# Patient Record
Sex: FEMALE | Race: WHITE
[De-identification: names, ages, dates, MRNs, and addresses within clinical notes are randomized per-mention and may not be internally consistent; named-entity substitution may affect disease eponyms.]

---

## 2014-11-13 RX ORDER — TIMOLOL 0.5 %
1 DROPS OPHTHALMIC (EYE)
Qty: 0 | Refills: 0 | DISCHARGE
Start: 2014-11-13

## 2014-11-13 RX ORDER — LATANOPROST 0.05 MG/ML
1 SOLUTION/ DROPS OPHTHALMIC; TOPICAL
Qty: 0 | Refills: 0 | DISCHARGE
Start: 2014-11-13

## 2017-02-07 ENCOUNTER — APPOINTMENT (OUTPATIENT)
Dept: NEUROLOGY | Facility: CLINIC | Age: 82
End: 2017-02-07

## 2017-02-07 VITALS — DIASTOLIC BLOOD PRESSURE: 78 MMHG | SYSTOLIC BLOOD PRESSURE: 124 MMHG | WEIGHT: 111 LBS | BODY MASS INDEX: 19.66 KG/M2

## 2017-02-07 RX ORDER — OXYCODONE AND ACETAMINOPHEN 5; 325 MG/1; MG/1
5-325 TABLET ORAL
Qty: 20 | Refills: 0 | Status: DISCONTINUED | COMMUNITY
Start: 2016-08-31

## 2017-02-07 RX ORDER — LATANOPROST/PF 0.005 %
0.01 DROPS OPHTHALMIC (EYE)
Qty: 25 | Refills: 0 | Status: ACTIVE | COMMUNITY
Start: 2016-09-30

## 2017-02-07 RX ORDER — PANTOPRAZOLE 40 MG/1
40 TABLET, DELAYED RELEASE ORAL
Qty: 60 | Refills: 0 | Status: ACTIVE | COMMUNITY
Start: 2016-09-01

## 2017-02-28 ENCOUNTER — MEDICATION RENEWAL (OUTPATIENT)
Age: 82
End: 2017-02-28

## 2017-03-20 ENCOUNTER — RX RENEWAL (OUTPATIENT)
Age: 82
End: 2017-03-20

## 2017-03-29 ENCOUNTER — EMERGENCY (EMERGENCY)
Facility: HOSPITAL | Age: 82
LOS: 0 days | Discharge: HOME | End: 2017-03-29
Attending: EMERGENCY MEDICINE | Admitting: EMERGENCY MEDICINE
Payer: MEDICARE

## 2017-03-29 VITALS
SYSTOLIC BLOOD PRESSURE: 192 MMHG | HEART RATE: 69 BPM | OXYGEN SATURATION: 99 % | DIASTOLIC BLOOD PRESSURE: 65 MMHG | TEMPERATURE: 98 F | RESPIRATION RATE: 18 BRPM

## 2017-03-29 VITALS — WEIGHT: 125 LBS

## 2017-03-29 DIAGNOSIS — Z98.89 OTHER SPECIFIED POSTPROCEDURAL STATES: Chronic | ICD-10-CM

## 2017-03-29 PROCEDURE — 72100 X-RAY EXAM L-S SPINE 2/3 VWS: CPT | Mod: 26

## 2017-03-29 PROCEDURE — 99283 EMERGENCY DEPT VISIT LOW MDM: CPT

## 2017-03-29 RX ORDER — ACETAMINOPHEN 500 MG
975 TABLET ORAL ONCE
Qty: 0 | Refills: 0 | Status: COMPLETED | OUTPATIENT
Start: 2017-03-29 | End: 2017-03-29

## 2017-03-29 RX ADMIN — Medication 975 MILLIGRAM(S): at 16:25

## 2017-03-29 NOTE — ED STATDOCS - NS ED MD SCRIBE ATTENDING SCRIBE SECTIONS
PHYSICAL EXAM/HISTORY OF PRESENT ILLNESS/RESULTS/PROGRESS NOTE/REVIEW OF SYSTEMS/PAST MEDICAL/SURGICAL/SOCIAL HISTORY/DISPOSITION

## 2017-03-29 NOTE — ED STATDOCS - ATTENDING CONTRIBUTION TO CARE
I, Sachin Santos, performed the initial face to face bedside interview with this patient regarding history of present illness, review of symptoms and relevant past medical, social and family history.  I completed an independent physical examination.  I was the initial provider who evaluated this patient. I have signed out the follow up of any pending tests (i.e. labs, radiological studies) to the ACP.  I have communicated the patient’s plan of care and disposition with the ACP.  The history, relevant review of systems, past medical and surgical history, medical decision making, and physical examination was documented by the scribe in my presence and I attest to the accuracy of the documentation.

## 2017-03-29 NOTE — ED STATDOCS - OBJECTIVE STATEMENT
80 y/o female presents to the ED brought in by home health aide s/p trip and fall yesterday. Pts aide states that she tripped over he robe and fell to the floor, no head trauma, no LOC. Pt is currently c/o lower back pain. Aide states that pt has been ambulatory today. Currently pt has no other complaints and denies chest pain, SOB, abd pain and n/v/d.

## 2017-03-29 NOTE — ED STATDOCS - PROGRESS NOTE DETAILS
81 yr. old female PMH; Alzheimers, Chronic back pain, Stomach Ulcer with Repair of Perforation,  Right Hip Replacement preasents to ED with aide who reports patient fell backward in bedroom last night no apparent head injury or LOC, No other injuries Not taking any blood thinners or aspirin. Seen and examined by attending in Intake . Plan: X-Ray LS spine and Tylenol for pain. Will F/U with results and re evaluate. Monserrat RODRIGUEZ

## 2017-03-29 NOTE — ED ADULT NURSE NOTE - OBJECTIVE STATEMENT
Pain after falling on a flat surface last night. Denies Head injury states she might have tripped on her robe. Intermittent lower back pain that doesn't radiate, worse with movement.

## 2017-03-30 ENCOUNTER — RX RENEWAL (OUTPATIENT)
Age: 82
End: 2017-03-30

## 2017-03-31 ENCOUNTER — INPATIENT (INPATIENT)
Facility: HOSPITAL | Age: 82
LOS: 2 days | Discharge: HOME HEALTH | End: 2017-04-03
Attending: HOSPITALIST | Admitting: HOSPITALIST
Payer: MEDICARE

## 2017-03-31 VITALS
DIASTOLIC BLOOD PRESSURE: 77 MMHG | HEART RATE: 73 BPM | TEMPERATURE: 99 F | HEIGHT: 62 IN | SYSTOLIC BLOOD PRESSURE: 191 MMHG | OXYGEN SATURATION: 95 % | WEIGHT: 125 LBS | RESPIRATION RATE: 15 BRPM

## 2017-03-31 DIAGNOSIS — Z98.89 OTHER SPECIFIED POSTPROCEDURAL STATES: Chronic | ICD-10-CM

## 2017-03-31 DIAGNOSIS — M54.9 DORSALGIA, UNSPECIFIED: ICD-10-CM

## 2017-03-31 DIAGNOSIS — E78.00 PURE HYPERCHOLESTEROLEMIA, UNSPECIFIED: ICD-10-CM

## 2017-03-31 DIAGNOSIS — Z04.1 ENCOUNTER FOR EXAMINATION AND OBSERVATION FOLLOWING TRANSPORT ACCIDENT: ICD-10-CM

## 2017-03-31 DIAGNOSIS — G30.9 ALZHEIMER'S DISEASE, UNSPECIFIED: ICD-10-CM

## 2017-03-31 LAB
ANION GAP SERPL CALC-SCNC: 8 MMOL/L — SIGNIFICANT CHANGE UP (ref 5–17)
APTT BLD: 29.5 SEC — SIGNIFICANT CHANGE UP (ref 27.5–37.4)
BASOPHILS # BLD AUTO: 0.1 K/UL — SIGNIFICANT CHANGE UP (ref 0–0.2)
BASOPHILS NFR BLD AUTO: 1.1 % — SIGNIFICANT CHANGE UP (ref 0–2)
BLD GP AB SCN SERPL QL: SIGNIFICANT CHANGE UP
BUN SERPL-MCNC: 9 MG/DL — SIGNIFICANT CHANGE UP (ref 7–23)
CALCIUM SERPL-MCNC: 9.5 MG/DL — SIGNIFICANT CHANGE UP (ref 8.5–10.1)
CHLORIDE SERPL-SCNC: 103 MMOL/L — SIGNIFICANT CHANGE UP (ref 96–108)
CO2 SERPL-SCNC: 25 MMOL/L — SIGNIFICANT CHANGE UP (ref 22–31)
CREAT SERPL-MCNC: 0.68 MG/DL — SIGNIFICANT CHANGE UP (ref 0.5–1.3)
EOSINOPHIL # BLD AUTO: 0.1 K/UL — SIGNIFICANT CHANGE UP (ref 0–0.5)
EOSINOPHIL NFR BLD AUTO: 1.1 % — SIGNIFICANT CHANGE UP (ref 0–6)
GLUCOSE SERPL-MCNC: 128 MG/DL — HIGH (ref 70–99)
HCT VFR BLD CALC: 46.5 % — HIGH (ref 34.5–45)
HGB BLD-MCNC: 16.1 G/DL — HIGH (ref 11.5–15.5)
INR BLD: 1 RATIO — SIGNIFICANT CHANGE UP (ref 0.88–1.16)
LYMPHOCYTES # BLD AUTO: 1.4 K/UL — SIGNIFICANT CHANGE UP (ref 1–3.3)
LYMPHOCYTES # BLD AUTO: 13.9 % — SIGNIFICANT CHANGE UP (ref 13–44)
MCHC RBC-ENTMCNC: 34.6 GM/DL — SIGNIFICANT CHANGE UP (ref 32–36)
MCHC RBC-ENTMCNC: 35 PG — HIGH (ref 27–34)
MCV RBC AUTO: 101.2 FL — HIGH (ref 80–100)
MONOCYTES # BLD AUTO: 0.8 K/UL — SIGNIFICANT CHANGE UP (ref 0–0.9)
MONOCYTES NFR BLD AUTO: 8.6 % — SIGNIFICANT CHANGE UP (ref 2–14)
NEUTROPHILS # BLD AUTO: 7.4 K/UL — SIGNIFICANT CHANGE UP (ref 1.8–7.4)
NEUTROPHILS NFR BLD AUTO: 75.3 % — SIGNIFICANT CHANGE UP (ref 43–77)
PLATELET # BLD AUTO: 196 K/UL — SIGNIFICANT CHANGE UP (ref 150–400)
POTASSIUM SERPL-MCNC: 4.2 MMOL/L — SIGNIFICANT CHANGE UP (ref 3.5–5.3)
POTASSIUM SERPL-SCNC: 4.2 MMOL/L — SIGNIFICANT CHANGE UP (ref 3.5–5.3)
PROTHROM AB SERPL-ACNC: 10.8 SEC — SIGNIFICANT CHANGE UP (ref 9.8–12.7)
RBC # BLD: 4.59 M/UL — SIGNIFICANT CHANGE UP (ref 3.8–5.2)
RBC # FLD: 11.9 % — SIGNIFICANT CHANGE UP (ref 10.3–14.5)
SODIUM SERPL-SCNC: 136 MMOL/L — SIGNIFICANT CHANGE UP (ref 135–145)
TYPE + AB SCN PNL BLD: SIGNIFICANT CHANGE UP
WBC # BLD: 9.8 K/UL — SIGNIFICANT CHANGE UP (ref 3.8–10.5)
WBC # FLD AUTO: 9.8 K/UL — SIGNIFICANT CHANGE UP (ref 3.8–10.5)

## 2017-03-31 PROCEDURE — 72131 CT LUMBAR SPINE W/O DYE: CPT | Mod: 26

## 2017-03-31 PROCEDURE — 99285 EMERGENCY DEPT VISIT HI MDM: CPT

## 2017-03-31 PROCEDURE — 72128 CT CHEST SPINE W/O DYE: CPT | Mod: 26

## 2017-03-31 RX ORDER — ENOXAPARIN SODIUM 100 MG/ML
40 INJECTION SUBCUTANEOUS EVERY 24 HOURS
Qty: 0 | Refills: 0 | Status: DISCONTINUED | OUTPATIENT
Start: 2017-03-31 | End: 2017-04-03

## 2017-03-31 RX ORDER — DONEPEZIL HYDROCHLORIDE 10 MG/1
10 TABLET, FILM COATED ORAL AT BEDTIME
Qty: 0 | Refills: 0 | Status: DISCONTINUED | OUTPATIENT
Start: 2017-03-31 | End: 2017-04-03

## 2017-03-31 RX ORDER — LATANOPROST 0.05 MG/ML
1 SOLUTION/ DROPS OPHTHALMIC; TOPICAL AT BEDTIME
Qty: 0 | Refills: 0 | Status: DISCONTINUED | OUTPATIENT
Start: 2017-03-31 | End: 2017-04-03

## 2017-03-31 RX ORDER — ONDANSETRON 8 MG/1
4 TABLET, FILM COATED ORAL ONCE
Qty: 0 | Refills: 0 | Status: COMPLETED | OUTPATIENT
Start: 2017-03-31 | End: 2017-03-31

## 2017-03-31 RX ORDER — ONDANSETRON 8 MG/1
4 TABLET, FILM COATED ORAL EVERY 6 HOURS
Qty: 0 | Refills: 0 | Status: DISCONTINUED | OUTPATIENT
Start: 2017-03-31 | End: 2017-04-03

## 2017-03-31 RX ORDER — MEMANTINE HYDROCHLORIDE 10 MG/1
10 TABLET ORAL
Qty: 0 | Refills: 0 | Status: DISCONTINUED | OUTPATIENT
Start: 2017-03-31 | End: 2017-04-03

## 2017-03-31 RX ORDER — OXYCODONE HYDROCHLORIDE 5 MG/1
5 TABLET ORAL EVERY 4 HOURS
Qty: 0 | Refills: 0 | Status: DISCONTINUED | OUTPATIENT
Start: 2017-03-31 | End: 2017-04-03

## 2017-03-31 RX ORDER — HYDROMORPHONE HYDROCHLORIDE 2 MG/ML
0.5 INJECTION INTRAMUSCULAR; INTRAVENOUS; SUBCUTANEOUS ONCE
Qty: 0 | Refills: 0 | Status: DISCONTINUED | OUTPATIENT
Start: 2017-03-31 | End: 2017-03-31

## 2017-03-31 RX ORDER — OXYCODONE HYDROCHLORIDE 5 MG/1
10 TABLET ORAL EVERY 4 HOURS
Qty: 0 | Refills: 0 | Status: DISCONTINUED | OUTPATIENT
Start: 2017-03-31 | End: 2017-04-03

## 2017-03-31 RX ORDER — ACETAMINOPHEN 500 MG
650 TABLET ORAL EVERY 6 HOURS
Qty: 0 | Refills: 0 | Status: DISCONTINUED | OUTPATIENT
Start: 2017-03-31 | End: 2017-04-03

## 2017-03-31 RX ORDER — DOCUSATE SODIUM 100 MG
100 CAPSULE ORAL THREE TIMES A DAY
Qty: 0 | Refills: 0 | Status: DISCONTINUED | OUTPATIENT
Start: 2017-03-31 | End: 2017-04-03

## 2017-03-31 RX ORDER — LIDOCAINE 4 G/100G
1 CREAM TOPICAL DAILY
Qty: 0 | Refills: 0 | Status: DISCONTINUED | OUTPATIENT
Start: 2017-03-31 | End: 2017-04-03

## 2017-03-31 RX ORDER — SIMVASTATIN 20 MG/1
20 TABLET, FILM COATED ORAL AT BEDTIME
Qty: 0 | Refills: 0 | Status: DISCONTINUED | OUTPATIENT
Start: 2017-03-31 | End: 2017-04-03

## 2017-03-31 RX ORDER — TIMOLOL 0.5 %
1 DROPS OPHTHALMIC (EYE)
Qty: 0 | Refills: 0 | Status: DISCONTINUED | OUTPATIENT
Start: 2017-03-31 | End: 2017-04-03

## 2017-03-31 RX ORDER — CITALOPRAM 10 MG/1
20 TABLET, FILM COATED ORAL DAILY
Qty: 0 | Refills: 0 | Status: DISCONTINUED | OUTPATIENT
Start: 2017-03-31 | End: 2017-04-03

## 2017-03-31 RX ADMIN — LATANOPROST 1 DROP(S): 0.05 SOLUTION/ DROPS OPHTHALMIC; TOPICAL at 22:08

## 2017-03-31 RX ADMIN — SIMVASTATIN 20 MILLIGRAM(S): 20 TABLET, FILM COATED ORAL at 22:09

## 2017-03-31 RX ADMIN — LIDOCAINE 1 PATCH: 4 CREAM TOPICAL at 18:44

## 2017-03-31 RX ADMIN — OXYCODONE HYDROCHLORIDE 10 MILLIGRAM(S): 5 TABLET ORAL at 17:27

## 2017-03-31 RX ADMIN — Medication 1 TABLET(S): at 15:01

## 2017-03-31 RX ADMIN — OXYCODONE HYDROCHLORIDE 10 MILLIGRAM(S): 5 TABLET ORAL at 22:39

## 2017-03-31 RX ADMIN — OXYCODONE HYDROCHLORIDE 10 MILLIGRAM(S): 5 TABLET ORAL at 15:03

## 2017-03-31 RX ADMIN — OXYCODONE HYDROCHLORIDE 10 MILLIGRAM(S): 5 TABLET ORAL at 22:09

## 2017-03-31 RX ADMIN — Medication 100 MILLIGRAM(S): at 22:08

## 2017-03-31 RX ADMIN — ONDANSETRON 4 MILLIGRAM(S): 8 TABLET, FILM COATED ORAL at 10:14

## 2017-03-31 RX ADMIN — Medication 1 DROP(S): at 17:18

## 2017-03-31 RX ADMIN — DONEPEZIL HYDROCHLORIDE 10 MILLIGRAM(S): 10 TABLET, FILM COATED ORAL at 22:09

## 2017-03-31 RX ADMIN — Medication 650 MILLIGRAM(S): at 15:02

## 2017-03-31 RX ADMIN — MEMANTINE HYDROCHLORIDE 10 MILLIGRAM(S): 10 TABLET ORAL at 17:18

## 2017-03-31 RX ADMIN — ENOXAPARIN SODIUM 40 MILLIGRAM(S): 100 INJECTION SUBCUTANEOUS at 15:05

## 2017-03-31 RX ADMIN — Medication 650 MILLIGRAM(S): at 17:25

## 2017-03-31 RX ADMIN — HYDROMORPHONE HYDROCHLORIDE 0.5 MILLIGRAM(S): 2 INJECTION INTRAMUSCULAR; INTRAVENOUS; SUBCUTANEOUS at 10:14

## 2017-03-31 RX ADMIN — CITALOPRAM 20 MILLIGRAM(S): 10 TABLET, FILM COATED ORAL at 17:18

## 2017-03-31 NOTE — H&P ADULT - ASSESSMENT
#S/p mechanical fall with new compression fractures T9 and S3  Admit to 2 [place]  Spine eval DR Shannon/DR العراقي  Additional imaging as per spine  Pain meds prn by oxycodone  PT if OK with spine    #H/o severe spinal canal stenosis/previous compression fractures  Pain meds    #HTN, stable    #Mild dementia  Cont current meds    #Dispo- inpatient admit

## 2017-03-31 NOTE — H&P ADULT - NSHPPHYSICALEXAM_GEN_ALL_CORE
Vital Signs Last 24 Hrs  T(C): 36.4, Max: 37.3 (03-31 @ 08:31)  T(F): 97.6, Max: 99.1 (03-31 @ 08:31)  HR: 79 (71 - 79)  BP: 122/57 (122/57 - 197/79)  BP(mean): --  RR: 16 (15 - 16)  SpO2: 92% (92% - 95%)

## 2017-03-31 NOTE — ED PROVIDER NOTE - OBJECTIVE STATEMENT
80 y/o F with a PMHx of chronic back pain, Alzheimer's disease presents to the ED c/o lower back pain that has been worsening over the past few days. Pt daughter at bedside states that she was recently here s/p mechanical fall. Pt daughter states that she has been having difficulty ambulating on her own and has been tx her with Percocet that was prescribed to her after fall. Pt states that the pain med have been providing little to no alleviation. Pt currently calm and denies extremity weakness, numbness, CP, abd pain, SOB, fever, cough, chills or any other acute c/o at this time.

## 2017-03-31 NOTE — H&P ADULT - NSHPLABSRESULTS_GEN_ALL_CORE
16.1   9.8   )-----------( 196      ( 31 Mar 2017 10:06 )             46.5     31 Mar 2017 11:06    136    |  103    |  9      ----------------------------<  128    4.2     |  25     |  0.68     Ca    9.5        31 Mar 2017 11:06    PT/INR - ( 31 Mar 2017 11:06 )   PT: 10.8 sec;   INR: 1.00 ratio       PTT - ( 31 Mar 2017 11:06 )  PTT:29.5 sec  CAPILLARY BLOOD GLUCOSE  EXAM:  LUMBAR SPINE                            PROCEDURE DATE:  03/29/2017        INTERPRETATION:  Clinical information: Fall    AP, lateral, coned-down lateral views of the lumbosacral spine    COMPARISON: Report from MRI lumbar spine March 09, 2016    FINDINGS: There is generalized bony demineralization. A severe wedge   compression deformity at T12 is new and a moderate L1 compression   deformity is significantly worsened. Mild L5 compression deformity is   unchanged. Mild anterolisthesis ofL4 and L5. Severe diffuse bony   demineralization.    IMPRESSION: As compared with March 09, 2016, there is new severe T12   wedge compression deformity and worsening of an L1 compression deformity.      EXAM:  CT LUMBAR SPINE                            PROCEDURE DATE:  03/31/2017        INTERPRETATION:  History: Status post fall. Upper and lower back pain.   Rule out fracture.    Description: Noncontrast CT studies of the thoracic and lumbar spinewere   performed.    Axial images with coronal and sagittal reformatted series were obtained.    Comparison is made to the lumbar spine x-ray series from 03/29/2017,   spinal region of the abdomen CT study from 07/04/2016, and prior lumbar   spine MRI study 03/09/2016.    A moderate compression fracture deformity involves the L1 vertebral body,   stable compared to the 03/29/2017 x-ray series, and increased compared to   the 07/04/2016 abdomen CT study.    A severe compression fracture deformity involves the T12 vertebral body   with associated kyphotic deformity (gibbus deformity). This is stable   compared to the 03/29/2017 x-ray studies, and mildly increased compared   to the 07/04/2016 abdomen CT study. Mild bowing of bone into the spinal  canal is present with associated mild bony canal stenosis.    A moderate to severe compression fracture deformity involves the T9   vertebral body. This is not fully covered on the x-ray series from   03/29/2017, and is new compared to the 07/04/2016 abdomen CT examination.   Mild bowing of bone posteriorly to the spinal canal is present with   associated mild bony canal stenosis.    A nondisplaced fracture involves the anterior aspect of the S3 vertebra,   new compared to the 07/04/2016 examination. This is not well appreciated   on the x-ray series from 03/29/2017.    Generalized osteopenia is noted. Consider correlation with bone   densitometry for quantification if clinically warranted.    Severe disc space narrowing is present at the L5-S1 level with associated   vacuum change.    Multilevel mild subluxation is noted in the lumbar spine, unchanged.    In the lumbar spine, disc bulges, facet degenerative changes, and   ligamentum flavum hypertrophy result in multilevel spinal canal stenosis   and neural foraminal narrowing, the overall degree of which is not well   demonstrated on this study.    Please note that evaluation of the central spinal canal, thoracic spinal   cord, and cauda equina is limited with noncontrast CT technique.    IMPRESSION:    A moderate compression fracture deformity involves the L1 vertebral body,   stable compared to the 03/29/2017 x-ray series, and increased compared to   the 07/04/2016 abdomen CT study.

## 2017-03-31 NOTE — ED PROVIDER NOTE - DETAILS:
I, Wily Bermudez, performed the initial face to face bedside interview with this patient regarding history of present illness, review of symptoms and relevant past medical, social and family history.  I completed an independent physical examination.  I was the initial provider who evaluated this patient.  The history, relevant review of systems, past medical and surgical history, medical decision making, and physical examination was documented by the scribe in my presence and I attest to the accuracy of the documentation.

## 2017-03-31 NOTE — PATIENT PROFILE ADULT. - VISION (WITH CORRECTIVE LENSES IF THE PATIENT USUALLY WEARS THEM):
utilizes glasses/Normal vision: sees adequately in most situations; can see medication labels, newsprint

## 2017-03-31 NOTE — CONSULT NOTE ADULT - ASSESSMENT
#S/p mechanical fall with new compression fractures T9 and S3  Admit to 2 [place]  Spine eval DR Shannon/DR العراقي  Additional imaging as per spine  Pain meds prn by oxycodone  PT if OK with spine    #H/o severe spinal canal stenosis/previous compression fractures  Pain meds    #HTN, stable    #Mild dementia  Cont current meds    #Dispo- inpatient admit #S/p mechanical fall with new compression fractures T9 and S3  Admit to 2 [place]  Pain meds prn by oxycodone  PT     #H/o severe spinal canal stenosis/previous compression fractures  Pain meds    #HTN, stable    #Mild dementia  Cont current meds    #Dispo- inpatient admit

## 2017-03-31 NOTE — ED PROVIDER NOTE - NS ED MD SCRIBE ATTENDING SCRIBE SECTIONS
HISTORY OF PRESENT ILLNESS/PROGRESS NOTE/PAST MEDICAL/SURGICAL/SOCIAL HISTORY/REVIEW OF SYSTEMS/DISPOSITION/PHYSICAL EXAM/RESULTS

## 2017-03-31 NOTE — ED ADULT NURSE NOTE - OBJECTIVE STATEMENT
pt fell on tuesday, since then she complains of progressively worse lower back pain, worse with activity, pt is hypertensive with no known history of HTN

## 2017-03-31 NOTE — H&P ADULT - HISTORY OF PRESENT ILLNESS
82yo/F with PMH HTN, mild dementia, depression, spinal stenosis presented with severe low back pain and difficulty ambulating. Patient slipped and fell 2 days ago, since then her pain has been getting worse and her ability to walk was impaired so daughter initially brought her in to ED 2 days ago, pt was discharged. Pain got worse and she came back

## 2017-03-31 NOTE — ED PROVIDER NOTE - PSH
S/P appendectomy  childhood  S/P colonoscopy  every 2 or 3 years  S/P D&C (status post dilation and curettage)  age 32  S/P tonsillectomy  childhood

## 2017-03-31 NOTE — CONSULT NOTE ADULT - SUBJECTIVE AND OBJECTIVE BOX
82yo/F with PMH HTN, mild dementia, depression, spinal stenosis presented with severe low back pain and difficulty ambulating. Patient slipped and fell 2 days ago, since then her pain has been getting worse and her ability to walk was impaired so daughter initially brought her in to ED 2 days ago, pt was discharged. Pain got worse and she came back      Remaining H&P reviewed-Full note dictated      PE: Patient states she has sacral pain without thoracic back pain  Answers some questions appropriately but poor STM  Tender over sacrum.   No thoracic or lumbar tenderness.  No T-L BP with log rolling  Negative SLR bilaterally  No focal motor weakness LEs.    CT T & LS spine with compression deformities T9-new since CT A&P in July, 2016, T12 with marked collapse since July 2016, L1 with slight worsening since July 2016 and new anterior cortex buckle S3-incompleted visualized on present CT and new since July 2016. (+) lysis B/L L5 with Gr1 spondylolisthesis L4/5, DDD L5/S1 with vacuum disc-stable since July 2016.    Imp:  Mechanical fall with sacral pain with possible ND S3 fracture  Chronic appearing compression deformities T9, T12, L1.  Lytic spondylolisthesis L4/5  DDD L5/S1    Plan:  Hold MRIs  Add Lidoderm patch to sacrum  Continue analgesia  Start PT in AM to determine function level

## 2017-03-31 NOTE — ED PROVIDER NOTE - CONSTITUTIONAL, MLM
normal... Well appearing, well nourished elderly female, awake, alert, oriented to person, place, time/situation and in no apparent distress.

## 2017-04-01 RX ADMIN — LIDOCAINE 1 PATCH: 4 CREAM TOPICAL at 11:29

## 2017-04-01 RX ADMIN — OXYCODONE HYDROCHLORIDE 10 MILLIGRAM(S): 5 TABLET ORAL at 11:30

## 2017-04-01 RX ADMIN — OXYCODONE HYDROCHLORIDE 10 MILLIGRAM(S): 5 TABLET ORAL at 06:34

## 2017-04-01 RX ADMIN — DONEPEZIL HYDROCHLORIDE 10 MILLIGRAM(S): 10 TABLET, FILM COATED ORAL at 21:50

## 2017-04-01 RX ADMIN — Medication 100 MILLIGRAM(S): at 21:50

## 2017-04-01 RX ADMIN — SIMVASTATIN 20 MILLIGRAM(S): 20 TABLET, FILM COATED ORAL at 21:50

## 2017-04-01 RX ADMIN — OXYCODONE HYDROCHLORIDE 10 MILLIGRAM(S): 5 TABLET ORAL at 10:51

## 2017-04-01 RX ADMIN — Medication 100 MILLIGRAM(S): at 14:22

## 2017-04-01 RX ADMIN — LIDOCAINE 1 PATCH: 4 CREAM TOPICAL at 06:35

## 2017-04-01 RX ADMIN — MEMANTINE HYDROCHLORIDE 10 MILLIGRAM(S): 10 TABLET ORAL at 06:34

## 2017-04-01 RX ADMIN — MEMANTINE HYDROCHLORIDE 10 MILLIGRAM(S): 10 TABLET ORAL at 18:03

## 2017-04-01 RX ADMIN — CITALOPRAM 20 MILLIGRAM(S): 10 TABLET, FILM COATED ORAL at 11:26

## 2017-04-01 RX ADMIN — OXYCODONE HYDROCHLORIDE 10 MILLIGRAM(S): 5 TABLET ORAL at 06:52

## 2017-04-01 RX ADMIN — LATANOPROST 1 DROP(S): 0.05 SOLUTION/ DROPS OPHTHALMIC; TOPICAL at 21:51

## 2017-04-01 RX ADMIN — Medication 1 DROP(S): at 18:02

## 2017-04-01 RX ADMIN — Medication 1 DROP(S): at 06:36

## 2017-04-01 RX ADMIN — ENOXAPARIN SODIUM 40 MILLIGRAM(S): 100 INJECTION SUBCUTANEOUS at 14:22

## 2017-04-01 RX ADMIN — OXYCODONE HYDROCHLORIDE 10 MILLIGRAM(S): 5 TABLET ORAL at 20:14

## 2017-04-01 RX ADMIN — OXYCODONE HYDROCHLORIDE 10 MILLIGRAM(S): 5 TABLET ORAL at 21:00

## 2017-04-01 RX ADMIN — Medication 1 TABLET(S): at 11:26

## 2017-04-01 NOTE — PHYSICAL THERAPY INITIAL EVALUATION ADULT - GROSSLY INTACT, SENSORY
Grossly Intact
no syncope/no vomiting/no nausea/no shortness of breath/no dizziness/no chills/no diaphoresis/no cough/no back pain/no fever

## 2017-04-01 NOTE — PROGRESS NOTE ADULT - ASSESSMENT
fall with sacral pain-possible acute ND fracture S3  chronic compression fractures T9, T12, L1  Degenerative spondylolisthesis L4/5  DDD L5/S1    Plan:  Continue mild analgesia.  Lidoderm patch  Start OOB/PT

## 2017-04-01 NOTE — PHYSICAL THERAPY INITIAL EVALUATION ADULT - CRITERIA FOR SKILLED THERAPEUTIC INTERVENTIONS
predicted duration of therapy intervention/anticipated discharge recommendation/rehab potential/risk reduction/prevention/therapy frequency/functional limitations in following categories/impairments found/anticipated equipment needs at discharge

## 2017-04-01 NOTE — PHYSICAL THERAPY INITIAL EVALUATION ADULT - ADDITIONAL COMMENTS
PLOF provided by pt, ? historian. Pt lives in 2 level [place] w/ 2-3 [facility] w/ rail. Indep amb w/o AD. Has shower chair, carter

## 2017-04-01 NOTE — PROGRESS NOTE ADULT - SUBJECTIVE AND OBJECTIVE BOX
YOSEF DESAI    Patient is a 81y old  Female who presents with a chief complaint of fall, back pain (31 Mar 2017 14:25)      HPI:  82yo/F with PMH HTN, mild dementia, depression, spinal stenosis presented with severe low back pain and difficulty ambulating. Patient slipped and fell 2 days ago, since then her pain has been getting worse and her ability to walk was impaired so daughter initially brought her in to ED 2 days ago, pt was discharged. Pain got worse and she came back (31 Mar 2017 14:25)    4/1/17 Patient appears coherent-states she has low back pain-manageable at present.    MEDICATIONS  (STANDING):  citalopram 20milliGRAM(s) Oral daily  simvastatin 20milliGRAM(s) Oral at bedtime  donepezil 10milliGRAM(s) Oral at bedtime  docusate sodium 100milliGRAM(s) Oral three times a day  memantine 10milliGRAM(s) Oral two times a day  latanoprost 0.005% Ophthalmic Solution 1Drop(s) Both EYES at bedtime  timolol 0.5% Solution 1Drop(s) Both EYES two times a day  enoxaparin Injectable 40milliGRAM(s) SubCutaneous every 24 hours  multivitamin 1Tablet(s) Oral daily  lidocaine   Patch 1Patch Transdermal daily      Allergies    No Known Allergies    Intolerances    Vital Signs Last 24 Hrs  T(C): 36.8, Max: 36.9 (03-31 @ 23:17)  T(F): 98.3, Max: 98.5 (03-31 @ 23:17)  HR: 72 (65 - 84)  BP: 145/60 (122/57 - 197/79)  BP(mean): --  RR: 18 (16 - 18)  SpO2: 95% (92% - 98%)    I&O's Summary      PHYSICAL EXAM:  Awake and alert, sitting upright in bed eating. States low back pain without thoracic pain  Tolerating diet  Mild lumbar pain with change in position  Neuro without deficits.                          16.1   9.8   )-----------( 196      ( 31 Mar 2017 10:06 )             46.5       31 Mar 2017 11:06    136    |  103    |  9      ----------------------------<  128    4.2     |  25     |  0.68     Ca    9.5        31 Mar 2017 11:06

## 2017-04-01 NOTE — PROGRESS NOTE ADULT - SUBJECTIVE AND OBJECTIVE BOX
PCP: DR Hughes    CC- Patient is a 81y old  Female who presents with a chief complaint of fall, back pain (31 Mar 2017 14:25)    HPI:  80yo/F with PMH HTN, mild dementia, depression, spinal stenosis presented with severe low back pain and difficulty ambulating. Patient slipped and fell 2 days ago, since then her pain has been getting worse and her ability to walk was impaired so daughter initially brought her in to ED 2 days ago, pt was discharged. Pain got worse and she came back (31 Mar 2017 14:25)    4/1/17- doing good, was up with PT, pain is managable    T(C): 36.8, Max: 36.9 (03-31 @ 23:17)  HR: 72 (65 - 84)  BP: 145/60 (131/53 - 159/62)  RR: 18 (16 - 18)  SpO2: 95% (95% - 98%)  Wt(kg): --    LABS:                        16.1   9.8   )-----------( 196      ( 31 Mar 2017 10:06 )             46.5     31 Mar 2017 11:06    136    |  103    |  9      ----------------------------<  128    4.2     |  25     |  0.68     Ca    9.5        31 Mar 2017 11:06    PT/INR - ( 31 Mar 2017 11:06 )   PT: 10.8 sec;   INR: 1.00 ratio    PTT - ( 31 Mar 2017 11:06 )  PTT:29.5 sec    RADIOLOGY & ADDITIONAL TESTS:    EXAM:  CT LUMBAR SPINE                        PROCEDURE DATE:  03/31/2017        INTERPRETATION:  History: Status post fall. Upper and lower back pain.   Rule out fracture.    Description: Noncontrast CT studies of the thoracic and lumbar spinewere   performed.    Axial images with coronal and sagittal reformatted series were obtained.    Comparison is made to the lumbar spine x-ray series from 03/29/2017,   spinal region of the abdomen CT study from 07/04/2016, and prior lumbar   spine MRI study 03/09/2016.    A moderate compression fracture deformity involves the L1 vertebral body,   stable compared to the 03/29/2017 x-ray series, and increased compared to   the 07/04/2016 abdomen CT study.    A severe compression fracture deformity involves the T12 vertebral body   with associated kyphotic deformity (gibbus deformity). This is stable   compared to the 03/29/2017 x-ray studies, and mildly increased compared   to the 07/04/2016 abdomen CT study. Mild bowing of bone into the spinal  canal is present with associated mild bony canal stenosis.    A moderate to severe compression fracture deformity involves the T9   vertebral body. This is not fully covered on the x-ray series from   03/29/2017, and is new compared to the 07/04/2016 abdomen CT examination.   Mild bowing of bone posteriorly to the spinal canal is present with   associated mild bony canal stenosis.    A nondisplaced fracture involves the anterior aspect of the S3 vertebra,   new compared to the 07/04/2016 examination. This is not well appreciated   on the x-ray series from 03/29/2017.    Generalized osteopenia is noted. Consider correlation with bone   densitometry for quantification if clinically warranted.    Severe disc space narrowing is present at the L5-S1 level with associated   vacuum change.    Multilevel mild subluxation is noted in the lumbar spine, unchanged.    In the lumbar spine, disc bulges, facet degenerative changes, and   ligamentum flavum hypertrophy result in multilevel spinal canal stenosis   and neural foraminal narrowing, the overall degree of which is not well   demonstrated on this study.    Please note that evaluation of the central spinal canal, thoracic spinal   cord, and cauda equina is limited with noncontrast CT technique.    IMPRESSION:    A moderate compression fracture deformity involves the L1 vertebral body,   stable compared to the 03/29/2017 x-ray series, and increased compared to   the 07/04/2016 abdomen CT study.    A severe compression fracture deformity involves the T12 vertebral body   with associated kyphotic deformity (gibbus deformity). This is stable   compared to the 03/29/2017 x-ray studies, and mildly increased compared   to the 07/04/2016 abdomen CT study.    A moderate to severe compressionfracture deformity involves the T9   vertebral body. This is not fully covered on the x-ray series from   03/29/2017, and is new compared to the 07/04/2016 abdomen CT examination.     A nondisplaced fracture involves the anterior aspect of the S3 vertebra,   new compared to the 07/04/2016 examination.     EXAM:  LUMBAR SPINE                          PROCEDURE DATE:  03/29/2017    INTERPRETATION:  Clinical information: Fall    AP, lateral, coned-down lateral views of the lumbosacral spine    COMPARISON: Report from MRI lumbar spine March 09, 2016    FINDINGS: There is generalized bony demineralization. A severe wedge   compression deformity at T12 is new and a moderate L1 compression   deformity is significantly worsened. Mild L5 compression deformity is   unchanged. Mild anterolisthesis ofL4 and L5. Severe diffuse bony   demineralization.    IMPRESSION: As compared with March 09, 2016, there is new severe T12   wedge compression deformity and worsening of an L1 compression deformity.    PHYSICAL EXAM:    GENERAL: NAD, well-groomed, well-developed  HEAD:  Atraumatic, Normocephalic  EYES: EOMI, PERRLA, conjunctiva and sclera clear  HEENT: Moist mucous membranes  NECK: Supple, No JVD  NERVOUS SYSTEM:  Awake, confused, Motor Strength 5/5 B/L upper and lower extremities; DTRs 2+ intact and symmetric  CHEST/LUNG: Clear to auscultation bilaterally; No rales, rhonchi, wheezing, or rubs  HEART: Regular rate and rhythm; No murmurs, rubs, or gallops  ABDOMEN: Soft, Nontender, Nondistended; Bowel sounds present  GENITOURINARY- Voiding, no palpable bladder  EXTREMITIES:  2+ Peripheral Pulses, No clubbing, cyanosis, or edema  MUSCULOSKELTAL- No muscle tenderness, Muscle tone normal, No joint tenderness, no Joint swelling, Joint range of motion-normal    MEDS:  citalopram 20milliGRAM(s) Oral daily  simvastatin 20milliGRAM(s) Oral at bedtime  donepezil 10milliGRAM(s) Oral at bedtime  docusate sodium 100milliGRAM(s) Oral three times a day  memantine 10milliGRAM(s) Oral two times a day  latanoprost 0.005% Ophthalmic Solution 1Drop(s) Both EYES at bedtime  timolol 0.5% Solution 1Drop(s) Both EYES two times a day  enoxaparin Injectable 40milliGRAM(s) SubCutaneous every 24 hours  acetaminophen   Tablet 650milliGRAM(s) Oral every 6 hours PRN  acetaminophen   Tablet. 650milliGRAM(s) Oral every 6 hours PRN  ondansetron Injectable 4milliGRAM(s) IV Push every 6 hours PRN  aluminum hydroxide/magnesium hydroxide/simethicone Suspension 30milliLiter(s) Oral every 4 hours PRN  multivitamin 1Tablet(s) Oral daily  oxyCODONE IR 5milliGRAM(s) Oral every 4 hours PRN  oxyCODONE IR 10milliGRAM(s) Oral every 4 hours PRN  lidocaine   Patch 1Patch Transdermal daily    Assessment/Plan    #S/p mechanical fall with new compression fractures T9 and S3  Spine eval DR Shannon/DR العراقي appreciated. No surgical intervention planned  Pain is OK on Lidoderm and PO Oxycodone  Daughter wants home PT upon discharge  SW eval for discharge planning/arrangements    #H/o severe spinal canal stenosis/previous compression fractures  Pain meds    #HTN, stable    #Mild dementia  Cont current meds    #Dispo- cont PT, SW eval for DC needs

## 2017-04-01 NOTE — PHYSICAL THERAPY INITIAL EVALUATION ADULT - PERTINENT HX OF CURRENT PROBLEM, REHAB EVAL
Pt w/ severe low back pain and difficulty ambulating. Patient slipped and fell 2 days ago. CT: mod compression fx deformity involves the L1 vertebral body,severe compression fx deformity involves the T12 vertebral body, mod to severe compression fx deformity involves the T9 vert. body, A ND fx involves the ant aspect of the S3 vertebra-see report, see XR. Per spine PA Start PT in AM to determine function level

## 2017-04-02 DIAGNOSIS — S32.14XA TYPE 1 FRACTURE OF SACRUM, INITIAL ENCOUNTER FOR CLOSED FRACTURE: ICD-10-CM

## 2017-04-02 RX ORDER — LIDOCAINE 4 G/100G
1 CREAM TOPICAL DAILY
Qty: 0 | Refills: 0 | Status: DISCONTINUED | OUTPATIENT
Start: 2017-04-02 | End: 2017-04-03

## 2017-04-02 RX ADMIN — MEMANTINE HYDROCHLORIDE 10 MILLIGRAM(S): 10 TABLET ORAL at 05:57

## 2017-04-02 RX ADMIN — SIMVASTATIN 20 MILLIGRAM(S): 20 TABLET, FILM COATED ORAL at 21:12

## 2017-04-02 RX ADMIN — ENOXAPARIN SODIUM 40 MILLIGRAM(S): 100 INJECTION SUBCUTANEOUS at 14:11

## 2017-04-02 RX ADMIN — OXYCODONE HYDROCHLORIDE 10 MILLIGRAM(S): 5 TABLET ORAL at 19:26

## 2017-04-02 RX ADMIN — Medication 100 MILLIGRAM(S): at 05:57

## 2017-04-02 RX ADMIN — LATANOPROST 1 DROP(S): 0.05 SOLUTION/ DROPS OPHTHALMIC; TOPICAL at 21:12

## 2017-04-02 RX ADMIN — Medication 1 DROP(S): at 17:20

## 2017-04-02 RX ADMIN — OXYCODONE HYDROCHLORIDE 10 MILLIGRAM(S): 5 TABLET ORAL at 19:41

## 2017-04-02 RX ADMIN — LIDOCAINE 1 PATCH: 4 CREAM TOPICAL at 11:11

## 2017-04-02 RX ADMIN — MEMANTINE HYDROCHLORIDE 10 MILLIGRAM(S): 10 TABLET ORAL at 17:51

## 2017-04-02 RX ADMIN — OXYCODONE HYDROCHLORIDE 10 MILLIGRAM(S): 5 TABLET ORAL at 08:28

## 2017-04-02 RX ADMIN — Medication 1 DROP(S): at 05:58

## 2017-04-02 RX ADMIN — OXYCODONE HYDROCHLORIDE 10 MILLIGRAM(S): 5 TABLET ORAL at 14:57

## 2017-04-02 RX ADMIN — OXYCODONE HYDROCHLORIDE 10 MILLIGRAM(S): 5 TABLET ORAL at 02:35

## 2017-04-02 RX ADMIN — Medication 100 MILLIGRAM(S): at 21:12

## 2017-04-02 RX ADMIN — Medication 100 MILLIGRAM(S): at 14:11

## 2017-04-02 RX ADMIN — Medication 1 TABLET(S): at 11:11

## 2017-04-02 RX ADMIN — OXYCODONE HYDROCHLORIDE 10 MILLIGRAM(S): 5 TABLET ORAL at 15:20

## 2017-04-02 RX ADMIN — OXYCODONE HYDROCHLORIDE 10 MILLIGRAM(S): 5 TABLET ORAL at 09:18

## 2017-04-02 RX ADMIN — Medication 650 MILLIGRAM(S): at 22:00

## 2017-04-02 RX ADMIN — CITALOPRAM 20 MILLIGRAM(S): 10 TABLET, FILM COATED ORAL at 11:11

## 2017-04-02 RX ADMIN — DONEPEZIL HYDROCHLORIDE 10 MILLIGRAM(S): 10 TABLET, FILM COATED ORAL at 21:12

## 2017-04-02 NOTE — PROGRESS NOTE ADULT - SUBJECTIVE AND OBJECTIVE BOX
pt still in LBP but not as severe as it has been;   the patch is helping;  ++ tenderness Lower back  NV intact  needs PT   \  A: old fx T9,T12,L1  Degen spondy L45  acute S3 buckle fx    P:  rest   Ice  analgesics  Lidoderm Patch

## 2017-04-02 NOTE — PROGRESS NOTE ADULT - SUBJECTIVE AND OBJECTIVE BOX
PCP: DR Hughes    CC- Patient is a 81y old  Female who presents with a chief complaint of fall, back pain (31 Mar 2017 14:25)    HPI:  82yo/F with PMH HTN, mild dementia, depression, spinal stenosis presented with severe low back pain and difficulty ambulating. Patient slipped and fell 2 days ago, since then her pain has been getting worse and her ability to walk was impaired so daughter initially brought her in to ED 2 days ago, pt was discharged. Pain got worse and she came back (31 Mar 2017 14:25)    4/1/17- doing good, was up with PT, pain is managable  4/2/17 pain is controlled    Vital Signs Last 24 Hrs  T(C): 36.4, Max: 36.8 (04-01 @ 15:56)  T(F): 97.5, Max: 98.2 (04-01 @ 15:56)  HR: 74 (74 - 77)  BP: 145/61 (120/57 - 145/61)  BP(mean): --  RR: 16 (16 - 17)  SpO2: 95% (93% - 95%)    LABS:                        16.1   9.8   )-----------( 196      ( 31 Mar 2017 10:06 )             46.5     31 Mar 2017 11:06    136    |  103    |  9      ----------------------------<  128    4.2     |  25     |  0.68     Ca    9.5        31 Mar 2017 11:06    PT/INR - ( 31 Mar 2017 11:06 )   PT: 10.8 sec;   INR: 1.00 ratio    PTT - ( 31 Mar 2017 11:06 )  PTT:29.5 sec    RADIOLOGY & ADDITIONAL TESTS:    EXAM:  CT LUMBAR SPINE                        PROCEDURE DATE:  03/31/2017        INTERPRETATION:  History: Status post fall. Upper and lower back pain.   Rule out fracture.    Description: Noncontrast CT studies of the thoracic and lumbar spinewere   performed.    Axial images with coronal and sagittal reformatted series were obtained.    Comparison is made to the lumbar spine x-ray series from 03/29/2017,   spinal region of the abdomen CT study from 07/04/2016, and prior lumbar   spine MRI study 03/09/2016.    A moderate compression fracture deformity involves the L1 vertebral body,   stable compared to the 03/29/2017 x-ray series, and increased compared to   the 07/04/2016 abdomen CT study.    A severe compression fracture deformity involves the T12 vertebral body   with associated kyphotic deformity (gibbus deformity). This is stable   compared to the 03/29/2017 x-ray studies, and mildly increased compared   to the 07/04/2016 abdomen CT study. Mild bowing of bone into the spinal  canal is present with associated mild bony canal stenosis.    A moderate to severe compression fracture deformity involves the T9   vertebral body. This is not fully covered on the x-ray series from   03/29/2017, and is new compared to the 07/04/2016 abdomen CT examination.   Mild bowing of bone posteriorly to the spinal canal is present with   associated mild bony canal stenosis.    A nondisplaced fracture involves the anterior aspect of the S3 vertebra,   new compared to the 07/04/2016 examination. This is not well appreciated   on the x-ray series from 03/29/2017.    Generalized osteopenia is noted. Consider correlation with bone   densitometry for quantification if clinically warranted.    Severe disc space narrowing is present at the L5-S1 level with associated   vacuum change.    Multilevel mild subluxation is noted in the lumbar spine, unchanged.    In the lumbar spine, disc bulges, facet degenerative changes, and   ligamentum flavum hypertrophy result in multilevel spinal canal stenosis   and neural foraminal narrowing, the overall degree of which is not well   demonstrated on this study.    Please note that evaluation of the central spinal canal, thoracic spinal   cord, and cauda equina is limited with noncontrast CT technique.    IMPRESSION:    A moderate compression fracture deformity involves the L1 vertebral body,   stable compared to the 03/29/2017 x-ray series, and increased compared to   the 07/04/2016 abdomen CT study.    A severe compression fracture deformity involves the T12 vertebral body   with associated kyphotic deformity (gibbus deformity). This is stable   compared to the 03/29/2017 x-ray studies, and mildly increased compared   to the 07/04/2016 abdomen CT study.    A moderate to severe compressionfracture deformity involves the T9   vertebral body. This is not fully covered on the x-ray series from   03/29/2017, and is new compared to the 07/04/2016 abdomen CT examination.     A nondisplaced fracture involves the anterior aspect of the S3 vertebra,   new compared to the 07/04/2016 examination.     EXAM:  LUMBAR SPINE                          PROCEDURE DATE:  03/29/2017    INTERPRETATION:  Clinical information: Fall    AP, lateral, coned-down lateral views of the lumbosacral spine    COMPARISON: Report from MRI lumbar spine March 09, 2016    FINDINGS: There is generalized bony demineralization. A severe wedge   compression deformity at T12 is new and a moderate L1 compression   deformity is significantly worsened. Mild L5 compression deformity is   unchanged. Mild anterolisthesis ofL4 and L5. Severe diffuse bony   demineralization.    IMPRESSION: As compared with March 09, 2016, there is new severe T12   wedge compression deformity and worsening of an L1 compression deformity.    PHYSICAL EXAM:    GENERAL: NAD, well-groomed, well-developed  HEAD:  Atraumatic, Normocephalic  EYES: EOMI, PERRLA, conjunctiva and sclera clear  HEENT: Moist mucous membranes  NECK: Supple, No JVD  NERVOUS SYSTEM:  Awake, confused, Motor Strength 5/5 B/L upper and lower extremities; DTRs 2+ intact and symmetric  CHEST/LUNG: Clear to auscultation bilaterally; No rales, rhonchi, wheezing, or rubs  HEART: Regular rate and rhythm; No murmurs, rubs, or gallops  ABDOMEN: Soft, Nontender, Nondistended; Bowel sounds present  GENITOURINARY- Voiding, no palpable bladder  EXTREMITIES:  2+ Peripheral Pulses, No clubbing, cyanosis, or edema  MUSCULOSKELTAL- No muscle tenderness, Muscle tone normal, No joint tenderness, no Joint swelling, Joint range of motion-normal    MEDS:  citalopram 20milliGRAM(s) Oral daily  simvastatin 20milliGRAM(s) Oral at bedtime  donepezil 10milliGRAM(s) Oral at bedtime  docusate sodium 100milliGRAM(s) Oral three times a day  memantine 10milliGRAM(s) Oral two times a day  latanoprost 0.005% Ophthalmic Solution 1Drop(s) Both EYES at bedtime  timolol 0.5% Solution 1Drop(s) Both EYES two times a day  enoxaparin Injectable 40milliGRAM(s) SubCutaneous every 24 hours  acetaminophen   Tablet 650milliGRAM(s) Oral every 6 hours PRN  acetaminophen   Tablet. 650milliGRAM(s) Oral every 6 hours PRN  ondansetron Injectable 4milliGRAM(s) IV Push every 6 hours PRN  aluminum hydroxide/magnesium hydroxide/simethicone Suspension 30milliLiter(s) Oral every 4 hours PRN  multivitamin 1Tablet(s) Oral daily  oxyCODONE IR 5milliGRAM(s) Oral every 4 hours PRN  oxyCODONE IR 10milliGRAM(s) Oral every 4 hours PRN  lidocaine   Patch 1Patch Transdermal daily    Assessment/Plan    #S/p mechanical fall with new compression fractures T9 and S3  Spine eval DR Shannon/DR العراقي appreciated. No surgical intervention planned  Pain is OK on Lidoderm and PO Oxycodone  Daughter wants home PT upon discharge   eval for discharge planning/arrangements    #H/o severe spinal canal stenosis/previous compression fractures  Pain meds    #HTN, stable    #Mild dementia  Cont current meds    #Dispo- cont PT,  eval for DC needs. Likely home with home PT tomorrow

## 2017-04-03 ENCOUNTER — TRANSCRIPTION ENCOUNTER (OUTPATIENT)
Age: 82
End: 2017-04-03

## 2017-04-03 VITALS
DIASTOLIC BLOOD PRESSURE: 67 MMHG | HEART RATE: 80 BPM | RESPIRATION RATE: 18 BRPM | SYSTOLIC BLOOD PRESSURE: 135 MMHG | OXYGEN SATURATION: 97 %

## 2017-04-03 RX ORDER — LOSARTAN POTASSIUM 100 MG/1
1 TABLET, FILM COATED ORAL
Qty: 30 | Refills: 0
Start: 2017-04-03 | End: 2017-05-03

## 2017-04-03 RX ORDER — OXYCODONE HYDROCHLORIDE 5 MG/1
1 TABLET ORAL
Qty: 84 | Refills: 0
Start: 2017-04-03 | End: 2017-04-17

## 2017-04-03 RX ORDER — LOSARTAN POTASSIUM 100 MG/1
25 TABLET, FILM COATED ORAL DAILY
Qty: 0 | Refills: 0 | Status: DISCONTINUED | OUTPATIENT
Start: 2017-04-03 | End: 2017-04-03

## 2017-04-03 RX ORDER — ACETAMINOPHEN 500 MG
2 TABLET ORAL
Qty: 0 | Refills: 0 | DISCHARGE
Start: 2017-04-03

## 2017-04-03 RX ADMIN — Medication 1 DROP(S): at 05:31

## 2017-04-03 RX ADMIN — Medication 100 MILLIGRAM(S): at 13:20

## 2017-04-03 RX ADMIN — LIDOCAINE 1 PATCH: 4 CREAM TOPICAL at 12:34

## 2017-04-03 RX ADMIN — OXYCODONE HYDROCHLORIDE 10 MILLIGRAM(S): 5 TABLET ORAL at 06:00

## 2017-04-03 RX ADMIN — OXYCODONE HYDROCHLORIDE 10 MILLIGRAM(S): 5 TABLET ORAL at 05:32

## 2017-04-03 RX ADMIN — Medication 1 TABLET(S): at 12:35

## 2017-04-03 RX ADMIN — OXYCODONE HYDROCHLORIDE 10 MILLIGRAM(S): 5 TABLET ORAL at 00:30

## 2017-04-03 RX ADMIN — Medication 100 MILLIGRAM(S): at 05:30

## 2017-04-03 RX ADMIN — OXYCODONE HYDROCHLORIDE 10 MILLIGRAM(S): 5 TABLET ORAL at 09:12

## 2017-04-03 RX ADMIN — LIDOCAINE 1 PATCH: 4 CREAM TOPICAL at 12:35

## 2017-04-03 RX ADMIN — OXYCODONE HYDROCHLORIDE 10 MILLIGRAM(S): 5 TABLET ORAL at 10:10

## 2017-04-03 RX ADMIN — LOSARTAN POTASSIUM 25 MILLIGRAM(S): 100 TABLET, FILM COATED ORAL at 13:20

## 2017-04-03 RX ADMIN — MEMANTINE HYDROCHLORIDE 10 MILLIGRAM(S): 10 TABLET ORAL at 05:31

## 2017-04-03 RX ADMIN — OXYCODONE HYDROCHLORIDE 10 MILLIGRAM(S): 5 TABLET ORAL at 13:20

## 2017-04-03 RX ADMIN — OXYCODONE HYDROCHLORIDE 10 MILLIGRAM(S): 5 TABLET ORAL at 14:20

## 2017-04-03 RX ADMIN — CITALOPRAM 20 MILLIGRAM(S): 10 TABLET, FILM COATED ORAL at 12:35

## 2017-04-03 RX ADMIN — OXYCODONE HYDROCHLORIDE 10 MILLIGRAM(S): 5 TABLET ORAL at 00:04

## 2017-04-03 NOTE — DISCHARGE NOTE ADULT - MEDICATION SUMMARY - MEDICATIONS TO TAKE
I will START or STAY ON the medications listed below when I get home from the hospital:    acetaminophen 325 mg oral tablet  -- 2 tab(s) by mouth every 6 hours, As needed, For Temp greater than 38 C (100.4 F)  -- Indication: For pain    acetaminophen 325 mg oral tablet  -- 2 tab(s) by mouth every 6 hours, As needed, Mild Pain (1 - 3)  -- Indication: For pain    losartan 25 mg oral tablet  -- 1 tab(s) by mouth once a day  -- Indication: For HTN    citalopram 20 mg oral tablet  -- 1 tab(s) by mouth once a day  -- Indication: For depression    simvastatin 20 mg oral tablet  -- 1 tab(s) by mouth once a day (at bedtime)  -- Indication: For hyperlipidemia    donepezil 10 mg oral tablet  -- 1 tab(s) by mouth once a day (at bedtime)  -- Indication: For dementia    docusate sodium 100 mg oral capsule  -- 1 cap(s) by mouth 3 times a day  -- Indication: For Constipation    Namenda XR 14 mg oral capsule, extended release  -- 1 cap(s) by mouth once a day  -- Indication: For dementia    latanoprost 0.005% ophthalmic solution  -- 1 drop(s) to each affected eye once a day (at bedtime)  -- Indication: For glaucoma    timolol 0.5% ophthalmic solution  -- 1 drop(s) to each affected eye 2 times a day  -- Indication: For glaucoma    Multiple Vitamins oral tablet  -- 1 tab(s) by mouth once a day  -- Indication: For vitamins

## 2017-04-03 NOTE — DISCHARGE NOTE ADULT - CARE PROVIDER_API CALL
Jim العراقي), Orthopaedic Surgery  [address] [place] [address]  Phone: [phone]  Fax: [phone]    Sunshine Enriquez), Pain Medicine; [facility]  [address] [place] [address]  Phone: [phone]  Fax: [phone]

## 2017-04-03 NOTE — DISCHARGE NOTE ADULT - INSTRUCTIONS
low salt be careful getting out of bed & the chair.  Always use assistance.  No bending or heavy lifting.  Follow up with spine MD & pain MD as directed

## 2017-04-03 NOTE — DISCHARGE NOTE ADULT - HOSPITAL COURSE
PCP: Ellen  CC-Patient is a 81y old  Female who presents with a chief complaint of fall, back pain (31 Mar 2017 14:25)  HPI:  80yo/F with PMH HTN, mild dementia, depression, spinal stenosis presented with severe low back pain and difficulty ambulating. Patient slipped and fell 2 days ago, since then her pain has been getting worse and her ability to walk was impaired so daughter initially brought her in to ED 2 days ago, pt was discharged. Pain got worse and she came back (31 Mar 2017 14:25)  Hospital course- seen by spine, no surgery needed. Pain is controlled.  T(C): 36.9, Max: 36.9 (04-03 @ 00:11)  HR: 80 (63 - 91)  BP: 135/67 (119/56 - 192/69)  RR: 18 (16 - 19)  SpO2: 97% (96% - 98%)  Wt(kg): --    PHYSICAL EXAM:    GENERAL: NAD, well-groomed, well-developed  HEAD:  Atraumatic, Normocephalic  EYES: EOMI, PERRLA, conjunctiva and sclera clear  HEENT: Moist mucous membranes  NECK: Supple, No JVD  NERVOUS SYSTEM:  Awake, alert  CHEST/LUNG: Clear to auscultation bilaterally; No rales, rhonchi, wheezing, or rubs  HEART: Regular rate and rhythm; No murmurs, rubs, or gallops  ABDOMEN: Soft, Nontender, Nondistended; Bowel sounds present  GENITOURINARY- Voiding, no palpable bladder  EXTREMITIES:  2+ Peripheral Pulses, No clubbing, cyanosis, or edema  MUSCULOSKELTAL- +spine tenderness    citalopram 20milliGRAM(s) Oral daily  simvastatin 20milliGRAM(s) Oral at bedtime  donepezil 10milliGRAM(s) Oral at bedtime  docusate sodium 100milliGRAM(s) Oral three times a day  memantine 10milliGRAM(s) Oral two times a day  latanoprost 0.005% Ophthalmic Solution 1Drop(s) Both EYES at bedtime  timolol 0.5% Solution 1Drop(s) Both EYES two times a day  enoxaparin Injectable 40milliGRAM(s) SubCutaneous every 24 hours  acetaminophen   Tablet 650milliGRAM(s) Oral every 6 hours PRN  acetaminophen   Tablet. 650milliGRAM(s) Oral every 6 hours PRN  ondansetron Injectable 4milliGRAM(s) IV Push every 6 hours PRN  aluminum hydroxide/magnesium hydroxide/simethicone Suspension 30milliLiter(s) Oral every 4 hours PRN  multivitamin 1Tablet(s) Oral daily  oxyCODONE IR 5milliGRAM(s) Oral every 4 hours PRN  oxyCODONE IR 10milliGRAM(s) Oral every 4 hours PRN  lidocaine   Patch 1Patch Transdermal daily  lidocaine   Patch 1Patch Transdermal daily  losartan 25milliGRAM(s) Oral daily    Assessment/Plan  #Fall with compression fracture T9, S3  Outpatient f/u with Dr العراقي/pain management Dr Draper  Home PT  #HTN, start low dose of Cozaar. Outpatient f/u with PMD  #Dementia/depression- resume home meds  #Dispo- home today

## 2017-04-03 NOTE — DISCHARGE NOTE ADULT - PATIENT PORTAL LINK FT
“You can access the Follow[employer] Patient Portal, offered by [facility], by registering with the following website: [email or web address]”

## 2017-04-03 NOTE — PROGRESS NOTE ADULT - SUBJECTIVE AND OBJECTIVE BOX
YOSEF DESAI    Patient is a 81y old  Female who presents with a chief complaint of fall, back pain (31 Mar 2017 14:25)      HPI:  80yo/F with PMH HTN, mild dementia, depression, spinal stenosis presented with severe low back pain and difficulty ambulating. Patient slipped and fell 2 days ago, since then her pain has been getting worse and her ability to walk was impaired so daughter initially brought her in to ED 2 days ago, pt was discharged. Pain got worse and she came back (31 Mar 2017 14:25)    4/1/17 Patient appears coherent-states she has low back pain-manageable at present.    MEDICATIONS  (STANDING):  citalopram 20milliGRAM(s) Oral daily  simvastatin 20milliGRAM(s) Oral at bedtime  donepezil 10milliGRAM(s) Oral at bedtime  docusate sodium 100milliGRAM(s) Oral three times a day  memantine 10milliGRAM(s) Oral two times a day  latanoprost 0.005% Ophthalmic Solution 1Drop(s) Both EYES at bedtime  timolol 0.5% Solution 1Drop(s) Both EYES two times a day  enoxaparin Injectable 40milliGRAM(s) SubCutaneous every 24 hours  multivitamin 1Tablet(s) Oral daily  lidocaine   Patch 1Patch Transdermal daily      Allergies    No Known Allergies    Intolerances    Vital Signs Last 24 Hrs  T(C): 36.8, Max: 36.9 (03-31 @ 23:17)  T(F): 98.3, Max: 98.5 (03-31 @ 23:17)  HR: 72 (65 - 84)  BP: 145/60 (122/57 - 197/79)  BP(mean): --  RR: 18 (16 - 18)  SpO2: 95% (92% - 98%)    I&O's Summary      PHYSICAL EXAM:  Awake and alert, sitting in bedside chair eating. States mild low back pain without thoracic pain  Ambulating with PT assist  Tolerating diet  Mild lumbar pain with change in position  Neuro without deficits.                          16.1   9.8   )-----------( 196      ( 31 Mar 2017 10:06 )             46.5       31 Mar 2017 11:06    136    |  103    |  9      ----------------------------<  128    4.2     |  25     |  0.68     Ca    9.5        31 Mar 2017 11:06

## 2017-04-03 NOTE — DISCHARGE NOTE ADULT - CARE PLAN
Principal Discharge DX:	Closed type I fracture of sacrum, initial encounter  Goal:	return to ADL's  Instructions for follow-up, activity and diet:	home PT/outpatient pain management

## 2017-04-03 NOTE — DISCHARGE NOTE ADULT - MEDICATION SUMMARY - MEDICATIONS TO STOP TAKING
I will STOP taking the medications listed below when I get home from the hospital:    Xarelto 10 mg oral tablet  -- 1 tab(s) by mouth once a day continue for   35 days    celecoxib 200 mg oral capsule  -- 1 cap(s) by mouth 2 times a day (with meals)

## 2017-04-03 NOTE — PROGRESS NOTE ADULT - ASSESSMENT
fall with sacral pain-possible acute ND fracture S3  chronic compression fractures T9, T12, L1  Degenerative spondylolisthesis L4/5  DDD L5/S1    Plan:  Continue mild analgesia.  Lidoderm patch  Cont OOB/PT  Stable for D/C for Spine  F/U office 2 weeks at [phone]

## 2017-04-05 DIAGNOSIS — H40.9 UNSPECIFIED GLAUCOMA: ICD-10-CM
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
## 2017-04-05 DIAGNOSIS — S32.14XA TYPE 1 FRACTURE OF SACRUM, INITIAL ENCOUNTER FOR CLOSED FRACTURE: ICD-10-CM
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
## 2017-04-05 DIAGNOSIS — E78.5 HYPERLIPIDEMIA, UNSPECIFIED: ICD-10-CM

## 2017-04-05 DIAGNOSIS — M48.00 SPINAL STENOSIS, SITE UNSPECIFIED: ICD-10-CM

## 2017-04-05 DIAGNOSIS — I10 ESSENTIAL (PRIMARY) HYPERTENSION: ICD-10-CM
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
## 2017-04-05 DIAGNOSIS — F32.9 MAJOR DEPRESSIVE DISORDER, SINGLE EPISODE, UNSPECIFIED: ICD-10-CM

## 2017-04-05 DIAGNOSIS — F03.90 UNSPECIFIED DEMENTIA, UNSPECIFIED SEVERITY, WITHOUT BEHAVIORAL DISTURBANCE, PSYCHOTIC DISTURBANCE, MOOD DISTURBANCE, AND ANXIETY: ICD-10-CM

## 2017-04-05 DIAGNOSIS — S22.078A OTHER FRACTURE OF T9-T10 VERTEBRA, INITIAL ENCOUNTER FOR CLOSED FRACTURE: ICD-10-CM

## 2017-04-05 DIAGNOSIS — M43.16 SPONDYLOLISTHESIS, LUMBAR REGION: ICD-10-CM
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
## 2017-04-28 ENCOUNTER — RX RENEWAL (OUTPATIENT)
Age: 82
End: 2017-04-28

## 2017-06-15 ENCOUNTER — RX RENEWAL (OUTPATIENT)
Age: 82
End: 2017-06-15

## 2017-06-20 ENCOUNTER — RX RENEWAL (OUTPATIENT)
Age: 82
End: 2017-06-20

## 2017-07-31 ENCOUNTER — MEDICATION RENEWAL (OUTPATIENT)
Age: 82
End: 2017-07-31

## 2017-08-07 ENCOUNTER — APPOINTMENT (OUTPATIENT)
Dept: NEUROLOGY | Facility: CLINIC | Age: 82
End: 2017-08-07
Payer: MEDICARE

## 2017-08-07 VITALS
HEIGHT: 63 IN | WEIGHT: 111 LBS | SYSTOLIC BLOOD PRESSURE: 118 MMHG | HEART RATE: 72 BPM | DIASTOLIC BLOOD PRESSURE: 70 MMHG | BODY MASS INDEX: 19.67 KG/M2

## 2017-08-07 PROCEDURE — 99214 OFFICE O/P EST MOD 30 MIN: CPT

## 2017-08-07 RX ORDER — LOSARTAN POTASSIUM 25 MG/1
25 TABLET, FILM COATED ORAL
Qty: 30 | Refills: 0 | Status: DISCONTINUED | COMMUNITY
Start: 2017-04-03

## 2017-08-07 RX ORDER — HYDROCODONE BITARTRATE AND ACETAMINOPHEN 10; 325 MG/1; MG/1
10-325 TABLET ORAL
Qty: 40 | Refills: 0 | Status: DISCONTINUED | COMMUNITY
Start: 2017-04-19

## 2017-08-07 RX ORDER — TRAMADOL HYDROCHLORIDE 50 MG/1
50 TABLET, COATED ORAL
Qty: 120 | Refills: 0 | Status: DISCONTINUED | COMMUNITY
Start: 2017-04-05

## 2017-08-07 RX ORDER — TIMOLOL MALEATE 5 MG/ML
0.5 SOLUTION OPHTHALMIC
Qty: 5 | Refills: 0 | Status: ACTIVE | COMMUNITY
Start: 2017-03-07

## 2017-08-07 RX ORDER — POLYETHYLENE GLYCOL 3350, SODIUM CHLORIDE, SODIUM BICARBONATE AND POTASSIUM CHLORIDE WITH LEMON FLAVOR 420; 11.2; 5.72; 1.48 G/4L; G/4L; G/4L; G/4L
420 POWDER, FOR SOLUTION ORAL
Qty: 4000 | Refills: 0 | Status: DISCONTINUED | COMMUNITY
Start: 2017-04-06

## 2017-08-07 RX ORDER — OXYCODONE 5 MG/1
5 TABLET ORAL
Qty: 84 | Refills: 0 | Status: DISCONTINUED | COMMUNITY
Start: 2017-04-03

## 2017-08-07 RX ORDER — MEMANTINE HYDROCHLORIDE 10 MG/1
10 TABLET, FILM COATED ORAL
Qty: 60 | Refills: 0 | Status: COMPLETED | COMMUNITY
Start: 2017-02-28 | End: 2017-08-07

## 2017-08-28 ENCOUNTER — RX RENEWAL (OUTPATIENT)
Age: 82
End: 2017-08-28

## 2017-08-31 ENCOUNTER — RX RENEWAL (OUTPATIENT)
Age: 82
End: 2017-08-31

## 2017-09-22 ENCOUNTER — RX RENEWAL (OUTPATIENT)
Age: 82
End: 2017-09-22

## 2017-09-25 ENCOUNTER — RX RENEWAL (OUTPATIENT)
Age: 82
End: 2017-09-25

## 2017-11-27 ENCOUNTER — APPOINTMENT (OUTPATIENT)
Dept: NEUROLOGY | Facility: CLINIC | Age: 82
End: 2017-11-27
Payer: MEDICARE

## 2017-11-27 ENCOUNTER — RX RENEWAL (OUTPATIENT)
Age: 82
End: 2017-11-27

## 2017-11-27 VITALS
DIASTOLIC BLOOD PRESSURE: 58 MMHG | WEIGHT: 119 LBS | SYSTOLIC BLOOD PRESSURE: 126 MMHG | HEART RATE: 64 BPM | HEIGHT: 63 IN | BODY MASS INDEX: 21.09 KG/M2

## 2017-11-27 DIAGNOSIS — R26.81 UNSTEADINESS ON FEET: ICD-10-CM

## 2017-11-27 PROCEDURE — 99214 OFFICE O/P EST MOD 30 MIN: CPT

## 2018-01-02 ENCOUNTER — RX RENEWAL (OUTPATIENT)
Age: 83
End: 2018-01-02

## 2018-03-05 ENCOUNTER — RX RENEWAL (OUTPATIENT)
Age: 83
End: 2018-03-05

## 2018-04-05 ENCOUNTER — RX RENEWAL (OUTPATIENT)
Age: 83
End: 2018-04-05

## 2018-05-18 ENCOUNTER — APPOINTMENT (OUTPATIENT)
Dept: NEUROLOGY | Facility: CLINIC | Age: 83
End: 2018-05-18
Payer: MEDICARE

## 2018-05-18 VITALS
DIASTOLIC BLOOD PRESSURE: 56 MMHG | HEIGHT: 63 IN | SYSTOLIC BLOOD PRESSURE: 100 MMHG | WEIGHT: 125 LBS | HEART RATE: 70 BPM | BODY MASS INDEX: 22.15 KG/M2

## 2018-05-18 PROCEDURE — 99214 OFFICE O/P EST MOD 30 MIN: CPT

## 2018-06-13 ENCOUNTER — RX RENEWAL (OUTPATIENT)
Age: 83
End: 2018-06-13

## 2018-06-14 ENCOUNTER — RX RENEWAL (OUTPATIENT)
Age: 83
End: 2018-06-14

## 2018-06-25 ENCOUNTER — RX RENEWAL (OUTPATIENT)
Age: 83
End: 2018-06-25

## 2018-07-02 ENCOUNTER — RX RENEWAL (OUTPATIENT)
Age: 83
End: 2018-07-02

## 2018-09-10 ENCOUNTER — RX RENEWAL (OUTPATIENT)
Age: 83
End: 2018-09-10

## 2018-09-17 ENCOUNTER — RX RENEWAL (OUTPATIENT)
Age: 83
End: 2018-09-17

## 2018-10-01 ENCOUNTER — RX RENEWAL (OUTPATIENT)
Age: 83
End: 2018-10-01

## 2018-11-19 ENCOUNTER — APPOINTMENT (OUTPATIENT)
Dept: NEUROLOGY | Facility: CLINIC | Age: 83
End: 2018-11-19
Payer: MEDICARE

## 2018-11-19 VITALS
WEIGHT: 136 LBS | HEIGHT: 63 IN | HEART RATE: 65 BPM | DIASTOLIC BLOOD PRESSURE: 58 MMHG | BODY MASS INDEX: 24.1 KG/M2 | SYSTOLIC BLOOD PRESSURE: 122 MMHG

## 2018-11-19 DIAGNOSIS — R63.5 ABNORMAL WEIGHT GAIN: ICD-10-CM

## 2018-11-19 PROCEDURE — 99214 OFFICE O/P EST MOD 30 MIN: CPT

## 2018-11-19 RX ORDER — CEFUROXIME AXETIL 500 MG/1
500 TABLET ORAL
Qty: 20 | Refills: 0 | Status: COMPLETED | COMMUNITY
Start: 2018-05-21

## 2018-12-25 ENCOUNTER — RX RENEWAL (OUTPATIENT)
Age: 83
End: 2018-12-25

## 2018-12-30 ENCOUNTER — RX RENEWAL (OUTPATIENT)
Age: 83
End: 2018-12-30

## 2019-02-25 ENCOUNTER — RX RENEWAL (OUTPATIENT)
Age: 84
End: 2019-02-25

## 2019-03-22 ENCOUNTER — RX RENEWAL (OUTPATIENT)
Age: 84
End: 2019-03-22

## 2019-04-05 ENCOUNTER — RX RENEWAL (OUTPATIENT)
Age: 84
End: 2019-04-05

## 2019-05-28 ENCOUNTER — APPOINTMENT (OUTPATIENT)
Dept: NEUROLOGY | Facility: CLINIC | Age: 84
End: 2019-05-28
Payer: MEDICARE

## 2019-05-28 VITALS
HEIGHT: 63 IN | HEART RATE: 69 BPM | SYSTOLIC BLOOD PRESSURE: 120 MMHG | WEIGHT: 137 LBS | BODY MASS INDEX: 24.27 KG/M2 | DIASTOLIC BLOOD PRESSURE: 72 MMHG

## 2019-05-28 PROCEDURE — 99214 OFFICE O/P EST MOD 30 MIN: CPT

## 2019-05-28 NOTE — HISTORY OF PRESENT ILLNESS
[FreeTextEntry1] : Patient is here for a followup visit today, was last seen on 11/19/18. Patient is accompanied by aide, as per pt and aide, pt is active, watches TV a lot, also reads a lot - Does not remember anything of what she reads, has been going for walks with her aide, She does not need help with her basic activities like bathing, dressing or feeding, aide also reports that she makes her that in the morning.\par \par Patient has no complaints, she is pleasant, reports she goes to [employer] regularly. Denies visual hallucinations or sleep disturbance.\par \par Pt has been noted to have mild progression of cognitive decline, not significant to warrant any change in her day-to-day life, she is on donepezil and Namenda, in addition takes citalopram for anxiety/mood\par

## 2019-05-28 NOTE — PHYSICAL EXAM
[General Appearance - In No Acute Distress] : in no acute distress [Limited Balance] : the patient's balance was impaired [Extraocular Movements] : extraocular movements were intact [PERRL With Normal Accommodation] : pupils were equal in size, round, reactive to light, with normal accommodation [No APD] : no afferent pupillary defect [Hearing Threshold Finger Rub Not [place]] : hearing was normal [Heart Sounds] : normal S1 and S2 [Auscultation Breath Sounds / Voice Sounds] : lungs were clear to auscultation bilaterally [No Spinal Tenderness] : no spinal tenderness [Arterial Pulses Carotid] : carotid pulses were normal with no bruits [Edema] : there was no peripheral edema [Abnormal Walk] : normal gait [] : no rash [FreeTextEntry1] : Mild - anxiety [FreeTextEntry4] : the patient is alert, attentive, follow simple commands, language is intact comprehension and repetition, mildly hesitant to speech at times. Patient is oriented to self and month, not to year she does not remember who the president is, severe deficits in short-term and intermediate recall. She is however pleasant, appropriate and interactive. [FreeTextEntry5] : 11- X11 normal. [FreeTextEntry6] : sensory/motor/coordination systems grossly normal. [FreeTextEntry8] : Mildly hunched posture

## 2019-05-28 NOTE — DISCUSSION/SUMMARY
[FreeTextEntry1] : 83-year-old female with history of mildly progressive cognitive decline since past 5 years, has noted significant decline in cognition and functioning in the recent past. Cognitive assessment; [employer]; performed on 4/14/15 reveals Global cognitive score: 77.7, more than one standard deviation below average in domains: Memory, exec function,  attention; compared with the prior study performed on 1/22/13, decline primarily in memory, attention and executive function.\par \par #1) Dementia, senile, progressive.\par \par - Continue  Aricept 10 mg q.d. Namenda 10 mg bid,  \par \par #2) Generalized anxiety disorder, she has responded well to citalopram 20 mg q.d.\par \par #3) Mild gait instability, wt gain\par \par - I had a long discussion with the patient and her aide, we discussed her activities, daily short walks as she has gained wt, also discussed other safety factors\par \par she will followup with me in 6 months.\par

## 2019-05-28 NOTE — DATA REVIEWED
[de-identified] : Cognitive assessment [employer]; 4/14/15.\par Global cognitive score: 77.7, \par More than one standard deviation below average in domains: Memory 79.1, exec function: 72.2,  attention: 59.4\par Above average in cognitive domains: Visual-spatial 100.2\par \par Cognitive assessment  [employer]x, MCI battery; 1/22/13\par Global cognitive score: 85.7, \par More than one standard deviation below average in domains: Memory 84.6, visuospatial 74\par Below average and domains: Executive function 95, extension 89.1 [de-identified] : 9/4/16: CT head: No intracranial hemorrhage mass effect or midline shift, unchanged microvascular disease\par CT C-spine: No acute fracture or dislocation, mild multilevel cervical spondylosis which appears unchanged

## 2019-05-28 NOTE — REVIEW OF SYSTEMS
[Recent Weight Gain (___ Lbs)] : recent [unfilled] ~Ulb weight gain [Decr. Concentrating Ability] : decreased concentrating ability [Memory Lapses or Loss] : memory loss [Poor Coordination] : poor coordination [Anxiety] : anxiety [Negative] : Constitutional [Feeling Poorly] : not feeling poorly [Feeling Tired] : not feeling tired [Recent Weight Loss (___ Lbs)] : no recent weight loss [Difficulty with Language] : no ~M difficulty with language [Arm Weakness] : no arm weakness [Hand Weakness] : no hand weakness [Leg Weakness] : no leg weakness [Difficulty Writing] : no difficulty writing [Numbness] : no numbness [Tingling] : no tingling [Vertigo] : no vertigo [Tension Headache] : no tension-type headache [Dizziness] : no dizziness [Eyesight Problems] : no eyesight problems [Difficulty Walking] : no difficulty walking [Loss Of Hearing] : no hearing loss [Palpitations] : no palpitations [Heartburn] : no heartburn [Vomiting] : no vomiting [Shortness Of Breath] : no shortness of breath [Muscle Weakness] : no muscle weakness [Skin Lesions] : no skin lesions [Incontinence] : no incontinence [FreeTextEntry9] : right hip pain [Easy Bleeding] : no tendency for easy bleeding

## 2019-05-28 NOTE — REASON FOR VISIT
[Follow-Up: _____] : a [unfilled] follow-up visit [Formal Caregiver] : formal caregiver [FreeTextEntry1] : for Dementia

## 2019-05-30 ENCOUNTER — RX RENEWAL (OUTPATIENT)
Age: 84
End: 2019-05-30

## 2019-06-18 ENCOUNTER — RX RENEWAL (OUTPATIENT)
Age: 84
End: 2019-06-18

## 2019-07-01 ENCOUNTER — RX RENEWAL (OUTPATIENT)
Age: 84
End: 2019-07-01

## 2019-08-26 ENCOUNTER — RX RENEWAL (OUTPATIENT)
Age: 84
End: 2019-08-26

## 2019-09-05 ENCOUNTER — RX RENEWAL (OUTPATIENT)
Age: 84
End: 2019-09-05

## 2019-09-15 ENCOUNTER — RX RENEWAL (OUTPATIENT)
Age: 84
End: 2019-09-15

## 2019-09-30 ENCOUNTER — RX RENEWAL (OUTPATIENT)
Age: 84
End: 2019-09-30

## 2019-12-16 ENCOUNTER — RX RENEWAL (OUTPATIENT)
Age: 84
End: 2019-12-16

## 2019-12-20 ENCOUNTER — APPOINTMENT (OUTPATIENT)
Dept: NEUROLOGY | Facility: CLINIC | Age: 84
End: 2019-12-20
Payer: MEDICARE

## 2019-12-20 VITALS
WEIGHT: 125 LBS | SYSTOLIC BLOOD PRESSURE: 122 MMHG | HEART RATE: 67 BPM | HEIGHT: 63 IN | BODY MASS INDEX: 22.15 KG/M2 | DIASTOLIC BLOOD PRESSURE: 71 MMHG

## 2019-12-20 PROCEDURE — 99214 OFFICE O/P EST MOD 30 MIN: CPT

## 2019-12-20 NOTE — DISCUSSION/SUMMARY
[FreeTextEntry1] : 84-year-old female with history of mildly progressive cognitive decline since past 5 years, has noted significant decline in cognition and functioning in the recent past. \par \par #1) Dementia, senile, progressive.\par \par - Continue  Aricept 10 mg q.d. Namenda 10 mg bid,  \par \par #2) Generalized anxiety disorder, she has responded well to citalopram 20 mg q.d.\par \par #3) Mild gait instability\par \par - I had a long discussion with the patient and her aide, we discussed her activities, daily short walks as she has gained wt, also discussed other safety factors\par \par -l followup with me in 6 months.\par

## 2019-12-20 NOTE — HISTORY OF PRESENT ILLNESS
[FreeTextEntry1] : Patient is here for a followup visit today, was last seen on 5/28/19. Patient reports she is well, reports she is active, watches TV, needs help with ADL's and driving, does not need help with her basic activities like bathing, dressing or feeding.\par \par Patient has no complaints, she is pleasant, reports she goes to [employer] regularly. Denies visual hallucinations or sleep disturbance.\par \par Pt has been noted to have mild progression of cognitive decline, not significant to warrant any change in her day-to-day life, she is on donepezil and Namenda, in addition takes citalopram for anxiety/mood\par

## 2019-12-20 NOTE — DATA REVIEWED
[de-identified] : Cognitive assessment [employer]; 4/14/15.\par Global cognitive score: 77.7, \par More than one standard deviation below average in domains: Memory 79.1, exec function: 72.2,  attention: 59.4\par Above average in cognitive domains: Visual-spatial 100.2\par \par Cognitive assessment  [employer]x, MCI battery; 1/22/13\par Global cognitive score: 85.7, \par More than one standard deviation below average in domains: Memory 84.6, visuospatial 74\par Below average and domains: Executive function 95, extension 89.1 [de-identified] : 9/4/16: CT head: No intracranial hemorrhage mass effect or midline shift, unchanged microvascular disease\par CT C-spine: No acute fracture or dislocation, mild multilevel cervical spondylosis which appears unchanged

## 2019-12-20 NOTE — PHYSICAL EXAM
[General Appearance - In No Acute Distress] : in no acute distress [Limited Balance] : the patient's balance was impaired [PERRL With Normal Accommodation] : pupils were equal in size, round, reactive to light, with normal accommodation [Extraocular Movements] : extraocular movements were intact [No APD] : no afferent pupillary defect [Auscultation Breath Sounds / Voice Sounds] : lungs were clear to auscultation bilaterally [] : the neck was supple [Hearing Threshold Finger Rub Not [place]] : hearing was normal [Edema] : there was no peripheral edema [Arterial Pulses Carotid] : carotid pulses were normal with no bruits [Abnormal Walk] : normal gait [FreeTextEntry1] : Mild - anxiety [FreeTextEntry8] : Mildly hunched posture [FreeTextEntry4] : the patient is alert, attentive, follow simple commands, language is intact comprehension and repetition, mildly hesitant to speech at times. Patient is oriented to self and month, not to year she does not remember who the president is, severe deficits in short-term and intermediate recall. She is however pleasant, appropriate and interactive. [FreeTextEntry5] : 11- X11 normal. [FreeTextEntry6] : sensory/motor/coordination systems grossly normal.

## 2019-12-20 NOTE — REVIEW OF SYSTEMS
[Recent Weight Gain (___ Lbs)] : recent [unfilled] ~Ulb weight gain [Memory Lapses or Loss] : memory loss [Decr. Concentrating Ability] : decreased concentrating ability [Poor Coordination] : poor coordination [Anxiety] : anxiety [Negative] : Constitutional [Feeling Tired] : not feeling tired [Feeling Poorly] : not feeling poorly [Recent Weight Loss (___ Lbs)] : no recent weight loss [Difficulty with Language] : no ~M difficulty with language [Hand Weakness] : no hand weakness [Arm Weakness] : no arm weakness [Leg Weakness] : no leg weakness [Numbness] : no numbness [Difficulty Writing] : no difficulty writing [Dizziness] : no dizziness [Vertigo] : no vertigo [Tingling] : no tingling [Difficulty Walking] : no difficulty walking [Tension Headache] : no tension-type headache [Eyesight Problems] : no eyesight problems [Shortness Of Breath] : no shortness of breath [Loss Of Hearing] : no hearing loss [Palpitations] : no palpitations [Heartburn] : no heartburn [Vomiting] : no vomiting [Incontinence] : no incontinence [Muscle Weakness] : no muscle weakness [Easy Bleeding] : no tendency for easy bleeding [Skin Lesions] : no skin lesions [FreeTextEntry9] : right hip pain

## 2019-12-26 ENCOUNTER — RX RENEWAL (OUTPATIENT)
Age: 84
End: 2019-12-26

## 2020-02-03 ENCOUNTER — RX RENEWAL (OUTPATIENT)
Age: 85
End: 2020-02-03

## 2020-03-16 ENCOUNTER — RX RENEWAL (OUTPATIENT)
Age: 85
End: 2020-03-16

## 2020-06-16 ENCOUNTER — RX RENEWAL (OUTPATIENT)
Age: 85
End: 2020-06-16

## 2020-07-17 ENCOUNTER — APPOINTMENT (OUTPATIENT)
Dept: NEUROLOGY | Facility: CLINIC | Age: 85
End: 2020-07-17
Payer: MEDICARE

## 2020-07-17 PROCEDURE — 99214 OFFICE O/P EST MOD 30 MIN: CPT | Mod: 95

## 2020-07-17 NOTE — PHYSICAL EXAM
[General Appearance - Alert] : alert [General Appearance - In No Acute Distress] : in no acute distress [FreeTextEntry1] : Cheerful and interactive [Person] : oriented to person [Cranial Nerves Optic (II)] : visual acuity intact bilaterally,  visual fields full to confrontation, pupils equal round and reactive to light [Cranial Nerves Oculomotor (III)] : extraocular motion intact [Cranial Nerves Facial (VII)] : face symmetrical [Cranial Nerves Vestibulocochlear (VIII)] : hearing was intact bilaterally [Cranial Nerves Accessory (XI - Cranial And Spinal)] : head turning and shoulder shrug symmetric [Cranial Nerves Hypoglossal (XII)] : there was no tongue deviation with protrusion [FreeTextEntry4] : the patient is alert, attentive, follow simple commands, language, comprehension and repetition intact, mildly hesitant speech at times. Does not remember who the president is, short-term and intermediate recall deficits, Knows Current Covid-19 situation [FreeTextEntry6] : Limited virtual sensory/motor exam.

## 2020-07-17 NOTE — REVIEW OF SYSTEMS
[Memory Lapses or Loss] : memory loss [Decr. Concentrating Ability] : decreased concentrating ability [Poor Coordination] : poor coordination [Anxiety] : anxiety [Confused or Disoriented] : no confusion [Difficulty Writing] : difficulty writing [Vomiting] : no vomiting [Heartburn] : no heartburn [Negative] : Heme/Lymph

## 2020-07-17 NOTE — HISTORY OF PRESENT ILLNESS
[Home] : at home, [unfilled] , at the time of the visit. [Medical Office: ([facility])___] : at the medical office located in  [Family Member] : family member [FreeTextEntry4] : Shira, daughter [FreeTextEntry1] : Patient for Telehealth followup visit, last seen on 12/20/19. Patient is accompanied by her daughter Shira, patient reports that she is well, is active, reports that she has been keeping herself busy with reading, TV and other activities, also tells me she has been going to [employer] every week ( that is not true to Covid), She does seem to of a virus that is going on. \par \par As per the daughter, she has not noted any significant change in her behavior, the median and or significant decline in her day-to-day activities, she does admit that she is eating less, she has a full-time aide who helps with all basic needs and ADLs.  Aide has not noted any visual hallucinations or sleep disturbance.\par \par Patient continues to take Donepezil and memantine, in addition to citalopram for anxiety and mood.

## 2020-07-17 NOTE — DATA REVIEWED
[de-identified] : Cognitive assessment [employer]; 4/14/15.\par Global cognitive score: 77.7, \par More than one standard deviation below average in domains: Memory 79.1, exec function: 72.2,  attention: 59.4\par Above average in cognitive domains: Visual-spatial 100.2\par \par Cognitive assessment  [employer]x, MCI battery; 1/22/13\par Global cognitive score: 85.7, \par More than one standard deviation below average in domains: Memory 84.6, visuospatial 74\par Below average and domains: Executive function 95, extension 89.1 [de-identified] : 9/4/16: CT head: No intracranial hemorrhage mass effect or midline shift, unchanged microvascular disease\par CT C-spine: No acute fracture or dislocation, mild multilevel cervical spondylosis which appears unchanged

## 2020-07-17 NOTE — DISCUSSION/SUMMARY
[FreeTextEntry1] : 84-year-old female with history of mildly progressive cognitive decline since past 5 years, has noted significant decline in cognition and functioning in the recent past. \par \par #1) Dementia - Alzheimers; progressive.\par \par - Continue  Aricept 10 mg q.d. Namenda 10 mg bid,  \par \par #2) Generalized anxiety disorder, she has responded well to citalopram 20 mg q.d.\par \par #3) Mild gait instability\par \par - I had a discussion with the patient and her daughter, recommended activities, daily short walks, recreational and cognitive exercises, discussed  safety factors\par \par -l followup with me in 6 months.\par

## 2020-09-11 ENCOUNTER — RX RENEWAL (OUTPATIENT)
Age: 85
End: 2020-09-11

## 2020-10-05 ENCOUNTER — RX RENEWAL (OUTPATIENT)
Age: 85
End: 2020-10-05

## 2020-12-04 ENCOUNTER — RX RENEWAL (OUTPATIENT)
Age: 85
End: 2020-12-04

## 2021-03-26 ENCOUNTER — APPOINTMENT (OUTPATIENT)
Dept: NEUROLOGY | Facility: CLINIC | Age: 86
End: 2021-03-26
Payer: MEDICARE

## 2021-03-26 PROCEDURE — 99214 OFFICE O/P EST MOD 30 MIN: CPT | Mod: 95

## 2021-03-26 NOTE — HISTORY OF PRESENT ILLNESS
[Home] : at home, [unfilled] , at the time of the visit. [Medical Office: ([facility])___] : at the medical office located in  [FreeTextEntry3] : Shira [FreeTextEntry1] : Patient for followup visit, last Telehealth visit on 7/17/20. Patient is accompanied by her daughter Shira, patient reports that she is well, has no complaints, gets little testy when asked questions about her activities, reports that she has been keeping herself busy, has no recollection of who the president is?  \par \par As per the daughter, she has has slight decline, in her day-to-day activities, is walking less and eating less, she has a full-time aide who helps with all basic needs and ADLs, pt needs prompting for showers, she has not has any visual hallucinations or sleep disturbance.\par \par Patient continues to take Donepezil and memantine, in addition to citalopram for anxiety and mood. \par  \par

## 2021-03-26 NOTE — DISCUSSION/SUMMARY
[FreeTextEntry1] : 85-year-old female with history of mildly progressive cognitive decline since past 5 years, has noted significant decline in cognition and functioning in the recent past. \par \par #1) Dementia - Alzheimers; progressive.\par \par - Continue  Aricept 10 mg q.d. Namenda 10 mg bid,  \par \par #2) Generalized anxiety disorder, she has responded well to citalopram 20 mg q.d.\par \par #3) Mild gait instability\par \par - I had a discussion with the patient and her daughter, recommended activities, daily short walks, recreational and cognitive exercises, discussed  safety factors\par \par -l followup with me in 6 months.\par

## 2021-03-26 NOTE — REVIEW OF SYSTEMS
[Recent Weight Loss (___ Lbs)] : recent [unfilled] ~Ulb weight loss [Confused or Disoriented] : no confusion [Memory Lapses or Loss] : memory loss [Decr. Concentrating Ability] : decreased concentrating ability [Poor Coordination] : poor coordination [Difficulty Writing] : difficulty writing [Anxiety] : anxiety [Change In Personality] : personality change [Vomiting] : no vomiting [Heartburn] : no heartburn [Negative] : Heme/Lymph [FreeTextEntry9] : LBP

## 2021-03-26 NOTE — REASON FOR VISIT
[Follow-Up: _____] : a [unfilled] follow-up visit [Family Member] : family member [FreeTextEntry1] : Dementia

## 2021-03-26 NOTE — PHYSICAL EXAM
[General Appearance - Alert] : alert [General Appearance - In No Acute Distress] : in no acute distress [FreeTextEntry1] : interactive BUT IRRITABLE [Person] : oriented to person [Cranial Nerves Optic (II)] : visual acuity intact bilaterally,  visual fields full to confrontation, pupils equal round and reactive to light [Cranial Nerves Oculomotor (III)] : extraocular motion intact [Cranial Nerves Facial (VII)] : face symmetrical [Cranial Nerves Vestibulocochlear (VIII)] : hearing was intact bilaterally [Cranial Nerves Accessory (XI - Cranial And Spinal)] : head turning and shoulder shrug symmetric [Cranial Nerves Hypoglossal (XII)] : there was no tongue deviation with protrusion [FreeTextEntry4] : the patient is alert, attentive, follow simple commands, language / repetition intact, mildly hesitant speech at times. Does not remember who the president is, short-term and intermediate recall deficits, Knows change of season [FreeTextEntry6] : Limited virtual sensory/motor exam.

## 2021-03-26 NOTE — DATA REVIEWED
[de-identified] : Cognitive assessment [employer]; 4/14/15.\par Global cognitive score: 77.7, \par More than one standard deviation below average in domains: Memory 79.1, exec function: 72.2,  attention: 59.4\par Above average in cognitive domains: Visual-spatial 100.2\par \par Cognitive assessment  [employer]x, MCI battery; 1/22/13\par Global cognitive score: 85.7, \par More than one standard deviation below average in domains: Memory 84.6, visuospatial 74\par Below average and domains: Executive function 95, extension 89.1 [de-identified] : 9/4/16: CT head: No intracranial hemorrhage mass effect or midline shift, unchanged microvascular disease\par CT C-spine: No acute fracture or dislocation, mild multilevel cervical spondylosis which appears unchanged

## 2021-04-05 ENCOUNTER — RX RENEWAL (OUTPATIENT)
Age: 86
End: 2021-04-05

## 2021-04-07 NOTE — CONSULT LETTER
Hpi Title: Evaluation of Skin Lesions
[Dear  ___] : Dear  [unfilled],
How Severe Are Your Spot(S)?: mild
Have Your Spot(S) Been Treated In The Past?: has not been treated

## 2021-06-07 ENCOUNTER — APPOINTMENT (OUTPATIENT)
Dept: MAMMOGRAPHY | Facility: CLINIC | Age: 86
End: 2021-06-07
Payer: MEDICARE

## 2021-06-07 ENCOUNTER — OUTPATIENT (OUTPATIENT)
Dept: OUTPATIENT SERVICES | Facility: HOSPITAL | Age: 86
LOS: 1 days | End: 2021-06-07
Payer: MEDICARE

## 2021-06-07 ENCOUNTER — APPOINTMENT (OUTPATIENT)
Dept: ULTRASOUND IMAGING | Facility: CLINIC | Age: 86
End: 2021-06-07
Payer: MEDICARE

## 2021-06-07 DIAGNOSIS — Z12.31 ENCOUNTER FOR SCREENING MAMMOGRAM FOR MALIGNANT NEOPLASM OF BREAST: ICD-10-CM

## 2021-06-07 DIAGNOSIS — Z98.89 OTHER SPECIFIED POSTPROCEDURAL STATES: Chronic | ICD-10-CM

## 2021-06-07 PROCEDURE — 76641 ULTRASOUND BREAST COMPLETE: CPT

## 2021-06-07 PROCEDURE — 76641 ULTRASOUND BREAST COMPLETE: CPT | Mod: 26,50

## 2021-06-30 ENCOUNTER — RX RENEWAL (OUTPATIENT)
Age: 86
End: 2021-06-30

## 2021-07-17 ENCOUNTER — TRANSCRIPTION ENCOUNTER (OUTPATIENT)
Age: 86
End: 2021-07-17

## 2021-08-04 ENCOUNTER — APPOINTMENT (OUTPATIENT)
Dept: DERMATOLOGY | Facility: CLINIC | Age: 86
End: 2021-08-04
Payer: MEDICARE

## 2021-08-04 ENCOUNTER — NON-APPOINTMENT (OUTPATIENT)
Age: 86
End: 2021-08-04

## 2021-08-04 DIAGNOSIS — D48.5 NEOPLASM OF UNCERTAIN BEHAVIOR OF SKIN: ICD-10-CM

## 2021-08-04 DIAGNOSIS — D18.01 HEMANGIOMA OF SKIN AND SUBCUTANEOUS TISSUE: ICD-10-CM

## 2021-08-04 DIAGNOSIS — L82.1 OTHER SEBORRHEIC KERATOSIS: ICD-10-CM

## 2021-08-04 PROCEDURE — 11102 TANGNTL BX SKIN SINGLE LES: CPT

## 2021-08-04 PROCEDURE — 99203 OFFICE O/P NEW LOW 30 MIN: CPT | Mod: 25

## 2021-08-04 NOTE — HISTORY OF PRESENT ILLNESS
[FreeTextEntry1] : Patient presents for skin examination. [de-identified] : Denies new, changing, bleeding or tender lesions on the skin over the past year.\par

## 2021-08-04 NOTE — ASSESSMENT
[FreeTextEntry1] : A complete skin examination was performed.  We discussed the importance of photoprotection, including the use of hats, protective clothing and sunscreens with an SPF of at least 30.  Sun avoidance was also discussed.  The ABCDE's of melanoma was discussed with the patient.  Regular skin exams are encouraged.\par \par R/O Cystic BCC - left  malar.\par Plan D&C if positive.

## 2021-08-04 NOTE — PHYSICAL EXAM
[Alert] : alert [Oriented x 3] : ~L oriented x 3 [Well Nourished] : well nourished [Full Body Skin Exam Performed] : performed [FreeTextEntry3] : A full skin exam was performed including the scalp, face, neck, chest, abdomen, back, buttocks, upper extremities and lower extremities.  The patient declined examination of the breasts and genitalia.  \par The exam did show the following benign growths:\par Seborrheic keratoses.\par Cherry angioma.\par \par Whitish papule with fine telangiectasias, left malar region.\par \par

## 2021-08-11 LAB — CORE LAB BIOPSY: NORMAL

## 2021-10-14 ENCOUNTER — EMERGENCY (EMERGENCY)
Facility: HOSPITAL | Age: 86
LOS: 0 days | Discharge: HOME | End: 2021-10-14
Attending: FAMILY MEDICINE
Payer: MEDICARE

## 2021-10-14 VITALS — WEIGHT: 130.07 LBS | HEIGHT: 62 IN

## 2021-10-14 VITALS
TEMPERATURE: 98 F | OXYGEN SATURATION: 99 % | DIASTOLIC BLOOD PRESSURE: 60 MMHG | RESPIRATION RATE: 18 BRPM | HEART RATE: 66 BPM | SYSTOLIC BLOOD PRESSURE: 105 MMHG

## 2021-10-14 DIAGNOSIS — R68.83 CHILLS (WITHOUT FEVER): ICD-10-CM

## 2021-10-14 DIAGNOSIS — R05.9 COUGH, UNSPECIFIED: ICD-10-CM

## 2021-10-14 DIAGNOSIS — N39.0 URINARY TRACT INFECTION, SITE NOT SPECIFIED: ICD-10-CM

## 2021-10-14 DIAGNOSIS — Z98.89 OTHER SPECIFIED POSTPROCEDURAL STATES: Chronic | ICD-10-CM

## 2021-10-14 DIAGNOSIS — R53.1 WEAKNESS: ICD-10-CM

## 2021-10-14 DIAGNOSIS — E78.00 PURE HYPERCHOLESTEROLEMIA, UNSPECIFIED: ICD-10-CM

## 2021-10-14 LAB
ALBUMIN SERPL ELPH-MCNC: 3.4 G/DL — SIGNIFICANT CHANGE UP (ref 3.3–5)
ALP SERPL-CCNC: 87 U/L — SIGNIFICANT CHANGE UP (ref 40–120)
ALT FLD-CCNC: 19 U/L — SIGNIFICANT CHANGE UP (ref 12–78)
ANION GAP SERPL CALC-SCNC: 4 MMOL/L — LOW (ref 5–17)
APPEARANCE UR: CLEAR — SIGNIFICANT CHANGE UP
AST SERPL-CCNC: 20 U/L — SIGNIFICANT CHANGE UP (ref 15–37)
BASOPHILS # BLD AUTO: 0.07 K/UL — SIGNIFICANT CHANGE UP (ref 0–0.2)
BASOPHILS NFR BLD AUTO: 0.9 % — SIGNIFICANT CHANGE UP (ref 0–2)
BILIRUB SERPL-MCNC: 0.4 MG/DL — SIGNIFICANT CHANGE UP (ref 0.2–1.2)
BILIRUB UR-MCNC: NEGATIVE — SIGNIFICANT CHANGE UP
BUN SERPL-MCNC: 21 MG/DL — SIGNIFICANT CHANGE UP (ref 7–23)
CALCIUM SERPL-MCNC: 9.4 MG/DL — SIGNIFICANT CHANGE UP (ref 8.5–10.1)
CHLORIDE SERPL-SCNC: 109 MMOL/L — HIGH (ref 96–108)
CO2 SERPL-SCNC: 28 MMOL/L — SIGNIFICANT CHANGE UP (ref 22–31)
COLOR SPEC: YELLOW — SIGNIFICANT CHANGE UP
CREAT SERPL-MCNC: 0.99 MG/DL — SIGNIFICANT CHANGE UP (ref 0.5–1.3)
DIFF PNL FLD: ABNORMAL
EOSINOPHIL # BLD AUTO: 0.17 K/UL — SIGNIFICANT CHANGE UP (ref 0–0.5)
EOSINOPHIL NFR BLD AUTO: 2.3 % — SIGNIFICANT CHANGE UP (ref 0–6)
GLUCOSE SERPL-MCNC: 93 MG/DL — SIGNIFICANT CHANGE UP (ref 70–99)
GLUCOSE UR QL: NEGATIVE MG/DL — SIGNIFICANT CHANGE UP
HCT VFR BLD CALC: 42.1 % — SIGNIFICANT CHANGE UP (ref 34.5–45)
HGB BLD-MCNC: 14 G/DL — SIGNIFICANT CHANGE UP (ref 11.5–15.5)
IMM GRANULOCYTES NFR BLD AUTO: 0.3 % — SIGNIFICANT CHANGE UP (ref 0–1.5)
KETONES UR-MCNC: NEGATIVE — SIGNIFICANT CHANGE UP
LEUKOCYTE ESTERASE UR-ACNC: ABNORMAL
LYMPHOCYTES # BLD AUTO: 2.68 K/UL — SIGNIFICANT CHANGE UP (ref 1–3.3)
LYMPHOCYTES # BLD AUTO: 36 % — SIGNIFICANT CHANGE UP (ref 13–44)
MCHC RBC-ENTMCNC: 33.3 GM/DL — SIGNIFICANT CHANGE UP (ref 32–36)
MCHC RBC-ENTMCNC: 33.3 PG — SIGNIFICANT CHANGE UP (ref 27–34)
MCV RBC AUTO: 100.2 FL — HIGH (ref 80–100)
MONOCYTES # BLD AUTO: 0.84 K/UL — SIGNIFICANT CHANGE UP (ref 0–0.9)
MONOCYTES NFR BLD AUTO: 11.3 % — SIGNIFICANT CHANGE UP (ref 2–14)
NEUTROPHILS # BLD AUTO: 3.66 K/UL — SIGNIFICANT CHANGE UP (ref 1.8–7.4)
NEUTROPHILS NFR BLD AUTO: 49.2 % — SIGNIFICANT CHANGE UP (ref 43–77)
NITRITE UR-MCNC: NEGATIVE — SIGNIFICANT CHANGE UP
NT-PROBNP SERPL-SCNC: 225 PG/ML — SIGNIFICANT CHANGE UP (ref 0–450)
PH UR: 7 — SIGNIFICANT CHANGE UP (ref 5–8)
PLATELET # BLD AUTO: 217 K/UL — SIGNIFICANT CHANGE UP (ref 150–400)
POTASSIUM SERPL-MCNC: 4.3 MMOL/L — SIGNIFICANT CHANGE UP (ref 3.5–5.3)
POTASSIUM SERPL-SCNC: 4.3 MMOL/L — SIGNIFICANT CHANGE UP (ref 3.5–5.3)
PROT SERPL-MCNC: 7 GM/DL — SIGNIFICANT CHANGE UP (ref 6–8.3)
PROT UR-MCNC: 30 MG/DL
RBC # BLD: 4.2 M/UL — SIGNIFICANT CHANGE UP (ref 3.8–5.2)
RBC # FLD: 12.8 % — SIGNIFICANT CHANGE UP (ref 10.3–14.5)
SARS-COV-2 RNA SPEC QL NAA+PROBE: SIGNIFICANT CHANGE UP
SODIUM SERPL-SCNC: 141 MMOL/L — SIGNIFICANT CHANGE UP (ref 135–145)
SP GR SPEC: 1.01 — SIGNIFICANT CHANGE UP (ref 1.01–1.02)
TROPONIN I, HIGH SENSITIVITY RESULT: 9.52 NG/L — SIGNIFICANT CHANGE UP
UROBILINOGEN FLD QL: NEGATIVE MG/DL — SIGNIFICANT CHANGE UP
WBC # BLD: 7.44 K/UL — SIGNIFICANT CHANGE UP (ref 3.8–10.5)
WBC # FLD AUTO: 7.44 K/UL — SIGNIFICANT CHANGE UP (ref 3.8–10.5)

## 2021-10-14 PROCEDURE — 80053 COMPREHEN METABOLIC PANEL: CPT

## 2021-10-14 PROCEDURE — 36415 COLL VENOUS BLD VENIPUNCTURE: CPT

## 2021-10-14 PROCEDURE — 93010 ELECTROCARDIOGRAM REPORT: CPT

## 2021-10-14 PROCEDURE — U0003: CPT

## 2021-10-14 PROCEDURE — 87086 URINE CULTURE/COLONY COUNT: CPT

## 2021-10-14 PROCEDURE — 84484 ASSAY OF TROPONIN QUANT: CPT

## 2021-10-14 PROCEDURE — 85025 COMPLETE CBC W/AUTO DIFF WBC: CPT

## 2021-10-14 PROCEDURE — 99285 EMERGENCY DEPT VISIT HI MDM: CPT

## 2021-10-14 PROCEDURE — 93005 ELECTROCARDIOGRAM TRACING: CPT

## 2021-10-14 PROCEDURE — 71045 X-RAY EXAM CHEST 1 VIEW: CPT

## 2021-10-14 PROCEDURE — U0005: CPT

## 2021-10-14 PROCEDURE — 99284 EMERGENCY DEPT VISIT MOD MDM: CPT | Mod: 25

## 2021-10-14 PROCEDURE — 71045 X-RAY EXAM CHEST 1 VIEW: CPT | Mod: 26

## 2021-10-14 PROCEDURE — 81001 URINALYSIS AUTO W/SCOPE: CPT

## 2021-10-14 PROCEDURE — 83880 ASSAY OF NATRIURETIC PEPTIDE: CPT

## 2021-10-14 RX ORDER — CEPHALEXIN 500 MG
500 CAPSULE ORAL ONCE
Refills: 0 | Status: COMPLETED | OUTPATIENT
Start: 2021-10-14 | End: 2021-10-14

## 2021-10-14 RX ORDER — CEPHALEXIN 500 MG
1 CAPSULE ORAL
Qty: 14 | Refills: 0
Start: 2021-10-14 | End: 2021-10-20

## 2021-10-14 RX ADMIN — Medication 500 MILLIGRAM(S): at 21:22

## 2021-10-14 NOTE — ED ADULT NURSE NOTE - CHIEF COMPLAINT QUOTE
c/o cough, chills this morning, and lethargy started today, O2 sat at home 93% on RA, denies fever HX; dementia, perforated stomach ulcer, unsteady gait, chronic back pain, patient's daughter Shira Navarro #[phone]

## 2021-10-14 NOTE — ED PROVIDER NOTE - NSFOLLOWUPINSTRUCTIONS_ED_ALL_ED_FT
Take Keflex 500mg twice daily . Drink lots of fluids. Follow up with your doctor. Return to er if worse.

## 2021-10-14 NOTE — ED PROVIDER NOTE - CLINICAL SUMMARY MEDICAL DECISION MAKING FREE TEXT BOX
Pt with chills this morning. Pt with chills this morning and lethargy with hx of dementia. Labs, UA, CXR.

## 2021-10-14 NOTE — ED PROVIDER NOTE - NSICDXPASTSURGICALHX_GEN_ALL_CORE_FT
PAST SURGICAL HISTORY:  S/P appendectomy childhood    S/P colonoscopy every 2 or 3 years    S/P D&C (status post dilation and curettage) age 32    S/P tonsillectomy childhood

## 2021-10-14 NOTE — ED ADULT TRIAGE NOTE - CHIEF COMPLAINT QUOTE
c/o cough, chills this morning, and lethargy started today, O2 sat at home 93% on RA, denies fever HX; dementia, perforated stomach ulcer, unsteady gait, chronic back pain, patient's daughter Shira Navarro #[phone]

## 2021-10-14 NOTE — ED ADULT NURSE NOTE - OBJECTIVE STATEMENT
PT to ED for reports from daughter PT was lethargic and a cough was noted. PT complained of chills, no fever. Denies SOB and chest pain at this time. No distress. PT confused at baseline. Will monitor and reassess

## 2021-10-14 NOTE — ED PROVIDER NOTE - NSICDXPASTMEDICALHX_GEN_ALL_CORE_FT
PAST MEDICAL HISTORY:  Alzheimer disease     Glaucoma     Hypercholesteremia     Osteoarthritis of right hip       Dementia

## 2021-10-14 NOTE — ED PROVIDER NOTE - OBJECTIVE STATEMENT
pt has dementia and is with daughtrr. this mornign she had chills and was weak. monitoring her. no fever. very lethargic. waching pulse ox and monitor temperature. no hx of covid. covid vaccinated. Dr. Hughes. appointment was at 2pm but couldn't get pt to appointment. still very largic but oriented. chills were unusual so wanted to get evaluated. no prob elsm urinatingno diarrhea, no vomiting. hx perforated stomach ulcer. 85 y/o female with PMHx of dementia, Alzheimer disease, glaucoma, hypercholesteremia, osteoarthritis of right hip, perforated stomach ulcer presents to the ED c/o cough. Per daughter, this morning pt had chills and was weak and lethargic so she monitored temperature and O2 sat. Pt has no hx of covid. Pt received both doses of COVID vaccine. PCP is Dr. Hughes. Pt had appointment today at 2pm but couldn't get up to go. Pt still feels very lethargic but oriented. Because chills were unusual, daughter brought pt in to get evaluated. Pt denies difficulty urinating, fever, diarrhea, vomiting.

## 2021-10-14 NOTE — ED PROVIDER NOTE - PATIENT PORTAL LINK FT
You can access the FollowMyHealth Patient Portal offered by [facility] by registering at the following website: [email or web address]. By joining [facility]’s FollowMyHealth portal, you will also be able to view your health information using other applications (apps) compatible with our system.

## 2021-10-14 NOTE — ED PROVIDER NOTE - CONSTITUTIONAL, MLM
Well appearing, awake, alert, oriented to person, place, time/situation and in no apparent distress. normal... Well appearing, awake, alert, oriented to person, place, time/situation, slightly confused and in no apparent distress.

## 2021-10-15 LAB
CULTURE RESULTS: SIGNIFICANT CHANGE UP
SPECIMEN SOURCE: SIGNIFICANT CHANGE UP

## 2021-10-16 ENCOUNTER — RX RENEWAL (OUTPATIENT)
Age: 86
End: 2021-10-16

## 2021-11-01 ENCOUNTER — APPOINTMENT (OUTPATIENT)
Dept: NEUROLOGY | Facility: CLINIC | Age: 86
End: 2021-11-01
Payer: MEDICARE

## 2021-11-01 VITALS
HEIGHT: 63 IN | HEART RATE: 60 BPM | TEMPERATURE: 97.8 F | SYSTOLIC BLOOD PRESSURE: 104 MMHG | WEIGHT: 125 LBS | DIASTOLIC BLOOD PRESSURE: 64 MMHG | BODY MASS INDEX: 22.15 KG/M2

## 2021-11-01 DIAGNOSIS — F41.9 ANXIETY DISORDER, UNSPECIFIED: ICD-10-CM

## 2021-11-01 PROCEDURE — 99214 OFFICE O/P EST MOD 30 MIN: CPT

## 2021-11-01 NOTE — REASON FOR VISIT
[Follow-Up: _____] : a [unfilled] follow-up visit [Family Member] : family member [FreeTextEntry1] : for Alz dementia

## 2021-11-01 NOTE — DATA REVIEWED
[de-identified] : Cognitive assessment [employer]; 4/14/15.\par Global cognitive score: 77.7, \par More than one standard deviation below average in domains: Memory 79.1, exec function: 72.2,  attention: 59.4\par Above average in cognitive domains: Visual-spatial 100.2\par \par Cognitive assessment  [employer]x, MCI battery; 1/22/13\par Global cognitive score: 85.7, \par More than one standard deviation below average in domains: Memory 84.6, visuospatial 74\par Below average and domains: Executive function 95, extension 89.1 [de-identified] : 9/4/16: CT head: No intracranial hemorrhage mass effect or midline shift, unchanged microvascular disease\par CT C-spine: No acute fracture or dislocation, mild multilevel cervical spondylosis which appears unchanged

## 2021-11-01 NOTE — HISTORY OF PRESENT ILLNESS
[FreeTextEntry1] : The patient is here for a followup visit today, last telehealth visit on 3/26/21. She is accompanied by her daughter Shira. Patient is pleasant and co-operative, reports no complaints.\par \par As per the daughter, she has had some decline, gets more confused especially when she is tired, she is less active, also has urinary incontinence, she has a full-time aides who helps with all basic needs and ADLs. Pt needs assist with showers and other ADL'S, she is able to feed herself, is able to converse well, follows all commands and is well oriented to her family. She has not has any visual hallucinations or sleep disturbance, appetite is good. She can walk within her home, for outside activities and longer stretches uses a wheelchair.\par \par also informs me that she has had few UTIs, giving these patients she gets more confused\par \par Patient continues to take Donepezil and memantine, in addition to citalopram for anxiety and mood. \par \par \par

## 2021-11-01 NOTE — PHYSICAL EXAM
[General Appearance - In No Acute Distress] : in no acute distress [PERRL With Normal Accommodation] : pupils were equal in size, round, reactive to light, with normal accommodation [Extraocular Movements] : extraocular movements were intact [No APD] : no afferent pupillary defect [Hearing Threshold Finger Rub Not Grainger] : hearing was normal [] : the neck was supple [General Appearance - Alert] : alert [FreeTextEntry1] : co-operative, poor insight [FreeTextEntry4] : Alert, attentive, follows simple commands, intact comprehension, at times dysatriculate speech, oriented to self and season, not to year she does not remember who the president is, she can recall her childrens' names [FreeTextEntry5] : 11- X11 normal. [FreeTextEntry6] : sensory/motor/coordination:  systems grossly normal. [FreeTextEntry8] : Gait/balance: Hunched posture, walks with broad-base small steps

## 2021-11-01 NOTE — DISCUSSION/SUMMARY
[FreeTextEntry1] : 86-year-old female with history of mildly progressive cognitive decline since past 5 years, has noted significant decline in cognition and functioning in the recent past. \par \par #1) Dementia - Alzheimers; mildly progressive.\par \par - Continue  Aricept 10 mg q.d. Namenda 10 mg bid,  \par \par #2) Generalized anxiety disorder, good response with citalopram 20 mg q.d.\par \par #3) Mild gait instability\par \par - I had a discussion with the patient and her daughter, recommended activities, daily short walks, recreational and cognitive exercises, discussed  safety factors\par \par -l followup with me in 6 months.\par

## 2021-11-01 NOTE — REVIEW OF SYSTEMS
[Recent Weight Loss (___ Lbs)] : recent [unfilled] ~Ulb weight loss [Memory Lapses or Loss] : memory loss [Decr. Concentrating Ability] : decreased concentrating ability [Poor Coordination] : poor coordination [Difficulty Writing] : difficulty writing [Change In Personality] : personality change [Negative] : Heme/Lymph [As Noted in HPI] : as noted in HPI [Confused or Disoriented] : confusion [Incontinence] : incontinence [Heartburn] : no heartburn

## 2021-12-08 ENCOUNTER — RX RENEWAL (OUTPATIENT)
Age: 86
End: 2021-12-08

## 2022-01-09 NOTE — ED PROVIDER NOTE - CHPI ED SYMPTOMS POS
Discharge teaching for vomiting and URI provided to mother utilizing hospital . Reviewed home care, use of cool mist humidifier, use of saline drops with nasal suctioning, importance of hydration and when to return to ED with worsening symptoms. Rx for zofran sent to preferred pharmacy, instruction provided. Tylenol and Motrin dosing discussed - dosing sheet provided. Instructed on importance of follow up care with Quincy Jeronimo M.D.  [address] [place] [id number] [phone]           All questions answered, mother verbalizes understanding to all teaching. Copy of discharge paperwork provided. Signed copy in chart. Armband removed. Pt alert, pink, interactive and in NAD. Carried out of department with mother in stable condition.     BACK PAIN

## 2022-03-25 PROBLEM — F03.90 UNSPECIFIED DEMENTIA, UNSPECIFIED SEVERITY, WITHOUT BEHAVIORAL DISTURBANCE, PSYCHOTIC DISTURBANCE, MOOD DISTURBANCE, AND ANXIETY: Chronic | Status: ACTIVE | Noted: 2021-10-15

## 2022-03-28 ENCOUNTER — APPOINTMENT (OUTPATIENT)
Dept: ORTHOPEDIC SURGERY | Facility: CLINIC | Age: 87
End: 2022-03-28

## 2022-03-31 ENCOUNTER — APPOINTMENT (OUTPATIENT)
Dept: ORTHOPEDIC SURGERY | Facility: CLINIC | Age: 87
End: 2022-03-31
Payer: MEDICARE

## 2022-03-31 DIAGNOSIS — M19.012 PRIMARY OSTEOARTHRITIS, LEFT SHOULDER: ICD-10-CM

## 2022-03-31 PROCEDURE — 73030 X-RAY EXAM OF SHOULDER: CPT | Mod: 26,LT

## 2022-03-31 PROCEDURE — 99202 OFFICE O/P NEW SF 15 MIN: CPT

## 2022-04-01 DIAGNOSIS — Z82.62 FAMILY HISTORY OF OSTEOPOROSIS: ICD-10-CM

## 2022-04-01 DIAGNOSIS — Z87.39 PERSONAL HISTORY OF OTHER DISEASES OF THE MUSCULOSKELETAL SYSTEM AND CONNECTIVE TISSUE: ICD-10-CM

## 2022-04-01 DIAGNOSIS — Z78.9 OTHER SPECIFIED HEALTH STATUS: ICD-10-CM

## 2022-04-05 VITALS — HEIGHT: 63 IN | WEIGHT: 125 LBS | BODY MASS INDEX: 22.15 KG/M2

## 2022-04-05 NOTE — HISTORY OF PRESENT ILLNESS
[(no relief)  0] : the relief from medicine is 0/10 (no relief) [3] : the ailment interference is 3/10 [de-identified] : The patient comes in today with her daughter for left shoulder pain.  She does have a history of dementia and is in a wheelchair.  She states she has a lot of left shoulder pain.  The patient states the onset/injury occurred on 03/01/22.  This injury is not work related or due to an automobile accident.  The patient states the pain seems to be a sharp pain when she moves it in certain ways.   [de-identified] : Moving certain ways, putting on coat or shirt [de-identified] : Massage [] : No [de-identified] : Physical therapy [de-identified] : Tylenol

## 2022-04-05 NOTE — PHYSICAL EXAM
[Wheelchair] : uses a wheelchair [de-identified] : Left Shoulder: Range of motion is about 90 actively and passively 115 to 120.  Neurovascular and neurologic within normal limits.  Internal rotation is definitely limited to about 15 degrees.  Negative crank test.  Negative O’Jeff’s test.  Negative Speed’s test. Negative Yergason’s test.  Positive cross arm test.  Positive tenderness to palpation over the AC joint. Positive Hawkin’s sign.  Positive Neer’s sign. Negative apprehension. Negative sulcus sign.  No gross neurological or vascular deficits distally.  Skin is intact.  No rashes, scars or lesions. 2+ radial and ulnar pulses. No extra-articular swelling or tenderness.\par \par Right Shoulder: \par Shoulder: Range of Motion in Degrees:   	\par    	                        Claimant:          Normal:  	\par Abduction (Active)  	180  	180 degrees  	\par Abduction (Passive)  	180  	180 degrees  	\par Forward elevation (Active):  	180  	180 degrees  	\par Forward elevation (Passive):  180  	180 degrees  	\par External rotation (Active):  	45  	45 degrees  	\par External rotation (Passive):  	45  	45 degrees  	\par Internal rotation (Active):  	L-1  	L-1  	\par Internal rotation (Passive):  	L-1  	L-1  \par 	\par No motor weakness to internal rotation, external rotation or abduction in the scapular plane.  Negative crank test.  Negative O’Jeff’s test.  Negative Speed’s test. Negative Yergason’s test.  Negative cross arm test.  No tenderness to palpation at the AC joint. Negative Hawkin’s sign.  Negative Neer’s sign.  Negative apprehension. Negative sulcus sign.  No gross neurological or vascular deficits distally.  Skin is intact.  No rashes, scars or lesions.  2+ radial and ulnar pulses. No extra-articular swelling or tenderness. \par \par   [de-identified] : Appearance:  Well developed, well-nourished female in no acute distress.\par   [de-identified] : Radiographs, two views of the left shoulder, reveal osteoarthritis.\par

## 2022-04-05 NOTE — ADDENDUM
[FreeTextEntry1] : This note was written by Cristi Mahoney on 04/05/2022, acting as a scribe for NAHID BUNN, NAYANA/L, PA

## 2022-04-05 NOTE — DISCUSSION/SUMMARY
[de-identified] : At this time, the patient presents with left shoulder osteoarthritis.  We are going to do conservative treatment with just topical cream and home exercises.  She gets some physical therapy at home anyway.  She will return to the office only as needed.\par

## 2022-04-09 ENCOUNTER — RX RENEWAL (OUTPATIENT)
Age: 87
End: 2022-04-09

## 2022-05-05 ENCOUNTER — EMERGENCY (EMERGENCY)
Facility: HOSPITAL | Age: 87
LOS: 0 days | Discharge: HOME | End: 2022-05-05
Attending: EMERGENCY MEDICINE
Payer: MEDICARE

## 2022-05-05 VITALS
SYSTOLIC BLOOD PRESSURE: 111 MMHG | TEMPERATURE: 98 F | DIASTOLIC BLOOD PRESSURE: 43 MMHG | OXYGEN SATURATION: 100 % | HEART RATE: 60 BPM | RESPIRATION RATE: 17 BRPM

## 2022-05-05 VITALS
WEIGHT: 139.99 LBS | HEIGHT: 62 IN | RESPIRATION RATE: 18 BRPM | SYSTOLIC BLOOD PRESSURE: 137 MMHG | TEMPERATURE: 98 F | DIASTOLIC BLOOD PRESSURE: 88 MMHG | OXYGEN SATURATION: 95 % | HEART RATE: 80 BPM

## 2022-05-05 DIAGNOSIS — F02.80 DEMENTIA IN OTHER DISEASES CLASSIFIED ELSEWHERE, UNSPECIFIED SEVERITY, WITHOUT BEHAVIORAL DISTURBANCE, PSYCHOTIC DISTURBANCE, MOOD DISTURBANCE, AND ANXIETY: ICD-10-CM

## 2022-05-05 DIAGNOSIS — R19.7 DIARRHEA, UNSPECIFIED: ICD-10-CM

## 2022-05-05 DIAGNOSIS — M16.11 UNILATERAL PRIMARY OSTEOARTHRITIS, RIGHT HIP: ICD-10-CM

## 2022-05-05 DIAGNOSIS — R53.83 OTHER FATIGUE: ICD-10-CM

## 2022-05-05 DIAGNOSIS — Z20.822 CONTACT WITH AND (SUSPECTED) EXPOSURE TO COVID-19: ICD-10-CM

## 2022-05-05 DIAGNOSIS — E78.00 PURE HYPERCHOLESTEROLEMIA, UNSPECIFIED: ICD-10-CM

## 2022-05-05 DIAGNOSIS — Z98.89 OTHER SPECIFIED POSTPROCEDURAL STATES: Chronic | ICD-10-CM

## 2022-05-05 DIAGNOSIS — Z87.59 PERSONAL HISTORY OF OTHER COMPLICATIONS OF PREGNANCY, CHILDBIRTH AND THE PUERPERIUM: ICD-10-CM

## 2022-05-05 DIAGNOSIS — Z90.49 ACQUIRED ABSENCE OF OTHER SPECIFIED PARTS OF DIGESTIVE TRACT: ICD-10-CM

## 2022-05-05 DIAGNOSIS — E78.5 HYPERLIPIDEMIA, UNSPECIFIED: ICD-10-CM

## 2022-05-05 DIAGNOSIS — G30.9 ALZHEIMER'S DISEASE, UNSPECIFIED: ICD-10-CM

## 2022-05-05 DIAGNOSIS — Z87.440 PERSONAL HISTORY OF URINARY (TRACT) INFECTIONS: ICD-10-CM

## 2022-05-05 LAB
ALBUMIN SERPL ELPH-MCNC: 3.3 G/DL — SIGNIFICANT CHANGE UP (ref 3.3–5)
ALP SERPL-CCNC: 99 U/L — SIGNIFICANT CHANGE UP (ref 40–120)
ALT FLD-CCNC: 23 U/L — SIGNIFICANT CHANGE UP (ref 12–78)
ANION GAP SERPL CALC-SCNC: 5 MMOL/L — SIGNIFICANT CHANGE UP (ref 5–17)
APPEARANCE UR: CLEAR — SIGNIFICANT CHANGE UP
APTT BLD: 31 SEC — SIGNIFICANT CHANGE UP (ref 27.5–35.5)
AST SERPL-CCNC: 21 U/L — SIGNIFICANT CHANGE UP (ref 15–37)
BASOPHILS # BLD AUTO: 0.05 K/UL — SIGNIFICANT CHANGE UP (ref 0–0.2)
BASOPHILS NFR BLD AUTO: 0.7 % — SIGNIFICANT CHANGE UP (ref 0–2)
BILIRUB SERPL-MCNC: 0.4 MG/DL — SIGNIFICANT CHANGE UP (ref 0.2–1.2)
BILIRUB UR-MCNC: NEGATIVE — SIGNIFICANT CHANGE UP
BUN SERPL-MCNC: 17 MG/DL — SIGNIFICANT CHANGE UP (ref 7–23)
CALCIUM SERPL-MCNC: 9.6 MG/DL — SIGNIFICANT CHANGE UP (ref 8.5–10.1)
CHLORIDE SERPL-SCNC: 112 MMOL/L — HIGH (ref 96–108)
CO2 SERPL-SCNC: 26 MMOL/L — SIGNIFICANT CHANGE UP (ref 22–31)
COLOR SPEC: YELLOW — SIGNIFICANT CHANGE UP
CREAT SERPL-MCNC: 0.91 MG/DL — SIGNIFICANT CHANGE UP (ref 0.5–1.3)
DIFF PNL FLD: NEGATIVE — SIGNIFICANT CHANGE UP
EGFR: 62 ML/MIN/1.73M2 — SIGNIFICANT CHANGE UP
EOSINOPHIL # BLD AUTO: 0.16 K/UL — SIGNIFICANT CHANGE UP (ref 0–0.5)
EOSINOPHIL NFR BLD AUTO: 2.2 % — SIGNIFICANT CHANGE UP (ref 0–6)
GLUCOSE SERPL-MCNC: 91 MG/DL — SIGNIFICANT CHANGE UP (ref 70–99)
GLUCOSE UR QL: NEGATIVE — SIGNIFICANT CHANGE UP
HCT VFR BLD CALC: 41 % — SIGNIFICANT CHANGE UP (ref 34.5–45)
HGB BLD-MCNC: 13.9 G/DL — SIGNIFICANT CHANGE UP (ref 11.5–15.5)
IMM GRANULOCYTES NFR BLD AUTO: 0.3 % — SIGNIFICANT CHANGE UP (ref 0–1.5)
INR BLD: 1.06 RATIO — SIGNIFICANT CHANGE UP (ref 0.88–1.16)
KETONES UR-MCNC: NEGATIVE — SIGNIFICANT CHANGE UP
LEUKOCYTE ESTERASE UR-ACNC: ABNORMAL
LYMPHOCYTES # BLD AUTO: 2.16 K/UL — SIGNIFICANT CHANGE UP (ref 1–3.3)
LYMPHOCYTES # BLD AUTO: 30.3 % — SIGNIFICANT CHANGE UP (ref 13–44)
MCHC RBC-ENTMCNC: 33.6 PG — SIGNIFICANT CHANGE UP (ref 27–34)
MCHC RBC-ENTMCNC: 33.9 GM/DL — SIGNIFICANT CHANGE UP (ref 32–36)
MCV RBC AUTO: 99 FL — SIGNIFICANT CHANGE UP (ref 80–100)
MONOCYTES # BLD AUTO: 0.61 K/UL — SIGNIFICANT CHANGE UP (ref 0–0.9)
MONOCYTES NFR BLD AUTO: 8.6 % — SIGNIFICANT CHANGE UP (ref 2–14)
NEUTROPHILS # BLD AUTO: 4.13 K/UL — SIGNIFICANT CHANGE UP (ref 1.8–7.4)
NEUTROPHILS NFR BLD AUTO: 57.9 % — SIGNIFICANT CHANGE UP (ref 43–77)
NITRITE UR-MCNC: NEGATIVE — SIGNIFICANT CHANGE UP
PH UR: 6 — SIGNIFICANT CHANGE UP (ref 5–8)
PLATELET # BLD AUTO: 224 K/UL — SIGNIFICANT CHANGE UP (ref 150–400)
POTASSIUM SERPL-MCNC: 4.1 MMOL/L — SIGNIFICANT CHANGE UP (ref 3.5–5.3)
POTASSIUM SERPL-SCNC: 4.1 MMOL/L — SIGNIFICANT CHANGE UP (ref 3.5–5.3)
PROT SERPL-MCNC: 6.8 GM/DL — SIGNIFICANT CHANGE UP (ref 6–8.3)
PROT UR-MCNC: NEGATIVE — SIGNIFICANT CHANGE UP
PROTHROM AB SERPL-ACNC: 12.3 SEC — SIGNIFICANT CHANGE UP (ref 10.5–13.4)
RBC # BLD: 4.14 M/UL — SIGNIFICANT CHANGE UP (ref 3.8–5.2)
RBC # FLD: 12.5 % — SIGNIFICANT CHANGE UP (ref 10.3–14.5)
SODIUM SERPL-SCNC: 143 MMOL/L — SIGNIFICANT CHANGE UP (ref 135–145)
SP GR SPEC: 1.01 — SIGNIFICANT CHANGE UP (ref 1.01–1.02)
UROBILINOGEN FLD QL: NEGATIVE — SIGNIFICANT CHANGE UP
WBC # BLD: 7.13 K/UL — SIGNIFICANT CHANGE UP (ref 3.8–10.5)
WBC # FLD AUTO: 7.13 K/UL — SIGNIFICANT CHANGE UP (ref 3.8–10.5)

## 2022-05-05 PROCEDURE — 70450 CT HEAD/BRAIN W/O DYE: CPT | Mod: MA

## 2022-05-05 PROCEDURE — U0003: CPT

## 2022-05-05 PROCEDURE — 85610 PROTHROMBIN TIME: CPT

## 2022-05-05 PROCEDURE — 85025 COMPLETE CBC W/AUTO DIFF WBC: CPT

## 2022-05-05 PROCEDURE — U0005: CPT

## 2022-05-05 PROCEDURE — 36415 COLL VENOUS BLD VENIPUNCTURE: CPT

## 2022-05-05 PROCEDURE — 85730 THROMBOPLASTIN TIME PARTIAL: CPT

## 2022-05-05 PROCEDURE — 74177 CT ABD & PELVIS W/CONTRAST: CPT | Mod: MA

## 2022-05-05 PROCEDURE — 80053 COMPREHEN METABOLIC PANEL: CPT

## 2022-05-05 PROCEDURE — 87040 BLOOD CULTURE FOR BACTERIA: CPT | Mod: 91

## 2022-05-05 PROCEDURE — 81001 URINALYSIS AUTO W/SCOPE: CPT

## 2022-05-05 PROCEDURE — 99284 EMERGENCY DEPT VISIT MOD MDM: CPT | Mod: GC

## 2022-05-05 PROCEDURE — 93010 ELECTROCARDIOGRAM REPORT: CPT

## 2022-05-05 PROCEDURE — 70450 CT HEAD/BRAIN W/O DYE: CPT | Mod: 26,MA

## 2022-05-05 PROCEDURE — 82962 GLUCOSE BLOOD TEST: CPT

## 2022-05-05 PROCEDURE — 71045 X-RAY EXAM CHEST 1 VIEW: CPT

## 2022-05-05 PROCEDURE — 93005 ELECTROCARDIOGRAM TRACING: CPT

## 2022-05-05 PROCEDURE — 99285 EMERGENCY DEPT VISIT HI MDM: CPT | Mod: 25

## 2022-05-05 PROCEDURE — 87086 URINE CULTURE/COLONY COUNT: CPT

## 2022-05-05 PROCEDURE — 74177 CT ABD & PELVIS W/CONTRAST: CPT | Mod: 26,MA

## 2022-05-05 PROCEDURE — 71045 X-RAY EXAM CHEST 1 VIEW: CPT | Mod: 26

## 2022-05-05 RX ORDER — SODIUM CHLORIDE 9 MG/ML
1000 INJECTION INTRAMUSCULAR; INTRAVENOUS; SUBCUTANEOUS ONCE
Refills: 0 | Status: COMPLETED | OUTPATIENT
Start: 2022-05-05 | End: 2022-05-05

## 2022-05-05 RX ADMIN — SODIUM CHLORIDE 1000 MILLILITER(S): 9 INJECTION INTRAMUSCULAR; INTRAVENOUS; SUBCUTANEOUS at 14:54

## 2022-05-05 RX ADMIN — SODIUM CHLORIDE 2000 MILLILITER(S): 9 INJECTION INTRAMUSCULAR; INTRAVENOUS; SUBCUTANEOUS at 13:21

## 2022-05-05 NOTE — ED ADULT NURSE NOTE - OBJECTIVE STATEMENT
86 year old female found laying on stretcher.  pt's son states the aide had a problem waking her this morning.  took about 3 hours.  son states this has happened in the past but not this long.  pt only became responsive while EMS was at the house.  pt has dementia.

## 2022-05-05 NOTE — ED ADULT NURSE NOTE - INTERVENTIONS DEFINITIONS
[place] to call system/Instruct patient to call for assistance/Monitor for mental status changes and reorient to person, place, and time

## 2022-05-05 NOTE — ED PROVIDER NOTE - PHYSICAL EXAMINATION
PHYSICAL EXAM:  GENERAL: non-toxic appearing; in no respiratory distress  HEAD Atraumatic, Normocephalic; dry mucous membranes;  NECK: No JVD; trachea midline  EYES: PERRL, EOMs intact b/l w/out deficits; normal conjunctiva  CHEST/LUNG: CTAB no wheezes/rhonchi/rales  HEART: RRR no murmur/gallops/rubs  ABDOMEN: soft, NT, ND  EXTREMITIES: No LE edema, +2 radial pulses b/l, +2 DP/PT pulses b/l  MUSCULOSKELETAL: moving all 4 extremities spontaneously;   NERVOUS SYSTEM:  A&Ox1 (knows name), mildly agitated when performing exam; No motor deficits or sensory deficits;   SKIN:  Warm and dry as visualized

## 2022-05-05 NOTE — ED PROVIDER NOTE - ATTENDING CONTRIBUTION TO CARE
I, Brittany Alberto MD,  performed the initial face to face bedside interview with this patient regarding history of present illness, review of symptoms and relevant past medical, social and family history.  I completed an independent physical examination.  I was the initial provider who evaluated this patient. I have signed out the follow up of any pending tests (i.e. labs, radiological studies) to the resident.  I have communicated the patient’s plan of care and disposition with the resident.  The history, relevant review of systems, past medical and surgical history, medical decision making, and physical examination was documented by the scribe in my presence and I attest to the accuracy of the documentation.

## 2022-05-05 NOTE — ED PROVIDER NOTE - NS ED ATTENDING STATEMENT MOD
I have seen and examined this patient and fully participated in the care of this patient as the teaching attending.  The service was shared with the LYSSA.  I reviewed and verified the documentation and independently performed the documented: Attending with

## 2022-05-05 NOTE — ED ADULT TRIAGE NOTE - CHIEF COMPLAINT QUOTE
Pt BIBA with report of lethargy this am. Per EMS report, pt live in aide reports that she sometimes is "lethargic in the morning." Per report, pt continued to be lethargic over morning, but per EMS pt returned to more baseline status of alert confusion with hx of dementia.

## 2022-05-05 NOTE — ED PROVIDER NOTE - PROGRESS NOTE DETAILS
Renee Schwartz for attending Dr. Alberto: 87 y/o female with a PMHx of Alzheimer's disease, dementia, glaucoma, hypercholesterolemia, osteoarthritis, UTI presents to the ED for AMS. Per son, pt's aide had difficulty waking pt up this morning. Son went to the house, noticed pt was lethargic and EMS was called. Pt has had diarrhea x10 days. Denies abd pain. Exam with dry mucosa. No abd tenderness. Bartolo Thao MD. shared decision making with daughter at bedside re: admission vs dc. at this time pt is more awake and more towards her baseline. given negative w/u, daughter agreeable to be d/c'd home where she would be more oriented /comfortable. CT abd/pelvis noting the right urothelial enhancement however, UA is negative (has epithelial cells) and thus, would not treat at this time; pt is non toxic appearing and afebrile. A urine culture was also sent to follow up. ED evaluation and management discussed with the patient. Close PMD follow up encouraged.  Strict ED return instructions discussed in detail and patient given the opportunity to ask any questions about their discharge diagnosis and instructions. Patient verbalized understanding.

## 2022-05-05 NOTE — ED PROVIDER NOTE - OBJECTIVE STATEMENT
87 yo F PMHx Alzheimer's dementia (A&Ox1), HLD, presents to the ED with son for lethargy. Pt has 24/7 home aides and when the son called for an update, they told the son that the pt was not arousable. When the son got up, pt was still hard to arouse. By the time EMS arrived, pt became more awake. Yesterday, she was in her usual state of health. Of note, the pt had watery diarrhea x10 days recently resolved 3 days ago and has been drinking more water recently. No known reported fevers, cough, n/v/d. History is limited due to pt's Alzheimers.

## 2022-05-05 NOTE — ED PROVIDER NOTE - CLINICAL SUMMARY MEDICAL DECISION MAKING FREE TEXT BOX
pt with pmhx advanced alzherimer dementia, presents to the ED for lethargy and difficult arousing the pt from sleep this morning. pt had 10 days of diarrhea that resolved 3 days ago. concern for possible UTI, RAJIV with severe dehydration, possible brain bleed. will obtain labs, urine, cxr, cth, reassess

## 2022-05-05 NOTE — ED PROVIDER NOTE - NSICDXPASTMEDICALHX_GEN_ALL_CORE_FT
PAST MEDICAL HISTORY:  Alzheimer disease     Dementia     Glaucoma     Hypercholesteremia     Osteoarthritis of right hip

## 2022-05-05 NOTE — ED PROVIDER NOTE - PATIENT PORTAL LINK FT
You can access the FollowMyHealth Patient Portal offered by [facility] by registering at the following website: [email or web address]. By joining [employer]’s FollowMyHealth portal, you will also be able to view your health information using other applications (apps) compatible with our system.

## 2022-05-05 NOTE — ED ADULT NURSE REASSESSMENT NOTE - NS ED NURSE REASSESS COMMENT FT1
pt's daughter refusing straight cath for clean catch urine on patient.  pt's daughter taking patient to bathroom to collect herself.

## 2022-05-05 NOTE — ED PROVIDER NOTE - NSFOLLOWUPINSTRUCTIONS_ED_ALL_ED_FT
Your mother was seen in the emergency department after she was found more lethargic than usual. Your blood tests, urine test, CAT scan of your head, abdomen, and pelvis were normal today.    Please have your mother follow up with her primary care physician within 2-3 days. Please call to make an appointment. Please bring a copy of your results from today to the doctor's visit.    You may take Acetaminophen over the counter as needed for pain and/or fever. Use as directed and see medication warnings.  You may take Ibuprofen over the counter as needed for pain and/or fever. Use as directed and see medication warnings.    Please return to the emergency department for worsening of your symptoms.

## 2022-05-06 LAB
CULTURE RESULTS: SIGNIFICANT CHANGE UP
SPECIMEN SOURCE: SIGNIFICANT CHANGE UP

## 2022-05-10 LAB
CULTURE RESULTS: SIGNIFICANT CHANGE UP
CULTURE RESULTS: SIGNIFICANT CHANGE UP
SPECIMEN SOURCE: SIGNIFICANT CHANGE UP
SPECIMEN SOURCE: SIGNIFICANT CHANGE UP

## 2022-06-03 ENCOUNTER — EMERGENCY (EMERGENCY)
Facility: HOSPITAL | Age: 87
LOS: 0 days | Discharge: HOME | End: 2022-06-04
Attending: EMERGENCY MEDICINE
Payer: MEDICARE

## 2022-06-03 VITALS
SYSTOLIC BLOOD PRESSURE: 112 MMHG | WEIGHT: 125 LBS | HEART RATE: 76 BPM | DIASTOLIC BLOOD PRESSURE: 58 MMHG | HEIGHT: 62 IN | TEMPERATURE: 98 F | RESPIRATION RATE: 16 BRPM | OXYGEN SATURATION: 94 %

## 2022-06-03 VITALS
TEMPERATURE: 98 F | SYSTOLIC BLOOD PRESSURE: 121 MMHG | RESPIRATION RATE: 18 BRPM | HEART RATE: 79 BPM | OXYGEN SATURATION: 98 % | DIASTOLIC BLOOD PRESSURE: 60 MMHG

## 2022-06-03 DIAGNOSIS — Z98.89 OTHER SPECIFIED POSTPROCEDURAL STATES: Chronic | ICD-10-CM

## 2022-06-03 DIAGNOSIS — S09.90XA UNSPECIFIED INJURY OF HEAD, INITIAL ENCOUNTER: ICD-10-CM

## 2022-06-03 DIAGNOSIS — R53.83 OTHER FATIGUE: ICD-10-CM
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
## 2022-06-03 DIAGNOSIS — G30.9 ALZHEIMER'S DISEASE, UNSPECIFIED: ICD-10-CM

## 2022-06-03 DIAGNOSIS — M25.511 PAIN IN RIGHT SHOULDER: ICD-10-CM

## 2022-06-03 DIAGNOSIS — E78.00 PURE HYPERCHOLESTEROLEMIA, UNSPECIFIED: ICD-10-CM

## 2022-06-03 DIAGNOSIS — R53.1 WEAKNESS: ICD-10-CM

## 2022-06-03 DIAGNOSIS — Z87.59 PERSONAL HISTORY OF OTHER COMPLICATIONS OF PREGNANCY, CHILDBIRTH AND THE PUERPERIUM: ICD-10-CM

## 2022-06-03 DIAGNOSIS — M16.11 UNILATERAL PRIMARY OSTEOARTHRITIS, RIGHT HIP: ICD-10-CM

## 2022-06-03 DIAGNOSIS — Z90.49 ACQUIRED ABSENCE OF OTHER SPECIFIED PARTS OF DIGESTIVE TRACT: ICD-10-CM

## 2022-06-03 DIAGNOSIS — N39.0 URINARY TRACT INFECTION, SITE NOT SPECIFIED: ICD-10-CM

## 2022-06-03 DIAGNOSIS — Z90.89 ACQUIRED ABSENCE OF OTHER ORGANS: ICD-10-CM

## 2022-06-03 DIAGNOSIS — R29.6 REPEATED FALLS: ICD-10-CM

## 2022-06-03 DIAGNOSIS — Z87.440 PERSONAL HISTORY OF URINARY (TRACT) INFECTIONS: ICD-10-CM

## 2022-06-03 DIAGNOSIS — S22.089A UNSPECIFIED FRACTURE OF T11-T12 VERTEBRA, INITIAL ENCOUNTER FOR CLOSED FRACTURE: ICD-10-CM

## 2022-06-03 DIAGNOSIS — Z20.822 CONTACT WITH AND (SUSPECTED) EXPOSURE TO COVID-19: ICD-10-CM

## 2022-06-03 DIAGNOSIS — F02.80 DEMENTIA IN OTHER DISEASES CLASSIFIED ELSEWHERE, UNSPECIFIED SEVERITY, WITHOUT BEHAVIORAL DISTURBANCE, PSYCHOTIC DISTURBANCE, MOOD DISTURBANCE, AND ANXIETY: ICD-10-CM
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
## 2022-06-03 DIAGNOSIS — Z87.81 PERSONAL HISTORY OF (HEALED) TRAUMATIC FRACTURE: ICD-10-CM

## 2022-06-03 DIAGNOSIS — F03.90 UNSPECIFIED DEMENTIA WITHOUT BEHAVIORAL DISTURBANCE: ICD-10-CM

## 2022-06-03 LAB
ALBUMIN SERPL ELPH-MCNC: 3.3 G/DL — SIGNIFICANT CHANGE UP (ref 3.3–5)
ALP SERPL-CCNC: 106 U/L — SIGNIFICANT CHANGE UP (ref 40–120)
ALT FLD-CCNC: 26 U/L — SIGNIFICANT CHANGE UP (ref 12–78)
ANION GAP SERPL CALC-SCNC: 6 MMOL/L — SIGNIFICANT CHANGE UP (ref 5–17)
APPEARANCE UR: CLEAR — SIGNIFICANT CHANGE UP
AST SERPL-CCNC: 21 U/L — SIGNIFICANT CHANGE UP (ref 15–37)
BASOPHILS # BLD AUTO: 0.08 K/UL — SIGNIFICANT CHANGE UP (ref 0–0.2)
BASOPHILS NFR BLD AUTO: 0.8 % — SIGNIFICANT CHANGE UP (ref 0–2)
BILIRUB SERPL-MCNC: 0.4 MG/DL — SIGNIFICANT CHANGE UP (ref 0.2–1.2)
BILIRUB UR-MCNC: NEGATIVE — SIGNIFICANT CHANGE UP
BUN SERPL-MCNC: 20 MG/DL — SIGNIFICANT CHANGE UP (ref 7–23)
CALCIUM SERPL-MCNC: 10.1 MG/DL — SIGNIFICANT CHANGE UP (ref 8.5–10.1)
CHLORIDE SERPL-SCNC: 110 MMOL/L — HIGH (ref 96–108)
CO2 SERPL-SCNC: 24 MMOL/L — SIGNIFICANT CHANGE UP (ref 22–31)
COLOR SPEC: YELLOW — SIGNIFICANT CHANGE UP
CREAT SERPL-MCNC: 0.99 MG/DL — SIGNIFICANT CHANGE UP (ref 0.5–1.3)
DIFF PNL FLD: ABNORMAL
EGFR: 56 ML/MIN/1.73M2 — LOW
EOSINOPHIL # BLD AUTO: 0.21 K/UL — SIGNIFICANT CHANGE UP (ref 0–0.5)
EOSINOPHIL NFR BLD AUTO: 2.1 % — SIGNIFICANT CHANGE UP (ref 0–6)
GLUCOSE SERPL-MCNC: 112 MG/DL — HIGH (ref 70–99)
GLUCOSE UR QL: NEGATIVE — SIGNIFICANT CHANGE UP
HCT VFR BLD CALC: 44 % — SIGNIFICANT CHANGE UP (ref 34.5–45)
HGB BLD-MCNC: 14.3 G/DL — SIGNIFICANT CHANGE UP (ref 11.5–15.5)
IMM GRANULOCYTES NFR BLD AUTO: 0.3 % — SIGNIFICANT CHANGE UP (ref 0–1.5)
KETONES UR-MCNC: ABNORMAL
LEUKOCYTE ESTERASE UR-ACNC: ABNORMAL
LYMPHOCYTES # BLD AUTO: 2.88 K/UL — SIGNIFICANT CHANGE UP (ref 1–3.3)
LYMPHOCYTES # BLD AUTO: 28.5 % — SIGNIFICANT CHANGE UP (ref 13–44)
MCHC RBC-ENTMCNC: 32.5 GM/DL — SIGNIFICANT CHANGE UP (ref 32–36)
MCHC RBC-ENTMCNC: 33.3 PG — SIGNIFICANT CHANGE UP (ref 27–34)
MCV RBC AUTO: 102.3 FL — HIGH (ref 80–100)
MONOCYTES # BLD AUTO: 1.13 K/UL — HIGH (ref 0–0.9)
MONOCYTES NFR BLD AUTO: 11.2 % — SIGNIFICANT CHANGE UP (ref 2–14)
NEUTROPHILS # BLD AUTO: 5.77 K/UL — SIGNIFICANT CHANGE UP (ref 1.8–7.4)
NEUTROPHILS NFR BLD AUTO: 57.1 % — SIGNIFICANT CHANGE UP (ref 43–77)
NITRITE UR-MCNC: NEGATIVE — SIGNIFICANT CHANGE UP
PH UR: 5 — SIGNIFICANT CHANGE UP (ref 5–8)
PLATELET # BLD AUTO: 247 K/UL — SIGNIFICANT CHANGE UP (ref 150–400)
POTASSIUM SERPL-MCNC: 4.3 MMOL/L — SIGNIFICANT CHANGE UP (ref 3.5–5.3)
POTASSIUM SERPL-SCNC: 4.3 MMOL/L — SIGNIFICANT CHANGE UP (ref 3.5–5.3)
PROT SERPL-MCNC: 7.2 GM/DL — SIGNIFICANT CHANGE UP (ref 6–8.3)
PROT UR-MCNC: NEGATIVE — SIGNIFICANT CHANGE UP
RBC # BLD: 4.3 M/UL — SIGNIFICANT CHANGE UP (ref 3.8–5.2)
RBC # FLD: 13 % — SIGNIFICANT CHANGE UP (ref 10.3–14.5)
SODIUM SERPL-SCNC: 140 MMOL/L — SIGNIFICANT CHANGE UP (ref 135–145)
SP GR SPEC: 1.02 — SIGNIFICANT CHANGE UP (ref 1.01–1.02)
TROPONIN I, HIGH SENSITIVITY RESULT: 7.33 NG/L — SIGNIFICANT CHANGE UP
UROBILINOGEN FLD QL: NEGATIVE — SIGNIFICANT CHANGE UP
WBC # BLD: 10.1 K/UL — SIGNIFICANT CHANGE UP (ref 3.8–10.5)
WBC # FLD AUTO: 10.1 K/UL — SIGNIFICANT CHANGE UP (ref 3.8–10.5)

## 2022-06-03 PROCEDURE — 72100 X-RAY EXAM L-S SPINE 2/3 VWS: CPT

## 2022-06-03 PROCEDURE — 99285 EMERGENCY DEPT VISIT HI MDM: CPT | Mod: 25

## 2022-06-03 PROCEDURE — 99284 EMERGENCY DEPT VISIT MOD MDM: CPT

## 2022-06-03 PROCEDURE — 93005 ELECTROCARDIOGRAM TRACING: CPT

## 2022-06-03 PROCEDURE — 87186 SC STD MICRODIL/AGAR DIL: CPT

## 2022-06-03 PROCEDURE — 85025 COMPLETE CBC W/AUTO DIFF WBC: CPT

## 2022-06-03 PROCEDURE — 84484 ASSAY OF TROPONIN QUANT: CPT

## 2022-06-03 PROCEDURE — 93010 ELECTROCARDIOGRAM REPORT: CPT

## 2022-06-03 PROCEDURE — U0003: CPT

## 2022-06-03 PROCEDURE — 71045 X-RAY EXAM CHEST 1 VIEW: CPT | Mod: 26

## 2022-06-03 PROCEDURE — 72125 CT NECK SPINE W/O DYE: CPT | Mod: MA

## 2022-06-03 PROCEDURE — 81001 URINALYSIS AUTO W/SCOPE: CPT

## 2022-06-03 PROCEDURE — 80053 COMPREHEN METABOLIC PANEL: CPT

## 2022-06-03 PROCEDURE — 71045 X-RAY EXAM CHEST 1 VIEW: CPT

## 2022-06-03 PROCEDURE — 72100 X-RAY EXAM L-S SPINE 2/3 VWS: CPT | Mod: 26

## 2022-06-03 PROCEDURE — U0005: CPT

## 2022-06-03 PROCEDURE — 70450 CT HEAD/BRAIN W/O DYE: CPT | Mod: MA

## 2022-06-03 PROCEDURE — 36415 COLL VENOUS BLD VENIPUNCTURE: CPT

## 2022-06-03 PROCEDURE — 72125 CT NECK SPINE W/O DYE: CPT | Mod: 26,MA

## 2022-06-03 PROCEDURE — 87086 URINE CULTURE/COLONY COUNT: CPT

## 2022-06-03 PROCEDURE — 96374 THER/PROPH/DIAG INJ IV PUSH: CPT

## 2022-06-03 PROCEDURE — 70450 CT HEAD/BRAIN W/O DYE: CPT | Mod: 26,MA

## 2022-06-03 RX ORDER — CIPROFLOXACIN HCL 0.3 %
2 DROPS OPHTHALMIC (EYE)
Qty: 28 | Refills: 0
Start: 2022-06-03 | End: 2022-06-09

## 2022-06-03 RX ORDER — CEFTRIAXONE 500 MG/1
1000 INJECTION, POWDER, FOR SOLUTION INTRAMUSCULAR; INTRAVENOUS ONCE
Refills: 0 | Status: DISCONTINUED | OUTPATIENT
Start: 2022-06-03 | End: 2022-06-03

## 2022-06-03 RX ORDER — SODIUM CHLORIDE 9 MG/ML
1000 INJECTION INTRAMUSCULAR; INTRAVENOUS; SUBCUTANEOUS ONCE
Refills: 0 | Status: COMPLETED | OUTPATIENT
Start: 2022-06-03 | End: 2022-06-03

## 2022-06-03 RX ORDER — CEFTRIAXONE 500 MG/1
1000 INJECTION, POWDER, FOR SOLUTION INTRAMUSCULAR; INTRAVENOUS ONCE
Refills: 0 | Status: COMPLETED | OUTPATIENT
Start: 2022-06-03 | End: 2022-06-03

## 2022-06-03 RX ORDER — CEPHALEXIN 500 MG
1 CAPSULE ORAL
Qty: 21 | Refills: 0
Start: 2022-06-03 | End: 2022-06-09

## 2022-06-03 RX ADMIN — SODIUM CHLORIDE 1000 MILLILITER(S): 9 INJECTION INTRAMUSCULAR; INTRAVENOUS; SUBCUTANEOUS at 20:47

## 2022-06-03 RX ADMIN — CEFTRIAXONE 100 MILLIGRAM(S): 500 INJECTION, POWDER, FOR SOLUTION INTRAMUSCULAR; INTRAVENOUS at 22:16

## 2022-06-03 NOTE — ED PROVIDER NOTE - PROGRESS NOTE DETAILS
signed out to ON doc pending CT thoracic spine.  family updated pt's family prefer to forgo the CT.  states pt has an old fx there and she is getting tired.  they want to take her home.  will dc.

## 2022-06-03 NOTE — ED PROVIDER NOTE - EYES, MLM
Clear bilaterally, pupils equal, round and reactive to light. scleral injection BL L>R c/w conjunctivitis

## 2022-06-03 NOTE — ED PROVIDER NOTE - PATIENT PORTAL LINK FT
You can access the FollowMyHealth Patient Portal offered by [facility] by registering at the following website: [email or web address]. By joining [employer]’s FollowMyHealth portal, you will also be able to view your health information using other applications (apps) compatible with our system.

## 2022-06-03 NOTE — ED PROVIDER NOTE - OBJECTIVE STATEMENT
87 yo female w/PMHx of dementia, glaucoma, OA, Alzheimer's, UTI presents to the ED s/p unwitnessed fall last night. According to daughter, pt has chronic back pain and has difficulty ambulating. Pt was found on the floor by home health aid. Hx of falls and hairline fractures of the spine. Pt has weakness b/l more in the left hand and pain in the right shoulders. No other complaints at this time. Pt is more lethargic than usual. Pt takes Atenolol.

## 2022-06-03 NOTE — ED ADULT NURSE NOTE - FINAL NURSING ELECTRONIC SIGNATURE
Chief Complaint   Patient presents with     Establish Care     Patient is here to establish a new PCP.      Physical     Patient is here for annual physical.      Ayleen Vasquez LPN at 9:54 AM on 2/8/2018.     04-Jun-2022 00:13

## 2022-06-03 NOTE — ED STATDOCS - PROGRESS NOTE DETAILS
Renee Long for attending Dr. Funk:  85 y/o female with PMHx of dementia, hypercholesteremia, alzheimer's, glaucoma, OA of right hip presents to the ED for ?syncope. Pt with dementia and non ambulatory. Will send pt to main ED for further evaluation.

## 2022-06-03 NOTE — ED ADULT NURSE NOTE - OBJECTIVE STATEMENT
86 year old female found laying on stretcher complaining of a fall last night and found on the floor by aid.  pt denies pain.  pt's daughter states she would like her mother scanned for any injuries.  pt's daughter also expresses need for UA due to AMS a couple days ago.

## 2022-06-03 NOTE — ED PROVIDER NOTE - NSFOLLOWUPINSTRUCTIONS_ED_ALL_ED_FT
Keflex for antibiotics for UTI  ciloxan eye drops for conjunctivitis  Continue your other medications as prescribed.  Anything worsens or persists, return to ER for further care and evaluation.      Urinary Tract Infection, Adult  ImageA urinary tract infection (UTI) is an infection of any part of the urinary tract, which includes the kidneys, ureters, bladder, and urethra. These organs make, store, and get rid of urine in the body. UTI can be a bladder infection (cystitis) or kidney infection (pyelonephritis).    What are the causes?  This infection may be caused by fungi, viruses, or bacteria. Bacteria are the most common cause of UTIs. This condition can also be caused by repeated incomplete emptying of the bladder during urination.    What increases the risk?  This condition is more likely to develop if:    You ignore your need to urinate or hold urine for long periods of time.  You do not empty your bladder completely during urination.  You wipe back to front after urinating or having a bowel movement, if you are female.  You are uncircumcised, if you are male.  You are constipated.  You have a urinary catheter that stays in place (indwelling).  You have a weak defense (immune) system.  You have a medical condition that affects your bowels, kidneys, or bladder.  You have diabetes.  You take antibiotic medicines frequently or for long periods of time, and the antibiotics no longer work well against certain types of infections (antibiotic resistance).  You take medicines that irritate your urinary tract.  You are exposed to chemicals that irritate your urinary tract.  You are female.    What are the signs or symptoms?  Symptoms of this condition include:    Fever.  Frequent urination or passing small amounts of urine frequently.  Needing to urinate urgently.  Pain or burning with urination.  Urine that smells bad or unusual.  Cloudy urine.  Pain in the lower abdomen or back.  Trouble urinating.  Blood in the urine.  Vomiting or being less hungry than normal.  Diarrhea or abdominal pain.  Vaginal discharge, if you are female.    How is this diagnosed?  This condition is diagnosed with a medical history and physical exam. You will also need to provide a urine sample to test your urine. Other tests may be done, including:    Blood tests.  Sexually transmitted disease (STD) testing.    If you have had more than one UTI, a cystoscopy or imaging studies may be done to determine the cause of the infections.    How is this treated?  Treatment for this condition often includes a combination of two or more of the following:    Antibiotic medicine.  Other medicines to treat less common causes of UTI.  Over-the-counter medicines to treat pain.  Drinking enough water to stay hydrated.    Follow these instructions at home:  Take over-the-counter and prescription medicines only as told by your health care provider.  If you were prescribed an antibiotic, take it as told by your health care provider. Do not stop taking the antibiotic even if you start to feel better.  Avoid alcohol, caffeine, tea, and carbonated beverages. They can irritate your bladder.  Drink enough fluid to keep your urine clear or pale yellow.  Keep all follow-up visits as told by your health care provider. This is important.  ImageMake sure to:    Empty your bladder often and completely. Do not hold urine for long periods of time.  Empty your bladder before and after sex.  Wipe from front to back after a bowel movement if you are female. Use each tissue one time when you wipe.    Contact a health care provider if:  You have back pain.  You have a fever.  You feel nauseous or vomit.  Your symptoms do not get better after 3 days.  Your symptoms go away and then return.  Get help right away if:  You have severe back pain or lower abdominal pain.  You are vomiting and cannot keep down any medicines or water.  This information is not intended to replace advice given to you by your health care provider. Make sure you discuss any questions you have with your health care provider.

## 2022-06-03 NOTE — ED ADULT TRIAGE NOTE - CHIEF COMPLAINT QUOTE
Pt BIB daughter after unwitnessed fall last night. Per daughter, aide found pt lying on floor having fallen out of bed. Pt unable to give hx of fall with baseline confusion.  Per daughter, pt has not complained of anything in particular, but would not know to complain at baseline.  Denies any anticoagulants. Pt denies pain at this time when asked.

## 2022-06-03 NOTE — ED PROVIDER NOTE - CARE PLAN
1 Principal Discharge DX:	Head injury  Secondary Diagnosis:	Falls  Secondary Diagnosis:	Thoracic compression fracture  Secondary Diagnosis:	Acute UTI

## 2022-06-03 NOTE — ED ADULT NURSE REASSESSMENT NOTE - NS ED NURSE REASSESS COMMENT FT1
PT CT of thoracic spine has been cancelled by ED MD.  Pt family member (son or son-in-law) stated that they were ready to take pt home because they were here all day and wanted to leave.  Pt medically cleared by ED MD for DC.

## 2022-06-15 ENCOUNTER — APPOINTMENT (OUTPATIENT)
Dept: NEUROLOGY | Facility: CLINIC | Age: 87
End: 2022-06-15

## 2022-06-15 ENCOUNTER — APPOINTMENT (OUTPATIENT)
Dept: NEUROLOGY | Facility: CLINIC | Age: 87
End: 2022-06-15
Payer: MEDICARE

## 2022-06-15 VITALS
HEIGHT: 63 IN | BODY MASS INDEX: 22.15 KG/M2 | SYSTOLIC BLOOD PRESSURE: 100 MMHG | WEIGHT: 125 LBS | HEART RATE: 64 BPM | DIASTOLIC BLOOD PRESSURE: 65 MMHG

## 2022-06-15 DIAGNOSIS — F02.80 ALZHEIMER'S DISEASE, UNSPECIFIED: ICD-10-CM

## 2022-06-15 DIAGNOSIS — G30.9 ALZHEIMER'S DISEASE, UNSPECIFIED: ICD-10-CM

## 2022-06-15 PROCEDURE — 99214 OFFICE O/P EST MOD 30 MIN: CPT

## 2022-06-15 NOTE — HISTORY OF PRESENT ILLNESS
[FreeTextEntry1] : Patient is here for follow-up visit today, last seen on 2021. Patient's daughter had called reporting patient's condition is deteriorating significantly, hence needed urgent evaluation.  Upon seeing the patient, patient has significantly declined in regard to cognitive abilities, is unable to recognize me, is disoriented to time place, cannot recall where she lives, reports that she lives with her  who is  long time ago. \par \par Daughter reports patient is not eating well, her motor skills are sluggish, and is unstable on her feet, apparently she fell a week ago when she woke up at night, was taken to [facility] ER, CT scan of the head and spine demonstrated no acute findings. \par \par Daughter also reports that she needs help with all ADLs, she has been using a diaper at night, has had occasional UTIs, also needs prompting and help with feeding.

## 2022-06-15 NOTE — PHYSICAL EXAM
[General Appearance - Alert] : alert [General Appearance - In No Acute Distress] : in no acute distress [PERRL With Normal Accommodation] : pupils were equal in size, round, reactive to light, with normal accommodation [Extraocular Movements] : extraocular movements were intact [No APD] : no afferent pupillary defect [Hearing Threshold Finger Rub Not [place]] : hearing was normal [] : the neck was supple [FreeTextEntry1] :  Poor insight / irritable [FreeTextEntry4] : Alert, inattentive, follows some simple commands, oriented to self only, can only remember her daughter Pamella' name  [FreeTextEntry5] : 11- X11 normal. [FreeTextEntry6] : sensory/motor/coordination: Slight tremors left hand, strength 5/5 in all 4 extremities [FreeTextEntry8] : Gait/balance: Hunched posture, walks with broad-base small steps with assist

## 2022-06-15 NOTE — DATA REVIEWED
[de-identified] : Cognitive assessment [employer]; 4/14/15.\par Global cognitive score: 77.7 \par More than one standard deviation below average in domains: Memory 79.1, exec function: 72.2,  attention: 59.4\par Above average in cognitive domains: Visual-spatial 100.2\par \par Cognitive assessment  [employer], MCI battery; 1/22/13\par Global cognitive score: 85.7, \par More than one standard deviation below average in domains: Memory 84.6, visuospatial 74\par Below average and domains: Executive function 95, extension 89.1 [de-identified] : 6/3/2022: CT head no mass-effect or hemorrhage, stable exam.  CT C-spine - no fracture or traumatic subluxation\par 9/4/16: CT head: No intracranial hemorrhage mass effect or midline shift, unchanged microvascular disease\par CT C-spine: No acute fracture or dislocation, mild multilevel cervical spondylosis which appears unchanged

## 2022-06-15 NOTE — REASON FOR VISIT
[Follow-Up: _____] : a [unfilled] follow-up visit [Formal Caregiver] : formal caregiver [Family Member] : family member [FreeTextEntry1] : For worsening cognitive and functioning status

## 2022-06-15 NOTE — DISCUSSION/SUMMARY
[FreeTextEntry1] : 86-year-old F with mildly progressive cognitive decline since past 6-7 years, has noted significant decline in cognition and functioning in the recent past year. \par \par #1) Dementia - Alzheimers; progressive, now totally dependent.\par \par - Continue  Aricept 10 mg q.d. Namenda 10 mg bid,  \par -Supportive care\par \par #2) Generalized anxiety disorder, \par \par - citalopram 20 mg q.d.\par \par #3) Gait instability\par \par - I had a discussion with the patients daughter, discussed  safety factors, especially falls\par \par -l followup with me in 6 months.\par

## 2022-06-15 NOTE — REVIEW OF SYSTEMS
[Confused or Disoriented] : confusion [Memory Lapses or Loss] : memory loss [Decr. Concentrating Ability] : decreased concentrating ability [Poor Coordination] : poor coordination [Difficulty Writing] : difficulty writing [Change In Personality] : personality change [Incontinence] : incontinence [Negative] : Heme/Lymph [Difficulty Walking] : difficulty walking [As Noted in HPI] : as noted in HPI [Heartburn] : no heartburn

## 2022-07-11 ENCOUNTER — RX RENEWAL (OUTPATIENT)
Age: 87
End: 2022-07-11

## 2022-09-06 RX ORDER — MEMANTINE HYDROCHLORIDE 10 MG/1
10 TABLET, FILM COATED ORAL
Qty: 180 | Refills: 0 | Status: ACTIVE | COMMUNITY
Start: 2017-07-31 | End: 1900-01-01

## 2022-09-24 ENCOUNTER — INPATIENT (INPATIENT)
Facility: HOSPITAL | Age: 87
LOS: 12 days | Discharge: NURSING, REHAB OR LONG-TERM CARE | DRG: 521 | End: 2022-10-07
Attending: INTERNAL MEDICINE | Admitting: FAMILY MEDICINE
Payer: MEDICARE

## 2022-09-24 VITALS
OXYGEN SATURATION: 100 % | TEMPERATURE: 98 F | DIASTOLIC BLOOD PRESSURE: 53 MMHG | SYSTOLIC BLOOD PRESSURE: 105 MMHG | HEART RATE: 73 BPM | HEIGHT: 62 IN | WEIGHT: 100.09 LBS | RESPIRATION RATE: 18 BRPM

## 2022-09-24 DIAGNOSIS — Z98.89 OTHER SPECIFIED POSTPROCEDURAL STATES: Chronic | ICD-10-CM

## 2022-09-24 DIAGNOSIS — S72.009A FRACTURE OF UNSPECIFIED PART OF NECK OF UNSPECIFIED FEMUR, INITIAL ENCOUNTER FOR CLOSED FRACTURE: ICD-10-CM

## 2022-09-24 LAB
APPEARANCE UR: CLEAR — SIGNIFICANT CHANGE UP
APTT BLD: 30.6 SEC — SIGNIFICANT CHANGE UP (ref 27.5–35.5)
BASOPHILS # BLD AUTO: 0.07 K/UL — SIGNIFICANT CHANGE UP (ref 0–0.2)
BASOPHILS NFR BLD AUTO: 0.4 % — SIGNIFICANT CHANGE UP (ref 0–2)
BILIRUB UR-MCNC: NEGATIVE — SIGNIFICANT CHANGE UP
COLOR SPEC: ABNORMAL
DIFF PNL FLD: ABNORMAL
EOSINOPHIL # BLD AUTO: 0.02 K/UL — SIGNIFICANT CHANGE UP (ref 0–0.5)
EOSINOPHIL NFR BLD AUTO: 0.1 % — SIGNIFICANT CHANGE UP (ref 0–6)
GLUCOSE UR QL: NEGATIVE — SIGNIFICANT CHANGE UP
HCT VFR BLD CALC: 44.9 % — SIGNIFICANT CHANGE UP (ref 34.5–45)
HGB BLD-MCNC: 15.3 G/DL — SIGNIFICANT CHANGE UP (ref 11.5–15.5)
IMM GRANULOCYTES NFR BLD AUTO: 0.7 % — SIGNIFICANT CHANGE UP (ref 0–0.9)
INR BLD: 1.08 RATIO — SIGNIFICANT CHANGE UP (ref 0.88–1.16)
KETONES UR-MCNC: ABNORMAL
LEUKOCYTE ESTERASE UR-ACNC: ABNORMAL
LYMPHOCYTES # BLD AUTO: 1.88 K/UL — SIGNIFICANT CHANGE UP (ref 1–3.3)
LYMPHOCYTES # BLD AUTO: 10.1 % — LOW (ref 13–44)
MCHC RBC-ENTMCNC: 34.1 GM/DL — SIGNIFICANT CHANGE UP (ref 32–36)
MCHC RBC-ENTMCNC: 34.5 PG — HIGH (ref 27–34)
MCV RBC AUTO: 101.4 FL — HIGH (ref 80–100)
MONOCYTES # BLD AUTO: 1.17 K/UL — HIGH (ref 0–0.9)
MONOCYTES NFR BLD AUTO: 6.3 % — SIGNIFICANT CHANGE UP (ref 2–14)
NEUTROPHILS # BLD AUTO: 15.37 K/UL — HIGH (ref 1.8–7.4)
NEUTROPHILS NFR BLD AUTO: 82.4 % — HIGH (ref 43–77)
NITRITE UR-MCNC: NEGATIVE — SIGNIFICANT CHANGE UP
PH UR: 6.5 — SIGNIFICANT CHANGE UP (ref 5–8)
PLATELET # BLD AUTO: 241 K/UL — SIGNIFICANT CHANGE UP (ref 150–400)
PROT UR-MCNC: 15
PROTHROM AB SERPL-ACNC: 12.5 SEC — SIGNIFICANT CHANGE UP (ref 10.5–13.4)
RBC # BLD: 4.43 M/UL — SIGNIFICANT CHANGE UP (ref 3.8–5.2)
RBC # FLD: 13.1 % — SIGNIFICANT CHANGE UP (ref 10.3–14.5)
SARS-COV-2 RNA SPEC QL NAA+PROBE: SIGNIFICANT CHANGE UP
SP GR SPEC: 1.02 — SIGNIFICANT CHANGE UP (ref 1.01–1.02)
UROBILINOGEN FLD QL: NEGATIVE — SIGNIFICANT CHANGE UP
WBC # BLD: 18.64 K/UL — HIGH (ref 3.8–10.5)
WBC # FLD AUTO: 18.64 K/UL — HIGH (ref 3.8–10.5)

## 2022-09-24 PROCEDURE — 73552 X-RAY EXAM OF FEMUR 2/>: CPT | Mod: 26,LT

## 2022-09-24 PROCEDURE — 83735 ASSAY OF MAGNESIUM: CPT

## 2022-09-24 PROCEDURE — 86901 BLOOD TYPING SEROLOGIC RH(D): CPT

## 2022-09-24 PROCEDURE — 88305 TISSUE EXAM BY PATHOLOGIST: CPT

## 2022-09-24 PROCEDURE — 71045 X-RAY EXAM CHEST 1 VIEW: CPT

## 2022-09-24 PROCEDURE — C1776: CPT

## 2022-09-24 PROCEDURE — U0005: CPT

## 2022-09-24 PROCEDURE — 97116 GAIT TRAINING THERAPY: CPT | Mod: GP

## 2022-09-24 PROCEDURE — 83605 ASSAY OF LACTIC ACID: CPT

## 2022-09-24 PROCEDURE — 97162 PT EVAL MOD COMPLEX 30 MIN: CPT | Mod: GP

## 2022-09-24 PROCEDURE — 93005 ELECTROCARDIOGRAM TRACING: CPT

## 2022-09-24 PROCEDURE — 81001 URINALYSIS AUTO W/SCOPE: CPT

## 2022-09-24 PROCEDURE — 82803 BLOOD GASES ANY COMBINATION: CPT

## 2022-09-24 PROCEDURE — 88311 DECALCIFY TISSUE: CPT

## 2022-09-24 PROCEDURE — 85027 COMPLETE CBC AUTOMATED: CPT

## 2022-09-24 PROCEDURE — 97166 OT EVAL MOD COMPLEX 45 MIN: CPT | Mod: GO

## 2022-09-24 PROCEDURE — 85610 PROTHROMBIN TIME: CPT

## 2022-09-24 PROCEDURE — C1713: CPT

## 2022-09-24 PROCEDURE — 80048 BASIC METABOLIC PNL TOTAL CA: CPT

## 2022-09-24 PROCEDURE — 99285 EMERGENCY DEPT VISIT HI MDM: CPT

## 2022-09-24 PROCEDURE — 72131 CT LUMBAR SPINE W/O DYE: CPT | Mod: 26,MA

## 2022-09-24 PROCEDURE — 93010 ELECTROCARDIOGRAM REPORT: CPT | Mod: 76

## 2022-09-24 PROCEDURE — 82306 VITAMIN D 25 HYDROXY: CPT

## 2022-09-24 PROCEDURE — 86900 BLOOD TYPING SEROLOGIC ABO: CPT

## 2022-09-24 PROCEDURE — 73501 X-RAY EXAM HIP UNI 1 VIEW: CPT | Mod: LT

## 2022-09-24 PROCEDURE — 86850 RBC ANTIBODY SCREEN: CPT

## 2022-09-24 PROCEDURE — 73502 X-RAY EXAM HIP UNI 2-3 VIEWS: CPT | Mod: 26,LT

## 2022-09-24 PROCEDURE — 99223 1ST HOSP IP/OBS HIGH 75: CPT

## 2022-09-24 PROCEDURE — 70450 CT HEAD/BRAIN W/O DYE: CPT | Mod: 26,MA

## 2022-09-24 PROCEDURE — 36415 COLL VENOUS BLD VENIPUNCTURE: CPT

## 2022-09-24 PROCEDURE — 71045 X-RAY EXAM CHEST 1 VIEW: CPT | Mod: 26

## 2022-09-24 PROCEDURE — 85730 THROMBOPLASTIN TIME PARTIAL: CPT

## 2022-09-24 PROCEDURE — 82962 GLUCOSE BLOOD TEST: CPT

## 2022-09-24 PROCEDURE — C1889: CPT

## 2022-09-24 PROCEDURE — 36600 WITHDRAWAL OF ARTERIAL BLOOD: CPT

## 2022-09-24 PROCEDURE — 84100 ASSAY OF PHOSPHORUS: CPT

## 2022-09-24 PROCEDURE — 80053 COMPREHEN METABOLIC PANEL: CPT

## 2022-09-24 PROCEDURE — 92610 EVALUATE SWALLOWING FUNCTION: CPT | Mod: GN

## 2022-09-24 PROCEDURE — 87040 BLOOD CULTURE FOR BACTERIA: CPT

## 2022-09-24 PROCEDURE — U0003: CPT

## 2022-09-24 PROCEDURE — 92526 ORAL FUNCTION THERAPY: CPT | Mod: GN

## 2022-09-24 PROCEDURE — 97530 THERAPEUTIC ACTIVITIES: CPT | Mod: GP

## 2022-09-24 PROCEDURE — 70450 CT HEAD/BRAIN W/O DYE: CPT

## 2022-09-24 RX ORDER — TRAMADOL HYDROCHLORIDE 50 MG/1
25 TABLET ORAL EVERY 6 HOURS
Refills: 0 | Status: DISCONTINUED | OUTPATIENT
Start: 2022-09-24 | End: 2022-09-24

## 2022-09-24 RX ORDER — MORPHINE SULFATE 50 MG/1
2 CAPSULE, EXTENDED RELEASE ORAL ONCE
Refills: 0 | Status: DISCONTINUED | OUTPATIENT
Start: 2022-09-24 | End: 2022-09-24

## 2022-09-24 RX ORDER — ONDANSETRON 8 MG/1
4 TABLET, FILM COATED ORAL EVERY 8 HOURS
Refills: 0 | Status: DISCONTINUED | OUTPATIENT
Start: 2022-09-24 | End: 2022-10-07

## 2022-09-24 RX ORDER — SODIUM CHLORIDE 9 MG/ML
1000 INJECTION, SOLUTION INTRAVENOUS
Refills: 0 | Status: DISCONTINUED | OUTPATIENT
Start: 2022-09-24 | End: 2022-09-24

## 2022-09-24 RX ORDER — CHOLECALCIFEROL (VITAMIN D3) 125 MCG
1000 CAPSULE ORAL DAILY
Refills: 0 | Status: DISCONTINUED | OUTPATIENT
Start: 2022-09-24 | End: 2022-10-02

## 2022-09-24 RX ORDER — TRANEXAMIC ACID 100 MG/ML
1000 INJECTION, SOLUTION INTRAVENOUS ONCE
Refills: 0 | Status: DISCONTINUED | OUTPATIENT
Start: 2022-09-25 | End: 2022-09-29

## 2022-09-24 RX ORDER — CITALOPRAM 10 MG/1
20 TABLET, FILM COATED ORAL DAILY
Refills: 0 | Status: DISCONTINUED | OUTPATIENT
Start: 2022-09-24 | End: 2022-10-02

## 2022-09-24 RX ORDER — ENOXAPARIN SODIUM 100 MG/ML
40 INJECTION SUBCUTANEOUS ONCE
Refills: 0 | Status: COMPLETED | OUTPATIENT
Start: 2022-09-24 | End: 2022-09-24

## 2022-09-24 RX ORDER — TIMOLOL 0.5 %
1 DROPS OPHTHALMIC (EYE) DAILY
Refills: 0 | Status: DISCONTINUED | OUTPATIENT
Start: 2022-09-24 | End: 2022-10-07

## 2022-09-24 RX ORDER — ACETAMINOPHEN 500 MG
650 TABLET ORAL EVERY 6 HOURS
Refills: 0 | Status: DISCONTINUED | OUTPATIENT
Start: 2022-09-24 | End: 2022-10-07

## 2022-09-24 RX ORDER — ENOXAPARIN SODIUM 100 MG/ML
40 INJECTION SUBCUTANEOUS EVERY 24 HOURS
Refills: 0 | Status: DISCONTINUED | OUTPATIENT
Start: 2022-09-26 | End: 2022-10-07

## 2022-09-24 RX ORDER — LATANOPROST 0.05 MG/ML
1 SOLUTION/ DROPS OPHTHALMIC; TOPICAL AT BEDTIME
Refills: 0 | Status: DISCONTINUED | OUTPATIENT
Start: 2022-09-24 | End: 2022-10-07

## 2022-09-24 RX ORDER — MORPHINE SULFATE 50 MG/1
2 CAPSULE, EXTENDED RELEASE ORAL EVERY 6 HOURS
Refills: 0 | Status: DISCONTINUED | OUTPATIENT
Start: 2022-09-24 | End: 2022-09-27

## 2022-09-24 RX ORDER — SIMVASTATIN 20 MG/1
20 TABLET, FILM COATED ORAL AT BEDTIME
Refills: 0 | Status: DISCONTINUED | OUTPATIENT
Start: 2022-09-24 | End: 2022-10-02

## 2022-09-24 RX ORDER — DONEPEZIL HYDROCHLORIDE 10 MG/1
10 TABLET, FILM COATED ORAL AT BEDTIME
Refills: 0 | Status: DISCONTINUED | OUTPATIENT
Start: 2022-09-24 | End: 2022-10-02

## 2022-09-24 RX ORDER — MEMANTINE HYDROCHLORIDE 10 MG/1
10 TABLET ORAL
Refills: 0 | Status: DISCONTINUED | OUTPATIENT
Start: 2022-09-24 | End: 2022-10-02

## 2022-09-24 RX ORDER — TRANEXAMIC ACID 100 MG/ML
1000 INJECTION, SOLUTION INTRAVENOUS ONCE
Refills: 0 | Status: DISCONTINUED | OUTPATIENT
Start: 2022-09-25 | End: 2022-10-07

## 2022-09-24 RX ORDER — SODIUM CHLORIDE 9 MG/ML
1000 INJECTION, SOLUTION INTRAVENOUS
Refills: 0 | Status: DISCONTINUED | OUTPATIENT
Start: 2022-09-24 | End: 2022-09-26

## 2022-09-24 RX ADMIN — MORPHINE SULFATE 2 MILLIGRAM(S): 50 CAPSULE, EXTENDED RELEASE ORAL at 17:28

## 2022-09-24 RX ADMIN — ENOXAPARIN SODIUM 40 MILLIGRAM(S): 100 INJECTION SUBCUTANEOUS at 14:05

## 2022-09-24 RX ADMIN — LATANOPROST 1 DROP(S): 0.05 SOLUTION/ DROPS OPHTHALMIC; TOPICAL at 22:54

## 2022-09-24 RX ADMIN — MEMANTINE HYDROCHLORIDE 10 MILLIGRAM(S): 10 TABLET ORAL at 22:54

## 2022-09-24 RX ADMIN — MORPHINE SULFATE 2 MILLIGRAM(S): 50 CAPSULE, EXTENDED RELEASE ORAL at 11:16

## 2022-09-24 RX ADMIN — SIMVASTATIN 20 MILLIGRAM(S): 20 TABLET, FILM COATED ORAL at 22:54

## 2022-09-24 RX ADMIN — DONEPEZIL HYDROCHLORIDE 10 MILLIGRAM(S): 10 TABLET, FILM COATED ORAL at 22:54

## 2022-09-24 RX ADMIN — MORPHINE SULFATE 2 MILLIGRAM(S): 50 CAPSULE, EXTENDED RELEASE ORAL at 17:13

## 2022-09-24 RX ADMIN — SODIUM CHLORIDE 75 MILLILITER(S): 9 INJECTION, SOLUTION INTRAVENOUS at 11:12

## 2022-09-24 NOTE — ED ADULT TRIAGE NOTE - CHIEF COMPLAINT QUOTE
Witnessed fall at home with aide. Did not hit head, -LOC. No anticoagulants. Per EMS patient was complaining of left hip pain, patient states she cannot remember which hip hurts. Hx dementia. Denies any complaints at triage, does not know why she is in ED. At baseline mental status, VS stable. Witnessed fall at home with aide. Did not hit head, -LOC. No anticoagulants. Per EMS patient was complaining of left hip pain, patient states she cannot remember which hip hurts. Hx dementia. Denies any complaints at triage, does not know why she is in ED. At baseline mental status, VS stable. Skin tear noted to left elbow.

## 2022-09-24 NOTE — H&P ADULT - NSHPPHYSICALEXAM_GEN_ALL_CORE
Vital Signs Last 24 Hrs  T(C): 36.7 (24 Sep 2022 06:07), Max: 36.7 (24 Sep 2022 06:07)  T(F): 98 (24 Sep 2022 06:07), Max: 98 (24 Sep 2022 06:07)  HR: 73 (24 Sep 2022 06:07) (73 - 73)  BP: 105/53 (24 Sep 2022 06:07) (105/53 - 105/53)  BP(mean): --  RR: 18 (24 Sep 2022 06:07) (18 - 18)  SpO2: 100% (24 Sep 2022 06:07) (100% - 100%)    GENERAL: No acute distress, pleasantly confused  HEENT: PERRL, EOMI, MM dry, no oropharyngeal lesions  NECK: supple, no stiffness, no JVD, no thyromegaly  PULM: respirations non-labored, clear to auscultation bilaterally, no rales, rhonchi, or wheezes  CV: regular rate and rhythm, no murmurs, gallops, or rubs  GI: abdomen soft, nontender, nondistended, no masses felt, normal bowel sounds  MSK: no joint swelling, TTP, erythema, or warmth.  LYMPH: no anterior cervical, posterior cervical, supraclavicular, or inguinal lymphadenopathy  NEURO: A&Ox0-1, non-focal, no tremors  SKIN: no rashes, lesions, or edema

## 2022-09-24 NOTE — ED ADULT NURSE NOTE - CHIEF COMPLAINT QUOTE
Witnessed fall at home with aide. Did not hit head, -LOC. No anticoagulants. Per EMS patient was complaining of left hip pain, patient states she cannot remember which hip hurts. Hx dementia. Denies any complaints at triage, does not know why she is in ED. At baseline mental status, VS stable. Skin tear noted to left elbow.

## 2022-09-24 NOTE — ED PROVIDER NOTE - MUSCULOSKELETAL NECK EXAM
[FreeTextEntry1] : 4y M seen for ear recheck- OM has cleared.\par Still has clear rhinorrhea. Cutting molars.\par Ibuprofen q6-8h PRN teething.\par Hydrocortisone for rash behind knee.\par F/U PRN.\par  no deformity, pain or tenderness. no restriction of movement

## 2022-09-24 NOTE — ED ADULT NURSE NOTE - NSIMPLEMENTINTERV_GEN_ALL_ED
Implemented All Fall with Harm Risk Interventions:  [place] to call system. Call bell, personal items and telephone within reach. Instruct patient to call for assistance. Room bathroom lighting operational. Non-slip footwear when patient is off stretcher. Physically safe environment: no spills, clutter or unnecessary equipment. Stretcher in lowest position, wheels locked, appropriate side rails in place. Provide visual cue, wrist band, yellow gown, etc. Monitor gait and stability. Monitor for mental status changes and reorient to person, place, and time. Review medications for side effects contributing to fall risk. Reinforce activity limits and safety measures with patient and family. Provide visual clues: red socks.

## 2022-09-24 NOTE — H&P ADULT - ASSESSMENT
88 yo F with a PMH of HLD, glaucoma, Alzheimer's dementia, and OA who presents after a fall, found to have a L hip fracture.    #Subcapital Fracture of L Femur/Fall/Preoperative Clearance  - S/p fall when climbing out of a hospital bed  - With non-comminuted fracture  - Seen by Ortho, plan for OR 9/25  - NPO after midnight with IVFs  - EKG pending but otherwise, patient is medically optimized as a low cardiac risk for low-moderate risk surgery  - Pain control PRN  - NWB    #Dementia with depression  -         In the ED, she was given Morphine 2 mg IV x1 and started on LR @ 75 ml/hr. 88 yo F with a PMH of HLD, glaucoma, Alzheimer's dementia, and OA who presents after a fall, found to have a L hip fracture.    #Subcapital Fracture of L Femur/Fall/Preoperative Clearance/OA  - S/p fall when climbing out of a hospital bed  - With non-comminuted fracture  - Seen by Ortho, plan for OR 9/25  - NPO after midnight with IVFs  - EKG pending but otherwise, patient is medically optimized as a low cardiac risk for low-moderate risk surgery  - Pain control PRN  - NWB  - Of note, has history of R hip placement from OA  - Will check Vitamin D levels    #Dementia, senile with depression  - C/w Citalopram 20 mg QD, Memantine 10 mg BID, and Donepezil 10 mg QHS    #HLD  - C/w Simvastatin 20 mg QD    #Glaucoma  - C/w Latanoprost and Timolol eye drops    #Prophylactic Measure  - DVT PPX: Lovenox x1 today, then hold until POD #1  - Diet: DASH, NPO after midnight  - Dispo: pending post-op course 88 yo F with a PMH of HLD, glaucoma, Alzheimer's dementia, and OA who presents after a fall, found to have a L hip fracture.    #Subcapital Fracture of L Femur/Fall/Preoperative Clearance/OA  - S/p fall when climbing out of a hospital bed  - With non-comminuted fracture  - Seen by Ortho, plan for OR 9/25  - NPO after midnight with IVFs  - EKG pending but otherwise, patient is medically optimized as a low cardiac risk for low-moderate risk surgery  - Pending UA  - Pain control PRN  - NWB  - Of note, has history of R hip placement from OA  - Will check Vitamin D levels    #Dementia, senile with depression  - C/w Citalopram 20 mg QD, Memantine 10 mg BID, and Donepezil 10 mg QHS    #HLD  - C/w Simvastatin 20 mg QD    #Glaucoma  - C/w Latanoprost and Timolol eye drops    #Prophylactic Measure  - DVT PPX: Lovenox x1 today, then hold until POD #1  - Diet: DASH, NPO after midnight  - Dispo: pending post-op course 88 yo F with a PMH of HLD, glaucoma, Alzheimer's dementia, and OA who presents after a fall, found to have a L hip fracture.    #Subcapital Fracture of L Femur/Fall/Preoperative Clearance/OA  - S/p fall when climbing out of a hospital bed  - With non-comminuted fracture  - Seen by Ortho, plan for OR 9/25  - NPO after midnight with IVFs  - Patient is medically optimized as a low cardiac risk for low-moderate risk surgery. No need for cardiology clearance  - Pending UA  - Pain control PRN  - NWB  - Of note, has history of R hip placement from OA  - Will check Vitamin D levels    #Dementia, senile with depression  - C/w Citalopram 20 mg QD, Memantine 10 mg BID, and Donepezil 10 mg QHS    #HLD  - C/w Simvastatin 20 mg QD    #Glaucoma  - C/w Latanoprost and Timolol eye drops    #Prophylactic Measure  - DVT PPX: Lovenox x1 today, then hold until POD #1  - Diet: DASH, NPO after midnight  - Dispo: pending post-op course 86 yo F with a PMH of HLD, glaucoma, Alzheimer's dementia, and OA who presents after a fall, found to have a L hip fracture.    #Subcapital Fracture of L Femur/Fall/Preoperative Clearance/OA/Leukocytosis  - S/p fall when climbing out of a hospital bed  - With non-comminuted fracture  - Seen by Ortho, plan for OR 9/25  - NPO after midnight with IVFs  - Patient is medically optimized as a low cardiac risk for low-moderate risk surgery. No need for cardiology clearance  - Pain control PRN  - NWB  - Of note, has history of R hip placement from OA  - Will check Vitamin D levels  - Leukocytosis is likely reactive, but continue to trend CBC. No obvious evidence of infection, but check UA    #Dementia, senile with depression  - C/w Citalopram 20 mg QD, Memantine 10 mg BID, and Donepezil 10 mg QHS    #HLD  - C/w Simvastatin 20 mg QD    #Glaucoma  - C/w Latanoprost and Timolol eye drops    #Prophylactic Measure  - DVT PPX: Lovenox x1 today, then hold until POD #1  - Diet: DASH, NPO after midnight  - Dispo: pending post-op course

## 2022-09-24 NOTE — H&P ADULT - NSHPSOCIALHISTORY_GEN_ALL_CORE
No smoking history, last drink was 5 years ago.  Patient lives at home with a 24/7 aide. She has a hospital bed.  She ambulates with 1-person assistance and eats with assistance.  She is generally continent during the day but wears pads at night.

## 2022-09-24 NOTE — ED ADULT NURSE NOTE - OBJECTIVE STATEMENT
Patient is an 87 year old female with history of dementia presented to the ED s/p witnessed fall at home with aide. Reportedly, patient did not hit head and did not lose consciousness.  As per EMS,  patient was complaining of left hip pain, but is denying pain/discomfort on arrival to the ED. She appears to be in no acute distress.

## 2022-09-24 NOTE — ED PROVIDER NOTE - OBJECTIVE STATEMENT
86 yo female w/PMHx of dementia, glaucoma, OA, Alzheimer's, UTI presents to the ED s/p unwitnessed fall approximately 1 hour prior to arrival.  The patient's son notes that she has had a prior fracture of the left hip.  Patient was found on the ground by her aide and noted to be complaining of left hip pain.  Patient is unable to provide any review of systems or HPI on her own due to her baseline mental status.

## 2022-09-24 NOTE — ED ADULT NURSE REASSESSMENT NOTE - NS ED NURSE REASSESS COMMENT FT1
Attempted to straight cath patient. Pt with 20ml of urine on bladder scan. Straight cath unsuccessful at this time. Will try again.

## 2022-09-24 NOTE — CONSULT NOTE ADULT - SUBJECTIVE AND OBJECTIVE BOX
87yFemale c/o L hip pain s/p unwitnessed mechanical fall around 0500 this AM. Patient is demented at baseline, poor historian. Daughter is present at bedside, states that patient has a walker but does not use it. Patient lives at home alone but with 24/7 nursing aides. Aide was away going to the bathroom when she heard a thump and found the patient down on the ground out of bed.  Unable to comment on head hit but LOC. Patient denies numbness or tingling in the LLE. Patient denies any other injuries.    PMH:  Osteoarthritis of right hip    Glaucoma    Alzheimer disease    Hypercholesteremia    Dementia      PSH:  S/P appendectomy    S/P tonsillectomy    S/P D&amp;C (status post dilation and curettage)    S/P colonoscopy      AH:    Meds: See med rec    T(C): 36.7 (09-24-22 @ 06:07)  HR: 73 (09-24-22 @ 06:07)  BP: 105/53 (09-24-22 @ 06:07)  RR: 18 (09-24-22 @ 06:07)  SpO2: 100% (09-24-22 @ 06:07)  Wt(kg): --                        15.3   18.64 )-----------( 241      ( 24 Sep 2022 08:09 )             44.9                 PE  Gen: NAD, alert and oriented  Resp: Unlabored breathing  LLE: Skin intact, no ecchymosis,        SILT DP/SP/ Justino/Saph,        +EHL/FHL/TA/Gastroc,        Knee/ankle painless ROM,        hip ROM limited 2/2 pain,       DP+,        soft compartments, no calf ttp,        +log roll.      Secondary:  No TTP over bony landmarks, SILT BL, ROM intact BL, distal pulses palpable.    Imaging:  XR demonstrating L hip transcervical femoral neck fracture     Plan:  -Plan for surgical intervention for L hip hemiarthroplasty tomorrow, will book and begin preop.  -Preop labs/imaging: CBC/BMP/PT/PTT/INR/T&S/Covid/CXR/EKG.  -NPO after midnight/IVFs while NPO.  -NWB LLE, strict bedrest  -DVT ppx - may give a dose of lovenox today and then hold all anticoagulants for OR tomorrow. SCDs ok  -Pain control prn  -Medical management, continue home meds. Please document medical clearance for surgery.  -Case discussed with attending, will advise if plan changes.

## 2022-09-24 NOTE — H&P ADULT - HISTORY OF PRESENT ILLNESS
88 yo F with a PMH of HLD, glaucoma, Alzheimer's dementia, and OA who presents after a fall. The patient cannot provide any history, so it was obtained from her daughter Shira at bedside. The patient has a 24/7 air, and has a hospital bed at home, but when the aid went to the bathroom last night, the patient (despite having railings) climbed to the edge of the bed and fell on her left side. It is unclear if she hit her head. Reportedly, the patient has been more restless recently at night. No recent medication changes. No complaints from daughter. Her last fall was years ago, and she does not normally fall because of the precautions with her bed and having her aide 24/7. She normally can ambulate with assistance and eat with assistance.      In the ED, she was given Morphine 2 mg IV x1 and started on LR @ 75 ml/hr.

## 2022-09-24 NOTE — ED ADULT TRIAGE NOTE - TEMPERATURE IN FAHRENHEIT (DEGREES F)
CT OF INTERNAL AUDITORY CANALS PERFORMED WITHOUT CONTRAST ENHANCEMENT:

 

History: Ear pain, also with fever. 

 

FINDINGS: 

Visualized brain parenchyma appears unremarkable. 

 

There is moderate mastoid air cell opacification bilaterally. There is also bilateral middle ear cell
 mucosal change. I do not see any signs of any bony erosive change to suggest any type of cholesteato
ma. Ossicular chains appear to be in normal positions. 

 

IMPRESSION: 

Bilateral mastoid air cell disease and bilateral middle ear changes. 

 

POS: TARI
98

## 2022-09-24 NOTE — ED PROVIDER NOTE - CLINICAL SUMMARY MEDICAL DECISION MAKING FREE TEXT BOX
Pt with h/o dementia p/w left hip pain/swelling and decreased ROM s/p fall suspect femur fracture.  Plan: pain control prn, CBC, CMP, CT head, left hip/femur x-ray

## 2022-09-24 NOTE — ED ADULT NURSE REASSESSMENT NOTE - NS ED NURSE REASSESS COMMENT FT1
Pt received from CELSO Linares, pt resting in bed comfortably with daughter at bedside, VSS pt placed on 2 L nasal cannula oxygen 88% on room air.  Report given to 2N at 3670.

## 2022-09-24 NOTE — ED PROVIDER NOTE - PHYSICAL EXAMINATION
Tenderness to palpation on lateral aspect of left hip with inability to perform a straight leg raise

## 2022-09-24 NOTE — ED ADULT TRIAGE NOTE - ARRIVAL FROM
[address] MD, [address], [facility], [place]       Chanell Vuong, SAYDA Castillo, [facility]   VIRGINIA Tam NP        at [place] [id number] [facility] [address] [address]     [phone]     FAX: [phone]    at [address], [address] [facility], [address]     [phone]     FAX: [phone]     Patient Care Team:  Alida Crystal MD as PCP - General (Family Practice)    Patient Active Problem List   Diagnosis Code    Hypertension I10    Kidney stones N20.0    Chronic [facility] without hepatic [facility] ([facility] B18.2       Arturo Blakely returns to the [place] [address] regarding chronic HCV and its management. The active problem list, all pertinent past medical history, medications, radiologic findings and laboratory findings related to the liver disorder were reviewed with the patient. The patient is a 66 y.o.  female who tested positive for chronic HCV in 2006 when she donated blood. 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 Patient completed 12 weeks of HCV re-treatment with Harvoni (6/2017-9/2017). She did well on treatment with no significant side effects. She missed no medications throughout treatment. She returns to the clinic today to monitor her response to treatment. The patient has no symptoms which can be attributed to the liver disorder. Today, patient denies abdominal pain, change in bowel habits, dark urine, myalgias, arthralgias, pruritus and problems concentrating. The patient completes all daily activities without any functional limitations.     ALLERGIES  Allergies   Allergen Reactions    Latex Rash    Clindamycin Rash    Sulfa (Sulfonamide Antibiotics) Rash     MEDICATIONS  Current Outpatient Prescriptions   Medication Sig    ALPRAZolam (XANAX) 0.25 mg tablet Take 0.25 mg by mouth daily as needed.  losartan-hydroCHLOROthiazide (HYZAAR) 100-25 mg per tablet Take 1 Tab by mouth daily.  metoprolol tartrate (LOPRESSOR) 25 mg tablet Take 25 mg by mouth two (2) times a day. No current facility-administered medications for this visit. SYSTEM REVIEW NOT RELATED TO LIVER DISEASE OR REVIEWED ABOVE:  Constitution systems: Negative for fever, chills, weight gain, weight loss. Eyes: Negative for visual changes. ENT: Negative for sore throat, painful swallowing. Respiratory: Negative for cough, hemoptysis, SOB. Cardiology: Negative for chest pain, palpitations. GI:  Negative for constipation or diarrhea. : Negative for urinary frequency, dysuria, hematuria, nocturia. Skin: Negative for rash. Hematology: Negative for easy bruising, blood clots. Musculo-skelatal: Negative for back pain, muscle pain, weakness. Neurologic: Negative for headaches, dizziness, vertigo, memory problems not related to HE. Psychology: Negative for anxiety, depression. FAMILY HISTORY:  The father  of MI. The mother has the following chronic diseases: heart disease. There is no family history of liver disease. SOCIAL HISTORY:  The patient is . The patient has 1 child, 1 stepchild and 9 grandchildren. The patient stopped using tobacco products in . The patient consumes 3 alcoholic beverages per week. 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 PHYSICAL EXAMINATION:  /78  Pulse 72  Temp 97.6 °F (36.4 °C) (Oral)   Wt 193 lb 3.2 oz (87.6 kg)  SpO2 98%  BMI 30.26 kg/m2  General: No acute distress. Eyes: Sclera anicteric. ENT: No oral lesions. Thyroid normal.  Nodes: No adenopathy. Skin: No spider angiomata. No jaundice. No palmar erythema. Respiratory: Lungs clear to auscultation. Cardiovascular: Regular heart rate. No murmurs. No JVD. Abdomen: Soft non-tender.  Liver size normal to percussion/palpation. Spleen not palpable. No obvious ascites. Extremities: No edema. No muscle wasting. No gross arthritic changes. Neurologic: Alert and oriented. Cranial nerves grossly intact. No asterixis. LABORATORY STUDIES:  Liver [place] of [address] Units 12/12/2017 9/12/2017   WBC 3.4 - 10.8 x10E3/uL     ANC 1.4 - 7.0 x10E3/uL     HGB 11.1 - 15.9 g/dL      - 379 x10E3/uL     AST 0 - 40 IU/L 20 27   ALT 0 - 32 IU/L 23 39 (H)   Alk Phos 39 - 117 IU/L 94 92   Bili, Total 0.0 - 1.2 mg/dL 0.3 0.4   Bili, Direct 0.00 - 0.40 mg/dL 0.09 0.12   Albumin 3.6 - 4.8 g/dL 4.4 4.3   BUN 8 - 27 mg/dL     Creat 0.57 - 1.00 mg/dL     Na 134 - 144 mmol/L     K 3.5 - 5.2 mmol/L     Cl 96 - 106 mmol/L     CO2 18 - 29 mmol/L     Glucose 65 - 99 mg/dL     Virology Latest Ref Rng & Units  9/12/2017   HCV RNA, ZEINAB, QL Negative  Negative     A hepatic function panel and HCV RNA were ordered today. Will review results when available. SEROLOGIES:  Serologies Latest Ref Rng & Units 4/28/2017   Hep A Ab, Total Negative Positive (A)   Hep B Surface Ag Negative Negative   Hep B Core Ab, Total Negative Negative   Hep B Surface AB QL  Non Reactive   Hep C Genotype  1a   HCV RT-PCR, Quant IU/mL [id number]     LIVER HISTOLOGY:  5/25/2017. FibroScan performed at [address]. EkPa was 7.0. Suggested fibrosis level is F1.    ENDOSCOPIC PROCEDURES:  Not available or performed    RADIOLOGY:  Not available or performed    OTHER TESTING:  Not available or performed    ASSESSMENT AND PLAN:  Chronic HCV with portal fibrosis. Liver function is normal. The platelet count is normal.      HCV treatment. Relapser to PEG/RBV/TEL treatment in the past. Completed 12 weeks of Harvoni treatment in early September 2017. Tolerated treatment very well. Will review HCV RNA when available to monitor response to treatment. The patient was directed to continue all current medications at the current dosages.  There are no contraindications for the patient to take any medications that are necessary for treatment of other medical issues. The patient was counseled regarding alcohol consumption. The need for vaccination against viral hepatitis A and B will be assessed with serologic and instituted as appropriate. All of the above issues were discussed with the patient. All questions were answered. The patient expressed a clear understanding of the above. [address] [place] [address] in 6 months.     Tamra Alexander NP  [address] [address]  Ph: [phone]  Fax: [phone] Home

## 2022-09-24 NOTE — H&P ADULT - NSHPLABSRESULTS_GEN_ALL_CORE
Labs personally reviewed and interpreted. Notable for leukocytosis (WBC 18.64) with 82.4% neutrophils but no lymphopenia. Hb 15.3, plt 241, INR 1.08, Na 139, K 4.2, Cl 109, HCO3 26, BUN/creatinine 12/1.01, , calcium 9.7 with albumin 3.4, and LFTs wnl.  COVID-19 PCR pending.    CXR personally reviewed and interpreted. Notable for clear lungs, no focal consolidations, effusions, interstitial markings, pneumothorax, or obvious cardiomegaly. Degenerative bone changes.  Pelvic XR personally reviewed and interpreted. Notable for left subcapital hip fracture with overriding of the fracture fragments. No   CT head without contrast personally reviewed and interpreted. Notable for chronic small microvascular changes.  CT lumbar spine without contrast personally reviewed and interpreted. Notable for multilevel vertebral spondylosis and chronic T12 and L1 compression fractures of the vertebral bodies.    EKG pending. Labs personally reviewed and interpreted. Notable for leukocytosis (WBC 18.64) with 82.4% neutrophils but no lymphopenia. Hb 15.3, plt 241, INR 1.08, Na 139, K 4.2, Cl 109, HCO3 26, BUN/creatinine 12/1.01, , calcium 9.7 with albumin 3.4, and LFTs wnl.  UA pending.  COVID-19 PCR negative.    CXR personally reviewed and interpreted. Notable for clear lungs, no focal consolidations, effusions, interstitial markings, pneumothorax, or obvious cardiomegaly. Degenerative bone changes.  Pelvic XR personally reviewed and interpreted. Notable for left subcapital hip fracture with overriding of the fracture fragments. No   CT head without contrast personally reviewed and interpreted. Notable for chronic small microvascular changes.  CT lumbar spine without contrast personally reviewed and interpreted. Notable for multilevel vertebral spondylosis and chronic T12 and L1 compression fractures of the vertebral bodies.    EKG pending. Labs personally reviewed and interpreted. Notable for leukocytosis (WBC 18.64) with 82.4% neutrophils but no lymphopenia. Hb 15.3, plt 241, INR 1.08, Na 139, K 4.2, Cl 109, HCO3 26, BUN/creatinine 12/1.01, , calcium 9.7 with albumin 3.4, and LFTs wnl.  UA pending.  COVID-19 PCR negative.    CXR personally reviewed and interpreted. Notable for clear lungs, no focal consolidations, effusions, interstitial markings, pneumothorax, or obvious cardiomegaly. Degenerative bone changes.  Pelvic XR personally reviewed and interpreted. Notable for left subcapital hip fracture with overriding of the fracture fragments. No   CT head without contrast personally reviewed and interpreted. Notable for chronic small microvascular changes.  CT lumbar spine without contrast personally reviewed and interpreted. Notable for multilevel vertebral spondylosis and chronic T12 and L1 compression fractures of the vertebral bodies.    EKG personally reviewed. Normal sinus rhythm, normal axis. TWIs V1-3 (only new in 3), small Q waves in inferior leads and V5-6 (only new in V5-6). No ST elevations or depressions. Rate 83, , QTc 474.

## 2022-09-24 NOTE — PATIENT PROFILE ADULT - FALL HARM RISK - HARM RISK INTERVENTIONS
Assistance with ambulation/Assistance OOB with selected safe patient handling equipment/Communicate Risk of Fall with Harm to all staff/Discuss with provider need for PT consult/Monitor gait and stability/Reinforce activity limits and safety measures with patient and family/Tailored Fall Risk Interventions/Visual Cue: Yellow wristband and red socks/Bed in lowest position, wheels locked, appropriate side rails in place/Call bell, personal items and telephone in reach/Instruct patient to call for assistance before getting out of bed or chair/Non-slip footwear when patient is out of bed/[place] to call system/Physically safe environment - no spills, clutter or unnecessary equipment/Purposeful Proactive Rounding/Room/bathroom lighting operational, light cord in reach

## 2022-09-25 ENCOUNTER — RESULT REVIEW (OUTPATIENT)
Age: 87
End: 2022-09-25

## 2022-09-25 ENCOUNTER — TRANSCRIPTION ENCOUNTER (OUTPATIENT)
Age: 87
End: 2022-09-25

## 2022-09-25 DIAGNOSIS — D72.829 ELEVATED WHITE BLOOD CELL COUNT, UNSPECIFIED: ICD-10-CM

## 2022-09-25 DIAGNOSIS — F03.90 UNSPECIFIED DEMENTIA, UNSPECIFIED SEVERITY, WITHOUT BEHAVIORAL DISTURBANCE, PSYCHOTIC DISTURBANCE, MOOD DISTURBANCE, AND ANXIETY: ICD-10-CM

## 2022-09-25 DIAGNOSIS — E78.5 HYPERLIPIDEMIA, UNSPECIFIED: ICD-10-CM

## 2022-09-25 DIAGNOSIS — S72.92XA UNSPECIFIED FRACTURE OF LEFT FEMUR, INITIAL ENCOUNTER FOR CLOSED FRACTURE: ICD-10-CM

## 2022-09-25 LAB
24R-OH-CALCIDIOL SERPL-MCNC: 41.4 NG/ML — SIGNIFICANT CHANGE UP (ref 30–80)
ANION GAP SERPL CALC-SCNC: 3 MMOL/L — LOW (ref 5–17)
ANION GAP SERPL CALC-SCNC: 7 MMOL/L — SIGNIFICANT CHANGE UP (ref 5–17)
APTT BLD: 22.3 SEC — LOW (ref 27.5–35.5)
BUN SERPL-MCNC: 10 MG/DL — SIGNIFICANT CHANGE UP (ref 7–23)
BUN SERPL-MCNC: 11 MG/DL — SIGNIFICANT CHANGE UP (ref 7–23)
CALCIUM SERPL-MCNC: 9 MG/DL — SIGNIFICANT CHANGE UP (ref 8.5–10.1)
CALCIUM SERPL-MCNC: 9.1 MG/DL — SIGNIFICANT CHANGE UP (ref 8.5–10.1)
CHLORIDE SERPL-SCNC: 107 MMOL/L — SIGNIFICANT CHANGE UP (ref 96–108)
CHLORIDE SERPL-SCNC: 110 MMOL/L — HIGH (ref 96–108)
CO2 SERPL-SCNC: 24 MMOL/L — SIGNIFICANT CHANGE UP (ref 22–31)
CO2 SERPL-SCNC: 26 MMOL/L — SIGNIFICANT CHANGE UP (ref 22–31)
CREAT SERPL-MCNC: 0.92 MG/DL — SIGNIFICANT CHANGE UP (ref 0.5–1.3)
CREAT SERPL-MCNC: 0.94 MG/DL — SIGNIFICANT CHANGE UP (ref 0.5–1.3)
EGFR: 59 ML/MIN/1.73M2 — LOW
EGFR: 61 ML/MIN/1.73M2 — SIGNIFICANT CHANGE UP
GLUCOSE SERPL-MCNC: 107 MG/DL — HIGH (ref 70–99)
GLUCOSE SERPL-MCNC: 138 MG/DL — HIGH (ref 70–99)
HCT VFR BLD CALC: 38.6 % — SIGNIFICANT CHANGE UP (ref 34.5–45)
HGB BLD-MCNC: 12.8 G/DL — SIGNIFICANT CHANGE UP (ref 11.5–15.5)
INR BLD: 1.2 RATIO — HIGH (ref 0.88–1.16)
MCHC RBC-ENTMCNC: 33.2 GM/DL — SIGNIFICANT CHANGE UP (ref 32–36)
MCHC RBC-ENTMCNC: 34.5 PG — HIGH (ref 27–34)
MCV RBC AUTO: 104 FL — HIGH (ref 80–100)
PLATELET # BLD AUTO: 144 K/UL — LOW (ref 150–400)
POTASSIUM SERPL-MCNC: 4.1 MMOL/L — SIGNIFICANT CHANGE UP (ref 3.5–5.3)
POTASSIUM SERPL-MCNC: 4.2 MMOL/L — SIGNIFICANT CHANGE UP (ref 3.5–5.3)
POTASSIUM SERPL-SCNC: 4.1 MMOL/L — SIGNIFICANT CHANGE UP (ref 3.5–5.3)
POTASSIUM SERPL-SCNC: 4.2 MMOL/L — SIGNIFICANT CHANGE UP (ref 3.5–5.3)
PROTHROM AB SERPL-ACNC: 14 SEC — HIGH (ref 10.5–13.4)
RBC # BLD: 3.71 M/UL — LOW (ref 3.8–5.2)
RBC # FLD: 13.5 % — SIGNIFICANT CHANGE UP (ref 10.3–14.5)
SODIUM SERPL-SCNC: 137 MMOL/L — SIGNIFICANT CHANGE UP (ref 135–145)
SODIUM SERPL-SCNC: 140 MMOL/L — SIGNIFICANT CHANGE UP (ref 135–145)
WBC # BLD: 17.75 K/UL — HIGH (ref 3.8–10.5)
WBC # FLD AUTO: 17.75 K/UL — HIGH (ref 3.8–10.5)

## 2022-09-25 PROCEDURE — 88305 TISSUE EXAM BY PATHOLOGIST: CPT | Mod: 26

## 2022-09-25 PROCEDURE — 99233 SBSQ HOSP IP/OBS HIGH 50: CPT

## 2022-09-25 PROCEDURE — 73501 X-RAY EXAM HIP UNI 1 VIEW: CPT | Mod: 26,LT

## 2022-09-25 PROCEDURE — 88311 DECALCIFY TISSUE: CPT | Mod: 26

## 2022-09-25 RX ORDER — ONDANSETRON 8 MG/1
4 TABLET, FILM COATED ORAL EVERY 6 HOURS
Refills: 0 | Status: DISCONTINUED | OUTPATIENT
Start: 2022-09-25 | End: 2022-10-07

## 2022-09-25 RX ORDER — OXYCODONE HYDROCHLORIDE 5 MG/1
5 TABLET ORAL EVERY 4 HOURS
Refills: 0 | Status: DISCONTINUED | OUTPATIENT
Start: 2022-09-25 | End: 2022-09-27

## 2022-09-25 RX ORDER — CEFAZOLIN SODIUM 1 G
2000 VIAL (EA) INJECTION EVERY 8 HOURS
Refills: 0 | Status: COMPLETED | OUTPATIENT
Start: 2022-09-25 | End: 2022-09-25

## 2022-09-25 RX ORDER — POLYETHYLENE GLYCOL 3350 17 G/17G
17 POWDER, FOR SOLUTION ORAL AT BEDTIME
Refills: 0 | Status: DISCONTINUED | OUTPATIENT
Start: 2022-09-25 | End: 2022-10-02

## 2022-09-25 RX ORDER — HYDROMORPHONE HYDROCHLORIDE 2 MG/ML
0.5 INJECTION INTRAMUSCULAR; INTRAVENOUS; SUBCUTANEOUS ONCE
Refills: 0 | Status: DISCONTINUED | OUTPATIENT
Start: 2022-09-25 | End: 2022-09-27

## 2022-09-25 RX ORDER — SODIUM CHLORIDE 9 MG/ML
1000 INJECTION, SOLUTION INTRAVENOUS
Refills: 0 | Status: DISCONTINUED | OUTPATIENT
Start: 2022-09-25 | End: 2022-09-26

## 2022-09-25 RX ORDER — OXYCODONE HYDROCHLORIDE 5 MG/1
5 TABLET ORAL ONCE
Refills: 0 | Status: DISCONTINUED | OUTPATIENT
Start: 2022-09-25 | End: 2022-09-25

## 2022-09-25 RX ORDER — TRAMADOL HYDROCHLORIDE 50 MG/1
25 TABLET ORAL EVERY 4 HOURS
Refills: 0 | Status: DISCONTINUED | OUTPATIENT
Start: 2022-09-25 | End: 2022-10-02

## 2022-09-25 RX ORDER — TRAMADOL HYDROCHLORIDE 50 MG/1
50 TABLET ORAL EVERY 4 HOURS
Refills: 0 | Status: DISCONTINUED | OUTPATIENT
Start: 2022-09-25 | End: 2022-10-02

## 2022-09-25 RX ORDER — ACETAMINOPHEN 500 MG
975 TABLET ORAL EVERY 8 HOURS
Refills: 0 | Status: DISCONTINUED | OUTPATIENT
Start: 2022-09-25 | End: 2022-09-28

## 2022-09-25 RX ORDER — OXYCODONE HYDROCHLORIDE 5 MG/1
10 TABLET ORAL ONCE
Refills: 0 | Status: DISCONTINUED | OUTPATIENT
Start: 2022-09-25 | End: 2022-09-25

## 2022-09-25 RX ORDER — MAGNESIUM HYDROXIDE 400 MG/1
30 TABLET, CHEWABLE ORAL DAILY
Refills: 0 | Status: DISCONTINUED | OUTPATIENT
Start: 2022-09-25 | End: 2022-10-02

## 2022-09-25 RX ORDER — FENTANYL CITRATE 50 UG/ML
50 INJECTION INTRAVENOUS
Refills: 0 | Status: DISCONTINUED | OUTPATIENT
Start: 2022-09-25 | End: 2022-09-25

## 2022-09-25 RX ORDER — ACETAMINOPHEN 500 MG
1000 TABLET ORAL ONCE
Refills: 0 | Status: DISCONTINUED | OUTPATIENT
Start: 2022-09-25 | End: 2022-09-27

## 2022-09-25 RX ORDER — ONDANSETRON 8 MG/1
4 TABLET, FILM COATED ORAL ONCE
Refills: 0 | Status: DISCONTINUED | OUTPATIENT
Start: 2022-09-25 | End: 2022-09-25

## 2022-09-25 RX ORDER — SENNA PLUS 8.6 MG/1
2 TABLET ORAL AT BEDTIME
Refills: 0 | Status: DISCONTINUED | OUTPATIENT
Start: 2022-09-25 | End: 2022-10-02

## 2022-09-25 RX ADMIN — Medication 100 MILLIGRAM(S): at 21:22

## 2022-09-25 RX ADMIN — FENTANYL CITRATE 50 MICROGRAM(S): 50 INJECTION INTRAVENOUS at 11:09

## 2022-09-25 RX ADMIN — MEMANTINE HYDROCHLORIDE 10 MILLIGRAM(S): 10 TABLET ORAL at 23:28

## 2022-09-25 RX ADMIN — CITALOPRAM 20 MILLIGRAM(S): 10 TABLET, FILM COATED ORAL at 13:58

## 2022-09-25 RX ADMIN — MEMANTINE HYDROCHLORIDE 10 MILLIGRAM(S): 10 TABLET ORAL at 13:58

## 2022-09-25 RX ADMIN — DONEPEZIL HYDROCHLORIDE 10 MILLIGRAM(S): 10 TABLET, FILM COATED ORAL at 23:29

## 2022-09-25 RX ADMIN — Medication 1000 UNIT(S): at 13:58

## 2022-09-25 RX ADMIN — Medication 1 DROP(S): at 13:58

## 2022-09-25 RX ADMIN — LATANOPROST 1 DROP(S): 0.05 SOLUTION/ DROPS OPHTHALMIC; TOPICAL at 23:29

## 2022-09-25 RX ADMIN — FENTANYL CITRATE 50 MICROGRAM(S): 50 INJECTION INTRAVENOUS at 11:17

## 2022-09-25 NOTE — PROGRESS NOTE ADULT - SUBJECTIVE AND OBJECTIVE BOX
Patient seen and examined at bedside. Pain well controlled with medication. Patient confused at baseline however denies any numbness, tingling, weakness, or any other orthopaedic complaint. Denies N/V/CP/SOB.         VITAL SIGNS:  T(C): 36.8 (22 @ 05:00), Max: 36.9 (22 @ 14:29)  HR: 91 (22 @ 05:00) (78 - 91)  BP: 126/58 (22 @ 05:00) (110/56 - 130/59)  RR: 18 (22 @ 05:00) (17 - 18)  SpO2: 98% (22 @ 05:00) (93% - 100%)      LABS:                        15.3   18.64 )-----------( 241      ( 24 Sep 2022 08:09 )             44.9         137  |  110<H>  |  11  ----------------------------<  107<H>  4.1   |  24  |  0.92    Ca    9.1      25 Sep 2022 05:08    TPro  7.1  /  Alb  3.4  /  TBili  0.7  /  DBili  x   /  AST  27  /  ALT  22  /  AlkPhos  102  09-24    PT/INR - ( 25 Sep 2022 05:08 )   PT: 14.0 sec;   INR: 1.20 ratio         PTT - ( 25 Sep 2022 05:08 )  PTT:22.3 sec  Urinalysis Basic - ( 24 Sep 2022 19:22 )    Color: Lilliam / Appearance: Clear / S.020 / pH: x  Gluc: x / Ketone: Small  / Bili: Negative / Urobili: Negative   Blood: x / Protein: 15 / Nitrite: Negative   Leuk Esterase: Trace / RBC: 11-25 /HPF / WBC 6-10   Sq Epi: x / Non Sq Epi: Few / Bacteria: Few      PE  Gen: NAD, alert and oriented  Resp: Unlabored breathing  LLE: Skin intact, no ecchymosis,        SILT DP/SP/ Justino/Saph,        +EHL/FHL/TA/Gastroc,        Knee/ankle painless ROM,        hip ROM limited 2/2 pain,       DP+,        soft compartments, no calf ttp,        +log roll.      Secondary:  No TTP over bony landmarks, SILT BL, ROM intact BL, distal pulses palpable.        Plan:  -Plan for surgical intervention for L hip hemiarthroplasty THIS MORNING   -FU AM LABS  -NPO/IVFs while NPO.  -NWB LLE, strict bedrest  -DVT ppx - HOLD ALL AC; SCDs ok  -Pain control prn  -Medical management, continue home meds. Please document medical clearance for surgery.  -Case discussed with attending, will advise if plan changes.

## 2022-09-25 NOTE — PROGRESS NOTE ADULT - ASSESSMENT
88 yo F with a PMH of HLD, glaucoma, Alzheimer's dementia, and OA who presents after a fall, found to have a L hip fracture.    #Subcapital Fracture of L Femur/Fall/Preoperative Clearance/OA/Leukocytosis  - S/p fall when climbing out of a hospital bed  - With non-comminuted fracture  - heading to OR this AM  - see H&P yesterday , agree w/ admitting hospitalist that patient is medically optimized as a low cardiac risk for low-moderate risk surgery. No need for cardiology clearance  - Pain control   - NWB    # Leukocytosis   - likely reactive   - trend CBC.   - No obvious evidence of infection    #Dementia, senile with depression  - C/w Citalopram 20 mg QD, Memantine 10 mg BID, and Donepezil 10 mg QHS    #HLD  - C/w Simvastatin 20 mg QD    #Glaucoma  - C/w Latanoprost and Timolol eye drops    #Prophylactic Measure  - DVT PPX: Lovenox given yesterday resume postop as per routine       # Dispo: pending post-op course   -may require subacute rehabilitation

## 2022-09-25 NOTE — PROGRESS NOTE ADULT - SUBJECTIVE AND OBJECTIVE BOX
Patient is a 87y old  Female who presents with a chief complaint of left hip fracture (25 Sep 2022 06:35)      History, interim events and clinically pertinent issues reviewed; patient interviewed and examined.  She has no complaints this AM and denies pain;     REVIEW OF SYMPTOMS: patient denies HA's, CP, palpitations, shortness of breath or upper respiratory symptoms, nausea, vomiting, diarrhea, constipation, dysuria, bruising/bleeding and all other systems were reviewed as negative      ALLERGIES:  No Known Allergies    MEDICATIONS  (STANDING):  cholecalciferol 1000 Unit(s) Oral daily  citalopram 20 milliGRAM(s) Oral daily  donepezil 10 milliGRAM(s) Oral at bedtime  lactated ringers. 1000 milliLiter(s) (75 mL/Hr) IV Continuous <Continuous>  latanoprost 0.005% Ophthalmic Solution 1 Drop(s) Both EYES at bedtime  memantine 10 milliGRAM(s) Oral two times a day  simvastatin 20 milliGRAM(s) Oral at bedtime  timolol 0.5% Solution 1 Drop(s) Both EYES daily  tranexamic acid IVPB 1000 milliGRAM(s) IV Intermittent once  tranexamic acid IVPB 1000 milliGRAM(s) IV Intermittent once    MEDICATIONS  (PRN):  acetaminophen     Tablet .. 650 milliGRAM(s) Oral every 6 hours PRN Mild Pain (1 - 3)  fentaNYL    Injectable 50 MICROGram(s) IV Push every 5 minutes PRN Severe Pain (7 - 10)  morphine  - Injectable 2 milliGRAM(s) IV Push every 6 hours PRN Severe Pain (7 - 10)  ondansetron Injectable 4 milliGRAM(s) IV Push every 8 hours PRN Nausea and/or Vomiting  ondansetron Injectable 4 milliGRAM(s) IV Push once PRN Nausea and/or Vomiting  oxyCODONE    IR 5 milliGRAM(s) Oral once PRN Moderate Pain (4 - 6)  oxyCODONE    IR 10 milliGRAM(s) Oral once PRN Severe Pain (7 - 10)  traMADol 25 milliGRAM(s) Oral every 6 hours PRN Moderate Pain (4 - 6)    Vital Signs Last 24 Hrs  T(F): 98.2 (25 Sep 2022 05:00), Max: 98.4 (24 Sep 2022 14:29)  HR: 91 (25 Sep 2022 05:00) (78 - 91)  BP: 126/58 (25 Sep 2022 05:00) (110/56 - 130/59)  RR: 18 (25 Sep 2022 05:00) (17 - 18)  SpO2: 98% (25 Sep 2022 05:00) (93% - 100%)  I&O's Summary    BMI (kg/m2): 18.3 (22 @ 06:07)          PHYSICAL EXAM:  General: NAD, A/O x 1-2  ENT: MM dry  Neck: Supple, No JVD  Lungs: Clear to auscultation bilaterally  Cardio: RRR, S1/S2, No murmurs  Abdomen: Soft, Nontender, Nondistended; Bowel sounds present  Extremities: No calf tenderness, No pitting edema      LABS:                        12.6   10.89 )-----------( 163      ( 25 Sep 2022 07:08 )             37.5           137  |  110  |  11  ----------------------------<  107  4.1   |  24  |  0.92    Ca    9.1      25 Sep 2022 05:08    TPro  7.1  /  Alb  3.4  /  TBili  0.7  /  DBili  x   /  AST  27  /  ALT  22  /  AlkPhos  102  09-24       PT/INR - ( 25 Sep 2022 05:08 )   PT: 14.0 sec;   INR: 1.20 ratio         PTT - ( 25 Sep 2022 05:08 )  PTT:22.3 sec                     Urinalysis Basic - ( 24 Sep 2022 19:22 )    Color: Lilliam / Appearance: Clear / S.020 / pH: x  Gluc: x / Ketone: Small  / Bili: Negative / Urobili: Negative   Blood: x / Protein: 15 / Nitrite: Negative   Leuk Esterase: Trace / RBC: 11-25 /HPF / WBC 6-10   Sq Epi: x / Non Sq Epi: Few / Bacteria: Few        COVID-19 PCR: NotDetec (22 @ 08:09)

## 2022-09-25 NOTE — PROGRESS NOTE ADULT - SUBJECTIVE AND OBJECTIVE BOX
Postop Check    Patient tolerated the procedure well. Patient seen and examined at bedside.  No acute complaints at this time. Pain well controlled. Denies chest pain, shortness of breath, nausea or vomiting.       Vital Signs Last 24 Hrs  T(C): 36.3 (09-25-22 @ 13:00), Max: 36.8 (09-25-22 @ 05:00)  T(F): 97.3 (09-25-22 @ 13:00), Max: 98.2 (09-25-22 @ 05:00)  HR: 74 (09-25-22 @ 13:00) (66 - 91)  BP: 98/80 (09-25-22 @ 13:30) (90/45 - 150/57)  BP(mean): 84 (09-25-22 @ 13:30) (84 - 84)  RR: 12 (09-25-22 @ 12:35) (12 - 20)  SpO2: 97% (09-25-22 @ 13:00) (97% - 100%)              12.8   17.75 )-----------( 144      ( 25 Sep 2022 11:48 )             38.6     25 Sep 2022 11:48    140    |  107    |  10     ----------------------------<  138    4.2     |  26     |  0.94     Ca    9.0        25 Sep 2022 11:48    TPro  7.1    /  Alb  3.4    /  TBili  0.7    /  DBili  x      /  AST  27     /  ALT  22     /  AlkPhos  102    24 Sep 2022 10:24    PT/INR - ( 25 Sep 2022 05:08 )   PT: 14.0 sec;   INR: 1.20 ratio         PTT - ( 25 Sep 2022 05:08 )  PTT:22.3 sec      PE:  General: NAD, resting comfortably in bed  LLE:   Dressing C/D/I  abd pillow in place  SCDs present bilaterally  Compartments soft and compressible  No calf tenderness bilaterally  +TA/EHL/FHL/GSC  SILT L3-S1  2+ DP/PT                    A/P:  87y f s/p L hip hemiarthroplasty POD  -PT/OT   -WBAT RLE  -posterior hip precautions  -Pain Control  -DVT ppx per AC team  -Continue perioperative abx x 24 hours  -FU AM Labs  -Rest, ice, compress the extremity as needed  -Incentive Spirometry  -Medical comanagement appreciated

## 2022-09-25 NOTE — PROGRESS NOTE ADULT - NSPROGADDITIONALINFOA_GEN_ALL_CORE
I have personally interviewed and examined this patient, reviewed pertinent clinical information, and performed the evaluation and management services provided at today's visit for inpatient medical follow up    I am available to discuss any issues related to the medical care of this patient on the unit, or by phone at [phone]

## 2022-09-26 ENCOUNTER — TRANSCRIPTION ENCOUNTER (OUTPATIENT)
Age: 87
End: 2022-09-26

## 2022-09-26 LAB
ALBUMIN SERPL ELPH-MCNC: 2.5 G/DL — LOW (ref 3.3–5)
ALP SERPL-CCNC: 81 U/L — SIGNIFICANT CHANGE UP (ref 40–120)
ALT FLD-CCNC: 13 U/L — SIGNIFICANT CHANGE UP (ref 12–78)
ANION GAP SERPL CALC-SCNC: 5 MMOL/L — SIGNIFICANT CHANGE UP (ref 5–17)
ANION GAP SERPL CALC-SCNC: 6 MMOL/L — SIGNIFICANT CHANGE UP (ref 5–17)
AST SERPL-CCNC: 39 U/L — HIGH (ref 15–37)
BASE EXCESS BLDA CALC-SCNC: 1.9 MMOL/L — SIGNIFICANT CHANGE UP (ref -2–3)
BILIRUB SERPL-MCNC: 0.6 MG/DL — SIGNIFICANT CHANGE UP (ref 0.2–1.2)
BLOOD GAS COMMENTS ARTERIAL: SIGNIFICANT CHANGE UP
BUN SERPL-MCNC: 10 MG/DL — SIGNIFICANT CHANGE UP (ref 7–23)
BUN SERPL-MCNC: 11 MG/DL — SIGNIFICANT CHANGE UP (ref 7–23)
CALCIUM SERPL-MCNC: 8.7 MG/DL — SIGNIFICANT CHANGE UP (ref 8.5–10.1)
CALCIUM SERPL-MCNC: 9.2 MG/DL — SIGNIFICANT CHANGE UP (ref 8.5–10.1)
CHLORIDE SERPL-SCNC: 108 MMOL/L — SIGNIFICANT CHANGE UP (ref 96–108)
CHLORIDE SERPL-SCNC: 108 MMOL/L — SIGNIFICANT CHANGE UP (ref 96–108)
CO2 BLDA-SCNC: 28 MMOL/L — HIGH (ref 19–24)
CO2 SERPL-SCNC: 26 MMOL/L — SIGNIFICANT CHANGE UP (ref 22–31)
CO2 SERPL-SCNC: 26 MMOL/L — SIGNIFICANT CHANGE UP (ref 22–31)
CREAT SERPL-MCNC: 0.8 MG/DL — SIGNIFICANT CHANGE UP (ref 0.5–1.3)
CREAT SERPL-MCNC: 0.9 MG/DL — SIGNIFICANT CHANGE UP (ref 0.5–1.3)
EGFR: 63 ML/MIN/1.73M2 — SIGNIFICANT CHANGE UP
EGFR: 72 ML/MIN/1.73M2 — SIGNIFICANT CHANGE UP
GAS PNL BLDA: SIGNIFICANT CHANGE UP
GLUCOSE SERPL-MCNC: 107 MG/DL — HIGH (ref 70–99)
GLUCOSE SERPL-MCNC: 109 MG/DL — HIGH (ref 70–99)
HCO3 BLDA-SCNC: 27 MMOL/L — SIGNIFICANT CHANGE UP (ref 21–28)
HCT VFR BLD CALC: 29.3 % — LOW (ref 34.5–45)
HCT VFR BLD CALC: 30.9 % — LOW (ref 34.5–45)
HGB BLD-MCNC: 10.1 G/DL — LOW (ref 11.5–15.5)
HGB BLD-MCNC: 10.3 G/DL — LOW (ref 11.5–15.5)
LACTATE SERPL-SCNC: 1.1 MMOL/L — SIGNIFICANT CHANGE UP (ref 0.7–2)
MCHC RBC-ENTMCNC: 33.3 GM/DL — SIGNIFICANT CHANGE UP (ref 32–36)
MCHC RBC-ENTMCNC: 34.2 PG — HIGH (ref 27–34)
MCHC RBC-ENTMCNC: 34.5 GM/DL — SIGNIFICANT CHANGE UP (ref 32–36)
MCHC RBC-ENTMCNC: 35.4 PG — HIGH (ref 27–34)
MCV RBC AUTO: 102.7 FL — HIGH (ref 80–100)
MCV RBC AUTO: 102.8 FL — HIGH (ref 80–100)
PCO2 BLDA: 41 MMHG — HIGH (ref 32–35)
PH BLDA: 7.42 — SIGNIFICANT CHANGE UP (ref 7.35–7.45)
PLATELET # BLD AUTO: 142 K/UL — LOW (ref 150–400)
PLATELET # BLD AUTO: 147 K/UL — LOW (ref 150–400)
PO2 BLDA: 117 MMHG — HIGH (ref 83–108)
POTASSIUM SERPL-MCNC: 3.9 MMOL/L — SIGNIFICANT CHANGE UP (ref 3.5–5.3)
POTASSIUM SERPL-MCNC: 3.9 MMOL/L — SIGNIFICANT CHANGE UP (ref 3.5–5.3)
POTASSIUM SERPL-SCNC: 3.9 MMOL/L — SIGNIFICANT CHANGE UP (ref 3.5–5.3)
POTASSIUM SERPL-SCNC: 3.9 MMOL/L — SIGNIFICANT CHANGE UP (ref 3.5–5.3)
PROT SERPL-MCNC: 5.7 GM/DL — LOW (ref 6–8.3)
RBC # BLD: 2.85 M/UL — LOW (ref 3.8–5.2)
RBC # BLD: 3.01 M/UL — LOW (ref 3.8–5.2)
RBC # FLD: 13.3 % — SIGNIFICANT CHANGE UP (ref 10.3–14.5)
RBC # FLD: 13.5 % — SIGNIFICANT CHANGE UP (ref 10.3–14.5)
SAO2 % BLDA: 100 % — SIGNIFICANT CHANGE UP
SODIUM SERPL-SCNC: 139 MMOL/L — SIGNIFICANT CHANGE UP (ref 135–145)
SODIUM SERPL-SCNC: 140 MMOL/L — SIGNIFICANT CHANGE UP (ref 135–145)
WBC # BLD: 12.83 K/UL — HIGH (ref 3.8–10.5)
WBC # BLD: 12.99 K/UL — HIGH (ref 3.8–10.5)
WBC # FLD AUTO: 12.83 K/UL — HIGH (ref 3.8–10.5)
WBC # FLD AUTO: 12.99 K/UL — HIGH (ref 3.8–10.5)

## 2022-09-26 PROCEDURE — 70450 CT HEAD/BRAIN W/O DYE: CPT | Mod: 26

## 2022-09-26 PROCEDURE — 99233 SBSQ HOSP IP/OBS HIGH 50: CPT

## 2022-09-26 PROCEDURE — 71045 X-RAY EXAM CHEST 1 VIEW: CPT | Mod: 26,77

## 2022-09-26 PROCEDURE — 99223 1ST HOSP IP/OBS HIGH 75: CPT

## 2022-09-26 PROCEDURE — 71045 X-RAY EXAM CHEST 1 VIEW: CPT | Mod: 26

## 2022-09-26 RX ORDER — CEFEPIME 1 G/1
2000 INJECTION, POWDER, FOR SOLUTION INTRAMUSCULAR; INTRAVENOUS ONCE
Refills: 0 | Status: COMPLETED | OUTPATIENT
Start: 2022-09-26 | End: 2022-09-26

## 2022-09-26 RX ORDER — ACETAMINOPHEN 500 MG
650 TABLET ORAL EVERY 6 HOURS
Refills: 0 | Status: DISCONTINUED | OUTPATIENT
Start: 2022-09-26 | End: 2022-10-07

## 2022-09-26 RX ORDER — PIPERACILLIN AND TAZOBACTAM 4; .5 G/20ML; G/20ML
3.38 INJECTION, POWDER, LYOPHILIZED, FOR SOLUTION INTRAVENOUS ONCE
Refills: 0 | Status: COMPLETED | OUTPATIENT
Start: 2022-09-27 | End: 2022-09-27

## 2022-09-26 RX ORDER — PIPERACILLIN AND TAZOBACTAM 4; .5 G/20ML; G/20ML
3.38 INJECTION, POWDER, LYOPHILIZED, FOR SOLUTION INTRAVENOUS ONCE
Refills: 0 | Status: COMPLETED | OUTPATIENT
Start: 2022-09-26 | End: 2022-09-26

## 2022-09-26 RX ORDER — SODIUM CHLORIDE 9 MG/ML
1000 INJECTION INTRAMUSCULAR; INTRAVENOUS; SUBCUTANEOUS
Refills: 0 | Status: DISCONTINUED | OUTPATIENT
Start: 2022-09-26 | End: 2022-09-28

## 2022-09-26 RX ORDER — CEFEPIME 1 G/1
INJECTION, POWDER, FOR SOLUTION INTRAMUSCULAR; INTRAVENOUS
Refills: 0 | Status: DISCONTINUED | OUTPATIENT
Start: 2022-09-26 | End: 2022-09-26

## 2022-09-26 RX ORDER — SCOPALAMINE 1 MG/3D
1 PATCH, EXTENDED RELEASE TRANSDERMAL
Refills: 0 | Status: COMPLETED | OUTPATIENT
Start: 2022-09-26 | End: 2022-09-26

## 2022-09-26 RX ORDER — CHLORHEXIDINE GLUCONATE 213 G/1000ML
1 SOLUTION TOPICAL
Refills: 0 | Status: DISCONTINUED | OUTPATIENT
Start: 2022-09-26 | End: 2022-10-07

## 2022-09-26 RX ORDER — PIPERACILLIN AND TAZOBACTAM 4; .5 G/20ML; G/20ML
3.38 INJECTION, POWDER, LYOPHILIZED, FOR SOLUTION INTRAVENOUS EVERY 8 HOURS
Refills: 0 | Status: COMPLETED | OUTPATIENT
Start: 2022-09-27 | End: 2022-09-30

## 2022-09-26 RX ADMIN — SODIUM CHLORIDE 75 MILLILITER(S): 9 INJECTION INTRAMUSCULAR; INTRAVENOUS; SUBCUTANEOUS at 21:41

## 2022-09-26 RX ADMIN — Medication 650 MILLIGRAM(S): at 18:00

## 2022-09-26 RX ADMIN — CEFEPIME 100 MILLIGRAM(S): 1 INJECTION, POWDER, FOR SOLUTION INTRAMUSCULAR; INTRAVENOUS at 15:25

## 2022-09-26 RX ADMIN — Medication 1 DROP(S): at 09:59

## 2022-09-26 RX ADMIN — CITALOPRAM 20 MILLIGRAM(S): 10 TABLET, FILM COATED ORAL at 09:58

## 2022-09-26 RX ADMIN — SODIUM CHLORIDE 75 MILLILITER(S): 9 INJECTION, SOLUTION INTRAVENOUS at 15:26

## 2022-09-26 RX ADMIN — Medication 100 MILLIGRAM(S): at 03:52

## 2022-09-26 RX ADMIN — Medication 650 MILLIGRAM(S): at 16:24

## 2022-09-26 RX ADMIN — Medication 1 TABLET(S): at 09:58

## 2022-09-26 RX ADMIN — SCOPALAMINE 1 PATCH: 1 PATCH, EXTENDED RELEASE TRANSDERMAL at 21:29

## 2022-09-26 RX ADMIN — Medication 1000 UNIT(S): at 09:58

## 2022-09-26 RX ADMIN — LATANOPROST 1 DROP(S): 0.05 SOLUTION/ DROPS OPHTHALMIC; TOPICAL at 22:20

## 2022-09-26 RX ADMIN — PIPERACILLIN AND TAZOBACTAM 200 GRAM(S): 4; .5 INJECTION, POWDER, LYOPHILIZED, FOR SOLUTION INTRAVENOUS at 21:29

## 2022-09-26 RX ADMIN — MEMANTINE HYDROCHLORIDE 10 MILLIGRAM(S): 10 TABLET ORAL at 09:59

## 2022-09-26 RX ADMIN — ENOXAPARIN SODIUM 40 MILLIGRAM(S): 100 INJECTION SUBCUTANEOUS at 06:01

## 2022-09-26 NOTE — DISCHARGE NOTE PROVIDER - NSDCCPCAREPLAN_GEN_ALL_CORE_FT
PRINCIPAL DISCHARGE DIAGNOSIS  Diagnosis: Femoral neck fracture  Assessment and Plan of Treatment:        PRINCIPAL DISCHARGE DIAGNOSIS  Diagnosis: Femoral neck fracture  Assessment and Plan of Treatment: S/p IMN  Outpatient ortho f/u  WBAT with PT      SECONDARY DISCHARGE DIAGNOSES  Diagnosis: Dementia  Assessment and Plan of Treatment:

## 2022-09-26 NOTE — OCCUPATIONAL THERAPY INITIAL EVALUATION ADULT - PERTINENT HX OF CURRENT PROBLEM, REHAB EVAL
Patient is a 86 y/o female with PMH of HLD, glaucoma, Alzheimer's/dementia, OA, adm to  s/p mechanical fall resulting in fracture of left subcapital hip fracture and is now s/p L hip hemiarthroplasty. Per H&P- patient is a poor historian at baseline pt's daughter reports patient has a 24/7 HHA and HHA went to the bathroom, pt climbed to EOB and fell onto L side.

## 2022-09-26 NOTE — DISCHARGE NOTE PROVIDER - NSDCFUADDINST_GEN_ALL_CORE_FT
Discharge Instructions  Hip Hemiarthroplasty    1. ACTIVITY: Weight Bearing as Tolerated with assistance and rolling walker. Abduction pillow while in bed or chair.  2. DVT:Continue DVT/PE Prophylaxis. See Med Rec for Duration and dose.  3. PT: daily, Posterior Hip Precautions.  4. FOLLOW UP: Orthopedic Surgeon DR CLEARY in 14 Days. Call Office For Appointment.  5. STAPLES: Remove by RN POD14 (OCT 9)  6. BANDAGE: POD7 (OCT2) place a new Mepilex Ag over wound. Also change PRN if saturated. Do NOT remove on arrival to inspect wound.  7. SHOWER: Ok. No tub baths.     Discharge Instructions  Hip Hemiarthroplasty    1. ACTIVITY: Weight Bearing as Tolerated with assistance and rolling walker. Abduction pillow while in bed or chair.  2. DVT: Continue DVT/PE Prophylaxis. See Med Rec for Duration and dose.  3. PT: daily, Posterior Hip Precautions.  4. FOLLOW UP: Orthopedic Surgeon DR CLEARY in 14 Days. Call Office For Appointment.  5. STAPLES: Remove by RN POD14 (OCT 9)  6. BANDAGE: POD7 (OCT2) place a new Mepilex Ag over wound. Also change PRN if saturated. Do NOT remove on arrival to inspect wound.  7. SHOWER: Ok. No tub baths.

## 2022-09-26 NOTE — PROGRESS NOTE ADULT - SUBJECTIVE AND OBJECTIVE BOX
Patient is a 87y old  Female who presents with a chief complaint of left hip fracture (26 Sep 2022 12:55)      BRIEF HOSPITAL COURSE:  88 yo F with a PMH of HLD, glaucoma, Alzheimer's dementia, and OA who presented to ED after a fall. The patient has a 24/7 air, and has a hospital bed at home, but when the aid went to the bathroom last night, the patient (despite having railings) climbed to the edge of the bed and fell on her left side. It is unclear if she hit her head. Reportedly, the patient has been more restless recently at night. No recent medication changes. No complaints from daughter. Her last fall was years ago, and she does not normally fall because of the precautions with her bed and having her aide 24/7. She normally can ambulate with assistance and eat with assistance.  She was admitted with hip fracture. Underwent Left hip saqib 9/25.     Rapid Response called for Hypoxia with concern for aspiration. Upon my initial evaluation pt on NRB SPO2 93%, gargling on her own secretions with the inability to clear on her own. Pt normotensive in NSR. Due to high aspiration risk and potential to lose airway will admit to ICU for need of higher level of care. Daughter at bedside, mother is full code.    PAST MEDICAL & SURGICAL HISTORY:  Osteoarthritis of right hip      Glaucoma      Alzheimer disease      Hypercholesteremia      Dementia      S/P appendectomy  childhood      S/P tonsillectomy  childhood      S/P D&amp;C (status post dilation and curettage)  age 32      S/P colonoscopy  every 2 or 3 years          Review of Systems:  KITA due to clinical condition    Medications:  cefepime   IVPB            acetaminophen     Tablet .. 975 milliGRAM(s) Oral every 8 hours  acetaminophen     Tablet .. 650 milliGRAM(s) Oral every 6 hours PRN  acetaminophen   IVPB .. 1000 milliGRAM(s) IV Intermittent once  acetaminophen  Suppository .. 650 milliGRAM(s) Rectal every 6 hours PRN  citalopram 20 milliGRAM(s) Oral daily  donepezil 10 milliGRAM(s) Oral at bedtime  HYDROmorphone  Injectable 0.5 milliGRAM(s) IV Push once  memantine 10 milliGRAM(s) Oral two times a day  morphine  - Injectable 2 milliGRAM(s) IV Push every 6 hours PRN  ondansetron Injectable 4 milliGRAM(s) IV Push every 6 hours PRN  ondansetron Injectable 4 milliGRAM(s) IV Push every 8 hours PRN  oxyCODONE    IR 5 milliGRAM(s) Oral every 4 hours PRN  traMADol 25 milliGRAM(s) Oral every 6 hours PRN  traMADol 25 milliGRAM(s) Oral every 4 hours PRN  traMADol 50 milliGRAM(s) Oral every 4 hours PRN      enoxaparin Injectable 40 milliGRAM(s) SubCutaneous every 24 hours  tranexamic acid IVPB 1000 milliGRAM(s) IV Intermittent once  tranexamic acid IVPB 1000 milliGRAM(s) IV Intermittent once    aluminum hydroxide/magnesium hydroxide/simethicone Suspension 30 milliLiter(s) Oral four times a day PRN  magnesium hydroxide Suspension 30 milliLiter(s) Oral daily PRN  polyethylene glycol 3350 17 Gram(s) Oral at bedtime  senna 2 Tablet(s) Oral at bedtime      simvastatin 20 milliGRAM(s) Oral at bedtime    cholecalciferol 1000 Unit(s) Oral daily  lactated ringers. 1000 milliLiter(s) IV Continuous <Continuous>  lactated ringers. 1000 milliLiter(s) IV Continuous <Continuous>  multivitamin 1 Tablet(s) Oral daily      chlorhexidine 4% Liquid 1 Application(s) Topical <User Schedule>  latanoprost 0.005% Ophthalmic Solution 1 Drop(s) Both EYES at bedtime  timolol 0.5% Solution 1 Drop(s) Both EYES daily            ICU Vital Signs Last 24 Hrs  T(C): 38.6 (26 Sep 2022 18:00), Max: 38.6 (26 Sep 2022 18:00)  T(F): 101.4 (26 Sep 2022 18:00), Max: 101.4 (26 Sep 2022 18:00)  HR: 84 (26 Sep 2022 14:22) (77 - 102)  BP: 105/50 (26 Sep 2022 14:22) (97/51 - 134/51)  BP(mean): --  ABP: --  ABP(mean): --  RR: 16 (26 Sep 2022 14:22) (16 - 18)  SpO2: 95% (26 Sep 2022 14:22) (95% - 100%)    O2 Parameters below as of 26 Sep 2022 14:22  Patient On (Oxygen Delivery Method): nasal cannula  O2 Flow (L/min): 2          ABG - ( 26 Sep 2022 19:44 )  pH, Arterial: 7.42  pH, Blood: x     /  pCO2: 41    /  pO2: 117   / HCO3: 27    / Base Excess: 1.9   /  SaO2: 100                 I&O's Detail    25 Sep 2022 07:01  -  26 Sep 2022 07:00  --------------------------------------------------------  IN:    IV PiggyBack: 100 mL    Lactated Ringers: 675 mL    Other (mL): 1300 mL  Total IN: 2075 mL    OUT:  Total OUT: 0 mL    Total NET: 2075 mL          LABS:                        10.3   12.83 )-----------( 147      ( 26 Sep 2022 19:53 )             30.9     09-26    140  |  108  |  10  ----------------------------<  107<H>  3.9   |  26  |  0.90    Ca    8.7      26 Sep 2022 09:16            CAPILLARY BLOOD GLUCOSE      POCT Blood Glucose.: 117 mg/dL (26 Sep 2022 19:20)    PT/INR - ( 25 Sep 2022 05:08 )   PT: 14.0 sec;   INR: 1.20 ratio         PTT - ( 25 Sep 2022 05:08 )  PTT:22.3 sec    CULTURES:      Physical Examination:    VITALS AT TIME OF EXAM:  HR: 98  BP: 128/60 (76)  RR: 20  SPO2: 93% NRB    General: Lying in bed, gargling on secretions    HEENT: Pupils equal, reactive to light. Symmetric. No scleral icterus or injection.    PULM: Right side coarse, left clear to auscultation B/L. No wheezes, rales, or rhonchi appreciated No significant sputum production or increased respiratory effort.    NECK: Supple, no lymphadenopathy, trachea midline.    CVS: Regular rate and rhythm, no murmurs appreciated, +s1/s2.    ABD: Soft, nondistended, nontender, normoactive bowel sounds.    EXT: No edema, nontender.    SKIN: Warm and well perfused, no rashes noted.    NEURO: Alert, oriented to self (baseline), interactive, nonfocal.      RADIOLOGY: < from: Xray Chest 1 View- PORTABLE-Urgent (Xray Chest 1 View- PORTABLE-Urgent .) (09.26.22 @ 09:26) >    ACC: [id number] EXAM:  XR CHEST PORTABLE URGENT 1V                          PROCEDURE DATE:  09/26/2022          INTERPRETATION:  AP erect chest on September 26, 2022 at 9:08 AM. Patient   has cough after surgery.    Mild right thoracic curve again noted. Heart magnified by technique.    On Angie 3 there was slight atelectasis in the right lower lung field.   Presently there is a rather small right base infiltrate.    IMPRESSION: Small right base infiltrate at this time.    --- End of Report ---            MARIELY MART MD; Attending Radiologist  This document has been electronically signed. Sep 26 2022  1:01PM    < end of copied text >

## 2022-09-26 NOTE — DISCHARGE NOTE PROVIDER - NSDCMRMEDTOKEN_GEN_ALL_CORE_FT
citalopram 20 mg oral tablet: 1 tab(s) orally once a day  donepezil 10 mg oral tablet: 1 tab(s) orally once a day (at bedtime)  latanoprost 0.005% ophthalmic solution: 1 drop(s) to each affected eye once a day (at bedtime)  memantine 10 mg oral tablet: 1 tab(s) orally 2 times a day  simvastatin 20 mg oral tablet: 1 tab(s) orally once a day (at bedtime)  timolol 0.5% ophthalmic solution: 1 drop(s) in each eye once a day  Vitamin D3 25 mcg (1000 intl units) oral tablet: 1 tab(s) orally once a day   citalopram 20 mg oral tablet: 1 tab(s) orally once a day  donepezil 10 mg oral tablet: 1 tab(s) orally once a day (at bedtime)  enoxaparin: 40 milligram(s) subcutaneous once a day last dose on 10-  latanoprost 0.005% ophthalmic solution: 1 drop(s) to each affected eye once a day (at bedtime)  memantine 10 mg oral tablet: 1 tab(s) orally 2 times a day  simvastatin 20 mg oral tablet: 1 tab(s) orally once a day (at bedtime)  timolol 0.5% ophthalmic solution: 1 drop(s) in each eye once a day  Vitamin D3 25 mcg (1000 intl units) oral tablet: 1 tab(s) orally once a day   acetaminophen 325 mg oral tablet: 2 tab(s) orally every 6 hours, As needed, Mild Pain (1 - 3)  citalopram 20 mg oral tablet: 1 tab(s) orally once a day  donepezil 10 mg oral tablet: 1 tab(s) orally once a day (at bedtime)  enoxaparin: 40 milligram(s) subcutaneous once a day last dose on 10-  latanoprost 0.005% ophthalmic solution: 1 drop(s) to each affected eye once a day (at bedtime)  memantine 10 mg oral tablet: 1 tab(s) orally 2 times a day  simvastatin 20 mg oral tablet: 1 tab(s) orally once a day (at bedtime)  timolol 0.5% ophthalmic solution: 1 drop(s) in each eye once a day  Vitamin D3 25 mcg (1000 intl units) oral tablet: 1 tab(s) orally once a day

## 2022-09-26 NOTE — DISCHARGE NOTE PROVIDER - CARE PROVIDER_API CALL
Valentin Dale (DO)  Orthopaedic Surgery  [address] [place] [address]  Phone: [phone]  Fax: [phone]  Follow Up Time:

## 2022-09-26 NOTE — OCCUPATIONAL THERAPY INITIAL EVALUATION ADULT - PRECAUTIONS/LIMITATIONS, REHAB EVAL
NPO, abduction pillow/aspiration precautions/cardiac precautions/fall precautions/left hip precautions

## 2022-09-26 NOTE — CONSULT NOTE ADULT - ASSESSMENT
This is 86 yo F with a PMH of HLD, glaucoma, Alzheimer's dementia, and OA who presents after a fall found to have left hip fracture now s/p Left Hip Hemiarthroplasty done 0n 9/25/2022 by Dr. Dale consulted   for VTE prophylaxis, risk stratification, and anticoagulation management, Patient is high risk for vte due to age, immobility, surgery, and moderate risk for bleeding.    plan  :Continue Lovenox 40mg sq daily for four weeks post procedure last dose 10/24/2022  :daily cbc/bmp  :LE Venodynes  : increase mobility as tolerated  :Thanks for consult will f/u

## 2022-09-26 NOTE — OCCUPATIONAL THERAPY INITIAL EVALUATION ADULT - RANGE OF MOTION EXAMINATION
Pt unable to follow commands of AROM assessment, PROM RUE WFL, pt presents with pain with movement on LUE- RN notified, elbow/hand WFL shoulder limited by pain unable to assess

## 2022-09-26 NOTE — PROGRESS NOTE ADULT - ASSESSMENT
87F POD1 L hip saqib    Plan:   NPO at this time, pending sp/sw eval, aspiration precautions  WBAT/PT/OT, abduction pillow  Pain management PRN  DVT prophylaxis: appreciate anticoagulation team recommendations.   Incentive spirometry  Dispo: pending PT recs  Will discuss with Dr. Dale, and will advise for any changes to the plan.

## 2022-09-26 NOTE — PROVIDER CONTACT NOTE (OTHER) - SITUATION
Pt this am, was coughing and gurgling after raising the HOB. Suction was set up at the bedside and I began suctioning. Pt coughed up clear frothy sputum x2, enough to saturate gown. Ortho was present.
pt desaturating to 70's on 3 liters of o2.  pt requiring suctioning every 10 min

## 2022-09-26 NOTE — OCCUPATIONAL THERAPY INITIAL EVALUATION ADULT - MD ORDER
MD orders received. Chart reviewed, contents noted, conferred with RN Aneica- confirmed patient okay to be seen for OT IE. Patient with c/o pain to LUE with movement- RN notified. Patient BP out of parameters (104/36) RN notified and reports to old off OOB assessment for now.

## 2022-09-26 NOTE — PROGRESS NOTE ADULT - SUBJECTIVE AND OBJECTIVE BOX
c/c: left hip pain    HPI: 86 yo F with a PMH of HLD, glaucoma, Alzheimer's dementia, and OA who presented to ED after a fall. The patient has a 24/7 air, and has a hospital bed at home, but when the aid went to the bathroom last night, the patient (despite having railings) climbed to the edge of the bed and fell on her left side. It is unclear if she hit her head. Reportedly, the patient has been more restless recently at night. No recent medication changes. No complaints from daughter. Her last fall was years ago, and she does not normally fall because of the precautions with her bed and having her aide 24/7. She normally can ambulate with assistance and eat with assistance.  She was admitted with hip fracture. Underwent Left hip saqib .     Pt seen and examined this am. Overnight events noted. Pt with increase oral secretions, coughing up large amounts of saliva requiring frequent suctioning.     ROS: unable to obtain d/t dementia.     Vital Signs Last 24 Hrs  T(C): 38.3 (26 Sep 2022 14:33), Max: 38.3 (26 Sep 2022 14:33)  T(F): 100.9 (26 Sep 2022 14:33), Max: 100.9 (26 Sep 2022 14:33)  HR: 84 (26 Sep 2022 14:22) (77 - 102)  BP: 105/50 (26 Sep 2022 14:22) (97/51 - 134/51)  BP(mean): --  RR: 16 (26 Sep 2022 14:22) (16 - 18)  SpO2: 95% (26 Sep 2022 14:22) (95% - 100%)    Parameters below as of 26 Sep 2022 14:22  Patient On (Oxygen Delivery Method): nasal cannula  O2 Flow (L/min): 2        PHYSICAL EXAM:    GENERAL: Comfortable, no acute distress   HEAD:  Normocephalic, atraumatic  EYES: EOMI, PERRLA  HEENT: Moist mucous membranes  NECK: Supple, No JVD  NERVOUS SYSTEM:  Alert & Oriented X1, Grossly non focal.   CHEST/LUNG: rhonchi bilaterally   HEART: Regular rate and rhythm  ABDOMEN: Soft, Nontender, Nondistended, Bowel sounds present  GENITOURINARY: Voiding, no palpable bladder  EXTREMITIES:   No clubbing, cyanosis, or edema  MUSCULOSKELETAL-Left hip dressing c/d/i  SKIN-no rash      LABS:                        10.1   12.99 )-----------( 142      ( 26 Sep 2022 09:16 )             29.3         140  |  108  |  10  ----------------------------<  107<H>  3.9   |  26  |  0.90    Ca    8.7      26 Sep 2022 09:16      PT/INR - ( 25 Sep 2022 05:08 )   PT: 14.0 sec;   INR: 1.20 ratio         PTT - ( 25 Sep 2022 05:08 )  PTT:22.3 sec  Urinalysis Basic - ( 24 Sep 2022 19:22 )    Color: Lilliam / Appearance: Clear / S.020 / pH: x  Gluc: x / Ketone: Small  / Bili: Negative / Urobili: Negative   Blood: x / Protein: 15 / Nitrite: Negative   Leuk Esterase: Trace / RBC: 11-25 /HPF / WBC 6-10   Sq Epi: x / Non Sq Epi: Few / Bacteria: Few    MEDICATIONS  (STANDING):  acetaminophen     Tablet .. 975 milliGRAM(s) Oral every 8 hours  acetaminophen   IVPB .. 1000 milliGRAM(s) IV Intermittent once  cefepime   IVPB      cholecalciferol 1000 Unit(s) Oral daily  citalopram 20 milliGRAM(s) Oral daily  donepezil 10 milliGRAM(s) Oral at bedtime  enoxaparin Injectable 40 milliGRAM(s) SubCutaneous every 24 hours  HYDROmorphone  Injectable 0.5 milliGRAM(s) IV Push once  lactated ringers. 1000 milliLiter(s) (75 mL/Hr) IV Continuous <Continuous>  lactated ringers. 1000 milliLiter(s) (75 mL/Hr) IV Continuous <Continuous>  latanoprost 0.005% Ophthalmic Solution 1 Drop(s) Both EYES at bedtime  memantine 10 milliGRAM(s) Oral two times a day  multivitamin 1 Tablet(s) Oral daily  polyethylene glycol 3350 17 Gram(s) Oral at bedtime  senna 2 Tablet(s) Oral at bedtime  simvastatin 20 milliGRAM(s) Oral at bedtime  timolol 0.5% Solution 1 Drop(s) Both EYES daily  tranexamic acid IVPB 1000 milliGRAM(s) IV Intermittent once  tranexamic acid IVPB 1000 milliGRAM(s) IV Intermittent once    MEDICATIONS  (PRN):  acetaminophen     Tablet .. 650 milliGRAM(s) Oral every 6 hours PRN Mild Pain (1 - 3)  acetaminophen  Suppository .. 650 milliGRAM(s) Rectal every 6 hours PRN Temp greater or equal to 38C (100.4F)  aluminum hydroxide/magnesium hydroxide/simethicone Suspension 30 milliLiter(s) Oral four times a day PRN Indigestion  magnesium hydroxide Suspension 30 milliLiter(s) Oral daily PRN Constipation  morphine  - Injectable 2 milliGRAM(s) IV Push every 6 hours PRN Severe Pain (7 - 10)  ondansetron Injectable 4 milliGRAM(s) IV Push every 8 hours PRN Nausea and/or Vomiting  ondansetron Injectable 4 milliGRAM(s) IV Push every 6 hours PRN Nausea and/or Vomiting  oxyCODONE    IR 5 milliGRAM(s) Oral every 4 hours PRN Severe Pain (7 - 10)  traMADol 25 milliGRAM(s) Oral every 6 hours PRN Moderate Pain (4 - 6)  traMADol 25 milliGRAM(s) Oral every 4 hours PRN Mild Pain (1 - 3)  traMADol 50 milliGRAM(s) Oral every 4 hours PRN Moderate Pain (4 - 6)    ASSESSMENT AND PLAN:  87f, PMH as above a/w    1. Left hip fracture s/p left hip saqib pOD#1  -pain control  -physical therapy  -incentive spirometry  -bowel  regimen.     2. Acute blood loss anemia:  -d/t surgeyr  -no need for transfusion at this time.   -repeat CBC in am.     3. Dysphagia:  -speech and swallow eval noted.   -started on pureed diet, but pt still cough/unable to clear oral secretions.   -Change diet back to NPO  -per family, this is new, f/u CT head.   -limit narcotics.     4. Aspiration PNA:  -started on iv cefepime.     5. Alzheimers dementia  -continue donepezil.    6. DVT px:  -per a/c services.     will d/w family

## 2022-09-26 NOTE — DISCHARGE NOTE PROVIDER - DETAILS OF MALNUTRITION DIAGNOSIS/DIAGNOSES
This patient has been assessed with a concern for Malnutrition and was treated during this hospitalization for the following Nutrition diagnosis/diagnoses:     -  09/29/2022: Severe protein-calorie malnutrition   -  09/29/2022: Underweight (BMI < 19)

## 2022-09-26 NOTE — CONSULT NOTE ADULT - SUBJECTIVE AND OBJECTIVE BOX
HPI:  86 yo F with a PMH of HLD, glaucoma, Alzheimer's dementia, and OA who presents after a fall. The patient cannot provide any history, so it was obtained from her daughter Shira at bedside. The patient has a 24/7 air, and has a hospital bed at home, but when the aid went to the bathroom last night, the patient (despite having railings) climbed to the edge of the bed and fell on her left side. It is unclear if she hit her head. Reportedly, the patient has been more restless recently at night. No recent medication changes. No complaints from daughter. Her last fall was years ago, and she does not normally fall because of the precautions with her bed and having her aide 24/7. She normally can ambulate with assistance and eat with assistance.      In the ED, she was given Morphine 2 mg IV x1 and started on LR @ 75 ml/hr. (24 Sep 2022 12:57)      Patient is a 87y old  Female who presents with a chief complaint of left hip fracture (26 Sep 2022 10:34)      Consulted by Dr. Dale   for VTE prophylaxis, risk stratification, and anticoagulation management.    PAST MEDICAL & SURGICAL HISTORY:  Osteoarthritis of right hip      Glaucoma      Alzheimer disease      Hypercholesteremia      Dementia      S/P appendectomy  childhood      S/P tonsillectomy  childhood      S/P D&amp;C (status post dilation and curettage)  age 32      S/P colonoscopy  every 2 or 3 years    Interval History  2022:patient seen at bedside awake , able to tell her name  patient was having swallowing difficulty Speech and swallow eval done, puree diet,   H&H stable will continue Lovenox 40mg daily for DVT prophylaxis           CrCl:31.3  BMI:18.3  EBL:150ml    Caprini VTE Risk Score:CAPRINI SCORE  AGE RELATED RISK FACTORS                                                       MOBILITY RELATED FACTORS  [ ] Age 41-60 years                                            (1 Point)                  [ ] Bed rest /restricted mobility                             (1 Point)  [ ] Age: 61-74 years                                           (2 Points)                [ ] Plaster cast                                                   (2 Points)  [x ] Age= 75 years                                              (3 Points)                 [ ] Bed bound for more than 72 hours                   (2 Points)    DISEASE RELATED RISK FACTORS                                               GENDER SPECIFIC FACTORS  [ ] Edema in the lower extremities                       (1 Point)           [ ] Pregnancy                                                            (1 Point)  [ ] Varicose veins                                               (1 Point)                  [ ] Post-partum < 6 weeks                                      (1 Point)             [ ] BMI > 25 Kg/m2                                            (1 Point)                  [ ] Hormonal therapy or oral contraception       (1 Point)                 [ ] Sepsis (in the previous month)                        (1 Point)             [ ] History of pregnancy complications                (1Point)  [ ] Pneumonia or serious lung disease                                             [ ] Unexplained or recurrent  (=/>3), premature                                 (In the previous month)                               (1 Point)                birth with toxemia or growth-restricted infant (1 Point)  [ ] Abnormal pulmonary function test            (1 Point)                                   SURGERY RELATED RISK FACTORS  [ ] Acute myocardial infarction                       (1 Point)                  [ ]  Section                                         (1 Point)  [ ] Congestive heart failure (in the previous month) (1 Point)   [ ] Minor surgery   lasting <45 minutes       (1 Point)   [ ] Inflammatory bowel disease                             (1 Point)          [ ] Arthroscopic surgery                                  (2 Points)  [ ] Central venous access                                    (2 Points)            [ ] General surgery lasting >45 minutes      (2 Points)       [ ] Stroke (in the previous month)                  (5 Points)            [ ] Elective major lower extremity arthroplasty (5 Points)                                   [  ] Malignancy (present or past include skin melanoma                                          but exclude  basal skin cell)    (2 points)                                      TRAUMA RELATED RISK FACTORS                HEMATOLOGY RELATED FACTORS                                  [ x] Fracture of the hip, pelvis, or leg                       (5 Points)  [ ] Prior episodes of VTE                                     (3 Points)          [ ] Acute spinal cord injury (in the previous month)  (5 Points)  [ ] Positive family history for VTE                         (3 Points)       [ ] Paralysis (less than 1 month)                          (5 Points)  [ ] Prothrombin 14223 A                                      (3 Points)         [ ] Multiple Trauma (within 1month)                 (5Points)                                                                                                                                                                [ ] Factor V Leiden                                          (3 Points)                                OTHER RISK FACTORS                          [ ] Lupus anticoagulants                                     (3 Points)                       [ ] BMI > 40                          (1 Point)                                                         [ ] Anticardiolipin antibodies                                (3 Points)                   [ ] Smoking                              (1Point)                                                [ ] High homocysteine in the blood                      (3 Points)                [  ] Diabetes requiring insulin (1point)                         [ ] Other congenital or acquired thrombophilia       (3 Points)          [  ] Chemotherapy                   (1 Point)  [ ] Heparin induced thrombocytopenia                  (3 Points)             [  ] Blood Transfusion                (1 point)                                                                                                             Total Score [    8      ]                                                                                                                                                                                                                                                                                                                                                                                                                                         IMPROVE Bleeding Risk Score:4.5      Falls Risk:   High ( x )  Mod (  )  Low (  )      FAMILY HISTORY:  Family history of colon cancer (Father)      Denies any personal or familial history of clotting or bleeding disorders.    Allergies    No Known Allergies    Intolerances        REVIEW OF SYSTEMS    (  )Fever	     (  )Constipation	(  )SOB				(  )Headache	(  )Dysuria  (  )Chills	     (  )Melena	(  )Dyspnea present on exertion	                    (  )Dizziness                    (  )Polyuria  (  )Nausea	     (  )Hematochezia	(  )Cough			                    (  )Syncope   	(  )Hematuria  (  )Vomiting    (  )Chest Pain	(  )Wheezing			(  )Weakness  (  )Diarrhea     (  )Palpitations	(  )Anorexia			( x )joint pain    All  other review of systems negative: Yes    Vital Signs Last 24 Hrs  T(C): 36.3 (26 Sep 2022 08:52), Max: 36.9 (26 Sep 2022 05:00)  T(F): 97.4 (26 Sep 2022 08:52), Max: 98.4 (26 Sep 2022 05:00)  HR: 102 (26 Sep 2022 08:52) (71 - 102)  BP: 134/51 (26 Sep 2022 08:52) (90/45 - 134/51)  BP(mean): 84 (25 Sep 2022 13:30) (84 - 84)  RR: 16 (26 Sep 2022 08:52) (12 - 20)  SpO2: 100% (26 Sep 2022 08:52) (97% - 100%)    PHYSICAL EXAM:    Constitutional: Appears Well    Neurological: A& O x 3    Skin: Warm    Respiratory and Thorax: normal effort; Breath sounds: normal; No rales/wheezing/rhonchi  	  Cardiovascular: S1, S2, regular, NMBR	    Gastrointestinal: BS + x 4Q, nontender	    Genitourinary:  Bladder nondistended, nontender    Musculoskeletal:   General Right:   no muscle/joint tenderness,   normal tone, no joint swelling,   ROM: full	    General Left:   no muscle/joint tenderness,   normal tone, no joint swelling,   ROM: limited    Hip: Left: Dressing CDI;     t    Lower extrems:   Right: no calf tenderness              negative chad's sign               + pedal pulses    Left:   no calf tenderness              negative chad's sign               + pedal pulses                          10.1   12.99 )-----------( 142      ( 26 Sep 2022 09:16 )             29.3       09-    140  |  108  |  10  ----------------------------<  107<H>  3.9   |  26  |  0.90    Ca    8.7      26 Sep 2022 09:16        PT/INR - ( 25 Sep 2022 05:08 )   PT: 14.0 sec;   INR: 1.20 ratio         PTT - ( 25 Sep 2022 05:08 )  PTT:22.3 sec				    MEDICATIONS  (STANDING):  acetaminophen     Tablet .. 975 milliGRAM(s) Oral every 8 hours  acetaminophen   IVPB .. 1000 milliGRAM(s) IV Intermittent once  cholecalciferol 1000 Unit(s) Oral daily  citalopram 20 milliGRAM(s) Oral daily  donepezil 10 milliGRAM(s) Oral at bedtime  enoxaparin Injectable 40 milliGRAM(s) SubCutaneous every 24 hours  HYDROmorphone  Injectable 0.5 milliGRAM(s) IV Push once  lactated ringers. 1000 milliLiter(s) IV Continuous <Continuous>  lactated ringers. 1000 milliLiter(s) IV Continuous <Continuous>  latanoprost 0.005% Ophthalmic Solution 1 Drop(s) Both EYES at bedtime  memantine 10 milliGRAM(s) Oral two times a day  multivitamin 1 Tablet(s) Oral daily  polyethylene glycol 3350 17 Gram(s) Oral at bedtime  senna 2 Tablet(s) Oral at bedtime  simvastatin 20 milliGRAM(s) Oral at bedtime  timolol 0.5% Solution 1 Drop(s) Both EYES daily  tranexamic acid IVPB 1000 milliGRAM(s) IV Intermittent once  tranexamic acid IVPB 1000 milliGRAM(s) IV Intermittent once      < from: CT Head No Cont (22 @ 07:09) >  MPRESSION: No acute intracranial hemorrhage, mass effect, or shift of   the midline structures.    Similar-appearing mild chronic white matter microvascular type changes.    < end of copied text >      DVT Prophylaxis:  LMWH                   ( x )  Heparin SQ           (  )  Coumadin             (  )  Xarelto                  (  )  Eliquis                   (  )  Venodynes           (x  )  Ambulation          ( x )  UFH                       (  )  Contraindicated  (  )  EC ASPIRIN       (  )             HPI:  88 yo F with a PMH of HLD, glaucoma, Alzheimer's dementia, and OA who presents after a fall. The patient cannot provide any history, so it was obtained from her daughter Shira at bedside. The patient has a 24/7 air, and has a hospital bed at home, but when the aid went to the bathroom last night, the patient (despite having railings) climbed to the edge of the bed and fell on her left side. It is unclear if she hit her head. Reportedly, the patient has been more restless recently at night. No recent medication changes. No complaints from daughter. Her last fall was years ago, and she does not normally fall because of the precautions with her bed and having her aide 24/7. She normally can ambulate with assistance and eat with assistance.      In the ED, she was given Morphine 2 mg IV x1 and started on LR @ 75 ml/hr. (24 Sep 2022 12:57)      Patient is a 87y old  Female who presents with a chief complaint of left hip fracture (26 Sep 2022 10:34)      Consulted by Dr. Dale   for VTE prophylaxis, risk stratification, and anticoagulation management.    PAST MEDICAL & SURGICAL HISTORY:  Osteoarthritis of right hip      Glaucoma      Alzheimer disease      Hypercholesteremia      Dementia      S/P appendectomy  childhood      S/P tonsillectomy  childhood      S/P D&amp;C (status post dilation and curettage)  age 32      S/P colonoscopy  every 2 or 3 years    Interval History  2022:patient seen at bedside awake , able to tell her name  patient was having swallowing difficulty Speech and swallow eval done, puree diet,   H&H stable will continue Lovenox 40mg daily for DVT prophylaxis           CrCl:31.3  BMI:18.3  EBL:150ml    Caprini VTE Risk Score:CAPRINI SCORE  AGE RELATED RISK FACTORS                                                       MOBILITY RELATED FACTORS  [ ] Age 41-60 years                                            (1 Point)                  [ ] Bed rest /restricted mobility                             (1 Point)  [ ] Age: 61-74 years                                           (2 Points)                [ ] Plaster cast                                                   (2 Points)  [x ] Age= 75 years                                              (3 Points)                 [ ] Bed bound for more than 72 hours                   (2 Points)    DISEASE RELATED RISK FACTORS                                               GENDER SPECIFIC FACTORS  [ ] Edema in the lower extremities                       (1 Point)           [ ] Pregnancy                                                            (1 Point)  [ ] Varicose veins                                               (1 Point)                  [ ] Post-partum < 6 weeks                                      (1 Point)             [ ] BMI > 25 Kg/m2                                            (1 Point)                  [ ] Hormonal therapy or oral contraception       (1 Point)                 [ ] Sepsis (in the previous month)                        (1 Point)             [ ] History of pregnancy complications                (1Point)  [ ] Pneumonia or serious lung disease                                             [ ] Unexplained or recurrent  (=/>3), premature                                 (In the previous month)                               (1 Point)                birth with toxemia or growth-restricted infant (1 Point)  [ ] Abnormal pulmonary function test            (1 Point)                                   SURGERY RELATED RISK FACTORS  [ ] Acute myocardial infarction                       (1 Point)                  [ ]  Section                                         (1 Point)  [ ] Congestive heart failure (in the previous month) (1 Point)   [ ] Minor surgery   lasting <45 minutes       (1 Point)   [ ] Inflammatory bowel disease                             (1 Point)          [ ] Arthroscopic surgery                                  (2 Points)  [ ] Central venous access                                    (2 Points)            [ ] General surgery lasting >45 minutes      (2 Points)       [ ] Stroke (in the previous month)                  (5 Points)            [ ] Elective major lower extremity arthroplasty (5 Points)                                   [  ] Malignancy (present or past include skin melanoma                                          but exclude  basal skin cell)    (2 points)                                      TRAUMA RELATED RISK FACTORS                HEMATOLOGY RELATED FACTORS                                  [ x] Fracture of the hip, pelvis, or leg                       (5 Points)  [ ] Prior episodes of VTE                                     (3 Points)          [ ] Acute spinal cord injury (in the previous month)  (5 Points)  [ ] Positive family history for VTE                         (3 Points)       [ ] Paralysis (less than 1 month)                          (5 Points)  [ ] Prothrombin 00361 A                                      (3 Points)         [ ] Multiple Trauma (within 1month)                 (5Points)                                                                                                                                                                [ ] Factor V Leiden                                          (3 Points)                                OTHER RISK FACTORS                          [ ] Lupus anticoagulants                                     (3 Points)                       [ ] BMI > 40                          (1 Point)                                                         [ ] Anticardiolipin antibodies                                (3 Points)                   [ ] Smoking                              (1Point)                                                [ ] High homocysteine in the blood                      (3 Points)                [  ] Diabetes requiring insulin (1point)                         [ ] Other congenital or acquired thrombophilia       (3 Points)          [  ] Chemotherapy                   (1 Point)  [ ] Heparin induced thrombocytopenia                  (3 Points)             [  ] Blood Transfusion                (1 point)                                                                                                             Total Score [    8      ]                                                                                                                                                                                                                                                                                                                                                                                                                                         IMPROVE Bleeding Risk Score:4.5      Falls Risk:   High ( x )  Mod (  )  Low (  )      FAMILY HISTORY:  Family history of colon cancer (Father)      Denies any personal or familial history of clotting or bleeding disorders.    Allergies    No Known Allergies    Intolerances        REVIEW OF SYSTEMS    (  )Fever	     (  )Constipation	(  )SOB				(  )Headache	(  )Dysuria  (  )Chills	     (  )Melena	(  )Dyspnea present on exertion	                    (  )Dizziness                    (  )Polyuria  (  )Nausea	     (  )Hematochezia	(  )Cough			                    (  )Syncope   	(  )Hematuria  (  )Vomiting    (  )Chest Pain	(  )Wheezing			(  )Weakness  (  )Diarrhea     (  )Palpitations	(  )Anorexia			( x )joint pain    All  other review of systems negative: Yes    Vital Signs Last 24 Hrs  T(C): 36.3 (26 Sep 2022 08:52), Max: 36.9 (26 Sep 2022 05:00)  T(F): 97.4 (26 Sep 2022 08:52), Max: 98.4 (26 Sep 2022 05:00)  HR: 102 (26 Sep 2022 08:52) (71 - 102)  BP: 134/51 (26 Sep 2022 08:52) (90/45 - 134/51)  BP(mean): 84 (25 Sep 2022 13:30) (84 - 84)  RR: 16 (26 Sep 2022 08:52) (12 - 20)  SpO2: 100% (26 Sep 2022 08:52) (97% - 100%)    PHYSICAL EXAM:    Constitutional: Appears Well    Neurological: Awake confused     Skin: Warm    Respiratory and Thorax: normal effort; Breath sounds: normal; No rales/wheezing/rhonchi  	  Cardiovascular: S1, S2, regular, NMBR	    Gastrointestinal: BS + x 4Q, nontender	    Genitourinary:  Bladder nondistended, nontender    Musculoskeletal:   General Right:   no muscle/joint tenderness,   normal tone, no joint swelling,   ROM: full	    General Left:   no muscle/joint tenderness,   normal tone, no joint swelling,   ROM: limited    Hip: Left: Dressing CDI;     t    Lower extrems:   Right: no calf tenderness              negative chad's sign               + pedal pulses    Left:   no calf tenderness              negative chad's sign               + pedal pulses                          10.1   12.99 )-----------( 142      ( 26 Sep 2022 09:16 )             29.3       09-    140  |  108  |  10  ----------------------------<  107<H>  3.9   |  26  |  0.90    Ca    8.7      26 Sep 2022 09:16        PT/INR - ( 25 Sep 2022 05:08 )   PT: 14.0 sec;   INR: 1.20 ratio         PTT - ( 25 Sep 2022 05:08 )  PTT:22.3 sec				    MEDICATIONS  (STANDING):  acetaminophen     Tablet .. 975 milliGRAM(s) Oral every 8 hours  acetaminophen   IVPB .. 1000 milliGRAM(s) IV Intermittent once  cholecalciferol 1000 Unit(s) Oral daily  citalopram 20 milliGRAM(s) Oral daily  donepezil 10 milliGRAM(s) Oral at bedtime  enoxaparin Injectable 40 milliGRAM(s) SubCutaneous every 24 hours  HYDROmorphone  Injectable 0.5 milliGRAM(s) IV Push once  lactated ringers. 1000 milliLiter(s) IV Continuous <Continuous>  lactated ringers. 1000 milliLiter(s) IV Continuous <Continuous>  latanoprost 0.005% Ophthalmic Solution 1 Drop(s) Both EYES at bedtime  memantine 10 milliGRAM(s) Oral two times a day  multivitamin 1 Tablet(s) Oral daily  polyethylene glycol 3350 17 Gram(s) Oral at bedtime  senna 2 Tablet(s) Oral at bedtime  simvastatin 20 milliGRAM(s) Oral at bedtime  timolol 0.5% Solution 1 Drop(s) Both EYES daily  tranexamic acid IVPB 1000 milliGRAM(s) IV Intermittent once  tranexamic acid IVPB 1000 milliGRAM(s) IV Intermittent once      < from: CT Head No Cont (22 @ 07:09) >  MPRESSION: No acute intracranial hemorrhage, mass effect, or shift of   the midline structures.    Similar-appearing mild chronic white matter microvascular type changes.    < end of copied text >      DVT Prophylaxis:  LMWH                   ( x )  Heparin SQ           (  )  Coumadin             (  )  Xarelto                  (  )  Eliquis                   (  )  Venodynes           (x  )  Ambulation          ( x )  UFH                       (  )  Contraindicated  (  )  EC ASPIRIN       (  )

## 2022-09-26 NOTE — PHYSICAL THERAPY INITIAL EVALUATION ADULT - MODALITIES TREATMENT COMMENTS
The pt was education on all posterior hip precautions and their impact on her ADLs. The pt demonstrated good overall activity tolerance, responding well to therex review, transfer and ambulation tx. The pt was returned to supine and adjusted for comfort in bed, bed alarm secured, RN aware. CB, tray and phone in place. The pt was limited by confusion and generalized weakness. The pt was in NAD at end of tx.

## 2022-09-26 NOTE — PROVIDER CONTACT NOTE (OTHER) - ASSESSMENT
VSS. 2L NC O2 (none prior to surgery). Pt was able to tolerate food and liquid prior. Pt appears to be in distress, coughing. Suctioning provided. O2 removed, desat to 86%, O2 placed back.    Ortho resident was in the room, witnessed the gurgling, suctioned himself, placed pt NPO.

## 2022-09-26 NOTE — OCCUPATIONAL THERAPY INITIAL EVALUATION ADULT - NSACTIVITYREC_GEN_A_OT
Acute OT services not warranted at this time 2* patient needed assist with ADL/IADL tasks at baseline- has 24/7 HHA per patient's daughter.

## 2022-09-26 NOTE — DISCHARGE NOTE PROVIDER - HOSPITAL COURSE
Orthopedic Summary  H&P:  Pt is a 87y Female      Now s/p LEFT Hip Hemiarthroplasty for fracture. Pt is afebrile with stable vital signs. Pain is controlled. Exam reveals intact EHL FHL TA GS, +DP. Dressing is clean and dry.    Hospital Course:  Patient presented to [facility] ED after a fall, found to have a hip fracture, and admitted to the Medical Service. Pt was  medically/cardiac cleared prior to surgery. Prophylactic antibiotics were started before the procedure and continued for 24 hours. They were admitted after surgery to the orthopedic floor.  There were no orthopedic complications during the hospital stay. All home medications were continued.    Routine consults were obtained from the Anticoagulation Team for DVT/PE prophylaxis and  Physical Therapy post-op. Patient was placed on  anticoagulation.  Pertinent home medications were continued.  Daily labs were followed.      On POD 0 there were no major issues. Pt received PT daily and will be Discharged per Medicine.  The orthopedic Attending is aware and agrees. See addendum to DC summary per medical team below for any additional info or changes. 88 yo F with a PMH of HLD, glaucoma, Alzheimer's dementia, and OA who presents after a fall with LT hip fracture. Pt underwent LT hip IMN. Postop course complicated by aspiration pneumonia that treated by antibiotics. Patient treated and stabilized in ICU and downgraded to the floor. Swallow eval was done and patient able to tolerated small amounts of food. Palliative team met with family- pt is full code. Patient is stable now for transfer to [facility]

## 2022-09-26 NOTE — PHYSICAL THERAPY INITIAL EVALUATION ADULT - PERTINENT HX OF CURRENT PROBLEM, REHAB EVAL
86 yo F with a PMH of HLD, glaucoma, Alzheimer's dementia, and OA who presents after a fall. The patient cannot provide any history, so it was obtained from her daughter Shira at bedside. The patient has a 24/7 air, and has a hospital bed at home, but when the aid went to the bathroom last night, the patient (despite having railings) climbed to the edge of the bed and fell on her left side. It is unclear if she hit her head.

## 2022-09-26 NOTE — DISCHARGE NOTE PROVIDER - NSDCHHASSISTDEVIC_GEN_ALL_CORE_FT
Ataxic gait attributed to acute pain after lower extremity total joint arthroplasty or lower extremity orthopedic surgery.

## 2022-09-26 NOTE — PROGRESS NOTE ADULT - ASSESSMENT
86 yo F with a PMH of HLD, glaucoma, Alzheimer's dementia, and OA admitted to ICU for    1. Acute Hypoxic respiratory Failure  2. Aspiration Pneumonitis  3. Left Hip Ryan 9/25  4. ABLA    Neuro:  - Avoid Neuro Deliriogenic / sedative medications  - CTH with no acute gross abnormalities appreciated awaiting official read, pt as baseline mental status as per daughter.  - Aspiration precautions, HOB > 30 degrees  - Repeat official Speech and swallow, may need MBS. Pt passed speech and swallow earlier this morning.  - Pain control prn  - Namenda/Aricept    CV:  - Normotensive, no active issues  - Statin    Pulm:  - Supplemental oxygen as needed to maintain spo2 > 94%, currently on NRB wean as tolerated.  - Incentive spirometry as able  - CXR with RLB Infiltrate, STAT CXR ordered  - Aggressive Chest pt  - Scopolamine for excess secretions  - ABG 7.42/41/117/27                  GI:  - NPO, will need NGT  - Enteral feeds as tolerated to avoid Stress ulceration and optimize nutritional state when indicated    Renal:  - Even to net negative fluid balance as tolerated by hemodynamics and renal fx.    - Preserved Renal Function Continue to monitor Bun/Cr and UOP  - Replacing electrolytes as needed with Goal K> 4, PO> 3, Mg> 2               - Strict I&O's  - Avoid Nephro toxic medication    Heme:  - Lovenox for DVT prophylaxis  - No active bleeding, H/H stable, will transfuse for hgb < 7 or signs of active bleeding    ID:  - WBC 12.83,  remains afebrile with no antipyretics administered   - Cefepime for aspiration PNA  - Microbiology and Radiology reviewed   - trend CBC with diff, CMP  and fever curve  - Lactate 1.5    Endo:  - ISS for aggressive glycemic control to limit FS glucose to < 180mg/dl.    MSK:  - Hip precautions with pillow    Critical Care Time (EXCLUSIVE of any non bundled procedures) :  60 minutes were spent assessing the patient's presenting problems of acute illness that pose a high probability of life threatening  deterioration or end organ damage / dysfunction.  Medical desicion making includes initiation / continuation of plan or care review data/ labwork/ radiographic study, direct patient care bedside ,  discussions with  consultants regarding care,  evaluation and interpretation of vital signs, any necessary ventilator management,   NIV or BIPAP changes  or initiation,    discussions with multidisipliary team,  am or pm rounds, discussions of goals of care with patient and family all non-inclusive of procedures.    Plan discussed with Dr Jiang 88 yo F with a PMH of HLD, glaucoma, Alzheimer's dementia, and OA admitted to ICU for    1. Acute Hypoxic respiratory Failure  2. Aspiration Pneumonitis  3. Left Hip Ryan 9/25  4. ABLA  5. UTI    Neuro:  - Avoid Neuro Deliriogenic / sedative medications  - CTH with no acute gross abnormalities appreciated awaiting official read, pt as baseline mental status as per daughter.  - Aspiration precautions, HOB > 30 degrees  - Repeat official Speech and swallow, may need MBS. Pt passed speech and swallow earlier this morning.  - Pain control prn  - Namenda/Aricept    CV:  - Normotensive, no active issues  - Statin    Pulm:  - Supplemental oxygen as needed to maintain spo2 > 94%, currently on NRB wean as tolerated.  - Incentive spirometry as able  - CXR with RLB Infiltrate, STAT CXR ordered  - Aggressive Chest pt  - Scopolamine for excess secretions  - ABG 7.42/41/117/27                  GI:  - NPO, will need NGT  - Enteral feeds as tolerated to avoid Stress ulceration and optimize nutritional state when indicated    Renal:  - Even to net negative fluid balance as tolerated by hemodynamics and renal fx.    - Preserved Renal Function Continue to monitor Bun/Cr and UOP  - Replacing electrolytes as needed with Goal K> 4, PO> 3, Mg> 2               - Strict I&O's  - Avoid Nephro toxic medication    Heme:  - Lovenox for DVT prophylaxis  - No active bleeding, H/H stable, will transfuse for hgb < 7 or signs of active bleeding    ID:  - WBC 12.83,  remains afebrile with no antipyretics administered   - Cefepime for aspiration PNA  - Microbiology and Radiology reviewed   - trend CBC with diff, CMP  and fever curve  - Lactate 1.5    Endo:  - ISS for aggressive glycemic control to limit FS glucose to < 180mg/dl.    MSK:  - Hip precautions with pillow    Critical Care Time (EXCLUSIVE of any non bundled procedures) :  60 minutes were spent assessing the patient's presenting problems of acute illness that pose a high probability of life threatening  deterioration or end organ damage / dysfunction.  Medical desicion making includes initiation / continuation of plan or care review data/ labwork/ radiographic study, direct patient care bedside ,  discussions with  consultants regarding care,  evaluation and interpretation of vital signs, any necessary ventilator management,   NIV or BIPAP changes  or initiation,    discussions with multidisipliary team,  am or pm rounds, discussions of goals of care with patient and family all non-inclusive of procedures.    Plan discussed with Dr Jiang 88 yo F with a PMH of HLD, glaucoma, Alzheimer's dementia, and OA admitted to ICU for    1. Acute Hypoxic respiratory Failure  2. Aspiration Pneumonitis  3. Left Hip Ryan 9/25  4. ABLA  5. UTI    Neuro:  - Avoid Neuro Deliriogenic / sedative medications  - CTH with no acute gross abnormalities appreciated awaiting official read, pt as baseline mental status as per daughter.  - Aspiration precautions, HOB > 30 degrees  - Repeat official Speech and swallow, may need MBS. Pt passed speech and swallow earlier this morning.  - Pain control prn  - Namenda/Aricept    CV:  - Normotensive, no active issues  - Statin    Pulm:  - Supplemental oxygen as needed to maintain spo2 > 94%, currently on NRB wean as tolerated.  - Incentive spirometry as able  - CXR with RLB Infiltrate, STAT CXR ordered  - Aggressive Chest pt  - Scopolamine for excess secretions  - ABG 7.42/41/117/27                  GI:  - NPO, will need NGT  - Enteral feeds as tolerated to avoid Stress ulceration and optimize nutritional state when indicated    Renal:  - Even to net negative fluid balance as tolerated by hemodynamics and renal fx.    - Preserved Renal Function Continue to monitor Bun/Cr and UOP  - Replacing electrolytes as needed with Goal K> 4, PO> 3, Mg> 2               - Strict I&O's  - Avoid Nephro toxic medication    Heme:  - Lovenox for DVT prophylaxis  - No active bleeding, H/H stable, will transfuse for hgb < 7 or signs of active bleeding    ID:  - WBC 12.83,  remains afebrile with no antipyretics administered   - Cefepime for aspiration PNA will broaden to Zosyn in setting of UTI  - Microbiology and Radiology reviewed   - trend CBC with diff, CMP  and fever curve  - Lactate 1.5    Endo:  - ISS for aggressive glycemic control to limit FS glucose to < 180mg/dl.    MSK:  - Hip precautions with pillow    Critical Care Time (EXCLUSIVE of any non bundled procedures) :  60 minutes were spent assessing the patient's presenting problems of acute illness that pose a high probability of life threatening  deterioration or end organ damage / dysfunction.  Medical desicion making includes initiation / continuation of plan or care review data/ labwork/ radiographic study, direct patient care bedside ,  discussions with  consultants regarding care,  evaluation and interpretation of vital signs, any necessary ventilator management,   NIV or BIPAP changes  or initiation,    discussions with multidisipliary team,  am or pm rounds, discussions of goals of care with patient and family all non-inclusive of procedures.    Plan discussed with Dr Jiang 86 yo F with a PMH of HLD, glaucoma, Alzheimer's dementia, and OA admitted to ICU for    1. Acute Hypoxic respiratory Failure  2. Aspiration Pneumonitis  3. Left Hip Ryan 9/25  4. ABLA  5. UTI  6. SIRS    Neuro:  - Avoid Neuro Deliriogenic / sedative medications  - CTH with no acute gross abnormalities appreciated awaiting official read, pt as baseline mental status as per daughter.  - Aspiration precautions, HOB > 30 degrees  - Repeat official Speech and swallow, may need MBS. Pt passed speech and swallow earlier this morning.  - Pain control prn  - Namenda/Aricept    CV:  - Normotensive, no active issues  - Statin    Pulm:  - Supplemental oxygen as needed to maintain spo2 > 94%, currently on NRB wean as tolerated.  - Incentive spirometry as able  - CXR with RLB Infiltrate, STAT CXR ordered  - Aggressive Chest pt  - Scopolamine for excess secretions  - ABG 7.42/41/117/27                  GI:  - NPO, will need NGT, family would like to hold off tonight and make a decision by tomorrow  - Enteral feeds as tolerated to avoid Stress ulceration and optimize nutritional state when indicated    Renal:  - Even to net negative fluid balance as tolerated by hemodynamics and renal fx.    - Preserved Renal Function Continue to monitor Bun/Cr and UOP  - Replacing electrolytes as needed with Goal K> 4, PO> 3, Mg> 2               - Strict I&O's  - Avoid Nephro toxic medication    Heme:  - Lovenox for DVT prophylaxis  - No active bleeding, H/H stable, will transfuse for hgb < 7 or signs of active bleeding    ID:  - WBC 12.83, Lactate 1.5, Blood Cx ordered  - Cefepime for aspiration PNA will broaden to Zosyn in setting of UTI  - Microbiology and Radiology reviewed   - trend CBC with diff, CMP  and fever curve    Endo:  - ISS for aggressive glycemic control to limit FS glucose to < 180mg/dl.    MSK:  - Hip precautions with pillow    Critical Care Time (EXCLUSIVE of any non bundled procedures) :  60 minutes were spent assessing the patient's presenting problems of acute illness that pose a high probability of life threatening  deterioration or end organ damage / dysfunction.  Medical desicion making includes initiation / continuation of plan or care review data/ labwork/ radiographic study, direct patient care bedside ,  discussions with  consultants regarding care,  evaluation and interpretation of vital signs, any necessary ventilator management,   NIV or BIPAP changes  or initiation,    discussions with multidisipliary team,  am or pm rounds, discussions of goals of care with patient and family all non-inclusive of procedures.    Plan discussed with Dr Jiang 86 yo F with a PMH of HLD, glaucoma, Alzheimer's dementia, and OA admitted to ICU for    1. Acute Hypoxic respiratory Failure  2. Aspiration Pneumonitis  3. Left Hip Ryan 9/25  4. ABLA  5. UTI  6. SIRS    Neuro:  - Avoid Neuro Deliriogenic / sedative medications  - CTH with no acute gross abnormalities appreciated awaiting official read, pt as baseline mental status as per daughter.  - Aspiration precautions, HOB > 30 degrees  - Repeat official Speech and swallow, may need MBS. Pt passed speech and swallow earlier this morning.  - Pain control prn  - Namenda/Aricept    CV:  - Normotensive, no active issues  - Statin    Pulm:  - Supplemental oxygen as needed to maintain spo2 > 94%, currently on NRB wean as tolerated.  - Incentive spirometry as able  - CXR with RLB Infiltrate, STAT CXR ordered  - Aggressive Chest pt  - Scopolamine for excess secretions  - ABG 7.42/41/117/27                  GI:  - NPO, will need NGT, family would like to hold off tonight and make a decision by tomorrow  - Enteral feeds as tolerated to avoid Stress ulceration and optimize nutritional state when indicated    Renal:  - Even to net negative fluid balance as tolerated by hemodynamics and renal fx.    - Preserved Renal Function Continue to monitor Bun/Cr and UOP  - Replacing electrolytes as needed with Goal K> 4, PO> 3, Mg> 2               - Strict I&O's  - Avoid Nephro toxic medication    Heme:  - Lovenox for DVT prophylaxis  - No active bleeding, H/H stable, will transfuse for hgb < 7 or signs of active bleeding    ID:  - WBC 12.83, Lactate 1.5, f/u Cx  - Cefepime for aspiration PNA will broaden to Zosyn in setting of UTI  - Microbiology and Radiology reviewed   - trend CBC with diff, CMP  and fever curve    Endo:  - ISS for aggressive glycemic control to limit FS glucose to < 180mg/dl.    MSK:  - Hip precautions with pillow    Critical Care Time (EXCLUSIVE of any non bundled procedures) :  60 minutes were spent assessing the patient's presenting problems of acute illness that pose a high probability of life threatening  deterioration or end organ damage / dysfunction.  Medical desicion making includes initiation / continuation of plan or care review data/ labwork/ radiographic study, direct patient care bedside ,  discussions with  consultants regarding care,  evaluation and interpretation of vital signs, any necessary ventilator management,   NIV or BIPAP changes  or initiation,    discussions with multidisipliary team,  am or pm rounds, discussions of goals of care with patient and family all non-inclusive of procedures.    Plan discussed with Dr Jiang

## 2022-09-26 NOTE — CHART NOTE - NSCHARTNOTEFT_GEN_A_CORE
Came to see patient at bedside with family present. Still with significant coughing, unable to clear secretions.   Maintaining spo2 of 91-93% on 2.5-3 L   Discussed with family re: current condition, risk of worsening respiratory failure requiring intubation and risk of further deterioration given advanced dementia and comorbidities.  They initially had wanted patient to go to [facility] then home with 24 hour aides as she was ambulatory prior to hip fracture.  GIven significant dysphagia , discussed with them it would not be safe to feed her at this time. They were unsure if they wanted to proceed with a PEG tube if this was persistent.   They wanted t0 discuss with rest of family before deciding on DNR/DNI.   Pt has been requiring very frequent suctioning (every 10-15 minutes). As this would be nearly impossible to continue on 2N and pt at risk for worsening resp failure, ICU consult was called, d/w ICU PA who will evaluate.   d/w pt's RN and Charge RN.   d/w family at length

## 2022-09-26 NOTE — CHART NOTE - NSCHARTNOTEFT_GEN_A_CORE
called to see pt 2/2 copious secretions  pt seen and examined  lungs with scattered rhonchi throughout  CXR: + for RLB infiltrate  started on cefipime  needing to be orally/NT suction frequently  noted while suctioned pt had an absence of her gag reflex  neuro exam: pt with equally generalized weakness in BUE, BLE  did follow commands  Neuro consult placed  spoke with Dr Peters, Pt known to MD  urgent CT head ordered  kept NPO  IVFs NS @ 75 cchr  suction as needed  speech and swallow eval again tomorrow

## 2022-09-26 NOTE — OCCUPATIONAL THERAPY INITIAL EVALUATION ADULT - ADDITIONAL COMMENTS
Patient is a poor historian at baseline: patient's daughter provided hx reports patient has a 24/7 HHA she has a hospital bed, was able to ambulate with assistance and east with assistance. Patient is a poor historian at baseline: patient's daughter provided hx reports patient has a 24/7 HHA she has a hospital bed, HHA performed ADL/IADL tasks for patient (she was able to help occasionally with feeding herself), patient was able to transfer and ambulate with hand over hand assist (did not like RW). Patient's daughter reports patient needs supervision always at home.

## 2022-09-26 NOTE — PROGRESS NOTE ADULT - SUBJECTIVE AND OBJECTIVE BOX
Patient seen and examined at bedside. Pain well controlled with medication. Has been having issues clearing secretions, will make NPO at this time. Formal speech and swallow evaluation pending. Patient denies any numbness, tingling, weakness, or any other orthopaedic complaint.     Exam:   T(C): 36.9 (09-26-22 @ 05:00), Max: 36.9 (09-26-22 @ 05:00)  T(F): 98.4 (09-26-22 @ 05:00), Max: 98.4 (09-26-22 @ 05:00)  HR: 88 (09-26-22 @ 05:00) (66 - 89)  BP: 113/51 (09-26-22 @ 05:00) (90/45 - 150/57)  RR: 16 (09-26-22 @ 05:00) (12 - 20)  SpO2: 100% (09-26-22 @ 05:00) (97% - 100%)    Gen: resting in bed, NAD  LLE:  Dressing in place, c/d/i.  Maris-incisional TTP; otherwise, NTTP throughout the rest of the extremity.   SILT L2-S1  GSC/TA/EHL/FHL intact  Extremity warm and well perfused  No calf tenderness bilaterally.  Compartments soft and compressible.     Laboratory Results:   CBC, 09-25-22 @ 11:48    WBC: 17.75  Hgb: 12.8  Hct: 38.6  Plt: 144      Chemistry, 09-25-22 @ 11:48    Na: 140     AST: --  K: 4.2       ALT: --  Cl: 107      TProt: --  CO2: 26     Alb: --  BUN: 10     TBili: --  Cr: 0.94      AP: --  Glu: 138       Mg: --  P: --    eGFR: --  eGFR, AA: --

## 2022-09-26 NOTE — PROVIDER CONTACT NOTE (OTHER) - RECOMMENDATIONS
Speech and swallow consult placed last night. Family refused all unnecessary medications. Pt placed NPO. Chest xray denied. Primary medicine team to follow up this am.

## 2022-09-27 LAB
ANION GAP SERPL CALC-SCNC: 4 MMOL/L — LOW (ref 5–17)
APTT BLD: 32.6 SEC — SIGNIFICANT CHANGE UP (ref 27.5–35.5)
BUN SERPL-MCNC: 10 MG/DL — SIGNIFICANT CHANGE UP (ref 7–23)
CALCIUM SERPL-MCNC: 8.8 MG/DL — SIGNIFICANT CHANGE UP (ref 8.5–10.1)
CHLORIDE SERPL-SCNC: 106 MMOL/L — SIGNIFICANT CHANGE UP (ref 96–108)
CO2 SERPL-SCNC: 28 MMOL/L — SIGNIFICANT CHANGE UP (ref 22–31)
CREAT SERPL-MCNC: 0.77 MG/DL — SIGNIFICANT CHANGE UP (ref 0.5–1.3)
EGFR: 76 ML/MIN/1.73M2 — SIGNIFICANT CHANGE UP
GLUCOSE SERPL-MCNC: 104 MG/DL — HIGH (ref 70–99)
HCT VFR BLD CALC: 28.7 % — LOW (ref 34.5–45)
HGB BLD-MCNC: 9.5 G/DL — LOW (ref 11.5–15.5)
INR BLD: 1.29 RATIO — HIGH (ref 0.88–1.16)
MAGNESIUM SERPL-MCNC: 2 MG/DL — SIGNIFICANT CHANGE UP (ref 1.6–2.6)
MCHC RBC-ENTMCNC: 33.1 GM/DL — SIGNIFICANT CHANGE UP (ref 32–36)
MCHC RBC-ENTMCNC: 34.4 PG — HIGH (ref 27–34)
MCV RBC AUTO: 104 FL — HIGH (ref 80–100)
PHOSPHATE SERPL-MCNC: 1.8 MG/DL — LOW (ref 2.5–4.5)
PLATELET # BLD AUTO: 135 K/UL — LOW (ref 150–400)
POTASSIUM SERPL-MCNC: 3.8 MMOL/L — SIGNIFICANT CHANGE UP (ref 3.5–5.3)
POTASSIUM SERPL-SCNC: 3.8 MMOL/L — SIGNIFICANT CHANGE UP (ref 3.5–5.3)
PROTHROM AB SERPL-ACNC: 15 SEC — HIGH (ref 10.5–13.4)
RBC # BLD: 2.76 M/UL — LOW (ref 3.8–5.2)
RBC # FLD: 13.4 % — SIGNIFICANT CHANGE UP (ref 10.3–14.5)
SODIUM SERPL-SCNC: 138 MMOL/L — SIGNIFICANT CHANGE UP (ref 135–145)
WBC # BLD: 14 K/UL — HIGH (ref 3.8–10.5)
WBC # FLD AUTO: 14 K/UL — HIGH (ref 3.8–10.5)

## 2022-09-27 PROCEDURE — 99223 1ST HOSP IP/OBS HIGH 75: CPT

## 2022-09-27 PROCEDURE — 71045 X-RAY EXAM CHEST 1 VIEW: CPT | Mod: 26

## 2022-09-27 PROCEDURE — 99231 SBSQ HOSP IP/OBS SF/LOW 25: CPT

## 2022-09-27 RX ORDER — LANOLIN ALCOHOL/MO/W.PET/CERES
3 CREAM (GRAM) TOPICAL AT BEDTIME
Refills: 0 | Status: DISCONTINUED | OUTPATIENT
Start: 2022-09-27 | End: 2022-10-02

## 2022-09-27 RX ADMIN — POLYETHYLENE GLYCOL 3350 17 GRAM(S): 17 POWDER, FOR SOLUTION ORAL at 23:00

## 2022-09-27 RX ADMIN — SENNA PLUS 2 TABLET(S): 8.6 TABLET ORAL at 23:00

## 2022-09-27 RX ADMIN — Medication 3 MILLIGRAM(S): at 23:44

## 2022-09-27 RX ADMIN — Medication 62.5 MILLIMOLE(S): at 17:34

## 2022-09-27 RX ADMIN — PIPERACILLIN AND TAZOBACTAM 25 GRAM(S): 4; .5 INJECTION, POWDER, LYOPHILIZED, FOR SOLUTION INTRAVENOUS at 01:30

## 2022-09-27 RX ADMIN — DONEPEZIL HYDROCHLORIDE 10 MILLIGRAM(S): 10 TABLET, FILM COATED ORAL at 23:03

## 2022-09-27 RX ADMIN — PIPERACILLIN AND TAZOBACTAM 25 GRAM(S): 4; .5 INJECTION, POWDER, LYOPHILIZED, FOR SOLUTION INTRAVENOUS at 09:53

## 2022-09-27 RX ADMIN — SIMVASTATIN 20 MILLIGRAM(S): 20 TABLET, FILM COATED ORAL at 23:03

## 2022-09-27 RX ADMIN — ENOXAPARIN SODIUM 40 MILLIGRAM(S): 100 INJECTION SUBCUTANEOUS at 05:03

## 2022-09-27 RX ADMIN — PIPERACILLIN AND TAZOBACTAM 25 GRAM(S): 4; .5 INJECTION, POWDER, LYOPHILIZED, FOR SOLUTION INTRAVENOUS at 23:02

## 2022-09-27 RX ADMIN — LATANOPROST 1 DROP(S): 0.05 SOLUTION/ DROPS OPHTHALMIC; TOPICAL at 23:29

## 2022-09-27 RX ADMIN — CHLORHEXIDINE GLUCONATE 1 APPLICATION(S): 213 SOLUTION TOPICAL at 05:53

## 2022-09-27 RX ADMIN — PIPERACILLIN AND TAZOBACTAM 25 GRAM(S): 4; .5 INJECTION, POWDER, LYOPHILIZED, FOR SOLUTION INTRAVENOUS at 17:35

## 2022-09-27 RX ADMIN — SODIUM CHLORIDE 75 MILLILITER(S): 9 INJECTION INTRAMUSCULAR; INTRAVENOUS; SUBCUTANEOUS at 09:57

## 2022-09-27 RX ADMIN — Medication 1 DROP(S): at 23:01

## 2022-09-27 RX ADMIN — MEMANTINE HYDROCHLORIDE 10 MILLIGRAM(S): 10 TABLET ORAL at 22:59

## 2022-09-27 RX ADMIN — Medication 975 MILLIGRAM(S): at 22:59

## 2022-09-27 NOTE — CONSULT NOTE ADULT - ASSESSMENT
88 yo F with a PMH of HLD, Alzheimer's dementia, OA presents from home after a fall from her hospital bed, when the aid went to the bathroom at night, she has aide 24/7, normally can ambulate with assistance and eat with assistance, has had progressive dementia over the past 2-3 years - it has advanced.    Pt was noted to have hip fracture, underwent Left hip hemiarthroplasty on 9/25. Has possible aspiration and difficulty swallowing and decreased gag reflex/      # Metabolic encephalopathy superimposed on advanced dementia    # Left hip #, aspiration Pneumonitis, UTI    - As d/w pts daughter who is well known to me, under stressful condi 86 yo F with a PMH of HLD, Alzheimer's dementia, OA presents from home after a fall from her hospital bed, when the aid went to the bathroom at night, she has aide 24/7, normally can ambulate with assistance and eat with assistance, has had progressive dementia over the past 2-3 years - it has advanced.    Pt was noted to have hip fracture, underwent Left hip hemiarthroplasty on 9/25. Has possible aspiration and difficulty swallowing and decreased gag reflex/      # Metabolic encephalopathy superimposed on advanced dementia    # Left hip #, aspiration Pneumonitis, UTI    - As d/w pts daughter, Shira, who is well known to me, under any stressful conditions, dementia and mental mental status can worsen, we need to monitor closely for dysphagia as risk of aspiration is high, we need to have f/u swallow eval, if pt fails then Corpac feeds can be tried for a short time.  - I discussed any advance directives that pt may have desired, Pts' daughter reports they will have a family meeting (with her brothers) after trial with Corpac feeds.  - Prognosis D/W pts daughter    Call neuro if needed henceforth

## 2022-09-27 NOTE — PROGRESS NOTE ADULT - SUBJECTIVE AND OBJECTIVE BOX
HPI:  86 yo F with a PMH of HLD, glaucoma, Alzheimer's dementia, and OA who presents after a fall. The patient cannot provide any history, so it was obtained from her daughter Shira at bedside. The patient has a 24/7 air, and has a hospital bed at home, but when the aid went to the bathroom last night, the patient (despite having railings) climbed to the edge of the bed and fell on her left side. It is unclear if she hit her head. Reportedly, the patient has been more restless recently at night. No recent medication changes. No complaints from daughter. Her last fall was years ago, and she does not normally fall because of the precautions with her bed and having her aide 24/7. She normally can ambulate with assistance and eat with assistance.      In the ED, she was given Morphine 2 mg IV x1 and started on LR @ 75 ml/hr. (24 Sep 2022 12:57)      Patient is a 87y old  Female who presents with a chief complaint of left hip fracture (26 Sep 2022 10:34)      Consulted by Dr. Dale   for VTE prophylaxis, risk stratification, and anticoagulation management.    PAST MEDICAL & SURGICAL HISTORY:  Osteoarthritis of right hip      Glaucoma      Alzheimer disease      Hypercholesteremia      Dementia      S/P appendectomy  childhood      S/P tonsillectomy  childhood      S/P D&amp;C (status post dilation and curettage)  age 32      S/P colonoscopy  every 2 or 3 years    Interval History  2022:patient seen at bedside awake , able to tell her name  patient was having swallowing difficulty Speech and swallow eval done, puree diet,   H&H stable will continue Lovenox 40mg daily for DVT prophylaxis   2022:patient seen at bedside on CCU, upgraded to  CCU low O2 sat and potential aspiration risk, patient is on NC now no concerns regarding AC will continue lovenox for DVT prophylaxis            CrCl:31.3  BMI:18.3  EBL:150ml    Caprini VTE Risk Score:CAPRINI SCORE  AGE RELATED RISK FACTORS                                                       MOBILITY RELATED FACTORS  [ ] Age 41-60 years                                            (1 Point)                  [ ] Bed rest /restricted mobility                             (1 Point)  [ ] Age: 61-74 years                                           (2 Points)                [ ] Plaster cast                                                   (2 Points)  [x ] Age= 75 years                                              (3 Points)                 [ ] Bed bound for more than 72 hours                   (2 Points)    DISEASE RELATED RISK FACTORS                                               GENDER SPECIFIC FACTORS  [ ] Edema in the lower extremities                       (1 Point)           [ ] Pregnancy                                                            (1 Point)  [ ] Varicose veins                                               (1 Point)                  [ ] Post-partum < 6 weeks                                      (1 Point)             [ ] BMI > 25 Kg/m2                                            (1 Point)                  [ ] Hormonal therapy or oral contraception       (1 Point)                 [ ] Sepsis (in the previous month)                        (1 Point)             [ ] History of pregnancy complications                (1Point)  [ ] Pneumonia or serious lung disease                                             [ ] Unexplained or recurrent  (=/>3), premature                                 (In the previous month)                               (1 Point)                birth with toxemia or growth-restricted infant (1 Point)  [ ] Abnormal pulmonary function test            (1 Point)                                   SURGERY RELATED RISK FACTORS  [ ] Acute myocardial infarction                       (1 Point)                  [ ]  Section                                         (1 Point)  [ ] Congestive heart failure (in the previous month) (1 Point)   [ ] Minor surgery   lasting <45 minutes       (1 Point)   [ ] Inflammatory bowel disease                             (1 Point)          [ ] Arthroscopic surgery                                  (2 Points)  [ ] Central venous access                                    (2 Points)            [ ] General surgery lasting >45 minutes      (2 Points)       [ ] Stroke (in the previous month)                  (5 Points)            [ ] Elective major lower extremity arthroplasty (5 Points)                                   [  ] Malignancy (present or past include skin melanoma                                          but exclude  basal skin cell)    (2 points)                                      TRAUMA RELATED RISK FACTORS                HEMATOLOGY RELATED FACTORS                                  [ x] Fracture of the hip, pelvis, or leg                       (5 Points)  [ ] Prior episodes of VTE                                     (3 Points)          [ ] Acute spinal cord injury (in the previous month)  (5 Points)  [ ] Positive family history for VTE                         (3 Points)       [ ] Paralysis (less than 1 month)                          (5 Points)  [ ] Prothrombin 88486 A                                      (3 Points)         [ ] Multiple Trauma (within 1month)                 (5Points)                                                                                                                                                                [ ] Factor V Leiden                                          (3 Points)                                OTHER RISK FACTORS                          [ ] Lupus anticoagulants                                     (3 Points)                       [ ] BMI > 40                          (1 Point)                                                         [ ] Anticardiolipin antibodies                                (3 Points)                   [ ] Smoking                              (1Point)                                                [ ] High homocysteine in the blood                      (3 Points)                [  ] Diabetes requiring insulin (1point)                         [ ] Other congenital or acquired thrombophilia       (3 Points)          [  ] Chemotherapy                   (1 Point)  [ ] Heparin induced thrombocytopenia                  (3 Points)             [  ] Blood Transfusion                (1 point)                                                                                                             Total Score [    8      ]                                                                                                                                                                                                                                                                                                                                                                                                                                         IMPROVE Bleeding Risk Score:4.5      Falls Risk:   High ( x )  Mod (  )  Low (  )      FAMILY HISTORY:  Family history of colon cancer (Father)      Denies any personal or familial history of clotting or bleeding disorders.    Allergies    No Known Allergies    Intolerances        REVIEW OF SYSTEMS    (  )Fever	     (  )Constipation	(  )SOB				(  )Headache	(  )Dysuria  (  )Chills	     (  )Melena	(  )Dyspnea present on exertion	                    (  )Dizziness                    (  )Polyuria  (  )Nausea	     (  )Hematochezia	(  )Cough			                    (  )Syncope   	(  )Hematuria  (  )Vomiting    (  )Chest Pain	(  )Wheezing			(  )Weakness  (  )Diarrhea     (  )Palpitations	(  )Anorexia			( x )joint pain    All  other review of systems negative: Yes    Vital Signs Last 24 Hrs  T(C): 36.2 (27 Sep 2022 04:00), Max: 38.6 (26 Sep 2022 18:00)  T(F): 97.1 (27 Sep 2022 04:00), Max: 101.4 (26 Sep 2022 18:00)  HR: 77 (27 Sep 2022 05:00) (77 - 89)  BP: 106/49 (27 Sep 2022 05:00) (99/52 - 126/54)  BP(mean): 63 (27 Sep 2022 05:00) (56 - 91)  RR: 18 (27 Sep 2022 05:00) (16 - 25)  SpO2: 100% (27 Sep 2022 05:00) (95% - 100%)  PHYSICAL EXAM:    Constitutional: Appears Well    Neurological: Awake confused     Skin: Warm    Respiratory and Thorax: normal effort; Breath sounds: normal; No rales/wheezing/rhonchi  	  Cardiovascular: S1, S2, regular, NMBR	    Gastrointestinal: BS + x 4Q, nontender	    Genitourinary:  Bladder nondistended, nontender    Musculoskeletal:   General Right:   no muscle/joint tenderness,   normal tone, no joint swelling,   ROM: full	    General Left:   no muscle/joint tenderness,   normal tone, no joint swelling,   ROM: limited    Hip: Left: Dressing CDI;         Lower extrems:   Right: no calf tenderness              negative chad's sign               + pedal pulses    Left:   no calf tenderness              negative chad's sign               + pedal pulses                        9.5    14.00 )-----------( 135      ( 27 Sep 2022 05:52 )             28.7       09-27    138  |  106  |  10  ----------------------------<  104<H>  3.8   |  28  |  0.77    Ca    8.8      27 Sep 2022 05:52  Phos  1.8       Mg     2.0         TPro  5.7<L>  /  Alb  2.5<L>  /  TBili  0.6  /  DBili  x   /  AST  39<H>  /  ALT  13  /  AlkPhos  81        PT/INR - ( 27 Sep 2022 05:52 )   PT: 15.0 sec;   INR: 1.29 ratio         PTT - ( 27 Sep 2022 05:52 )  PTT:32.6 sec                        10.1   12.99 )-----------( 142      ( 26 Sep 2022 09:16 )             29.3           140  |  108  |  10  ----------------------------<  107<H>  3.9   |  26  |  0.90    Ca    8.7      26 Sep 2022 09:16        PT/INR - ( 25 Sep 2022 05:08 )   PT: 14.0 sec;   INR: 1.20 ratio         PTT - ( 25 Sep 2022 05:08 )  PTT:22.3 sec				    MEDICATIONS  (STANDING):  acetaminophen     Tablet .. 975 milliGRAM(s) Oral every 8 hours  acetaminophen   IVPB .. 1000 milliGRAM(s) IV Intermittent once  cholecalciferol 1000 Unit(s) Oral daily  citalopram 20 milliGRAM(s) Oral daily  donepezil 10 milliGRAM(s) Oral at bedtime  enoxaparin Injectable 40 milliGRAM(s) SubCutaneous every 24 hours  HYDROmorphone  Injectable 0.5 milliGRAM(s) IV Push once  lactated ringers. 1000 milliLiter(s) IV Continuous <Continuous>  lactated ringers. 1000 milliLiter(s) IV Continuous <Continuous>  latanoprost 0.005% Ophthalmic Solution 1 Drop(s) Both EYES at bedtime  memantine 10 milliGRAM(s) Oral two times a day  multivitamin 1 Tablet(s) Oral daily  polyethylene glycol 3350 17 Gram(s) Oral at bedtime  senna 2 Tablet(s) Oral at bedtime  simvastatin 20 milliGRAM(s) Oral at bedtime  timolol 0.5% Solution 1 Drop(s) Both EYES daily  tranexamic acid IVPB 1000 milliGRAM(s) IV Intermittent once  tranexamic acid IVPB 1000 milliGRAM(s) IV Intermittent once      < from: CT Head No Cont (22 @ 07:09) >  MPRESSION: No acute intracranial hemorrhage, mass effect, or shift of   the midline structures.    Similar-appearing mild chronic white matter microvascular type changes.    < end of copied text >      DVT Prophylaxis:  LMWH                   ( x )  Heparin SQ           (  )  Coumadin             (  )  Xarelto                  (  )  Eliquis                   (  )  Venodynes           (x  )  Ambulation          ( x )  UFH                       (  )  Contraindicated  (  )  EC ASPIRIN       (  )

## 2022-09-27 NOTE — PROGRESS NOTE ADULT - SUBJECTIVE AND OBJECTIVE BOX
Patient seen and examined at bedside. Patient is non-verbal this AM and non-participatory in exam. Patient only responds to noxious stimuli.     LABS:                        10.3   12.83 )-----------( 147      ( 26 Sep 2022 19:53 )             30.9     09-26    139  |  108  |  11  ----------------------------<  109<H>  3.9   |  26  |  0.80    Ca    9.2      26 Sep 2022 19:53    TPro  5.7<L>  /  Alb  2.5<L>  /  TBili  0.6  /  DBili  x   /  AST  39<H>  /  ALT  13  /  AlkPhos  81  09-26          VITAL SIGNS:  T(C): 36.2 (09-26-22 @ 22:00), Max: 38.6 (09-26-22 @ 18:00)  HR: 77 (09-27-22 @ 05:00) (77 - 102)  BP: 106/49 (09-27-22 @ 05:00) (99/52 - 134/51)  RR: 18 (09-27-22 @ 05:00) (16 - 25)  SpO2: 100% (09-27-22 @ 05:00) (95% - 100%)    Gen: resting in bed, awake  LLE:  Dressing in place, c/d/i.  Maris-incisional TTP; otherwise, NTTP throughout the rest of the extremity.   sensation grossly intact to noxious stimuli L2-S1  GSC/TA/EHL/FHL intact grossly to noxious stimuli  Extremity warm and well perfused  No calf tenderness bilaterally.  Compartments soft and compressible.

## 2022-09-27 NOTE — PROGRESS NOTE ADULT - SUBJECTIVE AND OBJECTIVE BOX
Patient is a 87y old  Female who presents with a chief complaint of left hip fracture (27 Sep 2022 11:11)    HPI:  86 yo F with a PMH of HLD, glaucoma, Alzheimer's dementia, and OA who presents after a fall. The patient cannot provide any history, so it was obtained from her daughter Shira at bedside. The patient has a 24/7 air, and has a hospital bed at home, but when the aid went to the bathroom last night, the patient (despite having railings) climbed to the edge of the bed and fell on her left side. It is unclear if she hit her head. Reportedly, the patient has been more restless recently at night. No recent medication changes. No complaints from daughter. Her last fall was years ago, and she does not normally fall because of the precautions with her bed and having her aide 24/7. She normally can ambulate with assistance and eat with assistance.      In the ED, she was given Morphine 2 mg IV x1 and started on LR @ 75 ml/hr. (24 Sep 2022 12:57)    Allergies: No Known Allergies    PAST MEDICAL & SURGICAL HISTORY:  Osteoarthritis of right hip      Glaucoma      Alzheimer disease      Hypercholesteremia      Dementia      S/P appendectomy  childhood      S/P tonsillectomy  childhood      S/P D&amp;C (status post dilation and curettage)  age 32      S/P colonoscopy  every 2 or 3 years        FAMILY HISTORY:  Family history of colon cancer (Father)      SOCIAL HISTORY:    Home Medications:    Review of Systems:  Constitutional: no fever, chills, fatigue  Neuro: no headache, numbness, weakness  Resp: no cough, wheezing, shortness of breath  CVS: no chest pain, palpitations, leg swelling  GI: no abdominal pain, nausea, vomiting, diarrhea   : no dysuria, frequency, incontinence  Skin: no itching, burning, rashes, or lesions   Msk: no joint pain or swelling  Psych: no depression, anxiety, mood swings    T(F): 98.2 (09-27-22 @ 07:10), Max: 101.4 (09-26-22 @ 18:00)  HR: 77 (09-27-22 @ 05:00) (77 - 89)  BP: 106/49 (09-27-22 @ 05:00) (99/52 - 126/54)  RR: 18 (09-27-22 @ 05:00) (16 - 25)  SpO2: 100% (09-27-22 @ 05:00)  Wt(kg): --    CAPILLARY BLOOD GLUCOSE      POCT Blood Glucose.: 117 mg/dL (26 Sep 2022 19:20)    I&O's Summary    26 Sep 2022 07:01  -  27 Sep 2022 07:00  --------------------------------------------------------  IN: 875 mL / OUT: 200 mL / NET: 675 mL        Physical Exam:     Gen:  Neuro:  HEENT:  CVS:  Resp:  Abd:  Ext:  Skin:    Meds:  piperacillin/tazobactam IVPB.. 3.375 Gram(s) IV Intermittent every 8 hours       simvastatin 20 milliGRAM(s) Oral at bedtime        acetaminophen     Tablet .. 975 milliGRAM(s) Oral every 8 hours  acetaminophen     Tablet .. 650 milliGRAM(s) Oral every 6 hours PRN  acetaminophen   IVPB .. 1000 milliGRAM(s) IV Intermittent once  acetaminophen  Suppository .. 650 milliGRAM(s) Rectal every 6 hours PRN  citalopram 20 milliGRAM(s) Oral daily  donepezil 10 milliGRAM(s) Oral at bedtime  HYDROmorphone  Injectable 0.5 milliGRAM(s) IV Push once  memantine 10 milliGRAM(s) Oral two times a day  morphine  - Injectable 2 milliGRAM(s) IV Push every 6 hours PRN  ondansetron Injectable 4 milliGRAM(s) IV Push every 6 hours PRN  ondansetron Injectable 4 milliGRAM(s) IV Push every 8 hours PRN  oxyCODONE    IR 5 milliGRAM(s) Oral every 4 hours PRN  traMADol 25 milliGRAM(s) Oral every 6 hours PRN  traMADol 25 milliGRAM(s) Oral every 4 hours PRN  traMADol 50 milliGRAM(s) Oral every 4 hours PRN        enoxaparin Injectable 40 milliGRAM(s) SubCutaneous every 24 hours  tranexamic acid IVPB 1000 milliGRAM(s) IV Intermittent once  tranexamic acid IVPB 1000 milliGRAM(s) IV Intermittent once     aluminum hydroxide/magnesium hydroxide/simethicone Suspension 30 milliLiter(s) Oral four times a day PRN  magnesium hydroxide Suspension 30 milliLiter(s) Oral daily PRN  polyethylene glycol 3350 17 Gram(s) Oral at bedtime  senna 2 Tablet(s) Oral at bedtime        cholecalciferol 1000 Unit(s) Oral daily  multivitamin 1 Tablet(s) Oral daily  sodium chloride 0.9%. 1000 milliLiter(s) IV Continuous <Continuous>  sodium phosphate IVPB 15 milliMole(s) IV Intermittent once        chlorhexidine 4% Liquid 1 Application(s) Topical <User Schedule>  latanoprost 0.005% Ophthalmic Solution 1 Drop(s) Both EYES at bedtime  timolol 0.5% Solution 1 Drop(s) Both EYES daily                              9.5    14.00 )-----------( 135      ( 27 Sep 2022 05:52 )             28.7       09-27    138  |  106  |  10  ----------------------------<  104<H>  3.8   |  28  |  0.77    Ca    8.8      27 Sep 2022 05:52  Phos  1.8     09-27  Mg     2.0     09-27    TPro  5.7<L>  /  Alb  2.5<L>  /  TBili  0.6  /  DBili  x   /  AST  39<H>  /  ALT  13  /  AlkPhos  81  09-26    Lactate 1.1           09-26 @ 19:53          PT/INR - ( 27 Sep 2022 05:52 )   PT: 15.0 sec;   INR: 1.29 ratio         PTT - ( 27 Sep 2022 05:52 )  PTT:32.6 sec          ABG - ( 26 Sep 2022 19:44 )  pH, Arterial: 7.42  pH, Blood: x     /  pCO2: 41    /  pO2: 117   / HCO3: 27    / Base Excess: 1.9   /  SaO2: 100               Radiology:   < from: CT Head No Cont (09.26.22 @ 19:17) >    ACC: [id number] EXAM:  CT BRAIN                          PROCEDURE DATE:  09/26/2022          INTERPRETATION:  CLINICAL STATEMENT: Pain.    TECHNIQUE: CT of the head was performed without IV contrast.  RAPID   artificial intelligence was utilized forthe preliminary evaluation of   intracranial hemorrhage.    COMPARISON: CT head 9/24/2022    FINDINGS:  There is moderate diffuse parenchymal volume loss. There are areas of low   attenuation in the periventricular white matter likely related to mild  chronic microvascular ischemic changes.    There is no acute intracranial hemorrhage, parenchymal mass, mass effect   or midline shift. There is no acute territorial infarct. There is no   hydrocephalus. Partial empty sella    The cranium is intact. The visualized paranasal sinuses are well-aerated.    IMPRESSION:  No acute intracranial hemorrhage or acute territorial infarct.  If   symptoms persist, follow-up MRI exam recommended.    --- End of Report ---            MONTSERRAT TAYLOR MD; Attending Radiologist  This document has been electronically signed. Sep 26 2022  9:30PM    < end of copied text >      Problems  -Aspiration Pneumonitis  -UTI  -Left Hip fx s/p hemiarthroplasty 9/25  -Hypophosphatemia    Assessment/Plan:  -Patient confused at baseline hx of Alzheimers Dementia. Will limit pain rx w/ lethargy. Home Namenda and Aricept  -Difficulty controlling secretions. Scopalamine patch added. Frequent suctioning at bedside. Aspiration precautions  -NPO at this time. Concern for aspiration. Not candidate for PO diet, would require NGT placement for feeds/medications  -UA positive. Send cultures. Check sputum cx. Empiric Zosyn for aspiration/UTI coverage  -Phosphorus supplemented  -Right hip fx POD# 2 s/p ORIF.  -DVT PPX w/ Lovenox        Goals of care discussed. Family would like patient to be full code at this time and proceed with NGT placement   Patient is a 87y old  Female who presents with a chief complaint of left hip fracture (27 Sep 2022 11:11)    HPI:  86 yo F with a PMH of HLD, glaucoma, Alzheimer's dementia, and OA who presented to ED after a fall. The patient has a 24/7 air, and has a hospital bed at home, but when the aid went to the bathroom last night, the patient (despite having railings) climbed to the edge of the bed and fell on her left side. It is unclear if she hit her head. Reportedly, the patient has been more restless recently at night. No recent medication changes. No complaints from daughter. Her last fall was years ago, and she does not normally fall because of the precautions with her bed and having her aide 24/7. She normally can ambulate with assistance and eat with assistance.  She was admitted with hip fracture. Underwent Left hip saqib 9/25.     RRT called overnight after episode of hypoxia with concern for aspiration. Patient noted to be gargling on own secretions. Transferred to ICU for aspiration risk and airway monitoring    At bedside today patient is awake. Secretions noted with frequent coughing. Not able to follow commands or contribute to ROS        Allergies: No Known Allergies    PAST MEDICAL & SURGICAL HISTORY:  Osteoarthritis of right hip      Glaucoma      Alzheimer disease      Hypercholesteremia      Dementia      S/P appendectomy  childhood      S/P tonsillectomy  childhood      S/P D&amp;C (status post dilation and curettage)  age 32      S/P colonoscopy  every 2 or 3 years        FAMILY HISTORY:  Family history of colon cancer (Father)      SOCIAL HISTORY:    Home Medications:    Review of Systems:  Unable to participate in ROS    T(F): 98.2 (09-27-22 @ 07:10), Max: 101.4 (09-26-22 @ 18:00)  HR: 77 (09-27-22 @ 05:00) (77 - 89)  BP: 106/49 (09-27-22 @ 05:00) (99/52 - 126/54)  RR: 18 (09-27-22 @ 05:00) (16 - 25)  SpO2: 100% (09-27-22 @ 05:00)  Wt(kg): --    CAPILLARY BLOOD GLUCOSE      POCT Blood Glucose.: 117 mg/dL (26 Sep 2022 19:20)    I&O's Summary    26 Sep 2022 07:01  -  27 Sep 2022 07:00  --------------------------------------------------------  IN: 875 mL / OUT: 200 mL / NET: 675 mL        Physical Exam:     Gen: ill appearing elderly woman  Neuro: opens eyes, not following commands  CVS: +S1S2  Resp: CTA  Abd: soft NT ND  Ext: warm dry no edema  Skin: well perfused    Meds:  piperacillin/tazobactam IVPB.. 3.375 Gram(s) IV Intermittent every 8 hours       simvastatin 20 milliGRAM(s) Oral at bedtime        acetaminophen     Tablet .. 975 milliGRAM(s) Oral every 8 hours  acetaminophen     Tablet .. 650 milliGRAM(s) Oral every 6 hours PRN  acetaminophen   IVPB .. 1000 milliGRAM(s) IV Intermittent once  acetaminophen  Suppository .. 650 milliGRAM(s) Rectal every 6 hours PRN  citalopram 20 milliGRAM(s) Oral daily  donepezil 10 milliGRAM(s) Oral at bedtime  HYDROmorphone  Injectable 0.5 milliGRAM(s) IV Push once  memantine 10 milliGRAM(s) Oral two times a day  morphine  - Injectable 2 milliGRAM(s) IV Push every 6 hours PRN  ondansetron Injectable 4 milliGRAM(s) IV Push every 6 hours PRN  ondansetron Injectable 4 milliGRAM(s) IV Push every 8 hours PRN  oxyCODONE    IR 5 milliGRAM(s) Oral every 4 hours PRN  traMADol 25 milliGRAM(s) Oral every 6 hours PRN  traMADol 25 milliGRAM(s) Oral every 4 hours PRN  traMADol 50 milliGRAM(s) Oral every 4 hours PRN        enoxaparin Injectable 40 milliGRAM(s) SubCutaneous every 24 hours  tranexamic acid IVPB 1000 milliGRAM(s) IV Intermittent once  tranexamic acid IVPB 1000 milliGRAM(s) IV Intermittent once     aluminum hydroxide/magnesium hydroxide/simethicone Suspension 30 milliLiter(s) Oral four times a day PRN  magnesium hydroxide Suspension 30 milliLiter(s) Oral daily PRN  polyethylene glycol 3350 17 Gram(s) Oral at bedtime  senna 2 Tablet(s) Oral at bedtime        cholecalciferol 1000 Unit(s) Oral daily  multivitamin 1 Tablet(s) Oral daily  sodium chloride 0.9%. 1000 milliLiter(s) IV Continuous <Continuous>  sodium phosphate IVPB 15 milliMole(s) IV Intermittent once        chlorhexidine 4% Liquid 1 Application(s) Topical <User Schedule>  latanoprost 0.005% Ophthalmic Solution 1 Drop(s) Both EYES at bedtime  timolol 0.5% Solution 1 Drop(s) Both EYES daily                              9.5    14.00 )-----------( 135      ( 27 Sep 2022 05:52 )             28.7       09-27    138  |  106  |  10  ----------------------------<  104<H>  3.8   |  28  |  0.77    Ca    8.8      27 Sep 2022 05:52  Phos  1.8     09-27  Mg     2.0     09-27    TPro  5.7<L>  /  Alb  2.5<L>  /  TBili  0.6  /  DBili  x   /  AST  39<H>  /  ALT  13  /  AlkPhos  81  09-26    Lactate 1.1           09-26 @ 19:53          PT/INR - ( 27 Sep 2022 05:52 )   PT: 15.0 sec;   INR: 1.29 ratio         PTT - ( 27 Sep 2022 05:52 )  PTT:32.6 sec          ABG - ( 26 Sep 2022 19:44 )  pH, Arterial: 7.42  pH, Blood: x     /  pCO2: 41    /  pO2: 117   / HCO3: 27    / Base Excess: 1.9   /  SaO2: 100               Radiology:   < from: CT Head No Cont (09.26.22 @ 19:17) >    ACC: [id number] EXAM:  CT BRAIN                          PROCEDURE DATE:  09/26/2022          INTERPRETATION:  CLINICAL STATEMENT: Pain.    TECHNIQUE: CT of the head was performed without IV contrast.  RAPID   artificial intelligence was utilized forthe preliminary evaluation of   intracranial hemorrhage.    COMPARISON: CT head 9/24/2022    FINDINGS:  There is moderate diffuse parenchymal volume loss. There are areas of low   attenuation in the periventricular white matter likely related to mild  chronic microvascular ischemic changes.    There is no acute intracranial hemorrhage, parenchymal mass, mass effect   or midline shift. There is no acute territorial infarct. There is no   hydrocephalus. Partial empty sella    The cranium is intact. The visualized paranasal sinuses are well-aerated.    IMPRESSION:  No acute intracranial hemorrhage or acute territorial infarct.  If   symptoms persist, follow-up MRI exam recommended.    --- End of Report ---            MONTSERRAT TAYLOR MD; Attending Radiologist  This document has been electronically signed. Sep 26 2022  9:30PM    < end of copied text >      Problems  -Aspiration Pneumonitis  -UTI  -Left Hip fx s/p hemiarthroplasty 9/25  -Hypophosphatemia    Assessment/Plan:  -Patient confused at baseline hx of Alzheimers Dementia. Will limit pain rx w/ lethargy. Home Namenda and Aricept  -Difficulty controlling secretions. Scopalamine patch added. Frequent suctioning at bedside. Aspiration precautions  -NPO at this time. Concern for aspiration. Not candidate for PO diet, would require NGT placement for feeds/medications  -UA positive. Send cultures. Check sputum cx. Empiric Zosyn for aspiration/UTI coverage  -Phosphorus supplemented  -Right hip fx POD# 2 s/p ORIF. Ortho followiong  -DVT PPX w/ Lovenox        Goals of care discussed. Family would like patient to be full code at this time and proceed with NGT placement

## 2022-09-27 NOTE — PROCEDURE NOTE - NSPROCNAME_GEN_A_CORE
Discharge Summary/Instructions after an Endoscopic Procedure  Patient Name: John Javier  Patient MRN: [id number]  Patient YOB: 1954  Wednesday, May 11, 2022  Dk Jacobson MD  Dear patient,  As a result of recent federal legislation (The Federal Cures Act), you may   receive lab or pathology results from your procedure in your [facility]   account before your physician is able to contact you. Your physician or   their representative will relay the results to you with their   recommendations at their soonest availability.  Thank you,  RESTRICTIONS:  During your procedure today, you received medications for sedation.  These   medications may affect your judgment, balance and coordination.  Therefore,   for 24 hours, you have the following restrictions:   - DO NOT drive a car, operate machinery, make legal/financial decisions,   sign important papers or drink alcohol.    ACTIVITY:  Today: no heavy lifting, straining or running due to procedural   sedation/anesthesia.  The following day: return to full activity including work.  DIET:  Eat and drink normally unless instructed otherwise.     TREATMENT FOR COMMON SIDE EFFECTS:  - Mild abdominal pain, nausea, belching, bloating or excessive gas:  rest,   eat lightly and use a heating pad.  - Sore Throat: treat with throat lozenges and/or gargle with warm salt   water.  - Because air was used during the procedure, expelling large amounts of air   from your rectum or belching is normal.  - If a bowel prep was taken, you may not have a bowel movement for 1-3 days.    This is normal.  SYMPTOMS TO WATCH FOR AND REPORT TO YOUR PHYSICIAN:  1. Abdominal pain or bloating, other than gas cramps.  2. Chest pain.  3. Back pain.  4. Signs of infection such as: chills or fever occurring within 24 hours   after the procedure.  5. Rectal bleeding, which would show as bright red, maroon, or black stools.   (A tablespoon of blood from the rectum is not serious,  especially if   hemorrhoids are present.)  6. Vomiting.  7. Weakness or dizziness.  GO DIRECTLY TO THE NEAREST EMERGENCY ROOM IF YOU HAVE ANY OF THE FOLLOWING:      Difficulty breathing              Chills and/or fever over 101 F   Persistent vomiting and/or vomiting blood   Severe abdominal pain   Severe chest pain   Black, tarry stools   Bleeding- more than one tablespoon   Any other symptom or condition that you feel may need urgent attention  Your doctor recommends these additional instructions:  If any biopsies were taken, your doctors clinic will contact you in 1 to 2   weeks with any results.  discuss with pateint if he is amenable to capsule study  monitor hg  consider iron infusion  diet per capsule study if he chooses to proceed with this.  For questions, problems or results please call your physician - Dk Jacobson MD at Work:  [phone].  [facility], EMERGENCY ROOM PHONE NUMBER: [phone]  IF A COMPLICATION OR EMERGENCY SITUATION ARISES AND YOU ARE UNABLE TO REACH   YOUR PHYSICIAN - GO DIRECTLY TO THE EMERGENCY ROOM.  MD Dk Nation MD  5/11/2022 11:28:11 AM  This report has been verified and signed electronically.  Dear patient,  As a result of recent federal legislation (The Federal Cures Act), you may   receive lab or pathology results from your procedure in your [facility]   account before your physician is able to contact you. Your physician or   their representative will relay the results to you with their   recommendations at their soonest availability.  Thank you,  [employer]   Feeding Tube Placement

## 2022-09-27 NOTE — CONSULT NOTE ADULT - SUBJECTIVE AND OBJECTIVE BOX
CC: 87 y old  Female who presents with a chief complaint of left hip fracture (27 Sep 2022 12:51)      HPI:  86 yo F with a PMH of HLD,  Alzheimer's dementia, and OA who presents from home after a fall. Per history from her daughter Shira, pt has a hospital bed at home,  when the aid went to the bathroom last night, the patient (despite having railings) climbed to the edge of the bed and fell on her left side. She has her aide 24/7, normally can ambulate with assistance and eat with assistance. She has had progressive dementia over the past 2-3 years it has advanced.    In ED, pt was noted to have hip fracture, underwent Left hip hemiarthroplasty on 9/25.     Pt had an epiosde of RRT, possible aspiration, has been noted to be gargling on own secretions, was having swallowing difficulty and decreased gag reflex; speech and swallow eval done, puree diet, pt transferred to CCU for aspiration risk/airway  monitor      PAST MEDICAL & SURGICAL HISTORY:  Osteoarthritis of right hip  Glaucoma  Alzheimer Dementia  Hypercholesteremia  S/P appendectomy childhood      FAMILY HISTORY:  Family history of colon cancer (Father)    Social Hx: lives at home with 24/7 aide, Nonsmoker, no drug or alcohol use    MEDICATIONS  (STANDING):  acetaminophen     Tablet .. 975 milliGRAM(s) Oral every 8 hours  chlorhexidine 4% Liquid 1 Application(s) Topical <User Schedule>  cholecalciferol 1000 Unit(s) Oral daily  citalopram 20 milliGRAM(s) Oral daily  donepezil 10 milliGRAM(s) Oral at bedtime  enoxaparin Injectable 40 milliGRAM(s) SubCutaneous every 24 hours  latanoprost 0.005% Ophthalmic Solution 1 Drop(s) Both EYES at bedtime  memantine 10 milliGRAM(s) Oral two times a day  multivitamin 1 Tablet(s) Oral daily  piperacillin/tazobactam IVPB.. 3.375 Gram(s) IV Intermittent every 8 hours  polyethylene glycol 3350 17 Gram(s) Oral at bedtime  senna 2 Tablet(s) Oral at bedtime  simvastatin 20 milliGRAM(s) Oral at bedtime  sodium chloride 0.9%. 1000 milliLiter(s) (75 mL/Hr) IV Continuous <Continuous>  timolol 0.5% Solution 1 Drop(s) Both EYES daily  tranexamic acid IVPB 1000 milliGRAM(s) IV Intermittent once  tranexamic acid IVPB 1000 milliGRAM(s) IV Intermittent once       Allergies  No Known Allergies      ROS: Pertinent positives in HPI, all other ROS were reviewed and are negative.      Vital Signs Last 24 Hrs  T(C): 36.8 (27 Sep 2022 07:10), Max: 36.8 (27 Sep 2022 07:10)  T(F): 98.2 (27 Sep 2022 07:10), Max: 98.2 (27 Sep 2022 07:10)  HR: 77 (27 Sep 2022 05:00) (77 - 89)  BP: 106/49 (27 Sep 2022 05:00) (99/52 - 126/54)  BP(mean): 63 (27 Sep 2022 05:00) (56 - 74)  RR: 18 (27 Sep 2022 05:00) (18 - 25)  SpO2: 100% (27 Sep 2022 05:00) (96% - 100%)    Gen exam:  Normocephalic, frail looking, lethargic  HEENT: PERRLA, EOMI,   Neck: Supple.  Respiratory: Breath sounds are clear bilaterally  Cardiovascular: S1 and S2, regular  Extremities:  no edema  Vascular: Caritid Bruit - no  Musculoskeletal: no abnormal movements  Skin: No rashes    Neurological exam:  HF: Awake but inattentive, lethargic, responds to her name, answers yes/no, doesnot recognize her daughter   CN: RAINER, EOMI, no gross NLFD, tongue midline,   Motor: Moves both UE anigravity, LLE s/p surgery.    Sens/Reflexes/coord: unable   Gait/Balance: cannot test      Labs:   09-27    138  |  106  |  10  ----------------------------<  104<H>  3.8   |  28  |  0.77    Ca    8.8      27 Sep 2022 05:52  Phos  1.8     09-27  Mg     2.0     09-27    TPro  5.7<L>  /  Alb  2.5<L>  /  TBili  0.6  /  DBili  x   /  AST  39<H>  /  ALT  13  /  AlkPhos  81  09-26                        9.5    14.00 )-----------( 135      ( 27 Sep 2022 05:52 )             28.7       Radiology:  < from: CT Head No Cont (09.26.22 @ 19:17) >  IMPRESSION:  No acute intracranial hemorrhage or acute territorial infarct.  If   symptoms persist, follow-up MRI exam recommended.

## 2022-09-27 NOTE — PROGRESS NOTE ADULT - ASSESSMENT
87F POD2 L hip saqib    Plan:   NPO at this time  WBAT/PT/OT, abduction pillow  Pain management PRN  DVT prophylaxis: appreciate anticoagulation team recommendations.   CCU management appreciated  Dispo: pending PT recs  Will discuss with Dr. Dale, and will advise for any changes to the plan.

## 2022-09-27 NOTE — PROGRESS NOTE ADULT - ASSESSMENT
This is 86 yo F with a PMH of HLD, glaucoma, Alzheimer's dementia, and OA who presents after a fall found to have left hip fracture now s/p Left Hip Hemiarthroplasty done 0n 9/25/2022 by Dr. Dale consulted   for VTE prophylaxis, risk stratification, and anticoagulation management, Patient is high risk for vte due to age, immobility, surgery, and moderate risk for bleeding.    plan  :Continue Lovenox 40mg sq daily for four weeks post procedure last dose 10/24/2022  :daily cbc/bmp  :LE Venodynes  : increase mobility as tolerated  :will f/u  :dispo:rehab

## 2022-09-28 LAB
ALBUMIN SERPL ELPH-MCNC: 1.8 G/DL — LOW (ref 3.3–5)
ALP SERPL-CCNC: 148 U/L — HIGH (ref 40–120)
ALT FLD-CCNC: 15 U/L — SIGNIFICANT CHANGE UP (ref 12–78)
ANION GAP SERPL CALC-SCNC: 5 MMOL/L — SIGNIFICANT CHANGE UP (ref 5–17)
APTT BLD: 31.5 SEC — SIGNIFICANT CHANGE UP (ref 27.5–35.5)
AST SERPL-CCNC: 39 U/L — HIGH (ref 15–37)
BILIRUB SERPL-MCNC: 0.8 MG/DL — SIGNIFICANT CHANGE UP (ref 0.2–1.2)
BUN SERPL-MCNC: 9 MG/DL — SIGNIFICANT CHANGE UP (ref 7–23)
CALCIUM SERPL-MCNC: 8.5 MG/DL — SIGNIFICANT CHANGE UP (ref 8.5–10.1)
CHLORIDE SERPL-SCNC: 107 MMOL/L — SIGNIFICANT CHANGE UP (ref 96–108)
CO2 SERPL-SCNC: 29 MMOL/L — SIGNIFICANT CHANGE UP (ref 22–31)
CREAT SERPL-MCNC: 0.74 MG/DL — SIGNIFICANT CHANGE UP (ref 0.5–1.3)
EGFR: 79 ML/MIN/1.73M2 — SIGNIFICANT CHANGE UP
GLUCOSE SERPL-MCNC: 106 MG/DL — HIGH (ref 70–99)
HCT VFR BLD CALC: 26 % — LOW (ref 34.5–45)
HGB BLD-MCNC: 8.6 G/DL — LOW (ref 11.5–15.5)
INR BLD: 1.24 RATIO — HIGH (ref 0.88–1.16)
MAGNESIUM SERPL-MCNC: 2 MG/DL — SIGNIFICANT CHANGE UP (ref 1.6–2.6)
MCHC RBC-ENTMCNC: 33.1 GM/DL — SIGNIFICANT CHANGE UP (ref 32–36)
MCHC RBC-ENTMCNC: 33.7 PG — SIGNIFICANT CHANGE UP (ref 27–34)
MCV RBC AUTO: 102 FL — HIGH (ref 80–100)
PHOSPHATE SERPL-MCNC: 2.1 MG/DL — LOW (ref 2.5–4.5)
PLATELET # BLD AUTO: 156 K/UL — SIGNIFICANT CHANGE UP (ref 150–400)
POTASSIUM SERPL-MCNC: 3.2 MMOL/L — LOW (ref 3.5–5.3)
POTASSIUM SERPL-SCNC: 3.2 MMOL/L — LOW (ref 3.5–5.3)
PROT SERPL-MCNC: 5 GM/DL — LOW (ref 6–8.3)
PROTHROM AB SERPL-ACNC: 14.4 SEC — HIGH (ref 10.5–13.4)
RBC # BLD: 2.55 M/UL — LOW (ref 3.8–5.2)
RBC # FLD: 13.4 % — SIGNIFICANT CHANGE UP (ref 10.3–14.5)
SODIUM SERPL-SCNC: 141 MMOL/L — SIGNIFICANT CHANGE UP (ref 135–145)
WBC # BLD: 9.35 K/UL — SIGNIFICANT CHANGE UP (ref 3.8–10.5)
WBC # FLD AUTO: 9.35 K/UL — SIGNIFICANT CHANGE UP (ref 3.8–10.5)

## 2022-09-28 PROCEDURE — 99231 SBSQ HOSP IP/OBS SF/LOW 25: CPT

## 2022-09-28 RX ORDER — POTASSIUM CHLORIDE 20 MEQ
40 PACKET (EA) ORAL ONCE
Refills: 0 | Status: COMPLETED | OUTPATIENT
Start: 2022-09-28 | End: 2022-09-28

## 2022-09-28 RX ORDER — SODIUM,POTASSIUM PHOSPHATES 278-250MG
1 POWDER IN PACKET (EA) ORAL ONCE
Refills: 0 | Status: COMPLETED | OUTPATIENT
Start: 2022-09-28 | End: 2022-09-28

## 2022-09-28 RX ADMIN — CITALOPRAM 20 MILLIGRAM(S): 10 TABLET, FILM COATED ORAL at 10:21

## 2022-09-28 RX ADMIN — PIPERACILLIN AND TAZOBACTAM 25 GRAM(S): 4; .5 INJECTION, POWDER, LYOPHILIZED, FOR SOLUTION INTRAVENOUS at 06:00

## 2022-09-28 RX ADMIN — Medication 3 MILLIGRAM(S): at 21:42

## 2022-09-28 RX ADMIN — LATANOPROST 1 DROP(S): 0.05 SOLUTION/ DROPS OPHTHALMIC; TOPICAL at 22:30

## 2022-09-28 RX ADMIN — PIPERACILLIN AND TAZOBACTAM 25 GRAM(S): 4; .5 INJECTION, POWDER, LYOPHILIZED, FOR SOLUTION INTRAVENOUS at 14:17

## 2022-09-28 RX ADMIN — Medication 1 TABLET(S): at 10:20

## 2022-09-28 RX ADMIN — Medication 1 PACKET(S): at 10:19

## 2022-09-28 RX ADMIN — Medication 40 MILLIEQUIVALENT(S): at 10:20

## 2022-09-28 RX ADMIN — DONEPEZIL HYDROCHLORIDE 10 MILLIGRAM(S): 10 TABLET, FILM COATED ORAL at 21:42

## 2022-09-28 RX ADMIN — MEMANTINE HYDROCHLORIDE 10 MILLIGRAM(S): 10 TABLET ORAL at 21:42

## 2022-09-28 RX ADMIN — CHLORHEXIDINE GLUCONATE 1 APPLICATION(S): 213 SOLUTION TOPICAL at 14:18

## 2022-09-28 RX ADMIN — Medication 1 DROP(S): at 10:21

## 2022-09-28 RX ADMIN — SODIUM CHLORIDE 75 MILLILITER(S): 9 INJECTION INTRAMUSCULAR; INTRAVENOUS; SUBCUTANEOUS at 14:20

## 2022-09-28 RX ADMIN — MEMANTINE HYDROCHLORIDE 10 MILLIGRAM(S): 10 TABLET ORAL at 10:19

## 2022-09-28 RX ADMIN — Medication 1000 UNIT(S): at 10:20

## 2022-09-28 RX ADMIN — SIMVASTATIN 20 MILLIGRAM(S): 20 TABLET, FILM COATED ORAL at 21:42

## 2022-09-28 RX ADMIN — PIPERACILLIN AND TAZOBACTAM 25 GRAM(S): 4; .5 INJECTION, POWDER, LYOPHILIZED, FOR SOLUTION INTRAVENOUS at 21:43

## 2022-09-28 RX ADMIN — ENOXAPARIN SODIUM 40 MILLIGRAM(S): 100 INJECTION SUBCUTANEOUS at 06:00

## 2022-09-28 NOTE — PHYSICAL THERAPY INITIAL EVALUATION ADULT - MANUAL MUSCLE TESTING RESULTS, REHAB EVAL
BUE grossly 3/5, unable to test LEs 2/2 pain and deca bility to follow commands
Left LE reactive with a/aa/prom/grossly assessed due to

## 2022-09-28 NOTE — PROGRESS NOTE ADULT - SUBJECTIVE AND OBJECTIVE BOX
Patient is a 87y old  Female who presents with a chief complaint of left hip fracture (28 Sep 2022 12:22)      BRIEF HOSPITAL COURSE:88 yo F with a PMH of HLD, glaucoma, Alzheimer's dementia, and OA who presented to ED after a fall. The patient has a 24/7 air, and has a hospital bed at home, but when the aid went to the bathroom last night, the patient (despite having railings) climbed to the edge of the bed and fell on her left side. It is unclear if she hit her head. Reportedly, the patient has been more restless recently at night. No recent medication changes. No complaints from daughter. Her last fall was years ago, and she does not normally fall because of the precautions with her bed and having her aide 24/7. She normally can ambulate with assistance and eat with assistance.  She was admitted with hip fracture. Underwent Left hip saqib 9/25.     RRT called 2 nights ago after episode of hypoxia with concern for aspiration. Patient noted to be gargling on own secretions. Transferred to ICU for aspiration risk and airway monitoring        Events last 24 hours: More awake today and following commands. Currently on TFs via NGT. Secretions better       PAST MEDICAL & SURGICAL HISTORY:  Osteoarthritis of right hip      Glaucoma      Alzheimer disease      Hypercholesteremia      Dementia      S/P appendectomy  childhood      S/P tonsillectomy  childhood      S/P D&amp;C (status post dilation and curettage)  age 32      S/P colonoscopy  every 2 or 3 years          Review of Systems:  CONSTITUTIONAL: No fever, chills, or fatigue  EYES: No eye pain, visual disturbances, or discharge  ENMT:  No difficulty hearing, tinnitus, vertigo; No sinus or throat pain  NECK: No pain or stiffness  RESPIRATORY: No cough, wheezing, chills or hemoptysis; No shortness of breath  CARDIOVASCULAR: No chest pain, palpitations, dizziness, or leg swelling  GASTROINTESTINAL: No abdominal or epigastric pain. No nausea, vomiting, or hematemesis; No diarrhea or constipation. No melena or hematochezia.  GENITOURINARY: No dysuria, frequency, hematuria, or incontinence  NEUROLOGICAL: No headaches, memory loss, loss of strength, numbness, or tremors  SKIN: No itching, burning, rashes, or lesions   MUSCULOSKELETAL: No joint pain or swelling; No muscle, back, or extremity pain  PSYCHIATRIC: No depression, anxiety, mood swings, or difficulty sleeping      Medications:  piperacillin/tazobactam IVPB.. 3.375 Gram(s) IV Intermittent every 8 hours        acetaminophen     Tablet .. 650 milliGRAM(s) Oral every 6 hours PRN  acetaminophen  Suppository .. 650 milliGRAM(s) Rectal every 6 hours PRN  citalopram 20 milliGRAM(s) Oral daily  donepezil 10 milliGRAM(s) Oral at bedtime  melatonin 3 milliGRAM(s) Oral at bedtime  memantine 10 milliGRAM(s) Oral two times a day  ondansetron Injectable 4 milliGRAM(s) IV Push every 8 hours PRN  ondansetron Injectable 4 milliGRAM(s) IV Push every 6 hours PRN  traMADol 25 milliGRAM(s) Oral every 6 hours PRN  traMADol 50 milliGRAM(s) Oral every 4 hours PRN  traMADol 25 milliGRAM(s) Oral every 4 hours PRN      enoxaparin Injectable 40 milliGRAM(s) SubCutaneous every 24 hours  tranexamic acid IVPB 1000 milliGRAM(s) IV Intermittent once  tranexamic acid IVPB 1000 milliGRAM(s) IV Intermittent once    aluminum hydroxide/magnesium hydroxide/simethicone Suspension 30 milliLiter(s) Oral four times a day PRN  magnesium hydroxide Suspension 30 milliLiter(s) Oral daily PRN  polyethylene glycol 3350 17 Gram(s) Oral at bedtime  senna 2 Tablet(s) Oral at bedtime      simvastatin 20 milliGRAM(s) Oral at bedtime    cholecalciferol 1000 Unit(s) Oral daily  multivitamin 1 Tablet(s) Oral daily  sodium chloride 0.9%. 1000 milliLiter(s) IV Continuous <Continuous>      chlorhexidine 4% Liquid 1 Application(s) Topical <User Schedule>  latanoprost 0.005% Ophthalmic Solution 1 Drop(s) Both EYES at bedtime  timolol 0.5% Solution 1 Drop(s) Both EYES daily            ICU Vital Signs Last 24 Hrs  T(C): 36.4 (28 Sep 2022 09:00), Max: 36.5 (28 Sep 2022 05:00)  T(F): 97.6 (28 Sep 2022 09:00), Max: 97.7 (28 Sep 2022 05:00)  HR: 82 (28 Sep 2022 17:00) (54 - 82)  BP: 102/45 (28 Sep 2022 17:00) (85/39 - 141/67)  BP(mean): 58 (28 Sep 2022 17:00) (50 - 79)  ABP: --  ABP(mean): --  RR: 17 (28 Sep 2022 17:00) (12 - 24)  SpO2: 93% (28 Sep 2022 17:00) (92% - 100%)    O2 Parameters below as of 28 Sep 2022 12:00  Patient On (Oxygen Delivery Method): room air            ABG - ( 26 Sep 2022 19:44 )  pH, Arterial: 7.42  pH, Blood: x     /  pCO2: 41    /  pO2: 117   / HCO3: 27    / Base Excess: 1.9   /  SaO2: 100                 I&O's Detail    27 Sep 2022 07:01  -  28 Sep 2022 07:00  --------------------------------------------------------  IN:  Total IN: 0 mL    OUT:    Voided (mL): 100 mL  Total OUT: 100 mL    Total NET: -100 mL            LABS:                        8.6    9.35  )-----------( 156      ( 28 Sep 2022 06:31 )             26.0     09-28    141  |  107  |  9   ----------------------------<  106<H>  3.2<L>   |  29  |  0.74    Ca    8.5      28 Sep 2022 06:31  Phos  2.1     09-28  Mg     2.0     09-28    TPro  5.0<L>  /  Alb  1.8<L>  /  TBili  0.8  /  DBili  x   /  AST  39<H>  /  ALT  15  /  AlkPhos  148<H>  09-28          CAPILLARY BLOOD GLUCOSE      POCT Blood Glucose.: 117 mg/dL (26 Sep 2022 19:20)    PT/INR - ( 28 Sep 2022 06:31 )   PT: 14.4 sec;   INR: 1.24 ratio         PTT - ( 28 Sep 2022 06:31 )  PTT:31.5 sec    CULTURES:  Culture Results:   No growth to date. (09-26-22 @ 19:53)  Culture Results:   No growth to date. (09-26-22 @ 19:53)      Physical Examination:    General: No acute distress.  Alert, interactive, nonfocal, following simple commands and moving all extremities     HEENT: Pupils equal, reactive to light.  Symmetric.    PULM: Breathing comfortabley, good BS B/L with no Rales or Rhonchi, no significant sputum production    CVS: Regular rate and rhythm, no murmurs, rubs, or gallops    ABD: BS+ Soft, nondistended, nontender, no masses    EXT: No edema, nontender, Hip wound CLean and dry     SKIN: Warm and well perfused, no rashes noted.    RADIOLOGY: NA

## 2022-09-28 NOTE — PHYSICAL THERAPY INITIAL EVALUATION ADULT - CRITERIA FOR SKILLED THERAPEUTIC INTERVENTIONS
impairments found/functional limitations in following categories
impairments found/functional limitations in following categories/risk reduction/prevention

## 2022-09-28 NOTE — PHYSICAL THERAPY INITIAL EVALUATION ADULT - GAIT TRAINING, PT EVAL
GOAL: Pt will ambulate 50' with min AX1 with/without AD as needed in 4wks.
The pt will be able to ambulate more than 100 feet independently using the least restrictive assistive device by time of discharge from acute care PT program

## 2022-09-28 NOTE — PROGRESS NOTE ADULT - ASSESSMENT
86 yo F with a PMH of HLD, glaucoma, Alzheimer's dementia, and OA, Now S/P ORIF of the Hip POD #3. Had episode of aspiration and transferred to the ICU for airway management.      A/P   1) Respiratory distress related to aspiration. Now more awake and interactive. Swallow eval form 9/26 showed intact swallowing but with recommendations noted incluidng Puree diet, thickened liquids, and observation during eating  with upright positioning,   NOw with patient more interactive, would mobilize OOB and will trial PO intake over the next 24H when upright OOB. COntinue Scopalamine and frequent suctioning as needed. Would continue TFs DC IVF  2) S/P ORIF Hip. Continue activity level and PT as per Ortho. DVT prophylaxis   3) Anemia secondary to ORIF Hgb 8.6 with stable BP. will observe, no indication to transfuse at this time.

## 2022-09-28 NOTE — PHYSICAL THERAPY INITIAL EVALUATION ADULT - IMPAIRMENTS FOUND, PT EVAL
aerobic capacity/endurance/gait, locomotion, and balance/muscle strength
gait, locomotion, and balance/poor safety awareness

## 2022-09-28 NOTE — PHYSICAL THERAPY INITIAL EVALUATION ADULT - PLANNED THERAPY INTERVENTIONS, PT EVAL
bed mobility training/gait training/postural re-education/transfer training
balance training/bed mobility training/gait training/strengthening/transfer training

## 2022-09-28 NOTE — PHYSICAL THERAPY INITIAL EVALUATION ADULT - BED MOBILITY TRAINING, PT EVAL
The pt will perform all bed mobility activities independently by time of discharge from Acute Care PT program
GOAL: Pt will perform all bedmobility independently within 3-4 wks.

## 2022-09-28 NOTE — PROGRESS NOTE ADULT - SUBJECTIVE AND OBJECTIVE BOX
Patient seen and examined at bedside. Patient is talking and following commands this AM. However, speech poorly organized AOx0.     LABS:                        8.6    9.35  )-----------( 156      ( 28 Sep 2022 06:31 )             26.0     09-28    141  |  107  |  9   ----------------------------<  106<H>  3.2<L>   |  29  |  0.74    Ca    8.5      28 Sep 2022 06:31  Phos  2.1     09-28  Mg     2.0     09-28    TPro  5.0<L>  /  Alb  1.8<L>  /  TBili  0.8  /  DBili  x   /  AST  39<H>  /  ALT  15  /  AlkPhos  148<H>  09-28    PT/INR - ( 28 Sep 2022 06:31 )   PT: 14.4 sec;   INR: 1.24 ratio         PTT - ( 28 Sep 2022 06:31 )  PTT:31.5 sec      VITAL SIGNS:  T(C): 36.5 (09-28-22 @ 05:00), Max: 36.5 (09-28-22 @ 05:00)  HR: 59 (09-28-22 @ 06:00) (54 - 76)  BP: 101/41 (09-28-22 @ 06:00) (97/38 - 141/67)  RR: 15 (09-28-22 @ 06:00) (15 - 23)  SpO2: 100% (09-28-22 @ 06:00) (100% - 100%)  Gen: resting in bed, awake    LLE:  AOx0    Dressing in place, c/d/i.  Maris-incisional TTP; otherwise, NTTP throughout the rest of the extremity.   SILT L2-S1  + GSC/TA/EHL/FHL intact   Extremity warm and well perfused  No calf tenderness bilaterally.  Compartments soft and compressible.     87F POD3 L hip saqib    Plan:   NPO at this time, NGT in place   WBAT/PT/OT, abduction pillow  Pain management PRN  DVT prophylaxis: appreciate anticoagulation team recommendations.   CCU management appreciated  Dispo: pending PT recs  Will discuss with Dr. Dale, and will advise for any changes to the plan.

## 2022-09-28 NOTE — PHYSICAL THERAPY INITIAL EVALUATION ADULT - PERTINENT HX OF CURRENT PROBLEM, REHAB EVAL
88 yo F with a PMH of HLD, glaucoma, Alzheimer's dementia, and OA who presents after a fall. The patient cannot provide any history, so it was obtained from her daughter Shira at bedside. The patient has a 24/7 air, and has a hospital bed at home, but when the aid went to the bathroom last night, the patient (despite having railings) climbed to the edge of the bed and fell on her left side. It is unclear if she hit her head. Pt with Lt hip fx, s/p Ryan hip arthroplasty.     Pt ugraded to CCU 2/2 possible aspiration. Pt no NPO s/p NGT. 88 yo F with a PMH of HLD, glaucoma, Alzheimer's dementia, and OA who presents after a fall. The patient cannot provide any history, so it was obtained from her daughter Shira at bedside. The patient has a 24/7 air, and has a hospital bed at home, but when the aid went to the bathroom last night, the patient (despite having railings) climbed to the edge of the bed and fell on her left side. It is unclear if she hit her head. Pt with Lt hip fx, s/p Ryan hip arthroplasty.     Pt ungraded to CCU 2/2 possible aspiration. Pt no NPO s/p NGT.

## 2022-09-28 NOTE — PHYSICAL THERAPY INITIAL EVALUATION ADULT - GENERAL OBSERVATIONS, REHAB EVAL
The pt was received on 2N, supine, confused, speaking softly. The pt had 1 IV, 1 abduction pillow, bilateral SCDs in place.
Pt seen for 45min PT RE-Eval. Pt rec'd semi supine in bed in NAD, confused, +ABD pillow, +NGT, +IV. Pt requires mod-max Ax1. pt trans sit<>stand with max Ax2 at RW. upon second stand pt noted to have BM, Pt unable to take steps at this time. Pt returned semi supine all needs met, RN aware

## 2022-09-28 NOTE — PHYSICAL THERAPY INITIAL EVALUATION ADULT - PATIENT/FAMILY/SIGNIFICANT OTHER GOALS STATEMENT, PT EVAL
Pt's family is hoping for the patient to have a safe discharge to [facility] once medically stable.
Pt's family is hoping for the patient to have a safe discharge to [facility] once medically stable.

## 2022-09-28 NOTE — PROGRESS NOTE ADULT - SUBJECTIVE AND OBJECTIVE BOX
HPI:  88 yo F with a PMH of HLD, glaucoma, Alzheimer's dementia, and OA who presents after a fall. The patient cannot provide any history, so it was obtained from her daughter Shira at bedside. The patient has a 24/7 air, and has a hospital bed at home, but when the aid went to the bathroom last night, the patient (despite having railings) climbed to the edge of the bed and fell on her left side. It is unclear if she hit her head. Reportedly, the patient has been more restless recently at night. No recent medication changes. No complaints from daughter. Her last fall was years ago, and she does not normally fall because of the precautions with her bed and having her aide 24/7. She normally can ambulate with assistance and eat with assistance.      In the ED, she was given Morphine 2 mg IV x1 and started on LR @ 75 ml/hr. (24 Sep 2022 12:57)      Patient is a 87y old  Female who presents with a chief complaint of left hip fracture (26 Sep 2022 10:34)      Consulted by Dr. Dale   for VTE prophylaxis, risk stratification, and anticoagulation management.    PAST MEDICAL & SURGICAL HISTORY:  Osteoarthritis of right hip      Glaucoma      Alzheimer disease      Hypercholesteremia      Dementia      S/P appendectomy  childhood      S/P tonsillectomy  childhood      S/P D&amp;C (status post dilation and curettage)  age 32      S/P colonoscopy  every 2 or 3 years    Interval History  2022:patient seen at bedside awake , able to tell her name  patient was having swallowing difficulty Speech and swallow eval done, puree diet,   H&H stable will continue Lovenox 40mg daily for DVT prophylaxis   2022:patient seen at bedside on CCU, upgraded to  CCU low O2 sat and potential aspiration risk, patient is on NC now no concerns regarding AC will continue Lovenox for DVT prophylaxis   2022 Pt seen at bedside in CCU.  Pt non verbal,, physical therapy with pt.           CrCl:31.3  BMI:18.3  EBL:150ml    Caprini VTE Risk Score:CAPRINI SCORE  AGE RELATED RISK FACTORS                                                       MOBILITY RELATED FACTORS  [ ] Age 41-60 years                                            (1 Point)                  [ ] Bed rest /restricted mobility                             (1 Point)  [ ] Age: 61-74 years                                           (2 Points)                [ ] Plaster cast                                                   (2 Points)  [x ] Age= 75 years                                              (3 Points)                 [ ] Bed bound for more than 72 hours                   (2 Points)    DISEASE RELATED RISK FACTORS                                               GENDER SPECIFIC FACTORS  [ ] Edema in the lower extremities                       (1 Point)           [ ] Pregnancy                                                            (1 Point)  [ ] Varicose veins                                               (1 Point)                  [ ] Post-partum < 6 weeks                                      (1 Point)             [ ] BMI > 25 Kg/m2                                            (1 Point)                  [ ] Hormonal therapy or oral contraception       (1 Point)                 [ ] Sepsis (in the previous month)                        (1 Point)             [ ] History of pregnancy complications                (1Point)  [ ] Pneumonia or serious lung disease                                             [ ] Unexplained or recurrent  (=/>3), premature                                 (In the previous month)                               (1 Point)                birth with toxemia or growth-restricted infant (1 Point)  [ ] Abnormal pulmonary function test            (1 Point)                                   SURGERY RELATED RISK FACTORS  [ ] Acute myocardial infarction                       (1 Point)                  [ ]  Section                                         (1 Point)  [ ] Congestive heart failure (in the previous month) (1 Point)   [ ] Minor surgery   lasting <45 minutes       (1 Point)   [ ] Inflammatory bowel disease                             (1 Point)          [ ] Arthroscopic surgery                                  (2 Points)  [ ] Central venous access                                    (2 Points)            [ ] General surgery lasting >45 minutes      (2 Points)       [ ] Stroke (in the previous month)                  (5 Points)            [ ] Elective major lower extremity arthroplasty (5 Points)                                   [  ] Malignancy (present or past include skin melanoma                                          but exclude  basal skin cell)    (2 points)                                      TRAUMA RELATED RISK FACTORS                HEMATOLOGY RELATED FACTORS                                  [ x] Fracture of the hip, pelvis, or leg                       (5 Points)  [ ] Prior episodes of VTE                                     (3 Points)          [ ] Acute spinal cord injury (in the previous month)  (5 Points)  [ ] Positive family history for VTE                         (3 Points)       [ ] Paralysis (less than 1 month)                          (5 Points)  [ ] Prothrombin 35877 A                                      (3 Points)         [ ] Multiple Trauma (within 1month)                 (5Points)                                                                                                                                                                [ ] Factor V Leiden                                          (3 Points)                                OTHER RISK FACTORS                          [ ] Lupus anticoagulants                                     (3 Points)                       [ ] BMI > 40                          (1 Point)                                                         [ ] Anticardiolipin antibodies                                (3 Points)                   [ ] Smoking                              (1Point)                                                [ ] High homocysteine in the blood                      (3 Points)                [  ] Diabetes requiring insulin (1point)                         [ ] Other congenital or acquired thrombophilia       (3 Points)          [  ] Chemotherapy                   (1 Point)  [ ] Heparin induced thrombocytopenia                  (3 Points)             [  ] Blood Transfusion                (1 point)                                                                                                             Total Score [    8      ]                                                                                                                                                                                                                                                                                                                                                                                                                                         IMPROVE Bleeding Risk Score:4.5      Falls Risk:   High ( x )  Mod (  )  Low (  )      FAMILY HISTORY:  Family history of colon cancer (Father)      Denies any personal or familial history of clotting or bleeding disorders.    Allergies    No Known Allergies    Intolerances        REVIEW OF SYSTEMS    (  )Fever	     (  )Constipation	(  )SOB				(  )Headache	(  )Dysuria  (  )Chills	     (  )Melena	(  )Dyspnea present on exertion	                    (  )Dizziness                    (  )Polyuria  (  )Nausea	     (  )Hematochezia	(  )Cough			                    (  )Syncope   	(  )Hematuria  (  )Vomiting    (  )Chest Pain	(  )Wheezing			(  )Weakness  (  )Diarrhea     (  )Palpitations	(  )Anorexia			(  )joint pain    unable to obtain: dementia    Vital Signs Last 24 Hrs  T(C): 36.4 (22 @ 09:00), Max: 36.5 (22 @ 05:00)  T(F): 97.6 (22 @ 09:00), Max: 97.7 (22 @ 05:00)  HR: 58 (22 @ 11:00) (54 - 76)  BP: 110/55 (22 @ 11:00) (85/39 - 141/67)  BP(mean): 61 (22 @ 11:00) (50 - 79)  RR: 12 (22 @ 11:00) (12 - 23)  SpO2: 95% (22 @ 11:00) (92% - 100%)  PHYSICAL EXAM:    Constitutional: Appears Well    Neurological: Awake confused     Skin: Warm    Respiratory and Thorax: normal effort; Breath sounds: normal; No rales/wheezing/rhonchi  	  Cardiovascular: S1, S2, regular, NMBR	    Gastrointestinal: BS + x 4Q, nontender	    Genitourinary:  Bladder nondistended, nontender    Musculoskeletal:   General Right:   no muscle/joint tenderness,   normal tone, no joint swelling,   ROM: limited	    General Left:   no muscle/joint tenderness,   normal tone, notes swelling to left arm  ROM: limited    Hip: Left: Dressing CDI;         Lower extrems:   Right: no calf tenderness              negative chad's sign               + pedal pulses    Left:   no calf tenderness              negative chad's sign               + pedal pulses                                 8.6    9.35  )-----------( 156      ( 28 Sep 2022 06:31 )             26.0       09-28    141  |  107  |  9   ----------------------------<  106<H>  3.2<L>   |  29  |  0.74    Ca    8.5      28 Sep 2022 06:31  Phos  2.1     09-28  Mg     2.0     09-28    TPro  5.0<L>  /  Alb  1.8<L>  /  TBili  0.8  /  DBili  x   /  AST  39<H>  /  ALT  15  /  AlkPhos  148<H>  09-28      PT/INR - ( 28 Sep 2022 06:31 )   PT: 14.4 sec;   INR: 1.24 ratio         PTT - ( 28 Sep 2022 06:31 )  PTT:31.5 sec             9.5    14.00 )-----------( 135      ( 27 Sep 2022 05:52 )             28.7       0927    138  |  106  |  10  ----------------------------<  104<H>  3.8   |  28  |  0.77    Ca    8.8      27 Sep 2022 05:52  Phos  1.8     09-27  Mg     2.0     0927    TPro  5.7<L>  /  Alb  2.5<L>  /  TBili  0.6  /  DBili  x   /  AST  39<H>  /  ALT  13  /  AlkPhos  81  09-26      PT/INR - ( 27 Sep 2022 05:52 )   PT: 15.0 sec;   INR: 1.29 ratio         PTT - ( 27 Sep 2022 05:52 )  PTT:32.6 sec                        10.1   12.99 )-----------( 142      ( 26 Sep 2022 09:16 )             29.3       09-26    140  |  108  |  10  ----------------------------<  107<H>  3.9   |  26  |  0.90    Ca    8.7      26 Sep 2022 09:16        PT/INR - ( 25 Sep 2022 05:08 )   PT: 14.0 sec;   INR: 1.20 ratio         PTT - ( 25 Sep 2022 05:08 )  PTT:22.3 sec				    MEDICATIONS  (STANDING):  chlorhexidine 4% Liquid 1 Application(s) Topical <User Schedule>  cholecalciferol 1000 Unit(s) Oral daily  citalopram 20 milliGRAM(s) Oral daily  donepezil 10 milliGRAM(s) Oral at bedtime  enoxaparin Injectable 40 milliGRAM(s) SubCutaneous every 24 hours  latanoprost 0.005% Ophthalmic Solution 1 Drop(s) Both EYES at bedtime  melatonin 3 milliGRAM(s) Oral at bedtime  memantine 10 milliGRAM(s) Oral two times a day  multivitamin 1 Tablet(s) Oral daily  piperacillin/tazobactam IVPB.. 3.375 Gram(s) IV Intermittent every 8 hours  polyethylene glycol 3350 17 Gram(s) Oral at bedtime  senna 2 Tablet(s) Oral at bedtime  simvastatin 20 milliGRAM(s) Oral at bedtime  sodium chloride 0.9%. 1000 milliLiter(s) IV Continuous <Continuous>  timolol 0.5% Solution 1 Drop(s) Both EYES daily  tranexamic acid IVPB 1000 milliGRAM(s) IV Intermittent once  tranexamic acid IVPB 1000 milliGRAM(s) IV Intermittent once        < from: CT Head No Cont (22 @ 07:09) >  MPRESSION: No acute intracranial hemorrhage, mass effect, or shift of   the midline structures.    Similar-appearing mild chronic white matter microvascular type changes.    < end of copied text >      DVT Prophylaxis:  LMWH                   ( x )  Heparin SQ           (  )  Coumadin             (  )  Xarelto                  (  )  Eliquis                   (  )  Venodynes           (x  )  Ambulation          ( x )  UFH                       (  )  Contraindicated  (  )  EC ASPIRIN       (  )

## 2022-09-29 LAB
ANION GAP SERPL CALC-SCNC: 6 MMOL/L — SIGNIFICANT CHANGE UP (ref 5–17)
BUN SERPL-MCNC: 10 MG/DL — SIGNIFICANT CHANGE UP (ref 7–23)
CALCIUM SERPL-MCNC: 8.8 MG/DL — SIGNIFICANT CHANGE UP (ref 8.5–10.1)
CHLORIDE SERPL-SCNC: 111 MMOL/L — HIGH (ref 96–108)
CO2 SERPL-SCNC: 26 MMOL/L — SIGNIFICANT CHANGE UP (ref 22–31)
CREAT SERPL-MCNC: 0.67 MG/DL — SIGNIFICANT CHANGE UP (ref 0.5–1.3)
EGFR: 86 ML/MIN/1.73M2 — SIGNIFICANT CHANGE UP
GLUCOSE SERPL-MCNC: 142 MG/DL — HIGH (ref 70–99)
HCT VFR BLD CALC: 26.4 % — LOW (ref 34.5–45)
HGB BLD-MCNC: 9.1 G/DL — LOW (ref 11.5–15.5)
MCHC RBC-ENTMCNC: 34.5 GM/DL — SIGNIFICANT CHANGE UP (ref 32–36)
MCHC RBC-ENTMCNC: 34.5 PG — HIGH (ref 27–34)
MCV RBC AUTO: 100 FL — SIGNIFICANT CHANGE UP (ref 80–100)
PLATELET # BLD AUTO: 202 K/UL — SIGNIFICANT CHANGE UP (ref 150–400)
POTASSIUM SERPL-MCNC: 3.5 MMOL/L — SIGNIFICANT CHANGE UP (ref 3.5–5.3)
POTASSIUM SERPL-SCNC: 3.5 MMOL/L — SIGNIFICANT CHANGE UP (ref 3.5–5.3)
RBC # BLD: 2.64 M/UL — LOW (ref 3.8–5.2)
RBC # FLD: 13.6 % — SIGNIFICANT CHANGE UP (ref 10.3–14.5)
SODIUM SERPL-SCNC: 143 MMOL/L — SIGNIFICANT CHANGE UP (ref 135–145)
WBC # BLD: 11.08 K/UL — HIGH (ref 3.8–10.5)
WBC # FLD AUTO: 11.08 K/UL — HIGH (ref 3.8–10.5)

## 2022-09-29 PROCEDURE — 99233 SBSQ HOSP IP/OBS HIGH 50: CPT

## 2022-09-29 RX ADMIN — ENOXAPARIN SODIUM 40 MILLIGRAM(S): 100 INJECTION SUBCUTANEOUS at 05:06

## 2022-09-29 RX ADMIN — LATANOPROST 1 DROP(S): 0.05 SOLUTION/ DROPS OPHTHALMIC; TOPICAL at 21:54

## 2022-09-29 RX ADMIN — Medication 1 TABLET(S): at 10:50

## 2022-09-29 RX ADMIN — MEMANTINE HYDROCHLORIDE 10 MILLIGRAM(S): 10 TABLET ORAL at 10:49

## 2022-09-29 RX ADMIN — SIMVASTATIN 20 MILLIGRAM(S): 20 TABLET, FILM COATED ORAL at 21:54

## 2022-09-29 RX ADMIN — MEMANTINE HYDROCHLORIDE 10 MILLIGRAM(S): 10 TABLET ORAL at 21:53

## 2022-09-29 RX ADMIN — DONEPEZIL HYDROCHLORIDE 10 MILLIGRAM(S): 10 TABLET, FILM COATED ORAL at 21:54

## 2022-09-29 RX ADMIN — Medication 1 DROP(S): at 10:54

## 2022-09-29 RX ADMIN — PIPERACILLIN AND TAZOBACTAM 25 GRAM(S): 4; .5 INJECTION, POWDER, LYOPHILIZED, FOR SOLUTION INTRAVENOUS at 21:53

## 2022-09-29 RX ADMIN — Medication 3 MILLIGRAM(S): at 21:54

## 2022-09-29 RX ADMIN — TRAMADOL HYDROCHLORIDE 50 MILLIGRAM(S): 50 TABLET ORAL at 21:53

## 2022-09-29 RX ADMIN — PIPERACILLIN AND TAZOBACTAM 25 GRAM(S): 4; .5 INJECTION, POWDER, LYOPHILIZED, FOR SOLUTION INTRAVENOUS at 15:12

## 2022-09-29 RX ADMIN — CITALOPRAM 20 MILLIGRAM(S): 10 TABLET, FILM COATED ORAL at 10:50

## 2022-09-29 RX ADMIN — PIPERACILLIN AND TAZOBACTAM 25 GRAM(S): 4; .5 INJECTION, POWDER, LYOPHILIZED, FOR SOLUTION INTRAVENOUS at 05:06

## 2022-09-29 RX ADMIN — CHLORHEXIDINE GLUCONATE 1 APPLICATION(S): 213 SOLUTION TOPICAL at 10:49

## 2022-09-29 RX ADMIN — Medication 1000 UNIT(S): at 10:50

## 2022-09-29 NOTE — DIETITIAN INITIAL EVALUATION ADULT - NSFNSPHYEXAMSKINFT_GEN_A_CORE
Pressure Injury 1: Right:,heel, Stage I  Pressure Injury 2: Left:,heel, Stage I  Pressure Injury 3: sacrum, Stage I  Mando scale- 10

## 2022-09-29 NOTE — DIETITIAN INITIAL EVALUATION ADULT - OTHER INFO
86 yo F with a PMH of HLD, glaucoma, Alzheimer's dementia, and OA who presented to ED after a fall. The patient has a 24/7 air, and has a hospital bed at home, but when the aid went to the bathroom last night, the patient (despite having railings) climbed to the edge of the bed and fell on her left side. It is unclear if she hit her head. Reportedly, the patient has been more restless recently at night. No recent medication changes. No complaints from daughter. Her last fall was years ago, and she does not normally fall because of the precautions with her bed and having her aide 24/7. She normally can ambulate with assistance and eat with assistance. She was admitted with hip fracture. Underwent Left hip saqib 9/25. RRT called 2 nights ago after episode of hypoxia with concern for aspiration. Patient noted to be gargling on own secretions. Transferred to CCU for aspiration risk and airway monitoring.   S/P SLP evaluation with recommendations to remain NPO at this time. CORPAK placed and enteral feeds initiated by Critical care PA. Will review order and change prn. NFPE-severe muscle/fat wasting. See below for recommendations. Criteria met for severe malnutrition.

## 2022-09-29 NOTE — DIETITIAN NUTRITION RISK NOTIFICATION - ADDITIONAL COMMENTS/DIETITIAN RECOMMENDATIONS
Additional Recommendations  1) change enteral feeds to recommended regimen 2) Monitor weights and track trends 3) Maintain aspiration precautions, back of bed >45 degrees. 4) Monitor lytes and hydration replete prn 5) MVI w/ minerals daily to meet RDI's and Consider adding thiamine 100 mg daily 2/2 poor PO intake/ malnutrition. **Will continue to monitor and follow up prn**

## 2022-09-29 NOTE — DIETITIAN INITIAL EVALUATION ADULT - ADD RECOMMEND
1) change enteral feeds to recommended regimen 2) Monitor weights and track trends 3) Maintain aspiration precautions, back of bed >45 degrees. 4) Monitor lytes and hydration replete prn 5) MVI w/ minerals daily to meet RDI's and Consider adding thiamine 100 mg daily 2/2 poor PO intake/ malnutrition. **Will continue to monitor and follow up prn**

## 2022-09-29 NOTE — DIETITIAN INITIAL EVALUATION ADULT - PERTINENT MEDS FT
MEDICATIONS  (STANDING):  chlorhexidine 4% Liquid 1 Application(s) Topical <User Schedule>  cholecalciferol 1000 Unit(s) Oral daily  citalopram 20 milliGRAM(s) Oral daily  donepezil 10 milliGRAM(s) Oral at bedtime  enoxaparin Injectable 40 milliGRAM(s) SubCutaneous every 24 hours  latanoprost 0.005% Ophthalmic Solution 1 Drop(s) Both EYES at bedtime  melatonin 3 milliGRAM(s) Oral at bedtime  memantine 10 milliGRAM(s) Oral two times a day  multivitamin 1 Tablet(s) Oral daily  piperacillin/tazobactam IVPB.. 3.375 Gram(s) IV Intermittent every 8 hours  polyethylene glycol 3350 17 Gram(s) Oral at bedtime  senna 2 Tablet(s) Oral at bedtime  simvastatin 20 milliGRAM(s) Oral at bedtime  timolol 0.5% Solution 1 Drop(s) Both EYES daily  tranexamic acid IVPB 1000 milliGRAM(s) IV Intermittent once  tranexamic acid IVPB 1000 milliGRAM(s) IV Intermittent once    MEDICATIONS  (PRN):  acetaminophen     Tablet .. 650 milliGRAM(s) Oral every 6 hours PRN Mild Pain (1 - 3)  acetaminophen  Suppository .. 650 milliGRAM(s) Rectal every 6 hours PRN Temp greater or equal to 38C (100.4F)  aluminum hydroxide/magnesium hydroxide/simethicone Suspension 30 milliLiter(s) Oral four times a day PRN Indigestion  magnesium hydroxide Suspension 30 milliLiter(s) Oral daily PRN Constipation  ondansetron Injectable 4 milliGRAM(s) IV Push every 6 hours PRN Nausea and/or Vomiting  ondansetron Injectable 4 milliGRAM(s) IV Push every 8 hours PRN Nausea and/or Vomiting  traMADol 25 milliGRAM(s) Oral every 6 hours PRN Moderate Pain (4 - 6)  traMADol 25 milliGRAM(s) Oral every 4 hours PRN Mild Pain (1 - 3)  traMADol 50 milliGRAM(s) Oral every 4 hours PRN Moderate Pain (4 - 6)

## 2022-09-29 NOTE — DIETITIAN INITIAL EVALUATION ADULT - PERTINENT LABORATORY DATA
09-29    143  |  111<H>  |  10  ----------------------------<  142<H>  3.5   |  26  |  0.67    Ca    8.8      29 Sep 2022 05:36  Phos  2.1     09-28  Mg     2.0     09-28    TPro  5.0<L>  /  Alb  1.8<L>  /  TBili  0.8  /  DBili  x   /  AST  39<H>  /  ALT  15  /  AlkPhos  148<H>  09-28

## 2022-09-29 NOTE — DIETITIAN INITIAL EVALUATION ADULT - ENTERAL
Change enteral feeds to Jevity 1.5 @ 45 ml/hr (total volume 900ml) + 3 packs of Prosource TF daily (total provided: 1470 calories/ 90gm protein/ 700ml free water in formula) continuous water flush @ 35cc/hr (total volume 700ml). Total daily volume including free water in formula- 1400 ml.

## 2022-09-29 NOTE — PROGRESS NOTE ADULT - ASSESSMENT
88 yo F with a PMH of HLD, glaucoma, Alzheimer's dementia, and OA, Now S/P ORIF of the Hip POD #4. Had episode of aspiration and transferred to the ICU for aspiration PNA and acute hypoxic respiratory failure       Plan:  CCU    PULM: On Room air, continue Zosyn to 5 days    Cardio: Hemodynamics reasonable     Renal: Fx Stable    GI: Failed swallow eval today again, continue daily assessment, continue tube feeds    Neuro: Continue meds for dementia    Lovenox DVT Prophylaxis    D/W the patients family                1) Respiratory distress related to aspiration.    NOw with patient more interactive, would mobilize OOB and will trial PO intake over the next 24H when upright OOB. COntinue Scopalamine and frequent suctioning as needed. Would continue TFs DC IVF  2) S/P ORIF Hip. Continue activity level and PT as per Ortho. DVT prophylaxis   3) Anemia secondary to ORIF Hgb 8.6 with stable BP. will observe, no indication to transfuse at this time.

## 2022-09-29 NOTE — PROGRESS NOTE ADULT - SUBJECTIVE AND OBJECTIVE BOX
Patient is a 87y old  Female who presents with a chief complaint of left hip fracture      BRIEF HOSPITAL COURSE:88 yo F with a PMH of HLD, glaucoma, Alzheimer's dementia, and OA who presented to ED after a fall. The patient has a 24/7 air, and has a hospital bed at home, but when the aid went to the bathroom last night, the patient (despite having railings) climbed to the edge of the bed and fell on her left side. It is unclear if she hit her head. Reportedly, the patient has been more restless recently at night. No recent medication changes. No complaints from daughter. Her last fall was years ago, and she does not normally fall because of the precautions with her bed and having her aide 24/7. She normally can ambulate with assistance and eat with assistance.  She was admitted with hip fracture. Underwent Left hip saqib 9/25.     RRT called 2 after episode of hypoxia with concern for aspiration. Patient noted to be gargling on own secretions. Transferred to ICU for aspiration risk and airway monitoring      9/29: No events over night, tolerating feeds, confused, and failed her swallow eval today           PAST MEDICAL & SURGICAL HISTORY:  Osteoarthritis of right hip      Glaucoma      Alzheimer disease      Hypercholesteremia      Dementia      S/P appendectomy  childhood      S/P tonsillectomy  childhood      S/P D&amp;C (status post dilation and curettage)  age 32      S/P colonoscopy  every 2 or 3 years          FAMILY HISTORY:  Family history of colon cancer (Father)        Social Hx:    Allergies    No Known Allergies    Intolerances            ICU Vital Signs Last 24 Hrs  T(C): 37 (29 Sep 2022 09:10), Max: 37 (29 Sep 2022 09:10)  T(F): 98.6 (29 Sep 2022 09:10), Max: 98.6 (29 Sep 2022 09:10)  HR: 93 (29 Sep 2022 10:00) (64 - 93)  BP: 136/66 (29 Sep 2022 10:00) (92/41 - 141/62)  BP(mean): 83 (29 Sep 2022 10:00) (51 - 83)  ABP: --  ABP(mean): --  RR: 13 (29 Sep 2022 10:00) (13 - 25)  SpO2: 94% (29 Sep 2022 10:00) (93% - 100%)            I&O's Summary    28 Sep 2022 07:01  -  29 Sep 2022 07:00  --------------------------------------------------------  IN: 400 mL / OUT: 150 mL / NET: 250 mL                              9.1    11.08 )-----------( 202      ( 29 Sep 2022 05:36 )             26.4       09-29    143  |  111<H>  |  10  ----------------------------<  142<H>  3.5   |  26  |  0.67    Ca    8.8      29 Sep 2022 05:36  Phos  2.1     09-28  Mg     2.0     09-28    TPro  5.0<L>  /  Alb  1.8<L>  /  TBili  0.8  /  DBili  x   /  AST  39<H>  /  ALT  15  /  AlkPhos  148<H>  09-28                    MEDICATIONS  (STANDING):  chlorhexidine 4% Liquid 1 Application(s) Topical <User Schedule>  cholecalciferol 1000 Unit(s) Oral daily  citalopram 20 milliGRAM(s) Oral daily  donepezil 10 milliGRAM(s) Oral at bedtime  enoxaparin Injectable 40 milliGRAM(s) SubCutaneous every 24 hours  latanoprost 0.005% Ophthalmic Solution 1 Drop(s) Both EYES at bedtime  melatonin 3 milliGRAM(s) Oral at bedtime  memantine 10 milliGRAM(s) Oral two times a day  multivitamin 1 Tablet(s) Oral daily  piperacillin/tazobactam IVPB.. 3.375 Gram(s) IV Intermittent every 8 hours  polyethylene glycol 3350 17 Gram(s) Oral at bedtime  senna 2 Tablet(s) Oral at bedtime  simvastatin 20 milliGRAM(s) Oral at bedtime  timolol 0.5% Solution 1 Drop(s) Both EYES daily  tranexamic acid IVPB 1000 milliGRAM(s) IV Intermittent once  tranexamic acid IVPB 1000 milliGRAM(s) IV Intermittent once    MEDICATIONS  (PRN):  acetaminophen     Tablet .. 650 milliGRAM(s) Oral every 6 hours PRN Mild Pain (1 - 3)  acetaminophen  Suppository .. 650 milliGRAM(s) Rectal every 6 hours PRN Temp greater or equal to 38C (100.4F)  aluminum hydroxide/magnesium hydroxide/simethicone Suspension 30 milliLiter(s) Oral four times a day PRN Indigestion  magnesium hydroxide Suspension 30 milliLiter(s) Oral daily PRN Constipation  ondansetron Injectable 4 milliGRAM(s) IV Push every 6 hours PRN Nausea and/or Vomiting  ondansetron Injectable 4 milliGRAM(s) IV Push every 8 hours PRN Nausea and/or Vomiting  traMADol 25 milliGRAM(s) Oral every 6 hours PRN Moderate Pain (4 - 6)  traMADol 25 milliGRAM(s) Oral every 4 hours PRN Mild Pain (1 - 3)  traMADol 50 milliGRAM(s) Oral every 4 hours PRN Moderate Pain (4 - 6)      DVT Prophylaxis: Lovenox    Advanced Directives:  Discussed with:    Visit Information:    ** Time is exclusive of billed procedures and/or teaching and/or routine family updates.

## 2022-09-29 NOTE — PROGRESS NOTE ADULT - SUBJECTIVE AND OBJECTIVE BOX
Patient seen and examined at bedside. Patient is talking and following commands this AM. However, speech poorly organized AOx0.     Vital Signs Last 24 Hrs  T(C): 36.6 (29 Sep 2022 00:10), Max: 36.9 (28 Sep 2022 18:00)  T(F): 97.8 (29 Sep 2022 00:10), Max: 98.4 (28 Sep 2022 18:00)  HR: 76 (29 Sep 2022 04:00) (58 - 88)  BP: 107/50 (29 Sep 2022 04:00) (85/39 - 119/43)  BP(mean): 60 (29 Sep 2022 04:00) (50 - 80)  RR: 25 (29 Sep 2022 04:00) (12 - 25)  SpO2: 98% (29 Sep 2022 04:00) (92% - 100%)    Parameters below as of 28 Sep 2022 12:00  Patient On (Oxygen Delivery Method): room air      LLE:  AOx0    Dressing in place, c/d/i.  Maris-incisional TTP; otherwise, NTTP throughout the rest of the extremity.   SILT L2-S1  + GSC/TA/EHL/FHL intact   Extremity warm and well perfused  No calf tenderness bilaterally.  Compartments soft and compressible.     87F POD4 L hip saqib    Plan:   NPO at this time, NGT in place   WBAT/PT/OT, abduction pillow  Pain management PRN  DVT prophylaxis: appreciate anticoagulation team recommendations.   CCU management appreciated  Dispo: [facility]  Ortho Stable

## 2022-09-29 NOTE — DIETITIAN NUTRITION RISK NOTIFICATION - TREATMENT: THE FOLLOWING DIET HAS BEEN RECOMMENDED
Diet, NPO with Tube Feed:   Tube Feeding Modality: Nasogastric  Jevity 1.5 Ag (JEVITY1.5)  Total Volume for 24 Hours (mL): 1080  Continuous  Until Goal Tube Feed Rate (mL per Hour): 45  Tube Feed Duration (in Hours): 24  Tube Feed Start Time: 00:00  Free Water Flush Instructions:  @ 35 ml/hr (total volume 700ml)  No Carb Prosource TF     Qty per Day:  3 (09-29-22 @ 13:08) [Pending Verification By Attending]  Diet, NPO with Tube Feed:   Tube Feeding Modality: Nasogastric  Jevity 1.5 Ag (JEVITY1.5)  Total Volume for 24 Hours (mL): 960  Continuous  Starting Tube Feed Rate {mL per Hour}: 10     Every 4 hours  Until Goal Tube Feed Rate (mL per Hour): 40  Tube Feed Duration (in Hours): 24  Tube Feed Start Time: 16:00 (09-27-22 @ 14:57) [Active]

## 2022-09-30 LAB
ANION GAP SERPL CALC-SCNC: 4 MMOL/L — LOW (ref 5–17)
BUN SERPL-MCNC: 8 MG/DL — SIGNIFICANT CHANGE UP (ref 7–23)
CALCIUM SERPL-MCNC: 8.5 MG/DL — SIGNIFICANT CHANGE UP (ref 8.5–10.1)
CHLORIDE SERPL-SCNC: 111 MMOL/L — HIGH (ref 96–108)
CO2 SERPL-SCNC: 28 MMOL/L — SIGNIFICANT CHANGE UP (ref 22–31)
CREAT SERPL-MCNC: 0.56 MG/DL — SIGNIFICANT CHANGE UP (ref 0.5–1.3)
EGFR: 89 ML/MIN/1.73M2 — SIGNIFICANT CHANGE UP
GLUCOSE SERPL-MCNC: 106 MG/DL — HIGH (ref 70–99)
HCT VFR BLD CALC: 25 % — LOW (ref 34.5–45)
HGB BLD-MCNC: 8.7 G/DL — LOW (ref 11.5–15.5)
MAGNESIUM SERPL-MCNC: 2.1 MG/DL — SIGNIFICANT CHANGE UP (ref 1.6–2.6)
MCHC RBC-ENTMCNC: 34.8 GM/DL — SIGNIFICANT CHANGE UP (ref 32–36)
MCHC RBC-ENTMCNC: 34.8 PG — HIGH (ref 27–34)
MCV RBC AUTO: 100 FL — SIGNIFICANT CHANGE UP (ref 80–100)
PHOSPHATE SERPL-MCNC: 1.2 MG/DL — LOW (ref 2.5–4.5)
PLATELET # BLD AUTO: 227 K/UL — SIGNIFICANT CHANGE UP (ref 150–400)
POTASSIUM SERPL-MCNC: 3.4 MMOL/L — LOW (ref 3.5–5.3)
POTASSIUM SERPL-SCNC: 3.4 MMOL/L — LOW (ref 3.5–5.3)
RBC # BLD: 2.5 M/UL — LOW (ref 3.8–5.2)
RBC # FLD: 14 % — SIGNIFICANT CHANGE UP (ref 10.3–14.5)
SODIUM SERPL-SCNC: 143 MMOL/L — SIGNIFICANT CHANGE UP (ref 135–145)
WBC # BLD: 10.16 K/UL — SIGNIFICANT CHANGE UP (ref 3.8–10.5)
WBC # FLD AUTO: 10.16 K/UL — SIGNIFICANT CHANGE UP (ref 3.8–10.5)

## 2022-09-30 PROCEDURE — 99231 SBSQ HOSP IP/OBS SF/LOW 25: CPT

## 2022-09-30 PROCEDURE — 99233 SBSQ HOSP IP/OBS HIGH 50: CPT

## 2022-09-30 RX ORDER — SODIUM,POTASSIUM PHOSPHATES 278-250MG
2 POWDER IN PACKET (EA) ORAL THREE TIMES A DAY
Refills: 0 | Status: DISCONTINUED | OUTPATIENT
Start: 2022-09-30 | End: 2022-10-01

## 2022-09-30 RX ORDER — POTASSIUM CHLORIDE 20 MEQ
40 PACKET (EA) ORAL ONCE
Refills: 0 | Status: COMPLETED | OUTPATIENT
Start: 2022-09-30 | End: 2022-09-30

## 2022-09-30 RX ADMIN — SIMVASTATIN 20 MILLIGRAM(S): 20 TABLET, FILM COATED ORAL at 21:44

## 2022-09-30 RX ADMIN — TRAMADOL HYDROCHLORIDE 50 MILLIGRAM(S): 50 TABLET ORAL at 21:44

## 2022-09-30 RX ADMIN — CHLORHEXIDINE GLUCONATE 1 APPLICATION(S): 213 SOLUTION TOPICAL at 10:51

## 2022-09-30 RX ADMIN — Medication 2 PACKET(S): at 10:52

## 2022-09-30 RX ADMIN — CITALOPRAM 20 MILLIGRAM(S): 10 TABLET, FILM COATED ORAL at 09:32

## 2022-09-30 RX ADMIN — Medication 2 PACKET(S): at 15:57

## 2022-09-30 RX ADMIN — LATANOPROST 1 DROP(S): 0.05 SOLUTION/ DROPS OPHTHALMIC; TOPICAL at 21:51

## 2022-09-30 RX ADMIN — Medication 3 MILLIGRAM(S): at 21:44

## 2022-09-30 RX ADMIN — Medication 2 PACKET(S): at 21:43

## 2022-09-30 RX ADMIN — Medication 1000 UNIT(S): at 09:32

## 2022-09-30 RX ADMIN — TRAMADOL HYDROCHLORIDE 50 MILLIGRAM(S): 50 TABLET ORAL at 18:11

## 2022-09-30 RX ADMIN — MEMANTINE HYDROCHLORIDE 10 MILLIGRAM(S): 10 TABLET ORAL at 09:32

## 2022-09-30 RX ADMIN — Medication 40 MILLIEQUIVALENT(S): at 09:32

## 2022-09-30 RX ADMIN — PIPERACILLIN AND TAZOBACTAM 25 GRAM(S): 4; .5 INJECTION, POWDER, LYOPHILIZED, FOR SOLUTION INTRAVENOUS at 06:00

## 2022-09-30 RX ADMIN — TRAMADOL HYDROCHLORIDE 50 MILLIGRAM(S): 50 TABLET ORAL at 11:26

## 2022-09-30 RX ADMIN — Medication 1 TABLET(S): at 09:31

## 2022-09-30 RX ADMIN — DONEPEZIL HYDROCHLORIDE 10 MILLIGRAM(S): 10 TABLET, FILM COATED ORAL at 21:46

## 2022-09-30 RX ADMIN — MEMANTINE HYDROCHLORIDE 10 MILLIGRAM(S): 10 TABLET ORAL at 21:44

## 2022-09-30 RX ADMIN — Medication 650 MILLIGRAM(S): at 21:46

## 2022-09-30 RX ADMIN — ENOXAPARIN SODIUM 40 MILLIGRAM(S): 100 INJECTION SUBCUTANEOUS at 06:00

## 2022-09-30 NOTE — PROGRESS NOTE ADULT - SUBJECTIVE AND OBJECTIVE BOX
Patient seen and examined at bedside. Patient is talking and following commands this AM. However, speech organized AOx0, does not follow commands..    LABS:                        9.1    11.08 )-----------( 202      ( 29 Sep 2022 05:36 )             26.4     09-29    143  |  111<H>  |  10  ----------------------------<  142<H>  3.5   |  26  |  0.67    Ca    8.8      29 Sep 2022 05:36  Phos  2.1     09-28  Mg     2.0     09-28    TPro  5.0<L>  /  Alb  1.8<L>  /  TBili  0.8  /  DBili  x   /  AST  39<H>  /  ALT  15  /  AlkPhos  148<H>  09-28    PT/INR - ( 28 Sep 2022 06:31 )   PT: 14.4 sec;   INR: 1.24 ratio         PTT - ( 28 Sep 2022 06:31 )  PTT:31.5 sec      VITAL SIGNS:  T(C): 37.4 (09-30-22 @ 00:10), Max: 37.4 (09-29-22 @ 21:00)  HR: 86 (09-30-22 @ 04:00) (77 - 97)  BP: 142/56 (09-30-22 @ 04:00) (104/49 - 142/56)  RR: 16 (09-30-22 @ 04:00) (13 - 27)  SpO2: 94% (09-29-22 @ 17:00) (94% - 97%)    LLE:  AOx0    Dressing in place, c/d/i.  Maris-incisional TTP; otherwise, NTTP throughout the rest of the extremity.   sensatoin intact to noxious stimuli L2-S1  + GSC/TA/EHL/FHL intact   Extremity warm and well perfused  No calf tenderness bilaterally.  Compartments soft and compressible.     87F POD5 L hip saqib    Plan:   NPO at this time, NGT in place, speech and swallow recs appreciated  WBAT/PT/OT, abduction pillow  Pain management PRN  DVT prophylaxis: appreciate anticoagulation team recommendations.   CCU management appreciated  Dispo: [facility]  Ortho Stable for DC

## 2022-09-30 NOTE — PROGRESS NOTE ADULT - ASSESSMENT
This is 88 yo F with a PMH of HLD, glaucoma, Alzheimer's dementia, and OA who presents after a fall found to have left hip fracture now s/p Left Hip Hemiarthroplasty done 0n 9/25/2022 by Dr. Dale consulted   for VTE prophylaxis, risk stratification, and anticoagulation management, Patient is high risk for vte due to age, immobility, surgery, and moderate risk for bleeding.    plan  :Continue Lovenox 40mg sq daily for four weeks post procedure last dose 10/24/2022  :daily cbc/bmp  :LE Venodynes  : increase mobility as tolerated  :will f/u  :dispo:rehab

## 2022-09-30 NOTE — PROGRESS NOTE ADULT - SUBJECTIVE AND OBJECTIVE BOX
Patient is a 87y old  Female who presents with a chief complaint of left hip fracture      BRIEF HOSPITAL COURSE:86 yo F with a PMH of HLD, glaucoma, Alzheimer's dementia, and OA who presented to ED after a fall. The patient has a 24/7 air, and has a hospital bed at home, but when the aid went to the bathroom last night, the patient (despite having railings) climbed to the edge of the bed and fell on her left side. It is unclear if she hit her head. Reportedly, the patient has been more restless recently at night. No recent medication changes. No complaints from daughter. Her last fall was years ago, and she does not normally fall because of the precautions with her bed and having her aide 24/7. She normally can ambulate with assistance and eat with assistance.  She was admitted with hip fracture. Underwent Left hip saqib 9/25.     RRT called 2 after episode of hypoxia with concern for aspiration. Patient noted to be gargling on own secretions. Transferred to ICU for aspiration risk and airway monitoring      9/29: No events over night, tolerating feeds, confused, and failed her swallow eval today  9/30: No events over night, confused, tolerating feeds             PAST MEDICAL & SURGICAL HISTORY:  Osteoarthritis of right hip      Glaucoma      Alzheimer disease      Hypercholesteremia      Dementia      S/P appendectomy  childhood      S/P tonsillectomy  childhood      S/P D&amp;C (status post dilation and curettage)  age 32      S/P colonoscopy  every 2 or 3 years          FAMILY HISTORY:  Family history of colon cancer (Father)        Social Hx:    Allergies    No Known Allergies    Intolerances            ICU Vital Signs Last 24 Hrs  T(C): 37.1 (30 Sep 2022 06:00), Max: 37.4 (29 Sep 2022 21:00)  T(F): 98.7 (30 Sep 2022 06:00), Max: 99.3 (29 Sep 2022 21:00)  HR: 86 (30 Sep 2022 04:00) (77 - 97)  BP: 142/56 (30 Sep 2022 04:00) (104/49 - 142/56)  BP(mean): 77 (30 Sep 2022 04:00) (60 - 92)  ABP: --  ABP(mean): --  RR: 16 (30 Sep 2022 04:00) (15 - 27)  SpO2: 94% (29 Sep 2022 17:00) (94% - 97%)    O2 Parameters below as of 29 Sep 2022 12:00  Patient On (Oxygen Delivery Method): room air                I&O's Summary    29 Sep 2022 07:01  -  30 Sep 2022 07:00  --------------------------------------------------------  IN: 1690 mL / OUT: 0 mL / NET: 1690 mL                              8.7    10.16 )-----------( 227      ( 30 Sep 2022 06:12 )             25.0       09-30    143  |  111<H>  |  8   ----------------------------<  106<H>  3.4<L>   |  28  |  0.56    Ca    8.5      30 Sep 2022 06:12  Phos  1.2     09-30  Mg     2.1     09-30                      MEDICATIONS  (STANDING):  chlorhexidine 4% Liquid 1 Application(s) Topical <User Schedule>  cholecalciferol 1000 Unit(s) Oral daily  citalopram 20 milliGRAM(s) Oral daily  donepezil 10 milliGRAM(s) Oral at bedtime  enoxaparin Injectable 40 milliGRAM(s) SubCutaneous every 24 hours  latanoprost 0.005% Ophthalmic Solution 1 Drop(s) Both EYES at bedtime  melatonin 3 milliGRAM(s) Oral at bedtime  memantine 10 milliGRAM(s) Oral two times a day  multivitamin 1 Tablet(s) Oral daily  polyethylene glycol 3350 17 Gram(s) Oral at bedtime  potassium phosphate / sodium phosphate Powder (PHOS-NaK) 2 Packet(s) Oral three times a day  senna 2 Tablet(s) Oral at bedtime  simvastatin 20 milliGRAM(s) Oral at bedtime  timolol 0.5% Solution 1 Drop(s) Both EYES daily  tranexamic acid IVPB 1000 milliGRAM(s) IV Intermittent once    MEDICATIONS  (PRN):  acetaminophen     Tablet .. 650 milliGRAM(s) Oral every 6 hours PRN Mild Pain (1 - 3)  acetaminophen  Suppository .. 650 milliGRAM(s) Rectal every 6 hours PRN Temp greater or equal to 38C (100.4F)  aluminum hydroxide/magnesium hydroxide/simethicone Suspension 30 milliLiter(s) Oral four times a day PRN Indigestion  magnesium hydroxide Suspension 30 milliLiter(s) Oral daily PRN Constipation  ondansetron Injectable 4 milliGRAM(s) IV Push every 8 hours PRN Nausea and/or Vomiting  ondansetron Injectable 4 milliGRAM(s) IV Push every 6 hours PRN Nausea and/or Vomiting  traMADol 25 milliGRAM(s) Oral every 6 hours PRN Moderate Pain (4 - 6)  traMADol 50 milliGRAM(s) Oral every 4 hours PRN Moderate Pain (4 - 6)  traMADol 25 milliGRAM(s) Oral every 4 hours PRN Mild Pain (1 - 3)      DVT Prophylaxis: Lovenox    Advanced Directives:  Discussed with:    Visit Information:    ** Time is exclusive of billed procedures and/or teaching and/or routine family updates.

## 2022-09-30 NOTE — GOALS OF CARE CONVERSATION - ADVANCED CARE PLANNING - CONVERSATION DETAILS
I Had a discussion with the patients son and daughter.  I spoke about advanced directives(DNR/DNI) status and the likely need for a PEG    They will speak to their other siblings and likely make a decision about her CODE status today.      They will speak to their siblings about a PEG over the weekent    PAlliative care consult Monday

## 2022-09-30 NOTE — PROGRESS NOTE ADULT - ASSESSMENT
88 yo F with a PMH of HLD, glaucoma, Alzheimer's dementia, and OA, Now S/P ORIF of the Hip POD #4. Had episode of aspiration and transferred to the ICU for aspiration PNA and acute hypoxic respiratory failure       Plan:  CCU    PULM: On Room air, completed Zosyn    Cardio: Hemodynamics reasonable     Renal: Fx Stable    GI: Failed swallow eval x 2, continue daily assessment, continue tube feeds    Neuro: Continue meds for dementia    Lovenox DVT Prophylaxis    D/W the patients family    Transfer to a reg floor  Called into the hospitalist at 10:02AM awaiting a call back

## 2022-09-30 NOTE — PROGRESS NOTE ADULT - SUBJECTIVE AND OBJECTIVE BOX
HPI:  88 yo F with a PMH of HLD, glaucoma, Alzheimer's dementia, and OA who presents after a fall. The patient cannot provide any history, so it was obtained from her daughter Shira at bedside. The patient has a 24/7 air, and has a hospital bed at home, but when the aid went to the bathroom last night, the patient (despite having railings) climbed to the edge of the bed and fell on her left side. It is unclear if she hit her head. Reportedly, the patient has been more restless recently at night. No recent medication changes. No complaints from daughter. Her last fall was years ago, and she does not normally fall because of the precautions with her bed and having her aide 24/7. She normally can ambulate with assistance and eat with assistance.      In the ED, she was given Morphine 2 mg IV x1 and started on LR @ 75 ml/hr. (24 Sep 2022 12:57)      Patient is a 87y old  Female who presents with a chief complaint of left hip fracture (26 Sep 2022 10:34)      Consulted by Dr. Dale   for VTE prophylaxis, risk stratification, and anticoagulation management.    PAST MEDICAL & SURGICAL HISTORY:  Osteoarthritis of right hip      Glaucoma      Alzheimer disease      Hypercholesteremia      Dementia      S/P appendectomy  childhood      S/P tonsillectomy  childhood      S/P D&amp;C (status post dilation and curettage)  age 32      S/P colonoscopy  every 2 or 3 years    Interval History  2022:patient seen at bedside awake , able to tell her name  patient was having swallowing difficulty Speech and swallow eval done, puree diet,   H&H stable will continue Lovenox 40mg daily for DVT prophylaxis   2022:patient seen at bedside on CCU, upgraded to  CCU low O2 sat and potential aspiration risk, patient is on NC now no concerns regarding AC will continue Lovenox for DVT prophylaxis   2022 Pt seen at bedside in CCU.  Pt non verbal,, physical therapy with pt.   2022 Pt seen at bedside with daughter present.  Pt remains confused and non verbal.  Feeding tube noticed.  Discussed with daughter that pt is on Lovenox for DVT prophylaxis. No concerns.           CrCl:31.3  BMI:18.3  EBL:150ml    Caprini VTE Risk Score:CAPRINI SCORE  AGE RELATED RISK FACTORS                                                       MOBILITY RELATED FACTORS  [ ] Age 41-60 years                                            (1 Point)                  [ ] Bed rest /restricted mobility                             (1 Point)  [ ] Age: 61-74 years                                           (2 Points)                [ ] Plaster cast                                                   (2 Points)  [x ] Age= 75 years                                              (3 Points)                 [ ] Bed bound for more than 72 hours                   (2 Points)    DISEASE RELATED RISK FACTORS                                               GENDER SPECIFIC FACTORS  [ ] Edema in the lower extremities                       (1 Point)           [ ] Pregnancy                                                            (1 Point)  [ ] Varicose veins                                               (1 Point)                  [ ] Post-partum < 6 weeks                                      (1 Point)             [ ] BMI > 25 Kg/m2                                            (1 Point)                  [ ] Hormonal therapy or oral contraception       (1 Point)                 [ ] Sepsis (in the previous month)                        (1 Point)             [ ] History of pregnancy complications                (1Point)  [ ] Pneumonia or serious lung disease                                             [ ] Unexplained or recurrent  (=/>3), premature                                 (In the previous month)                               (1 Point)                birth with toxemia or growth-restricted infant (1 Point)  [ ] Abnormal pulmonary function test            (1 Point)                                   SURGERY RELATED RISK FACTORS  [ ] Acute myocardial infarction                       (1 Point)                  [ ]  Section                                         (1 Point)  [ ] Congestive heart failure (in the previous month) (1 Point)   [ ] Minor surgery   lasting <45 minutes       (1 Point)   [ ] Inflammatory bowel disease                             (1 Point)          [ ] Arthroscopic surgery                                  (2 Points)  [ ] Central venous access                                    (2 Points)            [ ] General surgery lasting >45 minutes      (2 Points)       [ ] Stroke (in the previous month)                  (5 Points)            [ ] Elective major lower extremity arthroplasty (5 Points)                                   [  ] Malignancy (present or past include skin melanoma                                          but exclude  basal skin cell)    (2 points)                                      TRAUMA RELATED RISK FACTORS                HEMATOLOGY RELATED FACTORS                                  [ x] Fracture of the hip, pelvis, or leg                       (5 Points)  [ ] Prior episodes of VTE                                     (3 Points)          [ ] Acute spinal cord injury (in the previous month)  (5 Points)  [ ] Positive family history for VTE                         (3 Points)       [ ] Paralysis (less than 1 month)                          (5 Points)  [ ] Prothrombin 34593 A                                      (3 Points)         [ ] Multiple Trauma (within 1month)                 (5Points)                                                                                                                                                                [ ] Factor V Leiden                                          (3 Points)                                OTHER RISK FACTORS                          [ ] Lupus anticoagulants                                     (3 Points)                       [ ] BMI > 40                          (1 Point)                                                         [ ] Anticardiolipin antibodies                                (3 Points)                   [ ] Smoking                              (1Point)                                                [ ] High homocysteine in the blood                      (3 Points)                [  ] Diabetes requiring insulin (1point)                         [ ] Other congenital or acquired thrombophilia       (3 Points)          [  ] Chemotherapy                   (1 Point)  [ ] Heparin induced thrombocytopenia                  (3 Points)             [  ] Blood Transfusion                (1 point)                                                                                                             Total Score [    8      ]                                                                                                                                                                                                                                                                                                                                                                                                                                         IMPROVE Bleeding Risk Score:4.5      Falls Risk:   High ( x )  Mod (  )  Low (  )      FAMILY HISTORY:  Family history of colon cancer (Father)      Denies any personal or familial history of clotting or bleeding disorders.    Allergies    No Known Allergies    Intolerances        REVIEW OF SYSTEMS    (  )Fever	     (  )Constipation	(  )SOB				(  )Headache	(  )Dysuria  (  )Chills	     (  )Melena	(  )Dyspnea present on exertion	                    (  )Dizziness                    (  )Polyuria  (  )Nausea	     (  )Hematochezia	(  )Cough			                    (  )Syncope   	(  )Hematuria  (  )Vomiting    (  )Chest Pain	(  )Wheezing			(  )Weakness  (  )Diarrhea     (  )Palpitations	(  )Anorexia			(  )joint pain    unable to obtain: dementia    Vital Signs Last 24 Hrs  T(C): 37.1 (22 @ 11:00), Max: 37.4 (22 @ 21:00)  T(F): 98.8 (22 @ 11:00), Max: 99.3 (22 @ 21:00)  HR: 76 (22 @ 14:00) (76 - 97)  BP: 130/56 (22 @ 14:00) (110/60 - 142/56)  BP(mean): 71 (22 @ 14:00) (67 - 101)  RR: 18 (22 @ 14:00) (16 - 29)  SpO2: 94% (22 @ 17:00) (94% - 94%)  PHYSICAL EXAM:    Constitutional: Appears ill    Neurological: Awake confused     Skin: Warm    Respiratory and Thorax: normal effort; Breath sounds: normal; No rales/wheezing/rhonchi, noted upper resp  gargling on secretions.  	  Cardiovascular: S1, S2, regular, NMBR	    Gastrointestinal: BS + x 4Q, nontender	    Genitourinary:  Bladder nondistended, nontender    Musculoskeletal:   General Right:   no muscle/joint tenderness,   normal tone, no joint swelling,   ROM: limited	    General Left:   no muscle/joint tenderness,   normal tone, notes swelling to left arm  ROM: limited    Hip: Left: Dressing CDI;         Lower extrems:   Right: no calf tenderness              negative chad's sign               + pedal pulses    Left:   no calf tenderness              negative chad's sign               + pedal pulses                                   8.7    10.16 )-----------( 227      ( 30 Sep 2022 06:12 )             25.0       09-30    143  |  111<H>  |  8   ----------------------------<  106<H>  3.4<L>   |  28  |  0.56    Ca    8.5      30 Sep 2022 06:12  Phos  1.2     09-30  Mg     2.1                                 8.6    9.35  )-----------( 156      ( 28 Sep 2022 06:31 )             26.0       0928    141  |  107  |  9   ----------------------------<  106<H>  3.2<L>   |  29  |  0.74    Ca    8.5      28 Sep 2022 06:31  Phos  2.1       Mg     2.0         TPro  5.0<L>  /  Alb  1.8<L>  /  TBili  0.8  /  DBili  x   /  AST  39<H>  /  ALT  15  /  AlkPhos  148<H>  0928      PT/INR - ( 28 Sep 2022 06:31 )   PT: 14.4 sec;   INR: 1.24 ratio         PTT - ( 28 Sep 2022 06:31 )  PTT:31.5 sec             9.5    14.00 )-----------( 135      ( 27 Sep 2022 05:52 )             28.7       0927    138  |  106  |  10  ----------------------------<  104<H>  3.8   |  28  |  0.77    Ca    8.8      27 Sep 2022 05:52  Phos  1.8     09  Mg     2.0         TPro  5.7<L>  /  Alb  2.5<L>  /  TBili  0.6  /  DBili  x   /  AST  39<H>  /  ALT  13  /  AlkPhos  81  09-26      PT/INR - ( 27 Sep 2022 05:52 )   PT: 15.0 sec;   INR: 1.29 ratio         PTT - ( 27 Sep 2022 05:52 )  PTT:32.6 sec                        10.1   12.99 )-----------( 142      ( 26 Sep 2022 09:16 )             29.3           140  |  108  |  10  ----------------------------<  107<H>  3.9   |  26  |  0.90    Ca    8.7      26 Sep 2022 09:16        PT/INR - ( 25 Sep 2022 05:08 )   PT: 14.0 sec;   INR: 1.20 ratio         PTT - ( 25 Sep 2022 05:08 )  PTT:22.3 sec				    MEDICATIONS  (STANDING):  chlorhexidine 4% Liquid 1 Application(s) Topical <User Schedule>  cholecalciferol 1000 Unit(s) Oral daily  citalopram 20 milliGRAM(s) Oral daily  donepezil 10 milliGRAM(s) Oral at bedtime  enoxaparin Injectable 40 milliGRAM(s) SubCutaneous every 24 hours  latanoprost 0.005% Ophthalmic Solution 1 Drop(s) Both EYES at bedtime  melatonin 3 milliGRAM(s) Oral at bedtime  memantine 10 milliGRAM(s) Oral two times a day  multivitamin 1 Tablet(s) Oral daily  polyethylene glycol 3350 17 Gram(s) Oral at bedtime  potassium phosphate / sodium phosphate Powder (PHOS-NaK) 2 Packet(s) Oral three times a day  senna 2 Tablet(s) Oral at bedtime  simvastatin 20 milliGRAM(s) Oral at bedtime  timolol 0.5% Solution 1 Drop(s) Both EYES daily  tranexamic acid IVPB 1000 milliGRAM(s) IV Intermittent once          < from: CT Head No Cont (22 @ 07:09) >  MPRESSION: No acute intracranial hemorrhage, mass effect, or shift of   the midline structures.    Similar-appearing mild chronic white matter microvascular type changes.    < end of copied text >      DVT Prophylaxis:  LMWH                   ( x )  Heparin SQ           (  )  Coumadin             (  )  Xarelto                  (  )  Eliquis                   (  )  Venodynes           (x  )  Ambulation          ( x )  UFH                       (  )  Contraindicated  (  )  EC ASPIRIN       (  )

## 2022-10-01 DIAGNOSIS — J96.01 ACUTE RESPIRATORY FAILURE WITH HYPOXIA: ICD-10-CM

## 2022-10-01 LAB
ANION GAP SERPL CALC-SCNC: 4 MMOL/L — LOW (ref 5–17)
BUN SERPL-MCNC: 8 MG/DL — SIGNIFICANT CHANGE UP (ref 7–23)
CALCIUM SERPL-MCNC: 8.8 MG/DL — SIGNIFICANT CHANGE UP (ref 8.5–10.1)
CHLORIDE SERPL-SCNC: 112 MMOL/L — HIGH (ref 96–108)
CO2 SERPL-SCNC: 25 MMOL/L — SIGNIFICANT CHANGE UP (ref 22–31)
CREAT SERPL-MCNC: 0.52 MG/DL — SIGNIFICANT CHANGE UP (ref 0.5–1.3)
EGFR: 91 ML/MIN/1.73M2 — SIGNIFICANT CHANGE UP
GLUCOSE SERPL-MCNC: 103 MG/DL — HIGH (ref 70–99)
HCT VFR BLD CALC: 26.3 % — LOW (ref 34.5–45)
HGB BLD-MCNC: 8.8 G/DL — LOW (ref 11.5–15.5)
MAGNESIUM SERPL-MCNC: 2.4 MG/DL — SIGNIFICANT CHANGE UP (ref 1.6–2.6)
MCHC RBC-ENTMCNC: 33.5 GM/DL — SIGNIFICANT CHANGE UP (ref 32–36)
MCHC RBC-ENTMCNC: 34 PG — SIGNIFICANT CHANGE UP (ref 27–34)
MCV RBC AUTO: 101.5 FL — HIGH (ref 80–100)
PHOSPHATE SERPL-MCNC: 2.7 MG/DL — SIGNIFICANT CHANGE UP (ref 2.5–4.5)
PLATELET # BLD AUTO: 238 K/UL — SIGNIFICANT CHANGE UP (ref 150–400)
POTASSIUM SERPL-MCNC: 3.9 MMOL/L — SIGNIFICANT CHANGE UP (ref 3.5–5.3)
POTASSIUM SERPL-SCNC: 3.9 MMOL/L — SIGNIFICANT CHANGE UP (ref 3.5–5.3)
RBC # BLD: 2.59 M/UL — LOW (ref 3.8–5.2)
RBC # FLD: 14.3 % — SIGNIFICANT CHANGE UP (ref 10.3–14.5)
SODIUM SERPL-SCNC: 141 MMOL/L — SIGNIFICANT CHANGE UP (ref 135–145)
WBC # BLD: 8.21 K/UL — SIGNIFICANT CHANGE UP (ref 3.8–10.5)
WBC # FLD AUTO: 8.21 K/UL — SIGNIFICANT CHANGE UP (ref 3.8–10.5)

## 2022-10-01 PROCEDURE — 99233 SBSQ HOSP IP/OBS HIGH 50: CPT

## 2022-10-01 PROCEDURE — 99232 SBSQ HOSP IP/OBS MODERATE 35: CPT

## 2022-10-01 RX ADMIN — CITALOPRAM 20 MILLIGRAM(S): 10 TABLET, FILM COATED ORAL at 09:27

## 2022-10-01 RX ADMIN — Medication 650 MILLIGRAM(S): at 12:07

## 2022-10-01 RX ADMIN — TRAMADOL HYDROCHLORIDE 50 MILLIGRAM(S): 50 TABLET ORAL at 20:30

## 2022-10-01 RX ADMIN — TRAMADOL HYDROCHLORIDE 50 MILLIGRAM(S): 50 TABLET ORAL at 05:13

## 2022-10-01 RX ADMIN — TRAMADOL HYDROCHLORIDE 50 MILLIGRAM(S): 50 TABLET ORAL at 12:30

## 2022-10-01 RX ADMIN — Medication 2 PACKET(S): at 05:07

## 2022-10-01 RX ADMIN — Medication 1000 UNIT(S): at 09:26

## 2022-10-01 RX ADMIN — Medication 3 MILLIGRAM(S): at 21:51

## 2022-10-01 RX ADMIN — MEMANTINE HYDROCHLORIDE 10 MILLIGRAM(S): 10 TABLET ORAL at 21:51

## 2022-10-01 RX ADMIN — Medication 1 DROP(S): at 09:29

## 2022-10-01 RX ADMIN — LATANOPROST 1 DROP(S): 0.05 SOLUTION/ DROPS OPHTHALMIC; TOPICAL at 21:51

## 2022-10-01 RX ADMIN — DONEPEZIL HYDROCHLORIDE 10 MILLIGRAM(S): 10 TABLET, FILM COATED ORAL at 21:51

## 2022-10-01 RX ADMIN — MEMANTINE HYDROCHLORIDE 10 MILLIGRAM(S): 10 TABLET ORAL at 09:27

## 2022-10-01 RX ADMIN — TRAMADOL HYDROCHLORIDE 50 MILLIGRAM(S): 50 TABLET ORAL at 12:08

## 2022-10-01 RX ADMIN — ENOXAPARIN SODIUM 40 MILLIGRAM(S): 100 INJECTION SUBCUTANEOUS at 05:07

## 2022-10-01 RX ADMIN — SIMVASTATIN 20 MILLIGRAM(S): 20 TABLET, FILM COATED ORAL at 21:51

## 2022-10-01 RX ADMIN — Medication 1 TABLET(S): at 09:27

## 2022-10-01 RX ADMIN — TRAMADOL HYDROCHLORIDE 50 MILLIGRAM(S): 50 TABLET ORAL at 22:13

## 2022-10-01 NOTE — PROGRESS NOTE ADULT - SUBJECTIVE AND OBJECTIVE BOX
88 yo F with a PMH of HLD, glaucoma, Alzheimer's dementia, and OA who presented to ED after a fall. The patient has a 24/7 air, and has a hospital bed at home, but when the aid went to the bathroom last night, the patient (despite having railings) climbed to the edge of the bed and fell on her left side. It is unclear if she hit her head. Reportedly, the patient has been more restless recently at night. No recent medication changes. No complaints from daughter. Her last fall was years ago, and she does not normally fall because of the precautions with her bed and having her aide 24/7. She normally can ambulate with assistance and eat with assistance.  She was admitted with hip fracture. Underwent Left hip saqib 9/25.     RRT called 2 after episode of hypoxia with concern for aspiration. Patient noted to be gargling on own secretions. Transferred to ICU for aspiration risk and airway monitoring      9/29: No events over night, tolerating feeds, confused, and failed her swallow eval today  9/30: No events over night, confused, tolerating feeds  10/1  No acute events, saturating well, participating in PT. Case discussed on CCU rounds, signed out to the hospitalist service for transfer to the floor for continued care.      ICU Vital Signs Last 24 Hrs  T(C): 36 (01 Oct 2022 09:14), Max: 37.1 (30 Sep 2022 11:00)  T(F): 96.8 (01 Oct 2022 09:14), Max: 98.8 (30 Sep 2022 11:00)  HR: 82 (01 Oct 2022 06:00) (76 - 87)  BP: 132/72 (01 Oct 2022 06:00) (107/54 - 132/72)  BP(mean): 87 (01 Oct 2022 06:00) (63 - 88)  RR: 20 (01 Oct 2022 06:00) (16 - 29)  SpO2: 94% (01 Oct 2022 06:00) (93% - 95%)                              8.8    8.21  )-----------( 238      ( 01 Oct 2022 06:30 )             26.3       10-01    141  |  112<H>  |  8   ----------------------------<  103<H>  3.9   |  25  |  0.52    Ca    8.8      01 Oct 2022 06:30  Phos  2.7     10-01  Mg     2.4     10-01

## 2022-10-01 NOTE — PROGRESS NOTE ADULT - SUBJECTIVE AND OBJECTIVE BOX
CC: Left hip Fx  Subjective and Objective:   88 yo F with a PMH of HLD, glaucoma, Alzheimer's dementia, and OA who presented to ED after a fall. The patient has a 24/7 air, and has a hospital bed at home, but when the aid went to the bathroom last night, the patient (despite having railings) climbed to the edge of the bed and fell on her left side. It is unclear if she hit her head. Reportedly, the patient has been more restless recently at night. No recent medication changes. No complaints from daughter. Her last fall was years ago, and she does not normally fall because of the precautions with her bed and having her aide 24/7. She normally can ambulate with assistance and eat with assistance. She was admitted with hip fracture. Underwent Left hip saqib 9/25. RRT called 2 after episode of hypoxia with concern for aspiration. Patient noted to be gargling on own secretions. Transferred to ICU for aspiration risk and airway monitoring.   Pt treated for  hip fracture s/p ORIF.  Hospital course complicated by acute respiratory failure with hypoxia due to aspiration PNA, treated with full course of IV antibiotics.  NG tube placed for feeds due to inability to swallow without risk of aspiration.  She is tolerating tube feeds.  At bedside pt is alert, answering basic questions, comfortable.  I spoke to son at bedside as well,  plan likely for hospice however family wishes to speak with palliative care services before making decision.        REVIEW OF SYSTEMS: All other review of systems is negative unless indicated above.    Vital Signs Last 24 Hrs  T(C): 36 (01 Oct 2022 09:14), Max: 37.1 (30 Sep 2022 16:00)  T(F): 96.8 (01 Oct 2022 09:14), Max: 98.8 (30 Sep 2022 21:00)  HR: 83 (01 Oct 2022 08:00) (79 - 87)  BP: 138/58 (01 Oct 2022 08:00) (107/54 - 138/58)  BP(mean): 78 (01 Oct 2022 08:00) (63 - 88)  RR: 15 (01 Oct 2022 08:00) (15 - 22)  SpO2: 95% (01 Oct 2022 08:00) (93% - 95%)      PHYSICAL EXAM:    Constitutional: NAD, awake and alert, weak, frail, elderly appearing  HEENT: PERR, EOMI, Normal Hearing, NG tube in place  Neck: Soft and supple  Respiratory: Breath sounds are diminished b/l  Cardiovascular: S1 and S2, regular rate and rhythm, no Murmurs, gallops or rubs  Gastrointestinal: Bowel Sounds present, soft, nontender, nondistended, no guarding, no rebound  Extremities: No peripheral edema  Neurological: A/O x 2, no focal deficits in my limited exam    MEDICATIONS  (STANDING):  chlorhexidine 4% Liquid 1 Application(s) Topical <User Schedule>  cholecalciferol 1000 Unit(s) Oral daily  citalopram 20 milliGRAM(s) Oral daily  donepezil 10 milliGRAM(s) Oral at bedtime  enoxaparin Injectable 40 milliGRAM(s) SubCutaneous every 24 hours  latanoprost 0.005% Ophthalmic Solution 1 Drop(s) Both EYES at bedtime  melatonin 3 milliGRAM(s) Oral at bedtime  memantine 10 milliGRAM(s) Oral two times a day  multivitamin 1 Tablet(s) Oral daily  polyethylene glycol 3350 17 Gram(s) Oral at bedtime  potassium phosphate / sodium phosphate Powder (PHOS-NaK) 2 Packet(s) Oral three times a day  senna 2 Tablet(s) Oral at bedtime  simvastatin 20 milliGRAM(s) Oral at bedtime  timolol 0.5% Solution 1 Drop(s) Both EYES daily  tranexamic acid IVPB 1000 milliGRAM(s) IV Intermittent once    MEDICATIONS  (PRN):  acetaminophen     Tablet .. 650 milliGRAM(s) Oral every 6 hours PRN Mild Pain (1 - 3)  acetaminophen  Suppository .. 650 milliGRAM(s) Rectal every 6 hours PRN Temp greater or equal to 38C (100.4F)  aluminum hydroxide/magnesium hydroxide/simethicone Suspension 30 milliLiter(s) Oral four times a day PRN Indigestion  magnesium hydroxide Suspension 30 milliLiter(s) Oral daily PRN Constipation  ondansetron Injectable 4 milliGRAM(s) IV Push every 6 hours PRN Nausea and/or Vomiting  ondansetron Injectable 4 milliGRAM(s) IV Push every 8 hours PRN Nausea and/or Vomiting  traMADol 25 milliGRAM(s) Oral every 6 hours PRN Moderate Pain (4 - 6)  traMADol 25 milliGRAM(s) Oral every 4 hours PRN Mild Pain (1 - 3)  traMADol 50 milliGRAM(s) Oral every 4 hours PRN Moderate Pain (4 - 6)                                8.8    8.21  )-----------( 238      ( 01 Oct 2022 06:30 )             26.3     10-01    141  |  112<H>  |  8   ----------------------------<  103<H>  3.9   |  25  |  0.52    Ca    8.8      01 Oct 2022 06:30  Phos  2.7     10-01  Mg     2.4     10-01      CAPILLARY BLOOD GLUCOSE      A/P:      Femur fracture, left.   -s/p ORIF day # 5  -ortho cleared  -PT however pt possibly to be placed for hospice   -pain management    Senile dementia with depression with dysphagia / aspiration PNA:  -aspiration PNA resolving s/p antibiotics  -NG tube w/ feeds tolerating  -plan for possible discontinuation, palliative care pending.  Family aiming for a more comfort care approach   -supportive care    Acute respiratory failure with hypoxia: RESOLVED    DVT ppx:  -lovenox x 4 weeks      DIspo:

## 2022-10-01 NOTE — PROGRESS NOTE ADULT - SUBJECTIVE AND OBJECTIVE BOX
Patient seen and examined at bedside. Patient is talking and following commands this AM, speech organized AOx0, follows commands..    LABS:                        8.8    8.21  )-----------( 238      ( 01 Oct 2022 06:30 )             26.3     10-01    141  |  112<H>  |  8   ----------------------------<  103<H>  3.9   |  25  |  0.52    Ca    8.8      01 Oct 2022 06:30  Phos  2.7     10-01  Mg     2.4     10-01            VITAL SIGNS:  T(C): 36.7 (10-01-22 @ 06:00), Max: 37.1 (09-30-22 @ 11:00)  HR: 82 (10-01-22 @ 06:00) (76 - 87)  BP: 132/72 (10-01-22 @ 06:00) (107/54 - 132/72)  RR: 20 (10-01-22 @ 06:00) (16 - 29)  SpO2: 94% (10-01-22 @ 06:00) (93% - 95%)    LLE:  AOx0    Dressing in place, c/d/i.  Maris-incisional TTP; otherwise, NTTP throughout the rest of the extremity.   SILT L2-S1  + GSC/TA/EHL/FHL intact   Extremity warm and well perfused  No calf tenderness bilaterally.  Compartments soft and compressible.     87F POD5 L hip saqib    Plan:   NPO at this time, NGT in place, speech and swallow recs appreciated  WBAT/PT/OT, abduction pillow  Pain management PRN  DVT prophylaxis: appreciate anticoagulation team recommendations.   CCU management appreciated  Dispo: [facility]  Ortho Stable for DC

## 2022-10-02 LAB
ANION GAP SERPL CALC-SCNC: 5 MMOL/L — SIGNIFICANT CHANGE UP (ref 5–17)
BUN SERPL-MCNC: 8 MG/DL — SIGNIFICANT CHANGE UP (ref 7–23)
CALCIUM SERPL-MCNC: 9.1 MG/DL — SIGNIFICANT CHANGE UP (ref 8.5–10.1)
CHLORIDE SERPL-SCNC: 109 MMOL/L — HIGH (ref 96–108)
CO2 SERPL-SCNC: 27 MMOL/L — SIGNIFICANT CHANGE UP (ref 22–31)
CREAT SERPL-MCNC: 0.6 MG/DL — SIGNIFICANT CHANGE UP (ref 0.5–1.3)
CULTURE RESULTS: SIGNIFICANT CHANGE UP
CULTURE RESULTS: SIGNIFICANT CHANGE UP
EGFR: 88 ML/MIN/1.73M2 — SIGNIFICANT CHANGE UP
GLUCOSE SERPL-MCNC: 104 MG/DL — HIGH (ref 70–99)
HCT VFR BLD CALC: 28.3 % — LOW (ref 34.5–45)
HGB BLD-MCNC: 9.7 G/DL — LOW (ref 11.5–15.5)
MCHC RBC-ENTMCNC: 34.3 GM/DL — SIGNIFICANT CHANGE UP (ref 32–36)
MCHC RBC-ENTMCNC: 34.5 PG — HIGH (ref 27–34)
MCV RBC AUTO: 100.7 FL — HIGH (ref 80–100)
PLATELET # BLD AUTO: 330 K/UL — SIGNIFICANT CHANGE UP (ref 150–400)
POTASSIUM SERPL-MCNC: 4 MMOL/L — SIGNIFICANT CHANGE UP (ref 3.5–5.3)
POTASSIUM SERPL-SCNC: 4 MMOL/L — SIGNIFICANT CHANGE UP (ref 3.5–5.3)
RBC # BLD: 2.81 M/UL — LOW (ref 3.8–5.2)
RBC # FLD: 14 % — SIGNIFICANT CHANGE UP (ref 10.3–14.5)
SODIUM SERPL-SCNC: 141 MMOL/L — SIGNIFICANT CHANGE UP (ref 135–145)
SPECIMEN SOURCE: SIGNIFICANT CHANGE UP
SPECIMEN SOURCE: SIGNIFICANT CHANGE UP
WBC # BLD: 8.9 K/UL — SIGNIFICANT CHANGE UP (ref 3.8–10.5)
WBC # FLD AUTO: 8.9 K/UL — SIGNIFICANT CHANGE UP (ref 3.8–10.5)

## 2022-10-02 PROCEDURE — 99231 SBSQ HOSP IP/OBS SF/LOW 25: CPT

## 2022-10-02 PROCEDURE — 99233 SBSQ HOSP IP/OBS HIGH 50: CPT

## 2022-10-02 RX ORDER — MORPHINE SULFATE 50 MG/1
2 CAPSULE, EXTENDED RELEASE ORAL EVERY 6 HOURS
Refills: 0 | Status: DISCONTINUED | OUTPATIENT
Start: 2022-10-02 | End: 2022-10-07

## 2022-10-02 RX ORDER — ENOXAPARIN SODIUM 100 MG/ML
40 INJECTION SUBCUTANEOUS
Qty: 0 | Refills: 0 | DISCHARGE
Start: 2022-10-02

## 2022-10-02 RX ORDER — SODIUM CHLORIDE 9 MG/ML
1000 INJECTION, SOLUTION INTRAVENOUS
Refills: 0 | Status: DISCONTINUED | OUTPATIENT
Start: 2022-10-02 | End: 2022-10-04

## 2022-10-02 RX ORDER — KETOROLAC TROMETHAMINE 30 MG/ML
15 SYRINGE (ML) INJECTION EVERY 6 HOURS
Refills: 0 | Status: DISCONTINUED | OUTPATIENT
Start: 2022-10-02 | End: 2022-10-07

## 2022-10-02 RX ADMIN — Medication 650 MILLIGRAM(S): at 06:04

## 2022-10-02 RX ADMIN — Medication 15 MILLIGRAM(S): at 14:38

## 2022-10-02 RX ADMIN — LATANOPROST 1 DROP(S): 0.05 SOLUTION/ DROPS OPHTHALMIC; TOPICAL at 21:58

## 2022-10-02 RX ADMIN — SODIUM CHLORIDE 75 MILLILITER(S): 9 INJECTION, SOLUTION INTRAVENOUS at 14:28

## 2022-10-02 RX ADMIN — ENOXAPARIN SODIUM 40 MILLIGRAM(S): 100 INJECTION SUBCUTANEOUS at 05:29

## 2022-10-02 RX ADMIN — Medication 15 MILLIGRAM(S): at 21:59

## 2022-10-02 RX ADMIN — Medication 15 MILLIGRAM(S): at 14:23

## 2022-10-02 RX ADMIN — Medication 1 DROP(S): at 10:00

## 2022-10-02 NOTE — PROGRESS NOTE ADULT - SUBJECTIVE AND OBJECTIVE BOX
HPI:  86 yo F with a PMH of HLD, glaucoma, Alzheimer's dementia, and OA who presents after a fall. The patient cannot provide any history, so it was obtained from her daughter Shira at bedside. The patient has a 24/7 air, and has a hospital bed at home, but when the aid went to the bathroom last night, the patient (despite having railings) climbed to the edge of the bed and fell on her left side. It is unclear if she hit her head. Reportedly, the patient has been more restless recently at night. No recent medication changes. No complaints from daughter. Her last fall was years ago, and she does not normally fall because of the precautions with her bed and having her aide 24/7. She normally can ambulate with assistance and eat with assistance.      In the ED, she was given Morphine 2 mg IV x1 and started on LR @ 75 ml/hr. (24 Sep 2022 12:57)      Patient is a 87y old  Female who presents with a chief complaint of left hip fracture (26 Sep 2022 10:34)      Consulted by Dr. Dale   for VTE prophylaxis, risk stratification, and anticoagulation management.    PAST MEDICAL & SURGICAL HISTORY:  Osteoarthritis of right hip      Glaucoma      Alzheimer disease      Hypercholesteremia      Dementia      S/P appendectomy  childhood      S/P tonsillectomy  childhood      S/P D&amp;C (status post dilation and curettage)  age 32      S/P colonoscopy  every 2 or 3 years    Interval History  2022:patient seen at bedside awake , able to tell her name  patient was having swallowing difficulty Speech and swallow eval done, puree diet,   H&H stable will continue Lovenox 40mg daily for DVT prophylaxis   2022:patient seen at bedside on CCU, upgraded to  CCU low O2 sat and potential aspiration risk, patient is on NC now no concerns regarding AC will continue Lovenox for DVT prophylaxis   2022 Pt seen at bedside in CCU.  Pt non verbal,, physical therapy with pt.   2022 Pt seen at bedside with daughter present.  Pt remains confused and non verbal.  Feeding tube noticed.  Discussed with daughter that pt is on Lovenox for DVT prophylaxis. No concerns.   10-2-2022: Pt seen at bedside on [facility] with son present.  Pt alert knows her name and able to answer questions.  dose not know where she is or even who her son is.  NG tube removed, awaiting re evaluation for swallowing.  Will cont on Lovenox.         CrCl:31.3  BMI:18.3  EBL:150ml    Caprini VTE Risk Score:CAPRINI SCORE  AGE RELATED RISK FACTORS                                                       MOBILITY RELATED FACTORS  [ ] Age 41-60 years                                            (1 Point)                  [ ] Bed rest /restricted mobility                             (1 Point)  [ ] Age: 61-74 years                                           (2 Points)                [ ] Plaster cast                                                   (2 Points)  [x ] Age= 75 years                                              (3 Points)                 [ ] Bed bound for more than 72 hours                   (2 Points)    DISEASE RELATED RISK FACTORS                                               GENDER SPECIFIC FACTORS  [ ] Edema in the lower extremities                       (1 Point)           [ ] Pregnancy                                                            (1 Point)  [ ] Varicose veins                                               (1 Point)                  [ ] Post-partum < 6 weeks                                      (1 Point)             [ ] BMI > 25 Kg/m2                                            (1 Point)                  [ ] Hormonal therapy or oral contraception       (1 Point)                 [ ] Sepsis (in the previous month)                        (1 Point)             [ ] History of pregnancy complications                (1Point)  [ ] Pneumonia or serious lung disease                                             [ ] Unexplained or recurrent  (=/>3), premature                                 (In the previous month)                               (1 Point)                birth with toxemia or growth-restricted infant (1 Point)  [ ] Abnormal pulmonary function test            (1 Point)                                   SURGERY RELATED RISK FACTORS  [ ] Acute myocardial infarction                       (1 Point)                  [ ]  Section                                         (1 Point)  [ ] Congestive heart failure (in the previous month) (1 Point)   [ ] Minor surgery   lasting <45 minutes       (1 Point)   [ ] Inflammatory bowel disease                             (1 Point)          [ ] Arthroscopic surgery                                  (2 Points)  [ ] Central venous access                                    (2 Points)            [ ] General surgery lasting >45 minutes      (2 Points)       [ ] Stroke (in the previous month)                  (5 Points)            [ ] Elective major lower extremity arthroplasty (5 Points)                                   [  ] Malignancy (present or past include skin melanoma                                          but exclude  basal skin cell)    (2 points)                                      TRAUMA RELATED RISK FACTORS                HEMATOLOGY RELATED FACTORS                                  [ x] Fracture of the hip, pelvis, or leg                       (5 Points)  [ ] Prior episodes of VTE                                     (3 Points)          [ ] Acute spinal cord injury (in the previous month)  (5 Points)  [ ] Positive family history for VTE                         (3 Points)       [ ] Paralysis (less than 1 month)                          (5 Points)  [ ] Prothrombin 05155 A                                      (3 Points)         [ ] Multiple Trauma (within 1month)                 (5Points)                                                                                                                                                                [ ] Factor V Leiden                                          (3 Points)                                OTHER RISK FACTORS                          [ ] Lupus anticoagulants                                     (3 Points)                       [ ] BMI > 40                          (1 Point)                                                         [ ] Anticardiolipin antibodies                                (3 Points)                   [ ] Smoking                              (1Point)                                                [ ] High homocysteine in the blood                      (3 Points)                [  ] Diabetes requiring insulin (1point)                         [ ] Other congenital or acquired thrombophilia       (3 Points)          [  ] Chemotherapy                   (1 Point)  [ ] Heparin induced thrombocytopenia                  (3 Points)             [  ] Blood Transfusion                (1 point)                                                                                                             Total Score [    8      ]                                                                                                                                                                                                                                                                                                                                                                                                                                         IMPROVE Bleeding Risk Score:4.5      Falls Risk:   High ( x )  Mod (  )  Low (  )      FAMILY HISTORY:  Family history of colon cancer (Father)      Denies any personal or familial history of clotting or bleeding disorders.    Allergies    No Known Allergies    Intolerances        REVIEW OF SYSTEMS    (  )Fever	     (  )Constipation	(  )SOB				(  )Headache	(  )Dysuria  (  )Chills	     (  )Melena	(  )Dyspnea present on exertion	                    (  )Dizziness                    (  )Polyuria  (  )Nausea	     (  )Hematochezia	(  )Cough			                    (  )Syncope   	(  )Hematuria  (  )Vomiting    (  )Chest Pain	(  )Wheezing			(  )Weakness  (  )Diarrhea     (  )Palpitations	(  )Anorexia			(  )joint pain    unable to obtain: dementia    Vital Signs Last 24 Hrs  T(C): 36.7 (10-02-22 @ 08:55), Max: 36.9 (10-02-22 @ 00:04)  T(F): 98 (10-02-22 @ 08:55), Max: 98.5 (10-02-22 @ 00:04)  HR: 77 (10-02-22 @ 08:55) (77 - 90)  BP: 125/46 (10-02-22 @ 08:55) (123/55 - 142/60)  BP(mean): 77 (10-01-22 @ 15:17) (77 - 77)  RR: 17 (10-02-22 @ 08:55) (16 - 18)  SpO2: 96% (10-02-22 @ 08:55) (95% - 97%)  PHYSICAL EXAM:    Constitutional: Appears ill    Neurological: Alert knows her name Opal    Skin: Warm    Respiratory and Thorax: normal effort; Breath sounds: normal; No rales/wheezing/rhonchi,   	  Cardiovascular: S1, S2, regular, NMBR	    Gastrointestinal: BS + x 4Q, nontender	    Genitourinary:  Bladder nondistended, nontender    Musculoskeletal:   General Right:   no muscle/joint tenderness,   normal tone, no joint swelling,   ROM: limited	    General Left:   no muscle/joint tenderness,   normal tone, notes swelling to left arm  ROM: limited    Hip: Left: Dressing CDI;         Lower extrems:   Right: no calf tenderness              negative chad's sign               + pedal pulses    Left:   no calf tenderness              negative chad's sign               + pedal pulses                                    8.8    8.21  )-----------( 238      ( 01 Oct 2022 06:30 )             26.3       10-    141  |  112<H>  |  8   ----------------------------<  103<H>  3.9   |  25  |  0.52    Ca    8.8      01 Oct 2022 06:30  Phos  2.7     10-01  Mg     2.4     10-01                             8.7    10.16 )-----------( 227      ( 30 Sep 2022 06:12 )             25.0       -    143  |  111<H>  |  8   ----------------------------<  106<H>  3.4<L>   |  28  |  0.56    Ca    8.5      30 Sep 2022 06:12  Phos  1.2     -30  Mg     2.1                                 8.6    9.35  )-----------( 156      ( 28 Sep 2022 06:31 )             26.0       09-28    141  |  107  |  9   ----------------------------<  106<H>  3.2<L>   |  29  |  0.74    Ca    8.5      28 Sep 2022 06:31  Phos  2.1       Mg     2.0         TPro  5.0<L>  /  Alb  1.8<L>  /  TBili  0.8  /  DBili  x   /  AST  39<H>  /  ALT  15  /  AlkPhos  148<H>        PT/INR - ( 28 Sep 2022 06:31 )   PT: 14.4 sec;   INR: 1.24 ratio         PTT - ( 28 Sep 2022 06:31 )  PTT:31.5 sec             9.5    14.00 )-----------( 135      ( 27 Sep 2022 05:52 )             28.7           138  |  106  |  10  ----------------------------<  104<H>  3.8   |  28  |  0.77    Ca    8.8      27 Sep 2022 05:52  Phos  1.8       Mg     2.0         TPro  5.7<L>  /  Alb  2.5<L>  /  TBili  0.6  /  DBili  x   /  AST  39<H>  /  ALT  13  /  AlkPhos  81        PT/INR - ( 27 Sep 2022 05:52 )   PT: 15.0 sec;   INR: 1.29 ratio         PTT - ( 27 Sep 2022 05:52 )  PTT:32.6 sec                        10.1   12.99 )-----------( 142      ( 26 Sep 2022 09:16 )             29.3           140  |  108  |  10  ----------------------------<  107<H>  3.9   |  26  |  0.90    Ca    8.7      26 Sep 2022 09:16        PT/INR - ( 25 Sep 2022 05:08 )   PT: 14.0 sec;   INR: 1.20 ratio         PTT - ( 25 Sep 2022 05:08 )  PTT:22.3 sec				    MEDICATIONS  (STANDING):  chlorhexidine 4% Liquid 1 Application(s) Topical <User Schedule>  cholecalciferol 1000 Unit(s) Oral daily  citalopram 20 milliGRAM(s) Oral daily  donepezil 10 milliGRAM(s) Oral at bedtime  enoxaparin Injectable 40 milliGRAM(s) SubCutaneous every 24 hours  latanoprost 0.005% Ophthalmic Solution 1 Drop(s) Both EYES at bedtime  melatonin 3 milliGRAM(s) Oral at bedtime  memantine 10 milliGRAM(s) Oral two times a day  multivitamin 1 Tablet(s) Oral daily  polyethylene glycol 3350 17 Gram(s) Oral at bedtime  potassium phosphate / sodium phosphate Powder (PHOS-NaK) 2 Packet(s) Oral three times a day  senna 2 Tablet(s) Oral at bedtime  simvastatin 20 milliGRAM(s) Oral at bedtime  timolol 0.5% Solution 1 Drop(s) Both EYES daily  tranexamic acid IVPB 1000 milliGRAM(s) IV Intermittent once          < from: CT Head No Cont (22 @ 07:09) >  MPRESSION: No acute intracranial hemorrhage, mass effect, or shift of   the midline structures.    Similar-appearing mild chronic white matter microvascular type changes.    < end of copied text >      DVT Prophylaxis:  LMWH                   ( x )  Heparin SQ           (  )  Coumadin             (  )  Xarelto                  (  )  Eliquis                   (  )  Venodynes           (x  )  Ambulation          ( x )  UFH                       (  )  Contraindicated  (  )  EC ASPIRIN       (  )

## 2022-10-02 NOTE — PROGRESS NOTE ADULT - SUBJECTIVE AND OBJECTIVE BOX
Patient seen and examined at bedside. Patient is talking and following commands this AM, speech organized AOx0, follows commands..    Vital Signs Last 24 Hrs  T(C): 36.9 (02 Oct 2022 00:04), Max: 36.9 (02 Oct 2022 00:04)  T(F): 98.5 (02 Oct 2022 00:04), Max: 98.5 (02 Oct 2022 00:04)  HR: 90 (02 Oct 2022 00:04) (77 - 90)  BP: 142/60 (02 Oct 2022 00:04) (123/55 - 142/60)  BP(mean): 77 (01 Oct 2022 15:17) (77 - 78)  RR: 16 (02 Oct 2022 00:04) (15 - 18)  SpO2: 96% (02 Oct 2022 00:04) (95% - 97%)    Parameters below as of 02 Oct 2022 00:04  Patient On (Oxygen Delivery Method): room air      LLE:  Dressing in place, c/d/i.  Maris-incisional TTP; otherwise, NTTP throughout the rest of the extremity.   SILT L2-S1  + GSC/TA/EHL/FHL intact   Extremity warm and well perfused  No calf tenderness bilaterally.  Compartments soft and compressible.     87F POD7 L hip saqib    Plan:   NPO at this time, NGT in place, speech and swallow recs appreciated  WBAT/PT/OT, abduction pillow  Pain management PRN  DVT prophylaxis: appreciate anticoagulation team recommendations.   CCU management appreciated  Dispo: [facility]  Ortho Stable for DC

## 2022-10-02 NOTE — PROGRESS NOTE ADULT - SUBJECTIVE AND OBJECTIVE BOX
CC: Left hip Fx  Subjective and Objective:   86 yo F with a PMH of HLD, glaucoma, Alzheimer's dementia, and OA who presented to ED after a fall. The patient has a 24/7 air, and has a hospital bed at home, but when the aid went to the bathroom last night, the patient (despite having railings) climbed to the edge of the bed and fell on her left side. It is unclear if she hit her head. Reportedly, the patient has been more restless recently at night. No recent medication changes. No complaints from daughter. Her last fall was years ago, and she does not normally fall because of the precautions with her bed and having her aide 24/7. She normally can ambulate with assistance and eat with assistance. She was admitted with hip fracture. Underwent Left hip saqib 9/25. RRT called 2 after episode of hypoxia with concern for aspiration. Patient noted to be gargling on own secretions. Transferred to ICU for aspiration risk and airway monitoring.   Pt treated for  hip fracture s/p ORIF.  Hospital course complicated by acute respiratory failure with hypoxia due to aspiration PNA, treated with full course of IV antibiotics.  NG tube placed for feeds due to inability to swallow without risk of aspiration.  She is tolerating tube feeds.  At bedside pt is alert, answering basic questions, comfortable.  I spoke to son at bedside as well,  plan likely for hospice however family wishes to speak with palliative care services before making decision.      s:  10/2: Pt more alert, answering basic questions.  SHe pulled NG tube out this AM, but feeling well.  Daughter at bedside, updated on plan of care.  Optimistic she may be able to tolerate diet due to being more alert.      REVIEW OF SYSTEMS: All other review of systems is negative unless indicated above.    Vital Signs Last 24 Hrs  T(C): 36.7 (02 Oct 2022 08:55), Max: 36.9 (02 Oct 2022 00:04)  T(F): 98 (02 Oct 2022 08:55), Max: 98.5 (02 Oct 2022 00:04)  HR: 77 (02 Oct 2022 08:55) (77 - 90)  BP: 125/46 (02 Oct 2022 08:55) (123/55 - 142/60)  BP(mean): 77 (01 Oct 2022 15:17) (77 - 77)  RR: 17 (02 Oct 2022 08:55) (16 - 18)  SpO2: 96% (02 Oct 2022 08:55) (95% - 97%)    Parameters below as of 02 Oct 2022 08:55  Patient On (Oxygen Delivery Method): room air          PHYSICAL EXAM:    Constitutional: NAD, awake and alert, weak, frail, elderly appearing  HEENT: PERR, EOMI, Normal Hearing, NG out  Neck: Soft and supple  Respiratory: Breath sounds are diminished b/l  Cardiovascular: S1 and S2, regular rate and rhythm, no Murmurs, gallops or rubs  Gastrointestinal: Bowel Sounds present, soft, nontender, nondistended, no guarding, no rebound  Extremities: No peripheral edema  Neurological: A/O x 2, no focal deficits in my limited exam      MEDICATIONS  (STANDING):  chlorhexidine 4% Liquid 1 Application(s) Topical <User Schedule>  dextrose 5% + sodium chloride 0.9% 1000 milliLiter(s) (75 mL/Hr) IV Continuous <Continuous>  enoxaparin Injectable 40 milliGRAM(s) SubCutaneous every 24 hours  latanoprost 0.005% Ophthalmic Solution 1 Drop(s) Both EYES at bedtime  timolol 0.5% Solution 1 Drop(s) Both EYES daily  tranexamic acid IVPB 1000 milliGRAM(s) IV Intermittent once    MEDICATIONS  (PRN):  acetaminophen     Tablet .. 650 milliGRAM(s) Oral every 6 hours PRN Mild Pain (1 - 3)  acetaminophen  Suppository .. 650 milliGRAM(s) Rectal every 6 hours PRN Temp greater or equal to 38C (100.4F)  ketorolac   Injectable 15 milliGRAM(s) IV Push every 6 hours PRN Severe Pain (7 - 10)  morphine  - Injectable 2 milliGRAM(s) IV Push every 6 hours PRN breakthrough pain  ondansetron Injectable 4 milliGRAM(s) IV Push every 6 hours PRN Nausea and/or Vomiting  ondansetron Injectable 4 milliGRAM(s) IV Push every 8 hours PRN Nausea and/or Vomiting                                8.8    8.21  )-----------( 238      ( 01 Oct 2022 06:30 )             26.3     10-01    141  |  112<H>  |  8   ----------------------------<  103<H>  3.9   |  25  |  0.52    Ca    8.8      01 Oct 2022 06:30  Phos  2.7     10-01  Mg     2.4     10-01      CAPILLARY BLOOD GLUCOSE              A/P:      Femur fracture, left.   -s/p ORIF day # 6  -ortho cleared  -PT   -pain management (convert to IV due to pt pulling NG tube) - awaiting swallow eval for tomorrow AM    Senile dementia with depression / dysphagia / acute metabolic encephalopathy / severe protein calorie malnutrition / aspiration PNA:  -acute encephalopathy resolved   -aspiration PNA resolved s/p antibiotics  -NG tube pulled - dementia meds on hold until swallow re-patrice tomorrow AM  -D51/2NS w/ KCl IVF for now  -supportive care    Acute respiratory failure with hypoxia: RESOLVED    DVT ppx:  -lovenox x 4 weeks      DIspo:  -d/c to [facility] if pt passes swallow eval.  If fails, palliative care eval to discuss goals of care.  Daughter updated on plan of are.

## 2022-10-03 LAB
ANION GAP SERPL CALC-SCNC: 6 MMOL/L — SIGNIFICANT CHANGE UP (ref 5–17)
BUN SERPL-MCNC: 6 MG/DL — LOW (ref 7–23)
CALCIUM SERPL-MCNC: 8.9 MG/DL — SIGNIFICANT CHANGE UP (ref 8.5–10.1)
CHLORIDE SERPL-SCNC: 112 MMOL/L — HIGH (ref 96–108)
CO2 SERPL-SCNC: 24 MMOL/L — SIGNIFICANT CHANGE UP (ref 22–31)
CREAT SERPL-MCNC: 0.68 MG/DL — SIGNIFICANT CHANGE UP (ref 0.5–1.3)
EGFR: 85 ML/MIN/1.73M2 — SIGNIFICANT CHANGE UP
GLUCOSE SERPL-MCNC: 115 MG/DL — HIGH (ref 70–99)
HCT VFR BLD CALC: 27.4 % — LOW (ref 34.5–45)
HGB BLD-MCNC: 9.1 G/DL — LOW (ref 11.5–15.5)
MCHC RBC-ENTMCNC: 33.2 GM/DL — SIGNIFICANT CHANGE UP (ref 32–36)
MCHC RBC-ENTMCNC: 34.3 PG — HIGH (ref 27–34)
MCV RBC AUTO: 103.4 FL — HIGH (ref 80–100)
PLATELET # BLD AUTO: 350 K/UL — SIGNIFICANT CHANGE UP (ref 150–400)
POTASSIUM SERPL-MCNC: 3.9 MMOL/L — SIGNIFICANT CHANGE UP (ref 3.5–5.3)
POTASSIUM SERPL-SCNC: 3.9 MMOL/L — SIGNIFICANT CHANGE UP (ref 3.5–5.3)
RBC # BLD: 2.65 M/UL — LOW (ref 3.8–5.2)
RBC # FLD: 14.6 % — HIGH (ref 10.3–14.5)
SARS-COV-2 RNA SPEC QL NAA+PROBE: SIGNIFICANT CHANGE UP
SODIUM SERPL-SCNC: 142 MMOL/L — SIGNIFICANT CHANGE UP (ref 135–145)
WBC # BLD: 8.57 K/UL — SIGNIFICANT CHANGE UP (ref 3.8–10.5)
WBC # FLD AUTO: 8.57 K/UL — SIGNIFICANT CHANGE UP (ref 3.8–10.5)

## 2022-10-03 PROCEDURE — 99232 SBSQ HOSP IP/OBS MODERATE 35: CPT

## 2022-10-03 PROCEDURE — 99231 SBSQ HOSP IP/OBS SF/LOW 25: CPT

## 2022-10-03 RX ADMIN — Medication 15 MILLIGRAM(S): at 18:42

## 2022-10-03 RX ADMIN — Medication 1 DROP(S): at 10:16

## 2022-10-03 RX ADMIN — CHLORHEXIDINE GLUCONATE 1 APPLICATION(S): 213 SOLUTION TOPICAL at 10:15

## 2022-10-03 RX ADMIN — LATANOPROST 1 DROP(S): 0.05 SOLUTION/ DROPS OPHTHALMIC; TOPICAL at 21:36

## 2022-10-03 RX ADMIN — Medication 15 MILLIGRAM(S): at 19:00

## 2022-10-03 RX ADMIN — ENOXAPARIN SODIUM 40 MILLIGRAM(S): 100 INJECTION SUBCUTANEOUS at 06:00

## 2022-10-03 NOTE — SWALLOW BEDSIDE ASSESSMENT ADULT - SWALLOW EVAL: RECOMMENDED DIET
PUREE , MODERATELY THICK LIQUIDS:  USE SMALL FLAT TEASPOON.  PT UP[RIGHT AS POSSIBLE. TELL PT WHAT IS PRESENTED: PT NEEDS VERBAL CUEING  FEED ONLY AS TOLERATED. DO NOT RUSH PT. PT HAS VERY SLOW SWALLOW.  NO STRAW AT THIS TIME. MEDS CRUSHED IN PUDDING/PUREE. ;
Puree, moderately thick liquids. meds crushed in puree/pudding.  Tell pt what is presented.   Pt will be a challenging feed 2/2 dementia.

## 2022-10-03 NOTE — SWALLOW BEDSIDE ASSESSMENT ADULT - SWALLOW EVAL: ORAL MUSCULATURE
upper lip sunken 2/2 to no dentures./unable to assess due to poor participation/comprehension
unable to assess due to poor participation/comprehension

## 2022-10-03 NOTE — SWALLOW BEDSIDE ASSESSMENT ADULT - SWALLOW EVAL: SECRETION MANAGEMENT
no overt problems. Note suctioning necessary during the night.
no voert problems, but no gurgling noted. No reflexive swallow observed

## 2022-10-03 NOTE — CONSULT NOTE ADULT - CONSULT REASON
Dementia, absent gag
GOC
Hip pain  Consulted at 1030 seen at 1040
DVT/PE prophylaxis, risk stratification and Anticoagulation Management

## 2022-10-03 NOTE — SWALLOW BEDSIDE ASSESSMENT ADULT - SLP GENERAL OBSERVATIONS
pt seated in bed, awake with eyes wide open: head in mild extension.  Minimal verbal output, and related only to immediate action of putting in her upper dentures.
pt lying in bed eyes closed, rousable to voice and touch: sparse verbal output and pt easily roused to irritation ("Go to hell").  Demeanor c/w dementia.  HoB position more upright with pt's head in improved flexion.

## 2022-10-03 NOTE — SWALLOW BEDSIDE ASSESSMENT ADULT - SWALLOW EVAL: DIAGNOSIS
oral-pharyngeal dysphagia in a setting of underlying Alzheimer's dementia, and now fall with hip fx.
oral-pharyngeal dysphagia in a setting of presumed Alzheimer's disease, and co-morbidities; acute hip fx.  Pt today shows just sufficient oral-pharyngeal integrity to start further directions and recommendations.

## 2022-10-03 NOTE — CONSULT NOTE ADULT - ASSESSMENT
Pt is a 87y old Female with hx of HLD, glaucoma, Alzheimer's dementia, and OA who presented to ED after a fall.     1) Femur fracture   - s/p ORIF   - c/w pain regimen patient appears comfortable at this time   - PT consult appreciated     2) History of Dementia   - unsure of baseline   - supportive care     3) dysphagia   - treated on this admission for aspiration PNA   - speech and swallow re- eval this AM   - possible calorie count     Process of Care  --Reviewed dx/treatment problems and alignment with Goals of Care    Physical Aspects of Care  --Pain  patient denies at this time  c/w current managment    --Bowel Regimen  risk for constipation d/t immobility    --Dyspnea  No SOB at this time  comfortable and in NAD    --Nausea Vomiting  denies    --Weakness  PT as tolerated     Psychological and Psychiatric Aspects of Care:   --Greif/Bereavment: emotional support provided  --Hx of psychiatric dx: none  -Pastoral Care Available PRN     Social Aspects of Care  -SW involved     Cultural Aspects  -Primary Language: English    Goals of Care:   We discussed Palliative Care team being a supportive team when a patient has ongoing illnesses.  We also discussed that it is not an end of life care service, but can help navigate symptoms and emotional support througout their hospital stay here.    Ethical and Legal Aspects:   NA    Capacity- patient does not appear to have capacity     HCP/Surrogate: HCP on one content dylan Alvarado Status- Full code   [facility]-   Dispo Plan-    Discussed With: Dr. Wilburn coordinated with attending and SW and RN      Pt is a 87y old Female with hx of HLD, glaucoma, Alzheimer's dementia, and OA who presented to ED after a fall.     1) Femur fracture   - s/p ORIF   - c/w pain regimen patient appears comfortable at this time   - PT consult appreciated     2) History of Dementia   - unsure of baseline   - supportive care     3) dysphagia   - treated on this admission for aspiration PNA   - speech and swallow re- eval this AM   - possible calorie count     Process of Care  --Reviewed dx/treatment problems and alignment with Goals of Care    Physical Aspects of Care  --Pain  patient denies at this time  c/w current managment    --Bowel Regimen  risk for constipation d/t immobility    --Dyspnea  No SOB at this time  comfortable and in NAD    --Nausea Vomiting  denies    --Weakness  PT as tolerated     Psychological and Psychiatric Aspects of Care:   --Greif/Bereavment: emotional support provided  --Hx of psychiatric dx: none  -Pastoral Care Available PRN     Social Aspects of Care  -SW involved     Cultural Aspects  -Primary Language: English    Goals of Care:  Scheduled for 10/4/2022 at 10AM     We discussed Palliative Care team being a supportive team when a patient has ongoing illnesses.  We also discussed that it is not an end of life care service, but can help navigate symptoms and emotional support througout their hospital stay here.    Ethical and Legal Aspects:   NA    Capacity- patient does not appear to have capacity     HCP/Surrogate: HCP on one content dylan Alfonso     Code Status- Full code   [facility]-   Dispo Plan-    Discussed With: Dr. Wilburn coordinated with attending and SW and RN

## 2022-10-03 NOTE — SWALLOW BEDSIDE ASSESSMENT ADULT - ORAL PHASE
rapid flow of thin liquid with "startle" swallow: improved AP control of moderately thick and puree. Pt tends to lift tongue to accept bolus;: feeder must attempt to get the spoon on the tongue surface.
slow start to oral processing, with rapid flow of thin liquid and startle swallow. slow processing and piecemeal AP transfer forpuree

## 2022-10-03 NOTE — PROGRESS NOTE ADULT - SUBJECTIVE AND OBJECTIVE BOX
CC: Left hip Fx  Subjective and Objective:   86 yo F with a PMH of HLD, glaucoma, Alzheimer's dementia, and OA who presented to ED after a fall. The patient has a 24/7 air, and has a hospital bed at home, but when the aid went to the bathroom last night, the patient (despite having railings) climbed to the edge of the bed and fell on her left side. It is unclear if she hit her head. Reportedly, the patient has been more restless recently at night. No recent medication changes. No complaints from daughter. Her last fall was years ago, and she does not normally fall because of the precautions with her bed and having her aide 24/7. She normally can ambulate with assistance and eat with assistance. She was admitted with hip fracture. Underwent Left hip saqib 9/25. RRT called 2 after episode of hypoxia with concern for aspiration. Patient noted to be gargling on own secretions. Transferred to ICU for aspiration risk and airway monitoring.   Pt treated for  hip fracture s/p ORIF.  Hospital course complicated by acute respiratory failure with hypoxia due to aspiration PNA, treated with full course of IV antibiotics.  NG tube placed for feeds due to inability to swallow without risk of aspiration.    She is now transferred to .   She removed her NGT.   Seen by speech and swallow, recommended trial of pureed and honey thick liquids.     pt seen and examined this am. sleeping but arousable. No distress.     REVIEW OF SYSTEMS: unable to obtain d/t dementia.     Vital Signs Last 24 Hrs  T(C): 36.4 (03 Oct 2022 08:53), Max: 36.8 (02 Oct 2022 20:34)  T(F): 97.6 (03 Oct 2022 08:53), Max: 98.3 (02 Oct 2022 20:34)  HR: 78 (03 Oct 2022 08:53) (73 - 79)  BP: 130/59 (03 Oct 2022 08:53) (120/50 - 154/54)  RR: 17 (03 Oct 2022 08:53) (17 - 18)  SpO2: 100% (03 Oct 2022 08:53) (97% - 100%)      PHYSICAL EXAM:    GENERAL: Comfortable, no acute distress   HEAD:  Normocephalic, atraumatic  EYES: EOMI, PERRLA  HEENT: Moist mucous membranes  NECK: Supple, No JVD  NERVOUS SYSTEM:  confused, non focal.   CHEST/LUNG: decreased bs at bases.  HEART: Regular rate and rhythm  ABDOMEN: Soft, Nontender, Nondistended, Bowel sounds present  GENITOURINARY: Voiding, no palpable bladder  EXTREMITIES:   No clubbing, cyanosis, or edema  MUSCULOSKELETAL- Left hip dressing c/d/i.   SKIN-no rash      LABS:                        9.1    8.57  )-----------( 350      ( 03 Oct 2022 07:52 )             27.4     10-03    142  |  112<H>  |  6<L>  ----------------------------<  115<H>  3.9   |  24  |  0.68    Ca    8.9      03 Oct 2022 07:52      MEDICATIONS  (STANDING):  chlorhexidine 4% Liquid 1 Application(s) Topical <User Schedule>  dextrose 5% + sodium chloride 0.9% 1000 milliLiter(s) (75 mL/Hr) IV Continuous <Continuous>  enoxaparin Injectable 40 milliGRAM(s) SubCutaneous every 24 hours  latanoprost 0.005% Ophthalmic Solution 1 Drop(s) Both EYES at bedtime  timolol 0.5% Solution 1 Drop(s) Both EYES daily  tranexamic acid IVPB 1000 milliGRAM(s) IV Intermittent once    MEDICATIONS  (PRN):  acetaminophen     Tablet .. 650 milliGRAM(s) Oral every 6 hours PRN Mild Pain (1 - 3)  acetaminophen  Suppository .. 650 milliGRAM(s) Rectal every 6 hours PRN Temp greater or equal to 38C (100.4F)  ketorolac   Injectable 15 milliGRAM(s) IV Push every 6 hours PRN Severe Pain (7 - 10)  morphine  - Injectable 2 milliGRAM(s) IV Push every 6 hours PRN breakthrough pain  ondansetron Injectable 4 milliGRAM(s) IV Push every 6 hours PRN Nausea and/or Vomiting  ondansetron Injectable 4 milliGRAM(s) IV Push every 8 hours PRN Nausea and/or Vomiting      ASSESSMENT AND PLAN:      1. Left hip fracture s/p left hip saqib pOD#7  -pain control  -physical therapy  -incentive spirometry  -bowel  regimen.     2. Acute blood loss anemia:  -d/t surgery  -transfused 1 unit prbcs.     3. Dysphagia/aspiration pna/acute hypoxic respiratory failure  -transferred back to  from icu.   -treated with iv abx.   -ngt out.   -trial of pureed/honey thick liquids.   -per daughter at bedside, pt mostly relied on smoothies for nutrition prior to admission, wasn't really eating much solid food.   -will order calorie count.     4. Alzheimers dementia  -continue donepezil.    5. DVT px:  -lovenox per a/c services.     dispo:  palliative meeting tomorrow.   f/u calorie count.  d/w family at bedside.

## 2022-10-03 NOTE — PROGRESS NOTE ADULT - SUBJECTIVE AND OBJECTIVE BOX
HPI:  86 yo F with a PMH of HLD, glaucoma, Alzheimer's dementia, and OA who presents after a fall. The patient cannot provide any history, so it was obtained from her daughter Shira at bedside. The patient has a 24/7 air, and has a hospital bed at home, but when the aid went to the bathroom last night, the patient (despite having railings) climbed to the edge of the bed and fell on her left side. It is unclear if she hit her head. Reportedly, the patient has been more restless recently at night. No recent medication changes. No complaints from daughter. Her last fall was years ago, and she does not normally fall because of the precautions with her bed and having her aide 24/7. She normally can ambulate with assistance and eat with assistance.      In the ED, she was given Morphine 2 mg IV x1 and started on LR @ 75 ml/hr. (24 Sep 2022 12:57)      Patient is a 87y old  Female who presents with a chief complaint of left hip fracture (26 Sep 2022 10:34)      Consulted by Dr. Dale   for VTE prophylaxis, risk stratification, and anticoagulation management.    PAST MEDICAL & SURGICAL HISTORY:  Osteoarthritis of right hip      Glaucoma      Alzheimer disease      Hypercholesteremia      Dementia      S/P appendectomy  childhood      S/P tonsillectomy  childhood      S/P D&amp;C (status post dilation and curettage)  age 32      S/P colonoscopy  every 2 or 3 years    Interval History  2022:patient seen at bedside awake , able to tell her name  patient was having swallowing difficulty Speech and swallow eval done, puree diet,   H&H stable will continue Lovenox 40mg daily for DVT prophylaxis   2022:patient seen at bedside on CCU, upgraded to  CCU low O2 sat and potential aspiration risk, patient is on NC now no concerns regarding AC will continue Lovenox for DVT prophylaxis   2022 Pt seen at bedside in CCU.  Pt non verbal,, physical therapy with pt.   2022 Pt seen at bedside with daughter present.  Pt remains confused and non verbal.  Feeding tube noticed.  Discussed with daughter that pt is on Lovenox for DVT prophylaxis. No concerns.   10-2-2022: Pt seen at bedside on [facility] with son present.  Pt alert knows her name and able to answer questions.  dose not know where she is or even who her son is.  NG tube removed, awaiting re evaluation for swallowing.  Will cont on Lovenox.   10-3-2022: Pt seen at bedside on [facility], On Puree diet , H&H stable will continue Lovenox for DVT prophylaxis      CrCl:31.3  BMI:18.3  EBL:150ml    Caprini VTE Risk Score:CAPRINI SCORE  AGE RELATED RISK FACTORS                                                       MOBILITY RELATED FACTORS  [ ] Age 41-60 years                                            (1 Point)                  [ ] Bed rest /restricted mobility                             (1 Point)  [ ] Age: 61-74 years                                           (2 Points)                [ ] Plaster cast                                                   (2 Points)  [x ] Age= 75 years                                              (3 Points)                 [ ] Bed bound for more than 72 hours                   (2 Points)    DISEASE RELATED RISK FACTORS                                               GENDER SPECIFIC FACTORS  [ ] Edema in the lower extremities                       (1 Point)           [ ] Pregnancy                                                            (1 Point)  [ ] Varicose veins                                               (1 Point)                  [ ] Post-partum < 6 weeks                                      (1 Point)             [ ] BMI > 25 Kg/m2                                            (1 Point)                  [ ] Hormonal therapy or oral contraception       (1 Point)                 [ ] Sepsis (in the previous month)                        (1 Point)             [ ] History of pregnancy complications                (1Point)  [ ] Pneumonia or serious lung disease                                             [ ] Unexplained or recurrent  (=/>3), premature                                 (In the previous month)                               (1 Point)                birth with toxemia or growth-restricted infant (1 Point)  [ ] Abnormal pulmonary function test            (1 Point)                                   SURGERY RELATED RISK FACTORS  [ ] Acute myocardial infarction                       (1 Point)                  [ ]  Section                                         (1 Point)  [ ] Congestive heart failure (in the previous month) (1 Point)   [ ] Minor surgery   lasting <45 minutes       (1 Point)   [ ] Inflammatory bowel disease                             (1 Point)          [ ] Arthroscopic surgery                                  (2 Points)  [ ] Central venous access                                    (2 Points)            [ ] General surgery lasting >45 minutes      (2 Points)       [ ] Stroke (in the previous month)                  (5 Points)            [ ] Elective major lower extremity arthroplasty (5 Points)                                   [  ] Malignancy (present or past include skin melanoma                                          but exclude  basal skin cell)    (2 points)                                      TRAUMA RELATED RISK FACTORS                HEMATOLOGY RELATED FACTORS                                  [ x] Fracture of the hip, pelvis, or leg                       (5 Points)  [ ] Prior episodes of VTE                                     (3 Points)          [ ] Acute spinal cord injury (in the previous month)  (5 Points)  [ ] Positive family history for VTE                         (3 Points)       [ ] Paralysis (less than 1 month)                          (5 Points)  [ ] Prothrombin 39165 A                                      (3 Points)         [ ] Multiple Trauma (within 1month)                 (5Points)                                                                                                                                                                [ ] Factor V Leiden                                          (3 Points)                                OTHER RISK FACTORS                          [ ] Lupus anticoagulants                                     (3 Points)                       [ ] BMI > 40                          (1 Point)                                                         [ ] Anticardiolipin antibodies                                (3 Points)                   [ ] Smoking                              (1Point)                                                [ ] High homocysteine in the blood                      (3 Points)                [  ] Diabetes requiring insulin (1point)                         [ ] Other congenital or acquired thrombophilia       (3 Points)          [  ] Chemotherapy                   (1 Point)  [ ] Heparin induced thrombocytopenia                  (3 Points)             [  ] Blood Transfusion                (1 point)                                                                                                             Total Score [    8      ]                                                                                                                                                                                                                                                                                                                                                                                                                                         IMPROVE Bleeding Risk Score:4.5      Falls Risk:   High ( x )  Mod (  )  Low (  )      FAMILY HISTORY:  Family history of colon cancer (Father)      Denies any personal or familial history of clotting or bleeding disorders.    Allergies    No Known Allergies    Intolerances        REVIEW OF SYSTEMS    (  )Fever	     (  )Constipation	(  )SOB				(  )Headache	(  )Dysuria  (  )Chills	     (  )Melena	(  )Dyspnea present on exertion	                    (  )Dizziness                    (  )Polyuria  (  )Nausea	     (  )Hematochezia	(  )Cough			                    (  )Syncope   	(  )Hematuria  (  )Vomiting    (  )Chest Pain	(  )Wheezing			(  )Weakness  (  )Diarrhea     (  )Palpitations	(  )Anorexia			(  )joint pain    unable to obtain: dementia    Vital Signs Last 24 Hrs  T(C): 36.4 (03 Oct 2022 08:53), Max: 36.8 (02 Oct 2022 20:34)  T(F): 97.6 (03 Oct 2022 08:53), Max: 98.3 (02 Oct 2022 20:34)  HR: 78 (03 Oct 2022 08:53) (73 - 79)  BP: 130/59 (03 Oct 2022 08:53) (120/50 - 154/54)  BP(mean): --  RR: 17 (03 Oct 2022 08:53) (17 - 18)  SpO2: 100% (03 Oct 2022 08:53) (97% - 100%)  PHYSICAL EXAM:    Constitutional: Appears ill    Neurological: Alert knows her name Opal    Skin: Warm    Respiratory and Thorax: normal effort; Breath sounds: normal; No rales/wheezing/rhonchi,   	  Cardiovascular: S1, S2, regular, NMBR	    Gastrointestinal: BS + x 4Q, nontender	    Genitourinary:  Bladder nondistended, nontender    Musculoskeletal:   General Right:   no muscle/joint tenderness,   normal tone, no joint swelling,   ROM: limited	    General Left:   no muscle/joint tenderness,   normal tone, notes swelling to left arm  ROM: limited    Hip: Left: Dressing CDI;         Lower extrems:   Right: no calf tenderness              negative chad's sign               + pedal pulses    Left:   no calf tenderness              negative chad's sign               + pedal pulses                         9.1    8.57  )-----------( 350      ( 03 Oct 2022 07:52 )             27.4       10    142  |  112<H>  |  6<L>  ----------------------------<  115<H>  3.9   |  24  |  0.68    Ca    8.9      03 Oct 2022 07:52                                         8.8    8.21  )-----------( 238      ( 01 Oct 2022 06:30 )             26.3       10    141  |  112<H>  |  8   ----------------------------<  103<H>  3.9   |  25  |  0.52    Ca    8.8      01 Oct 2022 06:30  Phos  2.7     10-  Mg     2.4     10-01                             8.7    10.16 )-----------( 227      ( 30 Sep 2022 06:12 )             25.0       09-30    143  |  111<H>  |  8   ----------------------------<  106<H>  3.4<L>   |  28  |  0.56    Ca    8.5      30 Sep 2022 06:12  Phos  1.2     30  Mg     2.1                                 8.6    9.35  )-----------( 156      ( 28 Sep 2022 06:31 )             26.0       09    141  |  107  |  9   ----------------------------<  106<H>  3.2<L>   |  29  |  0.74    Ca    8.5      28 Sep 2022 06:31  Phos  2.1     09-28  Mg     2.0     -28    TPro  5.0<L>  /  Alb  1.8<L>  /  TBili  0.8  /  DBili  x   /  AST  39<H>  /  ALT  15  /  AlkPhos  148<H>  -28      PT/INR - ( 28 Sep 2022 06:31 )   PT: 14.4 sec;   INR: 1.24 ratio         PTT - ( 28 Sep 2022 06:31 )  PTT:31.5 sec             9.5    14.00 )-----------( 135      ( 27 Sep 2022 05:52 )             28.7       09-27    138  |  106  |  10  ----------------------------<  104<H>  3.8   |  28  |  0.77    Ca    8.8      27 Sep 2022 05:52  Phos  1.8       Mg     2.0         TPro  5.7<L>  /  Alb  2.5<L>  /  TBili  0.6  /  DBili  x   /  AST  39<H>  /  ALT  13  /  AlkPhos  81  0926      PT/INR - ( 27 Sep 2022 05:52 )   PT: 15.0 sec;   INR: 1.29 ratio         PTT - ( 27 Sep 2022 05:52 )  PTT:32.6 sec                        10.1   12.99 )-----------( 142      ( 26 Sep 2022 09:16 )             29.3       09    140  |  108  |  10  ----------------------------<  107<H>  3.9   |  26  |  0.90    Ca    8.7      26 Sep 2022 09:16        PT/INR - ( 25 Sep 2022 05:08 )   PT: 14.0 sec;   INR: 1.20 ratio         PTT - ( 25 Sep 2022 05:08 )  PTT:22.3 sec				    MEDICATIONS  (STANDING):  chlorhexidine 4% Liquid 1 Application(s) Topical <User Schedule>  cholecalciferol 1000 Unit(s) Oral daily  citalopram 20 milliGRAM(s) Oral daily  donepezil 10 milliGRAM(s) Oral at bedtime  enoxaparin Injectable 40 milliGRAM(s) SubCutaneous every 24 hours  latanoprost 0.005% Ophthalmic Solution 1 Drop(s) Both EYES at bedtime  melatonin 3 milliGRAM(s) Oral at bedtime  memantine 10 milliGRAM(s) Oral two times a day  multivitamin 1 Tablet(s) Oral daily  polyethylene glycol 3350 17 Gram(s) Oral at bedtime  potassium phosphate / sodium phosphate Powder (PHOS-NaK) 2 Packet(s) Oral three times a day  senna 2 Tablet(s) Oral at bedtime  simvastatin 20 milliGRAM(s) Oral at bedtime  timolol 0.5% Solution 1 Drop(s) Both EYES daily  tranexamic acid IVPB 1000 milliGRAM(s) IV Intermittent once          < from: CT Head No Cont (22 @ 07:09) >  MPRESSION: No acute intracranial hemorrhage, mass effect, or shift of   the midline structures.    Similar-appearing mild chronic white matter microvascular type changes.    < end of copied text >      DVT Prophylaxis:  LMWH                   ( x )  Heparin SQ           (  )  Coumadin             (  )  Xarelto                  (  )  Eliquis                   (  )  Venodynes           (x  )  Ambulation          ( x )  UFH                       (  )  Contraindicated  (  )  EC ASPIRIN       (  )

## 2022-10-03 NOTE — SWALLOW BEDSIDE ASSESSMENT ADULT - ORAL PREPARATORY PHASE
orientation to eating routines: reduce doral aperture for acceptance of spoon; premature closure on spoon with acceptence of only small amount of bolus. oral containment.
pt today has improved oral acceptance, but tends to extend head back when trying to strip the spoon. did not follow directions to "Put nose in the spoon".  oral contianment for both minimal bolus volumes of thin liquid and for puree.. no anterior loss.

## 2022-10-03 NOTE — SWALLOW BEDSIDE ASSESSMENT ADULT - ASR SWALLOW DENTITION
no upper dentures; missing lower dentition; frontal incisors visible
missing lower dentition, upper dentures placed in mouth by clinician..

## 2022-10-03 NOTE — SWALLOW BEDSIDE ASSESSMENT ADULT - SWALLOW EVAL: RECOMMENDED FEEDING/EATING TECHNIQUES
allow for swallow between intakes/alternate food with liquid/check mouth frequently for oral residue/pocketing/crush medication (when feasible)/maintain upright posture during/after eating for 30 mins/small sips/bites
support head into flexion; pt tends to have head n extension./allow for swallow between intakes/alternate food with liquid/check mouth frequently for oral residue/pocketing/crush medication (when feasible)/maintain upright posture during/after eating for 30 mins/no straws/position upright (90 degrees)/small sips/bites

## 2022-10-03 NOTE — SWALLOW BEDSIDE ASSESSMENT ADULT - CONSISTENCIES ADMINISTERED
no other consistencies offered 2/2 mental status and dysphagia profile./thin liquid/moderately thick/pureed
thin liquid/moderately thick/pureed

## 2022-10-03 NOTE — SWALLOW BEDSIDE ASSESSMENT ADULT - COMMENTS
86 yo F with a PMH of HLD, glaucoma, Alzheimer's dementia, and OA who presents after a fall. The patient cannot provide any history, so it was obtained from her daughter Shira at bedside. The patient has a 24/7 air, and has a hospital bed at home, but when the aid went to the bathroom last night, the patient (despite having railings) climbed to the edge of the bed and fell on her left side. It is unclear if she hit her head. Reportedly, the patient has been more restless recently at night. No recent medication changes. No complaints from daughter. Her last fall was years ago, and she does not normally fall because of the precautions with her bed and having her aide 24/7. She normally can ambulate with assistance and eat with assistance.  pt is seen by Service for capacity for po intake, and to determine the level of diet consistency.
THis is a RE-EVALUATION on the above pt for po intake. Please see Notes in  Documents: Display Format: Specialty report, since pt has followed almost every day since the initial Evaluation.  Pt is reportedly now more awake, and is speaking a little:  She has pulled the Corp[employer].

## 2022-10-03 NOTE — SWALLOW BEDSIDE ASSESSMENT ADULT - SWALLOW EVAL: CRITERIA FOR SKILLED INTERVENTION MET
demonstrates skilled criteria for swallowing intervention
will follow in house/demonstrates skilled criteria for swallowing intervention

## 2022-10-03 NOTE — PROGRESS NOTE ADULT - SUBJECTIVE AND OBJECTIVE BOX
Patient seen and examined at bedside. Pain well controlled with medication. A/O x0, consistent with prior exam. No gurgling. Does not respond to questions or commands.     Vital Signs Last 24 Hrs  T(C): 36.4 (03 Oct 2022 08:53), Max: 36.8 (02 Oct 2022 20:34)  T(F): 97.6 (03 Oct 2022 08:53), Max: 98.3 (02 Oct 2022 20:34)  HR: 78 (03 Oct 2022 08:53) (73 - 79)  BP: 130/59 (03 Oct 2022 08:53) (120/50 - 154/54)  BP(mean): --  RR: 17 (03 Oct 2022 08:53) (17 - 18)  SpO2: 100% (03 Oct 2022 08:53) (97% - 100%)    Parameters below as of 03 Oct 2022 08:53  Patient On (Oxygen Delivery Method): room air      LLE:  Dressing in place, c/d/i.  Maris-incisional TTP; otherwise, NTTP throughout the rest of the extremity.   SILT L2-S1  + GSC/TA/EHL/FHL intact   Extremity warm and well perfused  No calf tenderness bilaterally.  Compartments soft and compressible.     87F POD7 L hip saqib    Plan:   NPO at this time, NGT in place, speech and swallow recs appreciated  WBAT/PT/OT, abduction pillow  Pain management PRN  DVT prophylaxis: appreciate anticoagulation team recommendations.   CCU management appreciated  Dispo: [facility]  Ortho Stable for DC

## 2022-10-03 NOTE — CONSULT NOTE ADULT - NS ATTEND AMEND GEN_ALL_CORE FT
agree w/ above note  pt seen and examined in NAD  lungs w/o wheezing or rales  cards s1s2   abdomen soft nontender

## 2022-10-03 NOTE — CHART NOTE - NSCHARTNOTEFT_GEN_A_CORE
Clinical Nutrition BRIEF Note    * 88 yo admitted for Fracture of unspecified part of neck of unspecified femur, initial encounter for closed fracture. hx of HLD, glaucoma, Alzheimer's dementia, and OA who presented to ED after a fall. The patient has a 24/7 aid, and has a hospital bed at home, but when the aid went to the bathroom last night, the patient (despite having railings) climbed to the edge of the bed and fell on her left side.     *Was in ICU w/ NG tube placed for feeds due to inability to swallow without risk of aspiration, now removed awaiting re-evaluation by speech and swallow - seen 10/3: rec'd pureed w/ mod thick liquids. Palliative care following - awaiting GOC discussion. Currently full code.    *being followed up for: RD re-consulted for calorie count - will do 2 days. Hung at bed side. Family at bed side during RD visit, DTR states pt's baseline was eating soft foods and relied on nutritional smoothies and ensure shakes for nutrition; shakes include veggies, peanut butter, yogurt, fruits, etc. Will add ensure + HP mod thick TID and magic cup BID to meet increased needs. Will f/u w/ calorie count results tomorrow.    * Rec'd to confirm GOC regarding long-term nutrition support. Nutrition support is not recommended due to overall declining medical status which evidenced based studies indicate EN is not effective in prolonging survival and improving quality of life. It can also increase risk of aspiration pneumonia as well as other related issues. Will provide nutrition within goals of care.     *labs reviewed; 10-03    142  |  112<H>  |  6<L>  ----------------------------<  115<H>  3.9   |  24  |  0.68    Ca    8.9      03 Oct 2022 07:52      *BM documented: 9/30 x 3 days, loose.     *Pt continues to meet criteria for severe protein-calorie malnutrition in context of acute on chronic illness r/t decreased ability to meet increased nutrient needs 2/2 fx/aspiration PNA? AEB severe muscle/fat wasting and <50% of ENN x > 5 days    Diet, Pureed:   Moderately Thick Liquids (MODTHICKLIQS) (10-03-22 @ 10:04)      ESTIMATED NUTR NEEDS based on 45.2 Kg  1356- 1582 Kcal (30- 35 Kcal/Kg)  81- 90 g (1.8- 2 g/Kg)  1356- 1582 mL (30- 35 mL/Kg)      RECOMMENDATIONS:  1) Confirm goals of care regarding long-term nutrition support - Nutrition support is not recommended due to overall declining medical status which evidenced based studies indicate EN is not effective in prolonging survival and improving quality of life. It can also increase risk of aspiration pneumonia as well as other related issues.   2) Maintain STRICT aspiration precautions, back of bed >45 degrees.   3) MVI w/ minerals daily to ensure 100% RDA met   4) Consider adding thiamine 200 mg daily 2/2 poor PO intake/ malnutrition   5) daily wts to track/trend changes  6) monitor lytes/ min and replete prn  7) Obtain vitamin D 25OH level to assess nutriture     *will continue to monitor and adjust nutrition plan prn*  Ivanna Santos, MS, RDN, [facility] [phone]  Nutrition

## 2022-10-03 NOTE — CONSULT NOTE ADULT - SUBJECTIVE AND OBJECTIVE BOX
HPI: Pt is a 87y old Female with hx of HLD, glaucoma, Alzheimer's dementia, and OA who presented to ED after a fall. The patient has a 24/7 aid, and has a hospital bed at home, but when the aid went to the bathroom last night, the patient (despite having railings) climbed to the edge of the bed and fell on her left side. It is unclear if she hit her head. Reportedly, the patient has been more restless recently at night. No recent medication changes. No complaints from daughter. Her last fall was years ago, and she does not normally fall because of the precautions with her bed and having her aide 24/7. She normally can ambulate with assistance and eat with assistance. She was admitted with hip fracture. Underwent Left hip saqib 9/25. RRT called 2 after episode of hypoxia with concern for aspiration. Patient noted to be gargling on own secretions. Transferred to ICU for aspiration risk and airway monitoring.   Pt treated for  hip fracture s/p ORIF.  Hospital course complicated by acute respiratory failure with hypoxia due to aspiration PNA, treated with full course of IV antibiotics.  NG tube placed for feeds due to inability to swallow without risk of aspiration, now removed awaiting re-evaluation by speech and swallow.  At bedside pt is alert, answering basic questions, comfortable.    Palliative medicine consulted to help establish GOC and advance care planning     10/3/2022 patient seen and examined with no family at bedside, patient resting comfortable at this time was able to answer basic questions when asked yes or no, unsure of patients insight to medical course.  Will reach out to patients daughter to schedule GOC meeting. Patient shaking head no to pain or dyspnea, and appears to be comfortable at this time       PAIN: ( )Yes   (x )No  DYSPNEA: ( ) Yes  (x ) No    PAST MEDICAL & SURGICAL HISTORY:  Osteoarthritis of right hip  Glaucoma  Alzheimer disease  Hypercholesteremia  Dementia  S/P appendectomy childhood  S/P tonsillectomy childhood  S/P D&C (status post dilation and curettage) age 32  S/P colonoscopy every 2 or 3 years    SOCIAL HX: lives at home with 24 hour aides     Hx opiate tolerance ( )YES  (x )NO    Baseline ADLs  (Prior to Admission)  ( ) Independent   (x )Dependent    FAMILY HISTORY:  Family history of colon cancer (Father)    Review of Systems:    Unable to obtain/Limited due to: limited due to garbled speech       PHYSICAL EXAM:    Vital Signs Last 24 Hrs  T(C): 36.4 (03 Oct 2022 08:53), Max: 36.8 (02 Oct 2022 20:34)  T(F): 97.6 (03 Oct 2022 08:53), Max: 98.3 (02 Oct 2022 20:34)  HR: 78 (03 Oct 2022 08:53) (73 - 79)  BP: 130/59 (03 Oct 2022 08:53) (120/50 - 154/54)  RR: 17 (03 Oct 2022 08:53) (17 - 18)  SpO2: 100% (03 Oct 2022 08:53) (97% - 100%)    Parameters below as of 03 Oct 2022 08:53  Patient On (Oxygen Delivery Method): room air    PPSV2: 20-30  %  FAST: unsure of baseline     General: elderly frail female in bed, NAD   Mental Status: alert but unable to fully asses capacity   HEENT: dry oral mucosa, missing teeth   Lungs: diminished breath sounds b/l   Cardiac: S1S2 +   GI: soft, nontender, +BS  : no suprapubic tenderness   Ext: no edema   Neuro: alert and oriented to self, unable to further assess     LABS:                        9.1    8.57  )-----------( 350      ( 03 Oct 2022 07:52 )             27.4     10-03    142  |  112<H>  |  6<L>  ----------------------------<  115<H>  3.9   |  24  |  0.68    Ca    8.9      03 Oct 2022 07:52        Albumin: Albumin, Serum: 1.8 g/dL (09-28 @ 06:31)      Allergies    No Known Allergies    Intolerances      MEDICATIONS  (STANDING):  chlorhexidine 4% Liquid 1 Application(s) Topical <User Schedule>  dextrose 5% + sodium chloride 0.9% 1000 milliLiter(s) (75 mL/Hr) IV Continuous <Continuous>  enoxaparin Injectable 40 milliGRAM(s) SubCutaneous every 24 hours  latanoprost 0.005% Ophthalmic Solution 1 Drop(s) Both EYES at bedtime  timolol 0.5% Solution 1 Drop(s) Both EYES daily  tranexamic acid IVPB 1000 milliGRAM(s) IV Intermittent once    MEDICATIONS  (PRN):  acetaminophen     Tablet .. 650 milliGRAM(s) Oral every 6 hours PRN Mild Pain (1 - 3)  acetaminophen  Suppository .. 650 milliGRAM(s) Rectal every 6 hours PRN Temp greater or equal to 38C (100.4F)  ketorolac   Injectable 15 milliGRAM(s) IV Push every 6 hours PRN Severe Pain (7 - 10)  morphine  - Injectable 2 milliGRAM(s) IV Push every 6 hours PRN breakthrough pain  ondansetron Injectable 4 milliGRAM(s) IV Push every 6 hours PRN Nausea and/or Vomiting  ondansetron Injectable 4 milliGRAM(s) IV Push every 8 hours PRN Nausea and/or Vomiting      RADIOLOGY/ADDITIONAL STUDIES:

## 2022-10-03 NOTE — SWALLOW BEDSIDE ASSESSMENT ADULT - NS SPL SWALLOW CLINIC TRIAL FT
Pt is showing sufficient swallow capacity to star on puree and moderately thick liquids.  Please note PUREE , MODERATELY THICK LIQUIDS:  USE SMALL FLAT TEASPOON.  PT UP[RIGHT AS POSSIBLE. TELL PT WHAT IS PRESENTED: PT NEEDS VERBAL CUEING  FEED ONLY AS TOLERATED. DO NOT RUSH PT. PT HAS VERY SLOW SWALLOW.  NO STRAW AT THIS TIME. MEDS CRUSHED IN PUDDING/PUREE. ;  pT'S SWALLOW FUNCTION REMAINS FRAGILE:  SHE WILL BE A CHALLENGING FEED. SHE HAS HIGH RISK FOR ASPIRATION. SHE WILL VERY LIKELY NOT EAT SUFFICIENT FOR HER NEEDS.   DISC AT LENGTH WITH NSG.  DISC WITH PALLIATIVE CARE.  SERVICE WILL FOLLOW.
Start Puree and moderately thick liquids: Use spoon.  No straw. No cup. Make sure Pt has her dentures in Tell pt what it presented.   Feed as tolerated.   Service will follow

## 2022-10-03 NOTE — SWALLOW BEDSIDE ASSESSMENT ADULT - PHARYNGEAL PHASE
mild latency in onset of pharyngeal swallow with indications of piecemeal swallow: pt making more than one swallow to clear the bolus.
Latent swallow onset with piecemeal swallow; more than one laryngeal lift to clear the pharynx, NO overt s/s aspiration observed.  See below. .

## 2022-10-04 LAB
ANION GAP SERPL CALC-SCNC: 6 MMOL/L — SIGNIFICANT CHANGE UP (ref 5–17)
BUN SERPL-MCNC: 5 MG/DL — LOW (ref 7–23)
CALCIUM SERPL-MCNC: 8.9 MG/DL — SIGNIFICANT CHANGE UP (ref 8.5–10.1)
CHLORIDE SERPL-SCNC: 113 MMOL/L — HIGH (ref 96–108)
CO2 SERPL-SCNC: 23 MMOL/L — SIGNIFICANT CHANGE UP (ref 22–31)
CREAT SERPL-MCNC: 0.66 MG/DL — SIGNIFICANT CHANGE UP (ref 0.5–1.3)
EGFR: 86 ML/MIN/1.73M2 — SIGNIFICANT CHANGE UP
GLUCOSE SERPL-MCNC: 125 MG/DL — HIGH (ref 70–99)
HCT VFR BLD CALC: 30.5 % — LOW (ref 34.5–45)
HGB BLD-MCNC: 10.1 G/DL — LOW (ref 11.5–15.5)
MCHC RBC-ENTMCNC: 33.1 GM/DL — SIGNIFICANT CHANGE UP (ref 32–36)
MCHC RBC-ENTMCNC: 34.1 PG — HIGH (ref 27–34)
MCV RBC AUTO: 103 FL — HIGH (ref 80–100)
PLATELET # BLD AUTO: 388 K/UL — SIGNIFICANT CHANGE UP (ref 150–400)
POTASSIUM SERPL-MCNC: 3.8 MMOL/L — SIGNIFICANT CHANGE UP (ref 3.5–5.3)
POTASSIUM SERPL-SCNC: 3.8 MMOL/L — SIGNIFICANT CHANGE UP (ref 3.5–5.3)
RBC # BLD: 2.96 M/UL — LOW (ref 3.8–5.2)
RBC # FLD: 14.7 % — HIGH (ref 10.3–14.5)
SODIUM SERPL-SCNC: 142 MMOL/L — SIGNIFICANT CHANGE UP (ref 135–145)
WBC # BLD: 9.39 K/UL — SIGNIFICANT CHANGE UP (ref 3.8–10.5)
WBC # FLD AUTO: 9.39 K/UL — SIGNIFICANT CHANGE UP (ref 3.8–10.5)

## 2022-10-04 PROCEDURE — 99231 SBSQ HOSP IP/OBS SF/LOW 25: CPT

## 2022-10-04 PROCEDURE — 99232 SBSQ HOSP IP/OBS MODERATE 35: CPT

## 2022-10-04 PROCEDURE — 99223 1ST HOSP IP/OBS HIGH 75: CPT

## 2022-10-04 RX ORDER — SODIUM CHLORIDE 9 MG/ML
1000 INJECTION, SOLUTION INTRAVENOUS
Refills: 0 | Status: DISCONTINUED | OUTPATIENT
Start: 2022-10-04 | End: 2022-10-07

## 2022-10-04 RX ADMIN — SODIUM CHLORIDE 75 MILLILITER(S): 9 INJECTION, SOLUTION INTRAVENOUS at 23:30

## 2022-10-04 RX ADMIN — Medication 1 DROP(S): at 09:26

## 2022-10-04 RX ADMIN — Medication 15 MILLIGRAM(S): at 22:56

## 2022-10-04 RX ADMIN — Medication 15 MILLIGRAM(S): at 14:34

## 2022-10-04 RX ADMIN — ENOXAPARIN SODIUM 40 MILLIGRAM(S): 100 INJECTION SUBCUTANEOUS at 05:20

## 2022-10-04 RX ADMIN — LATANOPROST 1 DROP(S): 0.05 SOLUTION/ DROPS OPHTHALMIC; TOPICAL at 22:26

## 2022-10-04 RX ADMIN — Medication 15 MILLIGRAM(S): at 15:04

## 2022-10-04 RX ADMIN — SODIUM CHLORIDE 75 MILLILITER(S): 9 INJECTION, SOLUTION INTRAVENOUS at 11:10

## 2022-10-04 RX ADMIN — Medication 15 MILLIGRAM(S): at 22:26

## 2022-10-04 RX ADMIN — CHLORHEXIDINE GLUCONATE 1 APPLICATION(S): 213 SOLUTION TOPICAL at 09:25

## 2022-10-04 NOTE — PROGRESS NOTE ADULT - SUBJECTIVE AND OBJECTIVE BOX
HPI:  86 yo F with a PMH of HLD, glaucoma, Alzheimer's dementia, and OA who presents after a fall. The patient cannot provide any history, so it was obtained from her daughter Shira at bedside. The patient has a 24/7 air, and has a hospital bed at home, but when the aid went to the bathroom last night, the patient (despite having railings) climbed to the edge of the bed and fell on her left side. It is unclear if she hit her head. Reportedly, the patient has been more restless recently at night. No recent medication changes. No complaints from daughter. Her last fall was years ago, and she does not normally fall because of the precautions with her bed and having her aide 24/7. She normally can ambulate with assistance and eat with assistance.      In the ED, she was given Morphine 2 mg IV x1 and started on LR @ 75 ml/hr. (24 Sep 2022 12:57)      Patient is a 87y old  Female who presents with a chief complaint of left hip fracture (26 Sep 2022 10:34)      Consulted by Dr. Dale   for VTE prophylaxis, risk stratification, and anticoagulation management.    PAST MEDICAL & SURGICAL HISTORY:  Osteoarthritis of right hip      Glaucoma      Alzheimer disease      Hypercholesteremia      Dementia      S/P appendectomy  childhood      S/P tonsillectomy  childhood      S/P D&amp;C (status post dilation and curettage)  age 32      S/P colonoscopy  every 2 or 3 years    Interval History  2022:patient seen at bedside awake , able to tell her name  patient was having swallowing difficulty Speech and swallow eval done, puree diet,   H&H stable will continue Lovenox 40mg daily for DVT prophylaxis   2022:patient seen at bedside on CCU, upgraded to  CCU low O2 sat and potential aspiration risk, patient is on NC now no concerns regarding AC will continue Lovenox for DVT prophylaxis   2022 Pt seen at bedside in CCU.  Pt non verbal,, physical therapy with pt.   2022 Pt seen at bedside with daughter present.  Pt remains confused and non verbal.  Feeding tube noticed.  Discussed with daughter that pt is on Lovenox for DVT prophylaxis. No concerns.   10-2-2022: Pt seen at bedside on [facility] with son present.  Pt alert knows her name and able to answer questions.  dose not know where she is or even who her son is.  NG tube removed, awaiting re evaluation for swallowing.  Will cont on Lovenox.   10-3-2022: Pt seen at bedside on [facility], On Puree diet , H&H stable will continue Lovenox for DVT prophylaxis    10-4-2022:  Pt seen at bedside on [facility] son and daughter present. PT ALERT AND KNOWS HER NAME ONLY. tELLME TO "SHUT UP AND LEAVE"    Discussed her Lovenox and last dose on 10-. no concerns.  Awaiting GOC discussion.     CrCl:31.3  BMI:18.3  EBL:150ml    Caprini VTE Risk Score:CAPRINI SCORE  AGE RELATED RISK FACTORS                                                       MOBILITY RELATED FACTORS  [ ] Age 41-60 years                                            (1 Point)                  [ ] Bed rest /restricted mobility                             (1 Point)  [ ] Age: 61-74 years                                           (2 Points)                [ ] Plaster cast                                                   (2 Points)  [x ] Age= 75 years                                              (3 Points)                 [ ] Bed bound for more than 72 hours                   (2 Points)    DISEASE RELATED RISK FACTORS                                               GENDER SPECIFIC FACTORS  [ ] Edema in the lower extremities                       (1 Point)           [ ] Pregnancy                                                            (1 Point)  [ ] Varicose veins                                               (1 Point)                  [ ] Post-partum < 6 weeks                                      (1 Point)             [ ] BMI > 25 Kg/m2                                            (1 Point)                  [ ] Hormonal therapy or oral contraception       (1 Point)                 [ ] Sepsis (in the previous month)                        (1 Point)             [ ] History of pregnancy complications                (1Point)  [ ] Pneumonia or serious lung disease                                             [ ] Unexplained or recurrent  (=/>3), premature                                 (In the previous month)                               (1 Point)                birth with toxemia or growth-restricted infant (1 Point)  [ ] Abnormal pulmonary function test            (1 Point)                                   SURGERY RELATED RISK FACTORS  [ ] Acute myocardial infarction                       (1 Point)                  [ ]  Section                                         (1 Point)  [ ] Congestive heart failure (in the previous month) (1 Point)   [ ] Minor surgery   lasting <45 minutes       (1 Point)   [ ] Inflammatory bowel disease                             (1 Point)          [ ] Arthroscopic surgery                                  (2 Points)  [ ] Central venous access                                    (2 Points)            [ ] General surgery lasting >45 minutes      (2 Points)       [ ] Stroke (in the previous month)                  (5 Points)            [ ] Elective major lower extremity arthroplasty (5 Points)                                   [  ] Malignancy (present or past include skin melanoma                                          but exclude  basal skin cell)    (2 points)                                      TRAUMA RELATED RISK FACTORS                HEMATOLOGY RELATED FACTORS                                  [ x] Fracture of the hip, pelvis, or leg                       (5 Points)  [ ] Prior episodes of VTE                                     (3 Points)          [ ] Acute spinal cord injury (in the previous month)  (5 Points)  [ ] Positive family history for VTE                         (3 Points)       [ ] Paralysis (less than 1 month)                          (5 Points)  [ ] Prothrombin 26332 A                                      (3 Points)         [ ] Multiple Trauma (within 1month)                 (5Points)                                                                                                                                                                [ ] Factor V Leiden                                          (3 Points)                                OTHER RISK FACTORS                          [ ] Lupus anticoagulants                                     (3 Points)                       [ ] BMI > 40                          (1 Point)                                                         [ ] Anticardiolipin antibodies                                (3 Points)                   [ ] Smoking                              (1Point)                                                [ ] High homocysteine in the blood                      (3 Points)                [  ] Diabetes requiring insulin (1point)                         [ ] Other congenital or acquired thrombophilia       (3 Points)          [  ] Chemotherapy                   (1 Point)  [ ] Heparin induced thrombocytopenia                  (3 Points)             [  ] Blood Transfusion                (1 point)                                                                                                             Total Score [    8      ]                                                                                                                                                                                                                                                                                                                                                                                                                                         IMPROVE Bleeding Risk Score:4.5      Falls Risk:   High ( x )  Mod (  )  Low (  )      FAMILY HISTORY:  Family history of colon cancer (Father)      Denies any personal or familial history of clotting or bleeding disorders.    Allergies    No Known Allergies    Intolerances        REVIEW OF SYSTEMS    (  )Fever	     (  )Constipation	(  )SOB				(  )Headache	(  )Dysuria  (  )Chills	     (  )Melena	(  )Dyspnea present on exertion	                    (  )Dizziness                    (  )Polyuria  (  )Nausea	     (  )Hematochezia	(  )Cough			                    (  )Syncope   	(  )Hematuria  (  )Vomiting    (  )Chest Pain	(  )Wheezing			(  )Weakness  (  )Diarrhea     (  )Palpitations	(  )Anorexia			(  )joint pain    unable to obtain: dementia    Vital Signs Last 24 Hrs  T(C): 37.1 (10-04-22 @ 08:41), Max: 37.3 (10-04-22 @ 00:00)  T(F): 98.8 (10-04-22 @ 08:41), Max: 99.1 (10-04-22 @ 00:00)  HR: 87 (10-04-22 @ 08:41) (85 - 91)  BP: 129/48 (10-04-22 @ 08:41) (129/48 - 148/55)  BP(mean): 80 (10-03-22 @ 20:34) (80 - 80)  RR: 16 (10-04-22 @ 08:41) (16 - 18)  SpO2: 96% (10-04-22 @ 08:41) (96% - 97%)  PHYSICAL EXAM:    Constitutional: Appears ill    Neurological: Alert knows her name Opal    Skin: Warm    Respiratory and Thorax: normal effort; Breath sounds: normal; No rales/wheezing/rhonchi,   	  Cardiovascular: S1, S2, regular, NMBR	    Gastrointestinal: BS + x 4Q, nontender	    Genitourinary:  Bladder nondistended, nontender    Musculoskeletal:   General Right:   no muscle/joint tenderness,   normal tone, no joint swelling,   ROM: limited	    General Left:   no muscle/joint tenderness,   normal tone, notes swelling to left arm  ROM: limited    Hip: Left: Dressing CDI;         Lower extrems:   Right: no calf tenderness              negative chad's sign               + pedal pulses    Left:   no calf tenderness              negative chad's sign               + pedal pulses                                  10.1   9.39  )-----------( 388      ( 04 Oct 2022 08:16 )             30.5       10-    142  |  113<H>  |  5<L>  ----------------------------<  125<H>  3.8   |  23  |  0.66    Ca    8.9      04 Oct 2022 08:16                     9.1    8.57  )-----------( 350      ( 03 Oct 2022 07:52 )             27.4       10-    142  |  112<H>  |  6<L>  ----------------------------<  115<H>  3.9   |  24  |  0.68    Ca    8.9      03 Oct 2022 07:52                                         8.8    8.21  )-----------( 238      ( 01 Oct 2022 06:30 )             26.3       10-    141  |  112<H>  |  8   ----------------------------<  103<H>  3.9   |  25  |  0.52    Ca    8.8      01 Oct 2022 06:30  Phos  2.7     10-01  Mg     2.4     10                             8.7    10.16 )-----------( 227      ( 30 Sep 2022 06:12 )             25.0       09-30    143  |  111<H>  |  8   ----------------------------<  106<H>  3.4<L>   |  28  |  0.56    Ca    8.5      30 Sep 2022 06:12  Phos  1.2     09-30  Mg     2.1     30                            8.6    9.35  )-----------( 156      ( 28 Sep 2022 06:31 )             26.0       09-28    141  |  107  |  9   ----------------------------<  106<H>  3.2<L>   |  29  |  0.74    Ca    8.5      28 Sep 2022 06:31  Phos  2.1     09-  Mg     2.0         TPro  5.0<L>  /  Alb  1.8<L>  /  TBili  0.8  /  DBili  x   /  AST  39<H>  /  ALT  15  /  AlkPhos  148<H>  09-28      PT/INR - ( 28 Sep 2022 06:31 )   PT: 14.4 sec;   INR: 1.24 ratio         PTT - ( 28 Sep 2022 06:31 )  PTT:31.5 sec             9.5    14.00 )-----------( 135      ( 27 Sep 2022 05:52 )             28.7       09-27    138  |  106  |  10  ----------------------------<  104<H>  3.8   |  28  |  0.77    Ca    8.8      27 Sep 2022 05:52  Phos  1.8     09-  Mg     2.0     09-    TPro  5.7<L>  /  Alb  2.5<L>  /  TBili  0.6  /  DBili  x   /  AST  39<H>  /  ALT  13  /  AlkPhos  81  09-26      PT/INR - ( 27 Sep 2022 05:52 )   PT: 15.0 sec;   INR: 1.29 ratio         PTT - ( 27 Sep 2022 05:52 )  PTT:32.6 sec                        10.1   12.99 )-----------( 142      ( 26 Sep 2022 09:16 )             29.3           140  |  108  |  10  ----------------------------<  107<H>  3.9   |  26  |  0.90    Ca    8.7      26 Sep 2022 09:16        PT/INR - ( 25 Sep 2022 05:08 )   PT: 14.0 sec;   INR: 1.20 ratio         PTT - ( 25 Sep 2022 05:08 )  PTT:22.3 sec				    MEDICATIONS  (STANDING):  chlorhexidine 4% Liquid 1 Application(s) Topical <User Schedule>  dextrose 5% + sodium chloride 0.45%. 1000 milliLiter(s) IV Continuous <Continuous>  enoxaparin Injectable 40 milliGRAM(s) SubCutaneous every 24 hours  latanoprost 0.005% Ophthalmic Solution 1 Drop(s) Both EYES at bedtime  timolol 0.5% Solution 1 Drop(s) Both EYES daily  tranexamic acid IVPB 1000 milliGRAM(s) IV Intermittent once          < from: CT Head No Cont (22 @ 07:09) >  MPRESSION: No acute intracranial hemorrhage, mass effect, or shift of   the midline structures.    Similar-appearing mild chronic white matter microvascular type changes.    < end of copied text >      DVT Prophylaxis:  LMWH                   ( x )  Heparin SQ           (  )  Coumadin             (  )  Xarelto                  (  )  Eliquis                   (  )  Venodynes           (x  )  Ambulation          ( x )  UFH                       (  )  Contraindicated  (  )  EC ASPIRIN       (  )

## 2022-10-04 NOTE — PROGRESS NOTE ADULT - SUBJECTIVE AND OBJECTIVE BOX
CC: Left hip Fx  Subjective and Objective:   88 yo F with a PMH of HLD, glaucoma, Alzheimer's dementia, and OA who presented to ED after a fall. The patient has a 24/7 air, and has a hospital bed at home, but when the aid went to the bathroom last night, the patient (despite having railings) climbed to the edge of the bed and fell on her left side. It is unclear if she hit her head. Reportedly, the patient has been more restless recently at night. No recent medication changes. No complaints from daughter. Her last fall was years ago, and she does not normally fall because of the precautions with her bed and having her aide 24/7. She normally can ambulate with assistance and eat with assistance. She was admitted with hip fracture. Underwent Left hip saqib 9/25. RRT called 2 after episode of hypoxia with concern for aspiration. Patient noted to be gargling on own secretions. Transferred to ICU for aspiration risk and airway monitoring.   Pt treated for  hip fracture s/p ORIF.  Hospital course complicated by acute respiratory failure with hypoxia due to aspiration PNA, treated with full course of IV antibiotics.  NG tube placed for feeds due to inability to swallow without risk of aspiration.    She is now transferred to .   She removed her NGT.   Seen by speech and swallow, recommended trial of pureed and honey thick liquids which she was started on.   Per nutrition, pt with minimal po intake and recommended to dc calorie count.     pt seen and examined this am. sleeping but arousable. no acute overnight events.   Denies pain.    REVIEW OF SYSTEMS: unable to obtain d/t dementia.     Vital Signs Last 24 Hrs  T(C): 37.1 (04 Oct 2022 08:41), Max: 37.3 (04 Oct 2022 00:00)  T(F): 98.8 (04 Oct 2022 08:41), Max: 99.1 (04 Oct 2022 00:00)  HR: 87 (04 Oct 2022 08:41) (85 - 91)  BP: 129/48 (04 Oct 2022 08:41) (129/48 - 148/55)  BP(mean): 80 (03 Oct 2022 20:34) (80 - 80)  RR: 16 (04 Oct 2022 08:41) (16 - 18)  SpO2: 96% (04 Oct 2022 08:41) (96% - 97%)          PHYSICAL EXAM:    GENERAL: Comfortable, no acute distress   HEAD:  Normocephalic, atraumatic  EYES: EOMI, PERRLA  HEENT: dry mucous membranes  NECK: Supple, No JVD  NERVOUS SYSTEM:  confused, non focal.   CHEST/LUNG: decreased bs at bases.  HEART: Regular rate and rhythm  ABDOMEN: Soft, Nontender, Nondistended, Bowel sounds present  GENITOURINARY: Voiding, no palpable bladder  EXTREMITIES:   No clubbing, cyanosis, or edema  MUSCULOSKELETAL- Left hip dressing c/d/i.   SKIN-no rash  LABS:                        10.1   9.39  )-----------( 388      ( 04 Oct 2022 08:16 )             30.5     10-04    142  |  113<H>  |  5<L>  ----------------------------<  125<H>  3.8   |  23  |  0.66    Ca    8.9      04 Oct 2022 08:16          MEDICATIONS  (STANDING):  chlorhexidine 4% Liquid 1 Application(s) Topical <User Schedule>  dextrose 5% + sodium chloride 0.9% 1000 milliLiter(s) (75 mL/Hr) IV Continuous <Continuous>  enoxaparin Injectable 40 milliGRAM(s) SubCutaneous every 24 hours  latanoprost 0.005% Ophthalmic Solution 1 Drop(s) Both EYES at bedtime  timolol 0.5% Solution 1 Drop(s) Both EYES daily  tranexamic acid IVPB 1000 milliGRAM(s) IV Intermittent once    MEDICATIONS  (PRN):  acetaminophen     Tablet .. 650 milliGRAM(s) Oral every 6 hours PRN Mild Pain (1 - 3)  acetaminophen  Suppository .. 650 milliGRAM(s) Rectal every 6 hours PRN Temp greater or equal to 38C (100.4F)  ketorolac   Injectable 15 milliGRAM(s) IV Push every 6 hours PRN Severe Pain (7 - 10)  morphine  - Injectable 2 milliGRAM(s) IV Push every 6 hours PRN breakthrough pain  ondansetron Injectable 4 milliGRAM(s) IV Push every 6 hours PRN Nausea and/or Vomiting  ondansetron Injectable 4 milliGRAM(s) IV Push every 8 hours PRN Nausea and/or Vomiting      ASSESSMENT AND PLAN:      1. Left hip fracture s/p left hip saqib pOD#9  -pain control  -physical therapy  -incentive spirometry  -bowel  regimen.     2. Acute blood loss anemia:  -d/t surgery  -transfused 1 unit prbcs with good response.     3. Dysphagia/aspiration pna/acute hypoxic respiratory failure  -transferred back to  from icu.   -treated with iv abx.   -ngt out.   -trial of pureed/honey thick liquids.   -per daughter at bedside, pt mostly relied on smoothies for nutrition prior to admission, wasn't really eating much solid food.   -pt with poor po intake.   -aspiration precautions.    4. Alzheimers dementia  -continue donepezil.    5. DVT px:  -lovenox per a/c services.     dispo:  -palliative meeting held today, current condition and prognosis discussed. Family does not wish to set any limits at this time.

## 2022-10-04 NOTE — CHART NOTE - NSCHARTNOTEFT_GEN_A_CORE
Dietitian Calorie Count Results Day #1    10/3/22:  Calorie count states pt ate "bites" of food and "sips" of Ensure shakes.   Total kcal/ protein intake negligible    *Discussed w/ MD COMPA Gutiérrez, will d/c calorie count. Plan for GOC meeting w/ palliative today. Plan for possible hospice. RD NOT recommending PEG placement at this time.    *ESTIMATED NUTR NEEDS based on 45.2 Kg  1356- 1582 Kcal (30- 35 Kcal/Kg)  81- 90 g (1.8- 2 g/Kg)  1356- 1582 mL (30- 35 mL/Kg)    *Pt continues to meet criteria for severe protein-calorie malnutrition in context of acute on chronic illness r/t decreased ability to meet increased nutrient needs 2/2 fx/aspiration PNA? AEB severe muscle/fat wasting and <50% of ENN x > 5 days    RECOMMENDATIONS:  1) Confirm goals of care regarding long-term nutrition support - Nutrition support is not recommended due to overall declining medical status which evidenced based studies indicate EN is not effective in prolonging survival and improving quality of life. It can also increase risk of aspiration pneumonia as well as other related issues.   2) Maintain STRICT aspiration precautions, back of bed >45 degrees.   3) MVI w/ minerals daily to ensure 100% RDA met   4) Consider adding thiamine 200 mg daily 2/2 poor PO intake/ malnutrition   5) daily wts to track/trend changes  6) monitor lytes/ min and replete prn  7) Obtain vitamin D 25OH level to assess nutriture     *will continue to monitor and adjust nutrition plan prn*  Ivanna Santos, MS, RDN, [facility] [phone]  Nutrition .

## 2022-10-04 NOTE — GOALS OF CARE CONVERSATION - ADVANCED CARE PLANNING - CONVERSATION DETAILS
HPI:  88 yo F with a PMH of HLD, glaucoma, Alzheimer's dementia, and OA who presents after a fall. The patient cannot provide any history, so it was obtained from her daughter Shira at bedside. The patient has a 24/7 air, and has a hospital bed at home, but when the aid went to the bathroom last night, the patient (despite having railings) climbed to the edge of the bed and fell on her left side. It is unclear if she hit her head. Reportedly, the patient has been more restless recently at night. No recent medication changes. No complaints from daughter. Her last fall was years ago, and she does not normally fall because of the precautions with her bed and having her aide 24/7. She normally can ambulate with assistance and eat with assistance.   (24 Sep 2022 12:57)      PERTINENT PMH REVIEWED:  [ X ] YES [ ] NO           Primary Contact:  Naty (Shira), daughter, health care agent, phone #[phone]    HCP [X  ] Surrogate [   ] Guardian [   ]    Mental Status: [ ] Alert  [  ] Oriented [ X ] Confused [  ] Lethargic [  ] -Pt lacks capacity  Concerns of Depression [  ] -Not identified.  Anxiety [   ] -Not identified.  Baseline ADLs (prior to admission):  Independent [ ] moderately [ ] fully   Dependent   [ ] moderately [X ]fully    Family Meeting attendees: GOC held with nayana silva and 3 sons.      Anticipated Grief: Patient[  ] Family [ X ]    Caregiver [place] Assessed: Yes [ X ] No [  ]    Gnosticism: Jainism.    Spiritual Concerns: Not identified,  available for support.    Goals of Care: To be determined.  Family desires to continue current medical management, no limits currently set.    Previous Services: 24/7 aide at home.    ADVANCE DIRECTIVES:    -Pt. lacks capacity  -HCP located on [employer] naming Naty as primary agent & Moustapha as alternate.  -Full Code    Anticipated D/C Plan: To be determined.                     Summary:  Team met with nayana Silva and son Delmar, conference called sons Raji and Ricki to discuss GOC, assist with planning and provide supportive counseling.  Palliative role explained.  Pt. resided at home with 24/7 aide prior to hospitalization where she was a 1 assist with all ADLs.  Families feelings explored.  Support provided.    We discussed Pts current medical condition.  We discussed Pts poor PO intake.  We discussed concern that Pt. is not taking enough PO, discussing the risks and benefits of a feeding tube.  It was also explained that a PEG would not stop aspiration and there is a risk of her pulling it out.  We also discussed why PEGs are not recommended with Pts with dementia.  Family state at this time they do not wish to make any decisions, they desire to see how the next couple of days progress and make decisions at the time they are required.    Advance directives reviewed.  We discussed CPR vs DNR/DNI.  We discussed the risks, benefits and survival rates of surviving CPR.  Family stated they are not ready to put limits in place.  We did discuss how resuscitation was likely to be unsuccessful given Pts underlying condition, which they understood.  Family desire to discuss CPR vs DNR/DNI as a family.  No limits currently set.    Plan to be determined.  Family stated to team they desire [facility].  Family wish to continue with current medical management, no limits currently set.  Family encouraged to think about their mothers wishes and speak as a family.  Emotional support provided.  Our team to continue to follow.

## 2022-10-05 LAB
ANION GAP SERPL CALC-SCNC: 6 MMOL/L — SIGNIFICANT CHANGE UP (ref 5–17)
BUN SERPL-MCNC: 6 MG/DL — LOW (ref 7–23)
CALCIUM SERPL-MCNC: 8.8 MG/DL — SIGNIFICANT CHANGE UP (ref 8.5–10.1)
CHLORIDE SERPL-SCNC: 113 MMOL/L — HIGH (ref 96–108)
CO2 SERPL-SCNC: 24 MMOL/L — SIGNIFICANT CHANGE UP (ref 22–31)
CREAT SERPL-MCNC: 0.69 MG/DL — SIGNIFICANT CHANGE UP (ref 0.5–1.3)
EGFR: 85 ML/MIN/1.73M2 — SIGNIFICANT CHANGE UP
GLUCOSE SERPL-MCNC: 118 MG/DL — HIGH (ref 70–99)
HCT VFR BLD CALC: 28.2 % — LOW (ref 34.5–45)
HGB BLD-MCNC: 9.6 G/DL — LOW (ref 11.5–15.5)
MCHC RBC-ENTMCNC: 34 GM/DL — SIGNIFICANT CHANGE UP (ref 32–36)
MCHC RBC-ENTMCNC: 35.3 PG — HIGH (ref 27–34)
MCV RBC AUTO: 103.7 FL — HIGH (ref 80–100)
PLATELET # BLD AUTO: 381 K/UL — SIGNIFICANT CHANGE UP (ref 150–400)
POTASSIUM SERPL-MCNC: 3.4 MMOL/L — LOW (ref 3.5–5.3)
POTASSIUM SERPL-SCNC: 3.4 MMOL/L — LOW (ref 3.5–5.3)
RBC # BLD: 2.72 M/UL — LOW (ref 3.8–5.2)
RBC # FLD: 14.6 % — HIGH (ref 10.3–14.5)
SODIUM SERPL-SCNC: 143 MMOL/L — SIGNIFICANT CHANGE UP (ref 135–145)
WBC # BLD: 8.36 K/UL — SIGNIFICANT CHANGE UP (ref 3.8–10.5)
WBC # FLD AUTO: 8.36 K/UL — SIGNIFICANT CHANGE UP (ref 3.8–10.5)

## 2022-10-05 PROCEDURE — 99232 SBSQ HOSP IP/OBS MODERATE 35: CPT

## 2022-10-05 PROCEDURE — 99497 ADVNCD CARE PLAN 30 MIN: CPT

## 2022-10-05 PROCEDURE — 99498 ADVNCD CARE PLAN ADDL 30 MIN: CPT | Mod: 25

## 2022-10-05 PROCEDURE — 99231 SBSQ HOSP IP/OBS SF/LOW 25: CPT

## 2022-10-05 RX ORDER — POTASSIUM CHLORIDE 20 MEQ
10 PACKET (EA) ORAL
Refills: 0 | Status: COMPLETED | OUTPATIENT
Start: 2022-10-05 | End: 2022-10-05

## 2022-10-05 RX ADMIN — ENOXAPARIN SODIUM 40 MILLIGRAM(S): 100 INJECTION SUBCUTANEOUS at 06:16

## 2022-10-05 RX ADMIN — Medication 15 MILLIGRAM(S): at 14:09

## 2022-10-05 RX ADMIN — Medication 1 DROP(S): at 10:57

## 2022-10-05 RX ADMIN — Medication 100 MILLIEQUIVALENT(S): at 11:01

## 2022-10-05 RX ADMIN — Medication 15 MILLIGRAM(S): at 21:08

## 2022-10-05 RX ADMIN — CHLORHEXIDINE GLUCONATE 1 APPLICATION(S): 213 SOLUTION TOPICAL at 10:57

## 2022-10-05 RX ADMIN — Medication 100 MILLIEQUIVALENT(S): at 17:06

## 2022-10-05 RX ADMIN — LATANOPROST 1 DROP(S): 0.05 SOLUTION/ DROPS OPHTHALMIC; TOPICAL at 21:08

## 2022-10-05 RX ADMIN — Medication 15 MILLIGRAM(S): at 14:24

## 2022-10-05 RX ADMIN — Medication 100 MILLIEQUIVALENT(S): at 14:08

## 2022-10-05 RX ADMIN — Medication 15 MILLIGRAM(S): at 21:38

## 2022-10-05 NOTE — PROGRESS NOTE ADULT - SUBJECTIVE AND OBJECTIVE BOX
Patient seen and examined at bedside. Pain well controlled with medication. A/O x0, consistent with prior exam. No gurgling. Does not respond to questions or commands.     LABS:                        9.6    8.36  )-----------( 381      ( 05 Oct 2022 07:13 )             28.2     10-04    142  |  113<H>  |  5<L>  ----------------------------<  125<H>  3.8   |  23  |  0.66    Ca    8.9      04 Oct 2022 08:16            VITAL SIGNS:  T(C): 36.8 (10-05-22 @ 00:43), Max: 37.1 (10-04-22 @ 08:41)  HR: 73 (10-05-22 @ 00:43) (73 - 87)  BP: 139/61 (10-05-22 @ 00:43) (129/48 - 139/61)  RR: 16 (10-05-22 @ 00:43) (16 - 17)  SpO2: 96% (10-05-22 @ 00:43) (94% - 96%)      LLE:  Dressing in place, c/d/i.  Maris-incisional TTP; otherwise, NTTP throughout the rest of the extremity.   SILT L2-S1  + GSC/TA/EHL/FHL intact   Extremity warm and well perfused  No calf tenderness bilaterally.  Compartments soft and compressible.     87F POD10 L hip saqib    Plan:   NPO at this time, NGT in place, speech and swallow recs appreciated  WBAT/PT/OT, abduction pillow  Pain management PRN  DVT prophylaxis: appreciate anticoagulation team recommendations.   CCU management appreciated  Dispo: [facility]  Ortho Stable for DC

## 2022-10-05 NOTE — PROGRESS NOTE ADULT - SUBJECTIVE AND OBJECTIVE BOX
CC: Left hip Fx  Subjective and Objective:   86 yo F with a PMH of HLD, glaucoma, Alzheimer's dementia, and OA who presented to ED after a fall. The patient has a 24/7 air, and has a hospital bed at home, but when the aid went to the bathroom last night, the patient (despite having railings) climbed to the edge of the bed and fell on her left side. It is unclear if she hit her head. Reportedly, the patient has been more restless recently at night. No recent medication changes. No complaints from daughter. Her last fall was years ago, and she does not normally fall because of the precautions with her bed and having her aide 24/7. She normally can ambulate with assistance and eat with assistance. She was admitted with hip fracture. Underwent Left hip saqib 9/25. RRT called 2 after episode of hypoxia with concern for aspiration. Patient noted to be gargling on own secretions. Transferred to ICU for aspiration risk and airway monitoring.   Pt treated for  hip fracture s/p ORIF.  Hospital course complicated by acute respiratory failure with hypoxia due to aspiration PNA, treated with full course of IV antibiotics.  NG tube placed for feeds due to inability to swallow without risk of aspiration.    She is now transferred to .   She removed her NGT.   Seen by speech and swallow, recommended trial of pureed and honey thick liquids which she was started on.   Per nutrition, pt with minimal po intake and recommended to dc calorie count.     pt seen and examined this am. OOB to chair, wants to go back to bed, disoriented to time and pklace  Denies pain.    REVIEW OF SYSTEMS: unable to obtain d/t dementia.     Vital Signs Last 24 Hrs  T(C): 36.9 (10-05-22 @ 08:50), Max: 36.9 (10-05-22 @ 08:50)  T(F): 98.4 (10-05-22 @ 08:50), Max: 98.4 (10-05-22 @ 08:50)  HR: 70 (10-05-22 @ 08:50) (70 - 78)  BP: 123/42 (10-05-22 @ 08:50) (123/42 - 139/61)  BP(mean): 80 (10-04-22 @ 20:28) (80 - 80)  RR: 16 (10-05-22 @ 08:50) (16 - 17)  SpO2: 98% (10-05-22 @ 08:50) (94% - 98%)        PHYSICAL EXAM:    GENERAL: Comfortable, no acute distress   HEAD:  Normocephalic, atraumatic  EYES: EOMI, PERRLA  HEENT: dry mucous membranes  NECK: Supple, No JVD  NERVOUS SYSTEM:  confused, non focal.   CHEST/LUNG: decreased bs at bases.  HEART: Regular rate and rhythm  ABDOMEN: Soft, Nontender, Nondistended, Bowel sounds present  GENITOURINARY: Voiding, no palpable bladder  EXTREMITIES:   No clubbing, cyanosis, or edema  MUSCULOSKELETAL- Left hip dressing c/d/i.   SKIN-no rash      LABS:                              9.6    8.36  )-----------( 381      ( 05 Oct 2022 07:13 )             28.2       10-05    143  |  113<H>  |  6<L>  ----------------------------<  118<H>  3.4<L>   |  24  |  0.69    Ca    8.8      05 Oct 2022 07:13        MEDICATIONS  (STANDING):  chlorhexidine 4% Liquid 1 Application(s) Topical <User Schedule>  dextrose 5% + sodium chloride 0.45%. 1000 milliLiter(s) (75 mL/Hr) IV Continuous <Continuous>  enoxaparin Injectable 40 milliGRAM(s) SubCutaneous every 24 hours  latanoprost 0.005% Ophthalmic Solution 1 Drop(s) Both EYES at bedtime  potassium chloride  10 mEq/100 mL IVPB 10 milliEquivalent(s) IV Intermittent every 1 hour  timolol 0.5% Solution 1 Drop(s) Both EYES daily  tranexamic acid IVPB 1000 milliGRAM(s) IV Intermittent once    MEDICATIONS  (PRN):  acetaminophen     Tablet .. 650 milliGRAM(s) Oral every 6 hours PRN Mild Pain (1 - 3)  acetaminophen  Suppository .. 650 milliGRAM(s) Rectal every 6 hours PRN Temp greater or equal to 38C (100.4F)  ketorolac   Injectable 15 milliGRAM(s) IV Push every 6 hours PRN Severe Pain (7 - 10)  morphine  - Injectable 2 milliGRAM(s) IV Push every 6 hours PRN breakthrough pain  ondansetron Injectable 4 milliGRAM(s) IV Push every 8 hours PRN Nausea and/or Vomiting  ondansetron Injectable 4 milliGRAM(s) IV Push every 6 hours PRN Nausea and/or Vomiting        ASSESSMENT AND PLAN:      1. Left hip fracture s/p left hip saqib pOD#10  -pain control  -physical therapy  -incentive spirometry  -bowel  regimen.     2. Acute blood loss anemia:  -d/t surgery  -stable    3. Hypokalemia   replete    4. Dysphagia/aspiration pna/acute hypoxic respiratory failure  -transferred back to  from icu.   -treated with iv abx.   -ngt out.   -trial of pureed/honey thick liquids.   -per family, pt mostly relied on smoothies for nutrition prior to admission, wasn't really eating much solid food.   -pt with poor po intake.   -aspiration precautions.    5. Alzheimers dementia  -continue donepezil.    6. DVT px:  -lovenox per a/c services.     dispo:  -palliative meeting held today, current condition and prognosis discussed. Family does not wish to set any limits at this time.    CC: Left hip Fx  Subjective and Objective:   88 yo F with a PMH of HLD, glaucoma, Alzheimer's dementia, and OA who presented to ED after a fall. The patient has a 24/7 air, and has a hospital bed at home, but when the aid went to the bathroom last night, the patient (despite having railings) climbed to the edge of the bed and fell on her left side. It is unclear if she hit her head. Reportedly, the patient has been more restless recently at night. No recent medication changes. No complaints from daughter. Her last fall was years ago, and she does not normally fall because of the precautions with her bed and having her aide 24/7. She normally can ambulate with assistance and eat with assistance. She was admitted with hip fracture. Underwent Left hip saqib 9/25. RRT called 2 after episode of hypoxia with concern for aspiration. Patient noted to be gargling on own secretions. Transferred to ICU for aspiration risk and airway monitoring.   Pt treated for  hip fracture s/p ORIF.  Hospital course complicated by acute respiratory failure with hypoxia due to aspiration PNA, treated with full course of IV antibiotics.  NG tube placed for feeds due to inability to swallow without risk of aspiration.    She is now transferred to .   She removed her NGT.   Seen by speech and swallow, recommended trial of pureed and honey thick liquids which she was started on.   Per nutrition, pt with minimal po intake and recommended to dc calorie count.     pt seen and examined this am. OOB to chair, wants to go back to bed, disoriented to time and pklace  Denies pain.    REVIEW OF SYSTEMS: unable to obtain d/t dementia.     Vital Signs Last 24 Hrs  T(C): 36.9 (10-05-22 @ 08:50), Max: 36.9 (10-05-22 @ 08:50)  T(F): 98.4 (10-05-22 @ 08:50), Max: 98.4 (10-05-22 @ 08:50)  HR: 70 (10-05-22 @ 08:50) (70 - 78)  BP: 123/42 (10-05-22 @ 08:50) (123/42 - 139/61)  BP(mean): 80 (10-04-22 @ 20:28) (80 - 80)  RR: 16 (10-05-22 @ 08:50) (16 - 17)  SpO2: 98% (10-05-22 @ 08:50) (94% - 98%)        PHYSICAL EXAM:    GENERAL: Comfortable, no acute distress   HEAD:  Normocephalic, atraumatic  EYES: EOMI, PERRLA  HEENT: dry mucous membranes  NECK: Supple, No JVD  NERVOUS SYSTEM:  confused, non focal.   CHEST/LUNG: decreased bs at bases.  HEART: Regular rate and rhythm  ABDOMEN: Soft, Nontender, Nondistended, Bowel sounds present  GENITOURINARY: Voiding, no palpable bladder  EXTREMITIES:   No clubbing, cyanosis, or edema  MUSCULOSKELETAL- Left hip dressing c/d/i.   SKIN-no rash      LABS:                              9.6    8.36  )-----------( 381      ( 05 Oct 2022 07:13 )             28.2       10-05    143  |  113<H>  |  6<L>  ----------------------------<  118<H>  3.4<L>   |  24  |  0.69    Ca    8.8      05 Oct 2022 07:13        MEDICATIONS  (STANDING):  chlorhexidine 4% Liquid 1 Application(s) Topical <User Schedule>  dextrose 5% + sodium chloride 0.45%. 1000 milliLiter(s) (75 mL/Hr) IV Continuous <Continuous>  enoxaparin Injectable 40 milliGRAM(s) SubCutaneous every 24 hours  latanoprost 0.005% Ophthalmic Solution 1 Drop(s) Both EYES at bedtime  potassium chloride  10 mEq/100 mL IVPB 10 milliEquivalent(s) IV Intermittent every 1 hour  timolol 0.5% Solution 1 Drop(s) Both EYES daily  tranexamic acid IVPB 1000 milliGRAM(s) IV Intermittent once    MEDICATIONS  (PRN):  acetaminophen     Tablet .. 650 milliGRAM(s) Oral every 6 hours PRN Mild Pain (1 - 3)  acetaminophen  Suppository .. 650 milliGRAM(s) Rectal every 6 hours PRN Temp greater or equal to 38C (100.4F)  ketorolac   Injectable 15 milliGRAM(s) IV Push every 6 hours PRN Severe Pain (7 - 10)  morphine  - Injectable 2 milliGRAM(s) IV Push every 6 hours PRN breakthrough pain  ondansetron Injectable 4 milliGRAM(s) IV Push every 8 hours PRN Nausea and/or Vomiting  ondansetron Injectable 4 milliGRAM(s) IV Push every 6 hours PRN Nausea and/or Vomiting        ASSESSMENT AND PLAN:      1. Left hip fracture s/p left hip saqib pOD#10  -pain control  -physical therapy  -incentive spirometry  -bowel  regimen.     2. Acute blood loss anemia:  -d/t surgery  -stable    3. Hypokalemia   replete    4. Dysphagia/aspiration pna/acute hypoxic respiratory failure  -transferred back to  from icu.   -treated with iv abx.   -trial of pureed/honey thick liquids.   -per family, pt mostly relied on smoothies for nutrition prior to admission, wasn't really eating much solid food.   -needs to be fed, took in 30% of breakfast, does better when family is present   -aspiration precautions.    5. Alzheimers dementia  -continue donepezil.    6. DVT px:  -lovenox per a/c services.     dispo:  -palliative meeting held today, current condition and prognosis discussed. Family does not wish to set any limits at this time.

## 2022-10-05 NOTE — PROGRESS NOTE ADULT - SUBJECTIVE AND OBJECTIVE BOX
HPI:  88 yo F with a PMH of HLD, glaucoma, Alzheimer's dementia, and OA who presents after a fall. The patient cannot provide any history, so it was obtained from her daughter Shira at bedside. The patient has a 24/7 air, and has a hospital bed at home, but when the aid went to the bathroom last night, the patient (despite having railings) climbed to the edge of the bed and fell on her left side. It is unclear if she hit her head. Reportedly, the patient has been more restless recently at night. No recent medication changes. No complaints from daughter. Her last fall was years ago, and she does not normally fall because of the precautions with her bed and having her aide 24/7. She normally can ambulate with assistance and eat with assistance.      In the ED, she was given Morphine 2 mg IV x1 and started on LR @ 75 ml/hr. (24 Sep 2022 12:57)      Patient is a 87y old  Female who presents with a chief complaint of left hip fracture (26 Sep 2022 10:34)      Consulted by Dr. Dale   for VTE prophylaxis, risk stratification, and anticoagulation management.    PAST MEDICAL & SURGICAL HISTORY:  Osteoarthritis of right hip      Glaucoma      Alzheimer disease      Hypercholesteremia      Dementia      S/P appendectomy  childhood      S/P tonsillectomy  childhood      S/P D&amp;C (status post dilation and curettage)  age 32      S/P colonoscopy  every 2 or 3 years    Interval History  2022:patient seen at bedside awake , able to tell her name  patient was having swallowing difficulty Speech and swallow eval done, puree diet,   H&H stable will continue Lovenox 40mg daily for DVT prophylaxis   2022:patient seen at bedside on CCU, upgraded to  CCU low O2 sat and potential aspiration risk, patient is on NC now no concerns regarding AC will continue Lovenox for DVT prophylaxis   2022 Pt seen at bedside in CCU.  Pt non verbal,, physical therapy with pt.   2022 Pt seen at bedside with daughter present.  Pt remains confused and non verbal.  Feeding tube noticed.  Discussed with daughter that pt is on Lovenox for DVT prophylaxis. No concerns.   10-2-2022: Pt seen at bedside on [facility] with son present.  Pt alert knows her name and able to answer questions.  dose not know where she is or even who her son is.  NG tube removed, awaiting re evaluation for swallowing.  Will cont on Lovenox.   10-3-2022: Pt seen at bedside on [facility], On Puree diet , H&H stable will continue Lovenox for DVT prophylaxis    10-4-2022:  Pt seen at bedside on [facility] son and daughter present. PT ALERT AND KNOWS HER NAME ONLY. tELLME TO "SHUT UP AND LEAVE"    Discussed her Lovenox and last dose on 10-. no concerns.  Awaiting GOC discussion.  10-5-2022 Pt seen at bedside on [facility] no changes daughter present pt awaiting placement in rehab or home. cont on Lovenox for DVT  prophylaxis.     CrCl:31.3  BMI:18.3  EBL:150ml    Caprini VTE Risk Score:CAPRINI SCORE  AGE RELATED RISK FACTORS                                                       MOBILITY RELATED FACTORS  [ ] Age 41-60 years                                            (1 Point)                  [ ] Bed rest /restricted mobility                             (1 Point)  [ ] Age: 61-74 years                                           (2 Points)                [ ] Plaster cast                                                   (2 Points)  [x ] Age= 75 years                                              (3 Points)                 [ ] Bed bound for more than 72 hours                   (2 Points)    DISEASE RELATED RISK FACTORS                                               GENDER SPECIFIC FACTORS  [ ] Edema in the lower extremities                       (1 Point)           [ ] Pregnancy                                                            (1 Point)  [ ] Varicose veins                                               (1 Point)                  [ ] Post-partum < 6 weeks                                      (1 Point)             [ ] BMI > 25 Kg/m2                                            (1 Point)                  [ ] Hormonal therapy or oral contraception       (1 Point)                 [ ] Sepsis (in the previous month)                        (1 Point)             [ ] History of pregnancy complications                (1Point)  [ ] Pneumonia or serious lung disease                                             [ ] Unexplained or recurrent  (=/>3), premature                                 (In the previous month)                               (1 Point)                birth with toxemia or growth-restricted infant (1 Point)  [ ] Abnormal pulmonary function test            (1 Point)                                   SURGERY RELATED RISK FACTORS  [ ] Acute myocardial infarction                       (1 Point)                  [ ]  Section                                         (1 Point)  [ ] Congestive heart failure (in the previous month) (1 Point)   [ ] Minor surgery   lasting <45 minutes       (1 Point)   [ ] Inflammatory bowel disease                             (1 Point)          [ ] Arthroscopic surgery                                  (2 Points)  [ ] Central venous access                                    (2 Points)            [ ] General surgery lasting >45 minutes      (2 Points)       [ ] Stroke (in the previous month)                  (5 Points)            [ ] Elective major lower extremity arthroplasty (5 Points)                                   [  ] Malignancy (present or past include skin melanoma                                          but exclude  basal skin cell)    (2 points)                                      TRAUMA RELATED RISK FACTORS                HEMATOLOGY RELATED FACTORS                                  [ x] Fracture of the hip, pelvis, or leg                       (5 Points)  [ ] Prior episodes of VTE                                     (3 Points)          [ ] Acute spinal cord injury (in the previous month)  (5 Points)  [ ] Positive family history for VTE                         (3 Points)       [ ] Paralysis (less than 1 month)                          (5 Points)  [ ] Prothrombin 00557 A                                      (3 Points)         [ ] Multiple Trauma (within 1month)                 (5Points)                                                                                                                                                                [ ] Factor V Leiden                                          (3 Points)                                OTHER RISK FACTORS                          [ ] Lupus anticoagulants                                     (3 Points)                       [ ] BMI > 40                          (1 Point)                                                         [ ] Anticardiolipin antibodies                                (3 Points)                   [ ] Smoking                              (1Point)                                                [ ] High homocysteine in the blood                      (3 Points)                [  ] Diabetes requiring insulin (1point)                         [ ] Other congenital or acquired thrombophilia       (3 Points)          [  ] Chemotherapy                   (1 Point)  [ ] Heparin induced thrombocytopenia                  (3 Points)             [  ] Blood Transfusion                (1 point)                                                                                                             Total Score [    8      ]                                                                                                                                                                                                                                                                                                                                                                                                                                         IMPROVE Bleeding Risk Score:4.5      Falls Risk:   High ( x )  Mod (  )  Low (  )      FAMILY HISTORY:  Family history of colon cancer (Father)      Denies any personal or familial history of clotting or bleeding disorders.    Allergies    No Known Allergies    Intolerances        REVIEW OF SYSTEMS    (  )Fever	     (  )Constipation	(  )SOB				(  )Headache	(  )Dysuria  (  )Chills	     (  )Melena	(  )Dyspnea present on exertion	                    (  )Dizziness                    (  )Polyuria  (  )Nausea	     (  )Hematochezia	(  )Cough			                    (  )Syncope   	(  )Hematuria  (  )Vomiting    (  )Chest Pain	(  )Wheezing			(  )Weakness  (  )Diarrhea     (  )Palpitations	(  )Anorexia			(  )joint pain    unable to obtain: dementia    Vital Signs Last 24 Hrs  T(C): 36.9 (10-05-22 @ 08:50), Max: 36.9 (10-05-22 @ 08:50)  T(F): 98.4 (10-05-22 @ 08:50), Max: 98.4 (10-05-22 @ 08:50)  HR: 70 (10-05-22 @ 08:50) (70 - 78)  BP: 123/42 (10-05-22 @ 08:50) (123/42 - 139/61)  BP(mean): 80 (10-04-22 @ 20:28) (80 - 80)  RR: 16 (10-05-22 @ 08:50) (16 - 17)  SpO2: 98% (10-05-22 @ 08:50) (94% - 98%)  PHYSICAL EXAM:    Constitutional: Appears ill    Neurological: Alert knows her name Opal    Skin: Warm    Respiratory and Thorax: normal effort; Breath sounds: normal; No rales/wheezing/rhonchi,   	  Cardiovascular: S1, S2, regular, NMBR	    Gastrointestinal: BS + x 4Q, nontender	    Genitourinary:  Bladder nondistended, nontender    Musculoskeletal:   General Right:   no muscle/joint tenderness,   normal tone, no joint swelling,   ROM: limited	    General Left:   no muscle/joint tenderness,   normal tone, notes swelling to left arm  ROM: limited    Hip: Left: Dressing CDI;         Lower extrems:   Right: no calf tenderness              negative chad's sign               + pedal pulses    Left:   no calf tenderness              negative chad's sign               + pedal pulses                                    9.6    8.36  )-----------( 381      ( 05 Oct 2022 07:13 )             28.2       10-    143  |  113<H>  |  6<L>  ----------------------------<  118<H>  3.4<L>   |  24  |  0.69    Ca    8.8      05 Oct 2022 07:13                            10.1   9.39  )-----------( 388      ( 04 Oct 2022 08:16 )             30.5       10-    142  |  113<H>  |  5<L>  ----------------------------<  125<H>  3.8   |  23  |  0.66    Ca    8.9      04 Oct 2022 08:16                     9.1    8.57  )-----------( 350      ( 03 Oct 2022 07:52 )             27.4       10-    142  |  112<H>  |  6<L>  ----------------------------<  115<H>  3.9   |  24  |  0.68    Ca    8.9      03 Oct 2022 07:52                                         8.8    8.21  )-----------( 238      ( 01 Oct 2022 06:30 )             26.3       10-    141  |  112<H>  |  8   ----------------------------<  103<H>  3.9   |  25  |  0.52    Ca    8.8      01 Oct 2022 06:30  Phos  2.7     10-01  Mg     2.4     10-01                             8.7    10.16 )-----------( 227      ( 30 Sep 2022 06:12 )             25.0       0930    143  |  111<H>  |  8   ----------------------------<  106<H>  3.4<L>   |  28  |  0.56    Ca    8.5      30 Sep 2022 06:12  Phos  1.2     30  Mg     2.1                                 8.6    9.35  )-----------( 156      ( 28 Sep 2022 06:31 )             26.0       0928    141  |  107  |  9   ----------------------------<  106<H>  3.2<L>   |  29  |  0.74    Ca    8.5      28 Sep 2022 06:31  Phos  2.1     28  Mg     2.0         TPro  5.0<L>  /  Alb  1.8<L>  /  TBili  0.8  /  DBili  x   /  AST  39<H>  /  ALT  15  /  AlkPhos  148<H>  28      PT/INR - ( 28 Sep 2022 06:31 )   PT: 14.4 sec;   INR: 1.24 ratio         PTT - ( 28 Sep 2022 06:31 )  PTT:31.5 sec             9.5    14.00 )-----------( 135      ( 27 Sep 2022 05:52 )             28.7       0927    138  |  106  |  10  ----------------------------<  104<H>  3.8   |  28  |  0.77    Ca    8.8      27 Sep 2022 05:52  Phos  1.8       Mg     2.0         TPro  5.7<L>  /  Alb  2.5<L>  /  TBili  0.6  /  DBili  x   /  AST  39<H>  /  ALT  13  /  AlkPhos  81        PT/INR - ( 27 Sep 2022 05:52 )   PT: 15.0 sec;   INR: 1.29 ratio         PTT - ( 27 Sep 2022 05:52 )  PTT:32.6 sec                        10.1   12.99 )-----------( 142      ( 26 Sep 2022 09:16 )             29.3           140  |  108  |  10  ----------------------------<  107<H>  3.9   |  26  |  0.90    Ca    8.7      26 Sep 2022 09:16        PT/INR - ( 25 Sep 2022 05:08 )   PT: 14.0 sec;   INR: 1.20 ratio         PTT - ( 25 Sep 2022 05:08 )  PTT:22.3 sec				  MEDICATIONS  (STANDING):  chlorhexidine 4% Liquid 1 Application(s) Topical <User Schedule>  dextrose 5% + sodium chloride 0.45%. 1000 milliLiter(s) IV Continuous <Continuous>  enoxaparin Injectable 40 milliGRAM(s) SubCutaneous every 24 hours  latanoprost 0.005% Ophthalmic Solution 1 Drop(s) Both EYES at bedtime  potassium chloride  10 mEq/100 mL IVPB 10 milliEquivalent(s) IV Intermittent every 1 hour  timolol 0.5% Solution 1 Drop(s) Both EYES daily  tranexamic acid IVPB 1000 milliGRAM(s) IV Intermittent once            < from: CT Head No Cont (22 @ 07:09) >  MPRESSION: No acute intracranial hemorrhage, mass effect, or shift of   the midline structures.    Similar-appearing mild chronic white matter microvascular type changes.    < end of copied text >      DVT Prophylaxis:  LMWH                   ( x )  Heparin SQ           (  )  Coumadin             (  )  Xarelto                  (  )  Eliquis                   (  )  Venodynes           (x  )  Ambulation          ( x )  UFH                       (  )  Contraindicated  (  )  EC ASPIRIN       (  )

## 2022-10-06 LAB
ANION GAP SERPL CALC-SCNC: 7 MMOL/L — SIGNIFICANT CHANGE UP (ref 5–17)
BUN SERPL-MCNC: 4 MG/DL — LOW (ref 7–23)
CALCIUM SERPL-MCNC: 9 MG/DL — SIGNIFICANT CHANGE UP (ref 8.5–10.1)
CHLORIDE SERPL-SCNC: 113 MMOL/L — HIGH (ref 96–108)
CO2 SERPL-SCNC: 24 MMOL/L — SIGNIFICANT CHANGE UP (ref 22–31)
CREAT SERPL-MCNC: 0.66 MG/DL — SIGNIFICANT CHANGE UP (ref 0.5–1.3)
EGFR: 86 ML/MIN/1.73M2 — SIGNIFICANT CHANGE UP
GLUCOSE SERPL-MCNC: 113 MG/DL — HIGH (ref 70–99)
HCT VFR BLD CALC: 28.5 % — LOW (ref 34.5–45)
HGB BLD-MCNC: 9.6 G/DL — LOW (ref 11.5–15.5)
MCHC RBC-ENTMCNC: 33.7 GM/DL — SIGNIFICANT CHANGE UP (ref 32–36)
MCHC RBC-ENTMCNC: 34.5 PG — HIGH (ref 27–34)
MCV RBC AUTO: 102.5 FL — HIGH (ref 80–100)
PLATELET # BLD AUTO: 414 K/UL — HIGH (ref 150–400)
POTASSIUM SERPL-MCNC: 3.6 MMOL/L — SIGNIFICANT CHANGE UP (ref 3.5–5.3)
POTASSIUM SERPL-SCNC: 3.6 MMOL/L — SIGNIFICANT CHANGE UP (ref 3.5–5.3)
RBC # BLD: 2.78 M/UL — LOW (ref 3.8–5.2)
RBC # FLD: 14.4 % — SIGNIFICANT CHANGE UP (ref 10.3–14.5)
SARS-COV-2 RNA SPEC QL NAA+PROBE: SIGNIFICANT CHANGE UP
SODIUM SERPL-SCNC: 144 MMOL/L — SIGNIFICANT CHANGE UP (ref 135–145)
WBC # BLD: 8.31 K/UL — SIGNIFICANT CHANGE UP (ref 3.8–10.5)
WBC # FLD AUTO: 8.31 K/UL — SIGNIFICANT CHANGE UP (ref 3.8–10.5)

## 2022-10-06 PROCEDURE — 99232 SBSQ HOSP IP/OBS MODERATE 35: CPT

## 2022-10-06 PROCEDURE — 99231 SBSQ HOSP IP/OBS SF/LOW 25: CPT

## 2022-10-06 RX ADMIN — ENOXAPARIN SODIUM 40 MILLIGRAM(S): 100 INJECTION SUBCUTANEOUS at 06:05

## 2022-10-06 RX ADMIN — LATANOPROST 1 DROP(S): 0.05 SOLUTION/ DROPS OPHTHALMIC; TOPICAL at 21:14

## 2022-10-06 RX ADMIN — Medication 1 DROP(S): at 10:35

## 2022-10-06 RX ADMIN — CHLORHEXIDINE GLUCONATE 1 APPLICATION(S): 213 SOLUTION TOPICAL at 10:36

## 2022-10-06 NOTE — PROGRESS NOTE ADULT - SUBJECTIVE AND OBJECTIVE BOX
HPI:  88 yo F with a PMH of HLD, glaucoma, Alzheimer's dementia, and OA who presents after a fall. The patient cannot provide any history, so it was obtained from her daughter Shira at bedside. The patient has a 24/7 air, and has a hospital bed at home, but when the aid went to the bathroom last night, the patient (despite having railings) climbed to the edge of the bed and fell on her left side. It is unclear if she hit her head. Reportedly, the patient has been more restless recently at night. No recent medication changes. No complaints from daughter. Her last fall was years ago, and she does not normally fall because of the precautions with her bed and having her aide 24/7. She normally can ambulate with assistance and eat with assistance.      In the ED, she was given Morphine 2 mg IV x1 and started on LR @ 75 ml/hr. (24 Sep 2022 12:57)      Patient is a 87y old  Female who presents with a chief complaint of left hip fracture (26 Sep 2022 10:34)      Consulted by Dr. Dale   for VTE prophylaxis, risk stratification, and anticoagulation management.    PAST MEDICAL & SURGICAL HISTORY:  Osteoarthritis of right hip      Glaucoma      Alzheimer disease      Hypercholesteremia      Dementia      S/P appendectomy  childhood      S/P tonsillectomy  childhood      S/P D&amp;C (status post dilation and curettage)  age 32      S/P colonoscopy  every 2 or 3 years    Interval History  2022:patient seen at bedside awake , able to tell her name  patient was having swallowing difficulty Speech and swallow eval done, puree diet,   H&H stable will continue Lovenox 40mg daily for DVT prophylaxis   2022:patient seen at bedside on CCU, upgraded to  CCU low O2 sat and potential aspiration risk, patient is on NC now no concerns regarding AC will continue Lovenox for DVT prophylaxis   2022 Pt seen at bedside in CCU.  Pt non verbal,, physical therapy with pt.   2022 Pt seen at bedside with daughter present.  Pt remains confused and non verbal.  Feeding tube noticed.  Discussed with daughter that pt is on Lovenox for DVT prophylaxis. No concerns.   10-2-2022: Pt seen at bedside on [facility] with son present.  Pt alert knows her name and able to answer questions.  dose not know where she is or even who her son is.  NG tube removed, awaiting re evaluation for swallowing.  Will cont on Lovenox.   10-3-2022: Pt seen at bedside on [facility], On Puree diet , H&H stable will continue Lovenox for DVT prophylaxis    10-4-2022:  Pt seen at bedside on [facility] son and daughter present. PT ALERT AND KNOWS HER NAME ONLY. tELLME TO "SHUT UP AND LEAVE"    Discussed her Lovenox and last dose on 10-. no concerns.  Awaiting GOC discussion.  10-5-2022 Pt seen at bedside on [facility] no changes daughter present pt awaiting placement in rehab or home. cont on Lovenox for DVT  prophylaxis.  10-6-2022 Pt seen at bedside on [facility], sitting in the chair, tolerating Lovenox H&H stable, cont on Lovenox for DVT  prophylaxis  CrCl:31.3  BMI:18.3  EBL:150ml    Caprini VTE Risk Score:CAPRINI SCORE  AGE RELATED RISK FACTORS                                                       MOBILITY RELATED FACTORS  [ ] Age 41-60 years                                            (1 Point)                  [ ] Bed rest /restricted mobility                             (1 Point)  [ ] Age: 61-74 years                                           (2 Points)                [ ] Plaster cast                                                   (2 Points)  [x ] Age= 75 years                                              (3 Points)                 [ ] Bed bound for more than 72 hours                   (2 Points)    DISEASE RELATED RISK FACTORS                                               GENDER SPECIFIC FACTORS  [ ] Edema in the lower extremities                       (1 Point)           [ ] Pregnancy                                                            (1 Point)  [ ] Varicose veins                                               (1 Point)                  [ ] Post-partum < 6 weeks                                      (1 Point)             [ ] BMI > 25 Kg/m2                                            (1 Point)                  [ ] Hormonal therapy or oral contraception       (1 Point)                 [ ] Sepsis (in the previous month)                        (1 Point)             [ ] History of pregnancy complications                (1Point)  [ ] Pneumonia or serious lung disease                                             [ ] Unexplained or recurrent  (=/>3), premature                                 (In the previous month)                               (1 Point)                birth with toxemia or growth-restricted infant (1 Point)  [ ] Abnormal pulmonary function test            (1 Point)                                   SURGERY RELATED RISK FACTORS  [ ] Acute myocardial infarction                       (1 Point)                  [ ]  Section                                         (1 Point)  [ ] Congestive heart failure (in the previous month) (1 Point)   [ ] Minor surgery   lasting <45 minutes       (1 Point)   [ ] Inflammatory bowel disease                             (1 Point)          [ ] Arthroscopic surgery                                  (2 Points)  [ ] Central venous access                                    (2 Points)            [ ] General surgery lasting >45 minutes      (2 Points)       [ ] Stroke (in the previous month)                  (5 Points)            [ ] Elective major lower extremity arthroplasty (5 Points)                                   [  ] Malignancy (present or past include skin melanoma                                          but exclude  basal skin cell)    (2 points)                                      TRAUMA RELATED RISK FACTORS                HEMATOLOGY RELATED FACTORS                                  [ x] Fracture of the hip, pelvis, or leg                       (5 Points)  [ ] Prior episodes of VTE                                     (3 Points)          [ ] Acute spinal cord injury (in the previous month)  (5 Points)  [ ] Positive family history for VTE                         (3 Points)       [ ] Paralysis (less than 1 month)                          (5 Points)  [ ] Prothrombin 90954 A                                      (3 Points)         [ ] Multiple Trauma (within 1month)                 (5Points)                                                                                                                                                                [ ] Factor V Leiden                                          (3 Points)                                OTHER RISK FACTORS                          [ ] Lupus anticoagulants                                     (3 Points)                       [ ] BMI > 40                          (1 Point)                                                         [ ] Anticardiolipin antibodies                                (3 Points)                   [ ] Smoking                              (1Point)                                                [ ] High homocysteine in the blood                      (3 Points)                [  ] Diabetes requiring insulin (1point)                         [ ] Other congenital or acquired thrombophilia       (3 Points)          [  ] Chemotherapy                   (1 Point)  [ ] Heparin induced thrombocytopenia                  (3 Points)             [  ] Blood Transfusion                (1 point)                                                                                                             Total Score [    8      ]                                                                                                                                                                                                                                                                                                                                                                                                                                         IMPROVE Bleeding Risk Score:4.5      Falls Risk:   High ( x )  Mod (  )  Low (  )      FAMILY HISTORY:  Family history of colon cancer (Father)      Denies any personal or familial history of clotting or bleeding disorders.    Allergies    No Known Allergies    Intolerances        REVIEW OF SYSTEMS    (  )Fever	     (  )Constipation	(  )SOB				(  )Headache	(  )Dysuria  (  )Chills	     (  )Melena	(  )Dyspnea present on exertion	                    (  )Dizziness                    (  )Polyuria  (  )Nausea	     (  )Hematochezia	(  )Cough			                    (  )Syncope   	(  )Hematuria  (  )Vomiting    (  )Chest Pain	(  )Wheezing			(  )Weakness  (  )Diarrhea     (  )Palpitations	(  )Anorexia			(  )joint pain    unable to obtain: dementia    Vital Signs Last 24 Hrs  T(C): 36.4 (06 Oct 2022 09:05), Max: 37 (06 Oct 2022 00:24)  T(F): 97.6 (06 Oct 2022 09:05), Max: 98.6 (06 Oct 2022 00:24)  HR: 82 (06 Oct 2022 09:05) (80 - 92)  BP: 132/51 (06 Oct 2022 09:05) (126/47 - 139/53)  BP(mean): --  RR: 16 (06 Oct 2022 09:05) (16 - 16)  SpO2: 98% (06 Oct 2022 09:05) (96% - 98%)  PHYSICAL EXAM:    Constitutional: Appears ill    Neurological: Alert knows her name Opal    Skin: Warm    Respiratory and Thorax: normal effort; Breath sounds: normal; No rales/wheezing/rhonchi,   	  Cardiovascular: S1, S2, regular, NMBR	    Gastrointestinal: BS + x 4Q, nontender	    Genitourinary:  Bladder nondistended, nontender    Musculoskeletal:   General Right:   no muscle/joint tenderness,   normal tone, no joint swelling,   ROM: limited	    General Left:   no muscle/joint tenderness,   normal tone, notes swelling to left arm  ROM: limited    Hip: Left: Dressing CDI;         Lower extrems:   Right: no calf tenderness              negative chad's sign               + pedal pulses    Left:   no calf tenderness              negative chad's sign               + pedal pulses                          9.6    8.31  )-----------( 414      ( 06 Oct 2022 08:18 )             28.5       10-    144  |  113<H>  |  4<L>  ----------------------------<  113<H>  3.6   |  24  |  0.66    Ca    9.0      06 Oct 2022 08:18                                        9.6    8.36  )-----------( 381      ( 05 Oct 2022 07:13 )             28.2       10-    143  |  113<H>  |  6<L>  ----------------------------<  118<H>  3.4<L>   |  24  |  0.69    Ca    8.8      05 Oct 2022 07:13                            10.1   9.39  )-----------( 388      ( 04 Oct 2022 08:16 )             30.5       10-    142  |  113<H>  |  5<L>  ----------------------------<  125<H>  3.8   |  23  |  0.66    Ca    8.9      04 Oct 2022 08:16                     9.1    8.57  )-----------( 350      ( 03 Oct 2022 07:52 )             27.4       10-03    142  |  112<H>  |  6<L>  ----------------------------<  115<H>  3.9   |  24  |  0.68    Ca    8.9      03 Oct 2022 07:52                                         8.8    8.21  )-----------( 238      ( 01 Oct 2022 06:30 )             26.3       10    141  |  112<H>  |  8   ----------------------------<  103<H>  3.9   |  25  |  0.52    Ca    8.8      01 Oct 2022 06:30  Phos  2.7     10-01  Mg     2.4     10-01                             8.7    10.16 )-----------( 227      ( 30 Sep 2022 06:12 )             25.0       09-30    143  |  111<H>  |  8   ----------------------------<  106<H>  3.4<L>   |  28  |  0.56    Ca    8.5      30 Sep 2022 06:12  Phos  1.2     09-30  Mg     2.1     30                            8.6    9.35  )-----------( 156      ( 28 Sep 2022 06:31 )             26.0       09-28    141  |  107  |  9   ----------------------------<  106<H>  3.2<L>   |  29  |  0.74    Ca    8.5      28 Sep 2022 06:31  Phos  2.1     -28  Mg     2.0         TPro  5.0<L>  /  Alb  1.8<L>  /  TBili  0.8  /  DBili  x   /  AST  39<H>  /  ALT  15  /  AlkPhos  148<H>        PT/INR - ( 28 Sep 2022 06:31 )   PT: 14.4 sec;   INR: 1.24 ratio         PTT - ( 28 Sep 2022 06:31 )  PTT:31.5 sec             9.5    14.00 )-----------( 135      ( 27 Sep 2022 05:52 )             28.7       0927    138  |  106  |  10  ----------------------------<  104<H>  3.8   |  28  |  0.77    Ca    8.8      27 Sep 2022 05:52  Phos  1.8       Mg     2.0         TPro  5.7<L>  /  Alb  2.5<L>  /  TBili  0.6  /  DBili  x   /  AST  39<H>  /  ALT  13  /  AlkPhos  81  0926      PT/INR - ( 27 Sep 2022 05:52 )   PT: 15.0 sec;   INR: 1.29 ratio         PTT - ( 27 Sep 2022 05:52 )  PTT:32.6 sec                        10.1   12.99 )-----------( 142      ( 26 Sep 2022 09:16 )             29.3       0926    140  |  108  |  10  ----------------------------<  107<H>  3.9   |  26  |  0.90    Ca    8.7      26 Sep 2022 09:16        PT/INR - ( 25 Sep 2022 05:08 )   PT: 14.0 sec;   INR: 1.20 ratio         PTT - ( 25 Sep 2022 05:08 )  PTT:22.3 sec				  MEDICATIONS  (STANDING):  chlorhexidine 4% Liquid 1 Application(s) Topical <User Schedule>  dextrose 5% + sodium chloride 0.45%. 1000 milliLiter(s) IV Continuous <Continuous>  enoxaparin Injectable 40 milliGRAM(s) SubCutaneous every 24 hours  latanoprost 0.005% Ophthalmic Solution 1 Drop(s) Both EYES at bedtime  potassium chloride  10 mEq/100 mL IVPB 10 milliEquivalent(s) IV Intermittent every 1 hour  timolol 0.5% Solution 1 Drop(s) Both EYES daily  tranexamic acid IVPB 1000 milliGRAM(s) IV Intermittent once            < from: CT Head No Cont (22 @ 07:09) >  MPRESSION: No acute intracranial hemorrhage, mass effect, or shift of   the midline structures.    Similar-appearing mild chronic white matter microvascular type changes.    < end of copied text >      DVT Prophylaxis:  LMWH                   ( x )  Heparin SQ           (  )  Coumadin             (  )  Xarelto                  (  )  Eliquis                   (  )  Venodynes           (x  )  Ambulation          ( x )  UFH                       (  )  Contraindicated  (  )  EC ASPIRIN       (  )

## 2022-10-06 NOTE — PROGRESS NOTE ADULT - SUBJECTIVE AND OBJECTIVE BOX
CC: Left hip Fx  Subjective and Objective:   88 yo F with a PMH of HLD, glaucoma, Alzheimer's dementia, and OA who presented to ED after a fall. The patient has a 24/7 air, and has a hospital bed at home, but when the aid went to the bathroom last night, the patient (despite having railings) climbed to the edge of the bed and fell on her left side. It is unclear if she hit her head. Reportedly, the patient has been more restless recently at night. No recent medication changes. No complaints from daughter. Her last fall was years ago, and she does not normally fall because of the precautions with her bed and having her aide 24/7. She normally can ambulate with assistance and eat with assistance. She was admitted with hip fracture. Underwent Left hip saqib 9/25. RRT called 2 after episode of hypoxia with concern for aspiration. Patient noted to be gargling on own secretions. Transferred to ICU for aspiration risk and airway monitoring.   Pt treated for  hip fracture s/p ORIF.  Hospital course complicated by acute respiratory failure with hypoxia due to aspiration PNA, treated with full course of IV antibiotics.  NG tube placed for feeds due to inability to swallow without risk of aspiration.    She is now transferred to .   She removed her NGT.   Seen by speech and swallow, recommended trial of pureed and honey thick liquids which she was started on.   Per nutrition, pt with minimal po intake and recommended to dc calorie count.     pt seen and examined this am. OOB to chair, no acute events overnight, remains disoriented to time and place, eating some puddings with assist,  does fair with PT      REVIEW OF SYSTEMS: unable to obtain d/t dementia.     Vital Signs Last 24 Hrs  T(C): 36.4 (10-06-22 @ 09:05), Max: 37 (10-06-22 @ 00:24)  T(F): 97.6 (10-06-22 @ 09:05), Max: 98.6 (10-06-22 @ 00:24)  HR: 82 (10-06-22 @ 09:05) (80 - 92)  BP: 132/51 (10-06-22 @ 09:05) (126/47 - 139/53)  BP(mean): --  RR: 16 (10-06-22 @ 09:05) (16 - 16)  SpO2: 98% (10-06-22 @ 09:05) (96% - 98%)      PHYSICAL EXAM:    GENERAL: Comfortable, no acute distress   HEAD:  Normocephalic, atraumatic  EYES: EOMI, PERRLA  HEENT: dry mucous membranes  NECK: Supple, No JVD  NERVOUS SYSTEM:  confused, non focal.   CHEST/LUNG: decreased bs at bases.  HEART: Regular rate and rhythm  ABDOMEN: Soft, Nontender, Nondistended, Bowel sounds present  GENITOURINARY: Voiding, no palpable bladder  EXTREMITIES:   No clubbing, cyanosis, or edema  MUSCULOSKELETAL- Left hip dressing c/d/i.   SKIN-no rash      LABS:                                                9.6    8.31  )-----------( 414      ( 06 Oct 2022 08:18 )             28.5       10-06    144  |  113<H>  |  4<L>  ----------------------------<  113<H>  3.6   |  24  |  0.66    Ca    9.0      06 Oct 2022 08:18        MEDICATIONS  (STANDING):  chlorhexidine 4% Liquid 1 Application(s) Topical <User Schedule>  dextrose 5% + sodium chloride 0.45%. 1000 milliLiter(s) (75 mL/Hr) IV Continuous <Continuous>  enoxaparin Injectable 40 milliGRAM(s) SubCutaneous every 24 hours  latanoprost 0.005% Ophthalmic Solution 1 Drop(s) Both EYES at bedtime  timolol 0.5% Solution 1 Drop(s) Both EYES daily  tranexamic acid IVPB 1000 milliGRAM(s) IV Intermittent once    MEDICATIONS  (PRN):  acetaminophen     Tablet .. 650 milliGRAM(s) Oral every 6 hours PRN Mild Pain (1 - 3)  acetaminophen  Suppository .. 650 milliGRAM(s) Rectal every 6 hours PRN Temp greater or equal to 38C (100.4F)  ketorolac   Injectable 15 milliGRAM(s) IV Push every 6 hours PRN Severe Pain (7 - 10)  morphine  - Injectable 2 milliGRAM(s) IV Push every 6 hours PRN breakthrough pain  ondansetron Injectable 4 milliGRAM(s) IV Push every 8 hours PRN Nausea and/or Vomiting  ondansetron Injectable 4 milliGRAM(s) IV Push every 6 hours PRN Nausea and/or Vomiting        ASSESSMENT AND PLAN:      1. Left hip fracture s/p left hip saqib pOD#11  -pain control  -physical therapy  -incentive spirometry  -bowel  regimen.     2. Acute blood loss anemia:  -d/t surgery  -stable    3. Hypokalemia   resolved    4. Dysphagia/aspiration pna/acute hypoxic respiratory failure  -taking in sm amts of soft foods  -treated with iv abx.   -trial of pureed/honey thick liquids.   -per family, pt mostly relied on smoothies for nutrition prior to admission, wasn't really eating much solid food.   -needs to be fed, took in 30% of breakfast, does better when family is present   -aspiration precautions.    5. Alzheimers dementia  -continue donepezil.    6. DVT px:  -lovenox per a/c services.     dispo:  home vs rehab

## 2022-10-06 NOTE — PROGRESS NOTE ADULT - SUBJECTIVE AND OBJECTIVE BOX
Patient seen and examined at bedside. Pain well controlled with medication. A/O x0, consistent with prior exam. No gurgling. Does not respond to questions or commands.     Vital Signs Last 24 Hrs  T(C): 37 (06 Oct 2022 00:24), Max: 37 (06 Oct 2022 00:24)  T(F): 98.6 (06 Oct 2022 00:24), Max: 98.6 (06 Oct 2022 00:24)  HR: 81 (06 Oct 2022 00:24) (70 - 92)  BP: 126/47 (06 Oct 2022 00:24) (123/42 - 139/53)  BP(mean): --  RR: 16 (06 Oct 2022 00:24) (16 - 16)  SpO2: 97% (06 Oct 2022 00:24) (97% - 98%)    Parameters below as of 06 Oct 2022 00:24  Patient On (Oxygen Delivery Method): room air        LLE:  Dressing in place, c/d/i.  Maris-incisional TTP; otherwise, NTTP throughout the rest of the extremity.   SILT L2-S1  + GSC/TA/EHL/FHL intact   Extremity warm and well perfused  No calf tenderness bilaterally.  Compartments soft and compressible.     87F POD11 L hip saqib    Plan:   speech and swallow recs appreciated, Mod thick liq, no PEG  WBAT/PT/OT, abduction pillow  Pain management PRN  DVT prophylaxis: appreciate anticoagulation team recommendations.   CCU management appreciated  Dispo: [facility]  [facility] St[facility] for DC

## 2022-10-07 ENCOUNTER — TRANSCRIPTION ENCOUNTER (OUTPATIENT)
Age: 87
End: 2022-10-07

## 2022-10-07 VITALS
HEART RATE: 76 BPM | TEMPERATURE: 98 F | SYSTOLIC BLOOD PRESSURE: 115 MMHG | RESPIRATION RATE: 16 BRPM | OXYGEN SATURATION: 98 % | DIASTOLIC BLOOD PRESSURE: 48 MMHG

## 2022-10-07 LAB
ANION GAP SERPL CALC-SCNC: 5 MMOL/L — SIGNIFICANT CHANGE UP (ref 5–17)
BUN SERPL-MCNC: 5 MG/DL — LOW (ref 7–23)
CALCIUM SERPL-MCNC: 8.9 MG/DL — SIGNIFICANT CHANGE UP (ref 8.5–10.1)
CHLORIDE SERPL-SCNC: 114 MMOL/L — HIGH (ref 96–108)
CO2 SERPL-SCNC: 25 MMOL/L — SIGNIFICANT CHANGE UP (ref 22–31)
CREAT SERPL-MCNC: 0.74 MG/DL — SIGNIFICANT CHANGE UP (ref 0.5–1.3)
EGFR: 79 ML/MIN/1.73M2 — SIGNIFICANT CHANGE UP
GLUCOSE SERPL-MCNC: 115 MG/DL — HIGH (ref 70–99)
HCT VFR BLD CALC: 28.2 % — LOW (ref 34.5–45)
HGB BLD-MCNC: 9.2 G/DL — LOW (ref 11.5–15.5)
MCHC RBC-ENTMCNC: 32.6 GM/DL — SIGNIFICANT CHANGE UP (ref 32–36)
MCHC RBC-ENTMCNC: 33.7 PG — SIGNIFICANT CHANGE UP (ref 27–34)
MCV RBC AUTO: 103.3 FL — HIGH (ref 80–100)
PLATELET # BLD AUTO: 414 K/UL — HIGH (ref 150–400)
POTASSIUM SERPL-MCNC: 3.6 MMOL/L — SIGNIFICANT CHANGE UP (ref 3.5–5.3)
POTASSIUM SERPL-SCNC: 3.6 MMOL/L — SIGNIFICANT CHANGE UP (ref 3.5–5.3)
RBC # BLD: 2.73 M/UL — LOW (ref 3.8–5.2)
RBC # FLD: 14.8 % — HIGH (ref 10.3–14.5)
SODIUM SERPL-SCNC: 144 MMOL/L — SIGNIFICANT CHANGE UP (ref 135–145)
WBC # BLD: 7.27 K/UL — SIGNIFICANT CHANGE UP (ref 3.8–10.5)
WBC # FLD AUTO: 7.27 K/UL — SIGNIFICANT CHANGE UP (ref 3.8–10.5)

## 2022-10-07 PROCEDURE — 99233 SBSQ HOSP IP/OBS HIGH 50: CPT

## 2022-10-07 PROCEDURE — 99239 HOSP IP/OBS DSCHRG MGMT >30: CPT

## 2022-10-07 PROCEDURE — 99231 SBSQ HOSP IP/OBS SF/LOW 25: CPT

## 2022-10-07 RX ORDER — ACETAMINOPHEN 500 MG
2 TABLET ORAL
Qty: 0 | Refills: 0 | DISCHARGE
Start: 2022-10-07

## 2022-10-07 RX ORDER — MODERNA COVID-19 VACCINE, BIVALENT 25; 25 UG/.5ML; UG/.5ML
0.5 INJECTION, SUSPENSION INTRAMUSCULAR ONCE
Refills: 0 | Status: COMPLETED | OUTPATIENT
Start: 2022-10-07 | End: 2022-10-07

## 2022-10-07 RX ADMIN — Medication 15 MILLIGRAM(S): at 11:10

## 2022-10-07 RX ADMIN — ENOXAPARIN SODIUM 40 MILLIGRAM(S): 100 INJECTION SUBCUTANEOUS at 06:31

## 2022-10-07 RX ADMIN — Medication 15 MILLIGRAM(S): at 10:40

## 2022-10-07 RX ADMIN — Medication 1 DROP(S): at 10:39

## 2022-10-07 RX ADMIN — MODERNA COVID-19 VACCINE, BIVALENT 0.5 MILLILITER(S): 25; 25 INJECTION, SUSPENSION INTRAMUSCULAR at 13:40

## 2022-10-07 RX ADMIN — SODIUM CHLORIDE 75 MILLILITER(S): 9 INJECTION, SOLUTION INTRAVENOUS at 01:52

## 2022-10-07 NOTE — DISCHARGE NOTE NURSING/CASE MANAGEMENT/SOCIAL WORK - NSDCPEFALRISK_GEN_ALL_CORE
For information on Fall & Injury Prevention, visit: [email or web address] OR  [email or web address] OR  https://www.cdc.gov/steadi/patient.html

## 2022-10-07 NOTE — PROGRESS NOTE ADULT - NUTRITIONAL ASSESSMENT
This patient has been assessed with a concern for Malnutrition and has been determined to have a diagnosis/diagnoses of Severe protein-calorie malnutrition and Underweight (BMI < 19).    This patient is being managed with:   Diet NPO with Tube Feed-  Tube Feeding Modality: Nasogastric  Jevity 1.5 Ag (JEVITY1.5)  Total Volume for 24 Hours (mL): 1080  Continuous  Until Goal Tube Feed Rate (mL per Hour): 45  Tube Feed Duration (in Hours): 24  Tube Feed Start Time: 00:00  Free Water Flush Instructions:  @ 35 ml/hr (total volume 700ml)  No Carb Prosource TF     Qty per Day:  3  Entered: Sep 29 2022  1:07PM    
This patient has been assessed with a concern for Malnutrition and has been determined to have a diagnosis/diagnoses of Severe protein-calorie malnutrition and Underweight (BMI < 19).    This patient is being managed with:   Diet NPO with Tube Feed-  Tube Feeding Modality: Nasogastric  Jevity 1.5 Ag (JEVITY1.5)  Total Volume for 24 Hours (mL): 1080  Continuous  Until Goal Tube Feed Rate (mL per Hour): 45  Tube Feed Duration (in Hours): 24  Tube Feed Start Time: 00:00  Free Water Flush Instructions:  @ 35 ml/hr (total volume 700ml)  No Carb Prosource TF     Qty per Day:  3  Entered: Sep 29 2022  1:07PM    
This patient has been assessed with a concern for Malnutrition and has been determined to have a diagnosis/diagnoses of Severe protein-calorie malnutrition and Underweight (BMI < 19).    This patient is being managed with:   Diet Pureed-  Moderately Thick Liquids (MODTHICKLIQS)  Supplement Feeding Modality:  Oral  Ensure Enlive Cans or Servings Per Day:  1       Frequency:  Three Times a day  Entered: Oct  3 2022 12:35PM    
This patient has been assessed with a concern for Malnutrition and has been determined to have a diagnosis/diagnoses of Severe protein-calorie malnutrition and Underweight (BMI < 19).    This patient is being managed with:   Diet NPO with Tube Feed-  Tube Feeding Modality: Nasogastric  Jevity 1.5 Ag (JEVITY1.5)  Total Volume for 24 Hours (mL): 1080  Continuous  Until Goal Tube Feed Rate (mL per Hour): 45  Tube Feed Duration (in Hours): 24  Tube Feed Start Time: 00:00  Free Water Flush Instructions:  @ 35 ml/hr (total volume 700ml)  No Carb Prosource TF     Qty per Day:  3  Entered: Sep 29 2022  1:07PM    
This patient has been assessed with a concern for Malnutrition and has been determined to have a diagnosis/diagnoses of Severe protein-calorie malnutrition and Underweight (BMI < 19).    This patient is being managed with:   Diet Pureed-  Moderately Thick Liquids (MODTHICKLIQS)  Supplement Feeding Modality:  Oral  Ensure Enlive Cans or Servings Per Day:  1       Frequency:  Three Times a day  Entered: Oct  3 2022 12:35PM    
This patient has been assessed with a concern for Malnutrition and has been determined to have a diagnosis/diagnoses of Severe protein-calorie malnutrition and Underweight (BMI < 19).    This patient is being managed with:   Diet NPO with Tube Feed-  Tube Feeding Modality: Nasogastric  Jevity 1.5 Ag (JEVITY1.5)  Total Volume for 24 Hours (mL): 1080  Continuous  Until Goal Tube Feed Rate (mL per Hour): 45  Tube Feed Duration (in Hours): 24  Tube Feed Start Time: 00:00  Free Water Flush Instructions:  @ 35 ml/hr (total volume 700ml)  No Carb Prosource TF     Qty per Day:  3  Entered: Sep 29 2022  1:07PM    
This patient has been assessed with a concern for Malnutrition and has been determined to have a diagnosis/diagnoses of Severe protein-calorie malnutrition and Underweight (BMI < 19).    This patient is being managed with:   Diet Pureed-  Moderately Thick Liquids (MODTHICKLIQS)  Supplement Feeding Modality:  Oral  Ensure Enlive Cans or Servings Per Day:  1       Frequency:  Three Times a day  Entered: Oct  3 2022 12:35PM    
This patient has been assessed with a concern for Malnutrition and has been determined to have a diagnosis/diagnoses of Severe protein-calorie malnutrition and Underweight (BMI < 19).    This patient is being managed with:   Diet NPO with Tube Feed-  Tube Feeding Modality: Nasogastric  Jevity 1.5 Ag (JEVITY1.5)  Total Volume for 24 Hours (mL): 1080  Continuous  Until Goal Tube Feed Rate (mL per Hour): 45  Tube Feed Duration (in Hours): 24  Tube Feed Start Time: 00:00  Free Water Flush Instructions:  @ 35 ml/hr (total volume 700ml)  No Carb Prosource TF     Qty per Day:  3  Entered: Sep 29 2022  1:07PM

## 2022-10-07 NOTE — DISCHARGE NOTE NURSING/CASE MANAGEMENT/SOCIAL WORK - NSDCVIVACCINE_GEN_ALL_CORE_FT
Moderna COVID-19 Vaccine, Bivalent; 07-Oct-2022 13:40; Gudelia Fraire (RN); [employer] [id number] (Exp. Date: 27-May-2023); IntraMuscular; Deltoid Left.; 0.5 milliLiter(s);

## 2022-10-07 NOTE — PROGRESS NOTE ADULT - REASON FOR ADMISSION
left hip fracture

## 2022-10-07 NOTE — PROGRESS NOTE ADULT - ASSESSMENT
Pt is a 87y old Female with hx of HLD, glaucoma, Alzheimer's dementia, and OA who presented to ED after a fall.     1) Femur fracture   - s/p ORIF   - c/w pain regimen patient appears comfortable at this time   - PT consult appreciated     2) History of Dementia   - unsure of baseline   - supportive care     3) dysphagia   - treated on this admission for aspiration PNA   - speech and swallow re- eval this AM   - possible calorie count     Process of Care  --Reviewed dx/treatment problems and alignment with Goals of Care    Physical Aspects of Care  --Pain  patient denies at this time  c/w current managment    --Bowel Regimen  risk for constipation d/t immobility    --Dyspnea  No SOB at this time  comfortable and in NAD    --Nausea Vomiting  denies    --Weakness  PT as tolerated     Psychological and Psychiatric Aspects of Care:   --Greif/Bereavment: emotional support provided  --Hx of psychiatric dx: none  -Pastoral Care Available PRN     Social Aspects of Care  -SW involved     Cultural Aspects  -Primary Language: English    Goals of Care:  Scheduled for 10/8/2022 at 10AM     We discussed Palliative Care team being a supportive team when a patient has ongoing illnesses.  We also discussed that it is not an end of life care service, but can help navigate symptoms and emotional support througout their hospital stay here.    Ethical and Legal Aspects:   NA    Capacity- patient does not appear to have capacity     HCP/Surrogate: HCP on one content dylan Alfonso (Shira)     Code Status- Full code   MOLST-   Dispo Plan- ongoing discussions     Discussed With: Dr. Wilburn coordinated with attending and SW and RN

## 2022-10-07 NOTE — PROGRESS NOTE ADULT - PROVIDER SPECIALTY LIST ADULT
Anticoag Management
Critical Care
Critical Care
Hospitalist
Orthopedics
Anticoag Management
Anticoag Management
Critical Care
Critical Care
Hospitalist
Orthopedics
Anticoag Management
Hospitalist
Hospitalist
MICU
Orthopedics
Anticoag Management
Hospitalist
Orthopedics
Palliative Care
Critical Care

## 2022-10-07 NOTE — CHART NOTE - NSCHARTNOTEFT_GEN_A_CORE
[facility] scheduled for this date at 10 am.  This SW called daughter Shira as family was not present for meeting, dtr reported that "since the hospital will be discharging mom there is no point in meeting".  During GOC earlier this week (10/4) we discussed potential discharge at the end of the week and discharge options such as [facility] or home with services.    At this time plan is for [facility].  Emotional support provided.  Our team to sign off as goals of care are clear, please reconsult palliative if needed. [facility] scheduled for this date at 10 am.  This SW called daughter Shira as family was not present for meeting, dtr reported that "since the hospital will be discharging mom there is no point in meeting".  During GOC earlier this week (10/4) we discussed potential discharge at the end of the week and discharge options such as [facility] or home with services along with discharge process.    At this time plan is for [facility].  Emotional support provided.  Our team to sign off as goals of care are clear, please reconsult palliative if needed.

## 2022-10-07 NOTE — PROGRESS NOTE ADULT - SUBJECTIVE AND OBJECTIVE BOX
CC: Left hip Fx  Subjective and Objective:   86 yo F with a PMH of HLD, glaucoma, Alzheimer's dementia, and OA who presented to ED after a fall. The patient has a 24/7 air, and has a hospital bed at home, but when the aid went to the bathroom last night, the patient (despite having railings) climbed to the edge of the bed and fell on her left side. It is unclear if she hit her head. Reportedly, the patient has been more restless recently at night. No recent medication changes. No complaints from daughter. Her last fall was years ago, and she does not normally fall because of the precautions with her bed and having her aide 24/7. She normally can ambulate with assistance and eat with assistance. She was admitted with hip fracture. Underwent Left hip saqib 9/25. RRT called 2 after episode of hypoxia with concern for aspiration. Patient noted to be gargling on own secretions. Transferred to ICU for aspiration risk and airway monitoring.   Pt treated for  hip fracture s/p ORIF.  Hospital course complicated by acute respiratory failure with hypoxia due to aspiration PNA, treated with full course of IV antibiotics.  NG tube placed for feeds due to inability to swallow without risk of aspiration.    She is now transferred to .   She removed her NGT.   Seen by speech and swallow, recommended trial of pureed and honey thick liquids which she was started on.   Per nutrition, pt with minimal po intake and recommended to dc calorie count.     10/7/22- confused and non-communicable. Eats better.       REVIEW OF SYSTEMS: unable to obtain d/t dementia.     Vital Signs Last 24 Hrs  T(C): 36.4 (07 Oct 2022 08:57), Max: 36.7 (07 Oct 2022 00:36)  T(F): 97.6 (07 Oct 2022 08:57), Max: 98 (07 Oct 2022 00:36)  HR: 76 (07 Oct 2022 08:57) (76 - 80)  BP: 115/48 (07 Oct 2022 08:57) (115/48 - 139/52)  BP(mean): --  RR: 16 (07 Oct 2022 08:57) (16 - 16)  SpO2: 98% (07 Oct 2022 08:57) (98% - 98%)    Parameters below as of 07 Oct 2022 08:57  Patient On (Oxygen Delivery Method): room air      PHYSICAL EXAM:  GENERAL: Comfortable, no acute distress   HEAD:  Normocephalic, atraumatic  EYES: EOMI, PERRLA  HEENT: dry mucous membranes  NECK: Supple, No JVD  NERVOUS SYSTEM:  confused, non focal.   CHEST/LUNG: decreased bs at bases.  HEART: Regular rate and rhythm  ABDOMEN: Soft, Nontender, Nondistended, Bowel sounds present  GENITOURINARY: Voiding, no palpable bladder  EXTREMITIES:   No clubbing, cyanosis, or edema  MUSCULOSKELETAL- Left hip dressing c/d/i.   SKIN-no rash      LABS:                                                           9.2    7.27  )-----------( 414      ( 07 Oct 2022 08:03 )             28.2     07 Oct 2022 08:03    144    |  114    |  5      ----------------------------<  115    3.6     |  25     |  0.74     Ca    8.9        07 Oct 2022 08:03    MEDICATIONS  (STANDING):  chlorhexidine 4% Liquid 1 Application(s) Topical <User Schedule>  dextrose 5% + sodium chloride 0.45%. 1000 milliLiter(s) (75 mL/Hr) IV Continuous <Continuous>  enoxaparin Injectable 40 milliGRAM(s) SubCutaneous every 24 hours  latanoprost 0.005% Ophthalmic Solution 1 Drop(s) Both EYES at bedtime  timolol 0.5% Solution 1 Drop(s) Both EYES daily  tranexamic acid IVPB 1000 milliGRAM(s) IV Intermittent once    MEDICATIONS  (PRN):  acetaminophen     Tablet .. 650 milliGRAM(s) Oral every 6 hours PRN Mild Pain (1 - 3)  acetaminophen  Suppository .. 650 milliGRAM(s) Rectal every 6 hours PRN Temp greater or equal to 38C (100.4F)  ketorolac   Injectable 15 milliGRAM(s) IV Push every 6 hours PRN Severe Pain (7 - 10)  morphine  - Injectable 2 milliGRAM(s) IV Push every 6 hours PRN breakthrough pain  ondansetron Injectable 4 milliGRAM(s) IV Push every 8 hours PRN Nausea and/or Vomiting  ondansetron Injectable 4 milliGRAM(s) IV Push every 6 hours PRN Nausea and/or Vomiting        ASSESSMENT AND PLAN:  1. Left hip fracture s/p left hip saqib pOD#12  -pain control  -physical therapy  -incentive spirometry  -bowel  regimen.     2. Acute blood loss anemia:  -d/t surgery  -stable    3. Hypokalemia   resolved    4. Dysphagia/aspiration pna/acute hypoxic respiratory failure  -taking in sm amts of soft foods  -treated with iv abx.   -trial of pureed/honey thick liquids.   -per family, pt mostly relied on smoothies for nutrition prior to admission, wasn't really eating much solid food.   -needs to be fed, does better when family is present   -aspiration precautions.    5. Alzheimers dementia- advanced  -continue donepezil.    6. DVT px:  -lovenox per a/c services.     dispo: [facility] today. DC time 45 mins. D/w daughter at bedside

## 2022-10-07 NOTE — PROGRESS NOTE ADULT - SUBJECTIVE AND OBJECTIVE BOX
HPI:  86 yo F with a PMH of HLD, glaucoma, Alzheimer's dementia, and OA who presents after a fall. The patient cannot provide any history, so it was obtained from her daughter Shira at bedside. The patient has a 24/7 air, and has a hospital bed at home, but when the aid went to the bathroom last night, the patient (despite having railings) climbed to the edge of the bed and fell on her left side. It is unclear if she hit her head. Reportedly, the patient has been more restless recently at night. No recent medication changes. No complaints from daughter. Her last fall was years ago, and she does not normally fall because of the precautions with her bed and having her aide 24/7. She normally can ambulate with assistance and eat with assistance.      In the ED, she was given Morphine 2 mg IV x1 and started on LR @ 75 ml/hr. (24 Sep 2022 12:57)      Patient is a 87y old  Female who presents with a chief complaint of left hip fracture (26 Sep 2022 10:34)      Consulted by Dr. Dale   for VTE prophylaxis, risk stratification, and anticoagulation management.    PAST MEDICAL & SURGICAL HISTORY:  Osteoarthritis of right hip      Glaucoma      Alzheimer disease      Hypercholesteremia      Dementia      S/P appendectomy  childhood      S/P tonsillectomy  childhood      S/P D&amp;C (status post dilation and curettage)  age 32      S/P colonoscopy  every 2 or 3 years    Interval History  2022:patient seen at bedside awake , able to tell her name  patient was having swallowing difficulty Speech and swallow eval done, puree diet,   H&H stable will continue Lovenox 40mg daily for DVT prophylaxis   2022:patient seen at bedside on CCU, upgraded to  CCU low O2 sat and potential aspiration risk, patient is on NC now no concerns regarding AC will continue Lovenox for DVT prophylaxis   2022 Pt seen at bedside in CCU.  Pt non verbal,, physical therapy with pt.   2022 Pt seen at bedside with daughter present.  Pt remains confused and non verbal.  Feeding tube noticed.  Discussed with daughter that pt is on Lovenox for DVT prophylaxis. No concerns.   10-2-2022: Pt seen at bedside on [facility] with son present.  Pt alert knows her name and able to answer questions.  dose not know where she is or even who her son is.  NG tube removed, awaiting re evaluation for swallowing.  Will cont on Lovenox.   10-3-2022: Pt seen at bedside on [facility], On Puree diet , H&H stable will continue Lovenox for DVT prophylaxis    10-4-2022:  Pt seen at bedside on [facility] son and daughter present. PT ALERT AND KNOWS HER NAME ONLY. tELLME TO "SHUT UP AND LEAVE"    Discussed her Lovenox and last dose on 10-. no concerns.  Awaiting GOC discussion.  10-5-2022 Pt seen at bedside on [facility] no changes daughter present pt awaiting placement in rehab or home. cont on Lovenox for DVT  prophylaxis.  10-6-2022 Pt seen at bedside on [facility], sitting in the chair, tolerating Lovenox H&H stable, cont on Lovenox for DVT  prophylaxis  10-7-2022 Pt seen at bedside on [facility], no overnight events, needs goals of care conversation with family pending placement   CrCl:31.3  BMI:18.3  EBL:150ml    Caprini VTE Risk Score:CAPRINI SCORE  AGE RELATED RISK FACTORS                                                       MOBILITY RELATED FACTORS  [ ] Age 41-60 years                                            (1 Point)                  [ ] Bed rest /restricted mobility                             (1 Point)  [ ] Age: 61-74 years                                           (2 Points)                [ ] Plaster cast                                                   (2 Points)  [x ] Age= 75 years                                              (3 Points)                 [ ] Bed bound for more than 72 hours                   (2 Points)    DISEASE RELATED RISK FACTORS                                               GENDER SPECIFIC FACTORS  [ ] Edema in the lower extremities                       (1 Point)           [ ] Pregnancy                                                            (1 Point)  [ ] Varicose veins                                               (1 Point)                  [ ] Post-partum < 6 weeks                                      (1 Point)             [ ] BMI > 25 Kg/m2                                            (1 Point)                  [ ] Hormonal therapy or oral contraception       (1 Point)                 [ ] Sepsis (in the previous month)                        (1 Point)             [ ] History of pregnancy complications                (1Point)  [ ] Pneumonia or serious lung disease                                             [ ] Unexplained or recurrent  (=/>3), premature                                 (In the previous month)                               (1 Point)                birth with toxemia or growth-restricted infant (1 Point)  [ ] Abnormal pulmonary function test            (1 Point)                                   SURGERY RELATED RISK FACTORS  [ ] Acute myocardial infarction                       (1 Point)                  [ ]  Section                                         (1 Point)  [ ] Congestive heart failure (in the previous month) (1 Point)   [ ] Minor surgery   lasting <45 minutes       (1 Point)   [ ] Inflammatory bowel disease                             (1 Point)          [ ] Arthroscopic surgery                                  (2 Points)  [ ] Central venous access                                    (2 Points)            [ ] General surgery lasting >45 minutes      (2 Points)       [ ] Stroke (in the previous month)                  (5 Points)            [ ] Elective major lower extremity arthroplasty (5 Points)                                   [  ] Malignancy (present or past include skin melanoma                                          but exclude  basal skin cell)    (2 points)                                      TRAUMA RELATED RISK FACTORS                HEMATOLOGY RELATED FACTORS                                  [ x] Fracture of the hip, pelvis, or leg                       (5 Points)  [ ] Prior episodes of VTE                                     (3 Points)          [ ] Acute spinal cord injury (in the previous month)  (5 Points)  [ ] Positive family history for VTE                         (3 Points)       [ ] Paralysis (less than 1 month)                          (5 Points)  [ ] Prothrombin 46610 A                                      (3 Points)         [ ] Multiple Trauma (within 1month)                 (5Points)                                                                                                                                                                [ ] Factor V Leiden                                          (3 Points)                                OTHER RISK FACTORS                          [ ] Lupus anticoagulants                                     (3 Points)                       [ ] BMI > 40                          (1 Point)                                                         [ ] Anticardiolipin antibodies                                (3 Points)                   [ ] Smoking                              (1Point)                                                [ ] High homocysteine in the blood                      (3 Points)                [  ] Diabetes requiring insulin (1point)                         [ ] Other congenital or acquired thrombophilia       (3 Points)          [  ] Chemotherapy                   (1 Point)  [ ] Heparin induced thrombocytopenia                  (3 Points)             [  ] Blood Transfusion                (1 point)                                                                                                             Total Score [    8      ]                                                                                                                                                                                                                                                                                                                                                                                                                                         IMPROVE Bleeding Risk Score:4.5      Falls Risk:   High ( x )  Mod (  )  Low (  )      FAMILY HISTORY:  Family history of colon cancer (Father)      Denies any personal or familial history of clotting or bleeding disorders.    Allergies    No Known Allergies    Intolerances        REVIEW OF SYSTEMS    (  )Fever	     (  )Constipation	(  )SOB				(  )Headache	(  )Dysuria  (  )Chills	     (  )Melena	(  )Dyspnea present on exertion	                    (  )Dizziness                    (  )Polyuria  (  )Nausea	     (  )Hematochezia	(  )Cough			                    (  )Syncope   	(  )Hematuria  (  )Vomiting    (  )Chest Pain	(  )Wheezing			(  )Weakness  (  )Diarrhea     (  )Palpitations	(  )Anorexia			(  )joint pain    unable to obtain: dementia    Vital Signs Last 24 Hrs  T(C): 36.4 (07 Oct 2022 08:57), Max: 36.7 (07 Oct 2022 00:36)  T(F): 97.6 (07 Oct 2022 08:57), Max: 98 (07 Oct 2022 00:36)  HR: 76 (07 Oct 2022 08:57) (76 - 80)  BP: 115/48 (07 Oct 2022 08:57) (115/48 - 139/52)  BP(mean): --  RR: 16 (07 Oct 2022 08:57) (16 - 16)  SpO2: 98% (07 Oct 2022 08:57) (98% - 98%)  PHYSICAL EXAM:    Constitutional: Appears ill    Neurological: Alert knows her name Opal    Skin: Warm    Respiratory and Thorax: normal effort; Breath sounds: normal; No rales/wheezing/rhonchi,   	  Cardiovascular: S1, S2, regular, NMBR	    Gastrointestinal: BS + x 4Q, nontender	    Genitourinary:  Bladder nondistended, nontender    Musculoskeletal:   General Right:   no muscle/joint tenderness,   normal tone, no joint swelling,   ROM: limited	    General Left:   no muscle/joint tenderness,   normal tone, notes swelling to left arm  ROM: limited    Hip: Left: Dressing CDI;         Lower extrems:   Right: no calf tenderness              negative chad's sign               + pedal pulses    Left:   no calf tenderness              negative chad's sign               + pedal pulses                         9.2    7.27  )-----------( 414      ( 07 Oct 2022 08:03 )             28.2       10-    144  |  114<H>  |  5<L>  ----------------------------<  115<H>  3.6   |  25  |  0.74    Ca    8.9      07 Oct 2022 08:03                               9.6    8.31  )-----------( 414      ( 06 Oct 2022 08:18 )             28.5       10-    144  |  113<H>  |  4<L>  ----------------------------<  113<H>  3.6   |  24  |  0.66    Ca    9.0      06 Oct 2022 08:18                                        9.6    8.36  )-----------( 381      ( 05 Oct 2022 07:13 )             28.2       10-    143  |  113<H>  |  6<L>  ----------------------------<  118<H>  3.4<L>   |  24  |  0.69    Ca    8.8      05 Oct 2022 07:13                            10.1   9.39  )-----------( 388      ( 04 Oct 2022 08:16 )             30.5       10-    142  |  113<H>  |  5<L>  ----------------------------<  125<H>  3.8   |  23  |  0.66    Ca    8.9      04 Oct 2022 08:16                     9.1    8.57  )-----------( 350      ( 03 Oct 2022 07:52 )             27.4       10-    142  |  112<H>  |  6<L>  ----------------------------<  115<H>  3.9   |  24  |  0.68    Ca    8.9      03 Oct 2022 07:52                                         8.8    8.21  )-----------( 238      ( 01 Oct 2022 06:30 )             26.3       10-01    141  |  112<H>  |  8   ----------------------------<  103<H>  3.9   |  25  |  0.52    Ca    8.8      01 Oct 2022 06:30  Phos  2.7     10-01  Mg     2.4     10-                             8.7    10.16 )-----------( 227      ( 30 Sep 2022 06:12 )             25.0       09-30    143  |  111<H>  |  8   ----------------------------<  106<H>  3.4<L>   |  28  |  0.56    Ca    8.5      30 Sep 2022 06:12  Phos  1.2     09-30  Mg     2.1     09-30                            8.6    9.35  )-----------( 156      ( 28 Sep 2022 06:31 )             26.0       09-28    141  |  107  |  9   ----------------------------<  106<H>  3.2<L>   |  29  |  0.74    Ca    8.5      28 Sep 2022 06:31  Phos  2.1     09-28  Mg     2.0     09-28    TPro  5.0<L>  /  Alb  1.8<L>  /  TBili  0.8  /  DBili  x   /  AST  39<H>  /  ALT  15  /  AlkPhos  148<H>  09-28      PT/INR - ( 28 Sep 2022 06:31 )   PT: 14.4 sec;   INR: 1.24 ratio         PTT - ( 28 Sep 2022 06:31 )  PTT:31.5 sec             9.5    14.00 )-----------( 135      ( 27 Sep 2022 05:52 )             28.7       09-27    138  |  106  |  10  ----------------------------<  104<H>  3.8   |  28  |  0.77    Ca    8.8      27 Sep 2022 05:52  Phos  1.8     09-27  Mg     2.0     09-27    TPro  5.7<L>  /  Alb  2.5<L>  /  TBili  0.6  /  DBili  x   /  AST  39<H>  /  ALT  13  /  AlkPhos  81  09-26      PT/INR - ( 27 Sep 2022 05:52 )   PT: 15.0 sec;   INR: 1.29 ratio         PTT - ( 27 Sep 2022 05:52 )  PTT:32.6 sec                        10.1   12.99 )-----------( 142      ( 26 Sep 2022 09:16 )             29.3       09-26    140  |  108  |  10  ----------------------------<  107<H>  3.9   |  26  |  0.90    Ca    8.7      26 Sep 2022 09:16        PT/INR - ( 25 Sep 2022 05:08 )   PT: 14.0 sec;   INR: 1.20 ratio         PTT - ( 25 Sep 2022 05:08 )  PTT:22.3 sec				  MEDICATIONS  (STANDING):  chlorhexidine 4% Liquid 1 Application(s) Topical <User Schedule>  dextrose 5% + sodium chloride 0.45%. 1000 milliLiter(s) IV Continuous <Continuous>  enoxaparin Injectable 40 milliGRAM(s) SubCutaneous every 24 hours  latanoprost 0.005% Ophthalmic Solution 1 Drop(s) Both EYES at bedtime  potassium chloride  10 mEq/100 mL IVPB 10 milliEquivalent(s) IV Intermittent every 1 hour  timolol 0.5% Solution 1 Drop(s) Both EYES daily  tranexamic acid IVPB 1000 milliGRAM(s) IV Intermittent once            < from: CT Head No Cont (22 @ 07:09) >  MPRESSION: No acute intracranial hemorrhage, mass effect, or shift of   the midline structures.    Similar-appearing mild chronic white matter microvascular type changes.    < end of copied text >      DVT Prophylaxis:  LMWH                   ( x )  Heparin SQ           (  )  Coumadin             (  )  Xarelto                  (  )  Eliquis                   (  )  Venodynes           (x  )  Ambulation          ( x )  UFH                       (  )  Contraindicated  (  )  EC ASPIRIN       (  )

## 2022-10-07 NOTE — DISCHARGE NOTE NURSING/CASE MANAGEMENT/SOCIAL WORK - PATIENT PORTAL LINK FT
You can access the FollowMyHealth Patient Portal offered by [facility] by registering at the following website: [email or web address]. By joining [facility]’s FollowMyHealth portal, you will also be able to view your health information using other applications (apps) compatible with our system.

## 2022-10-07 NOTE — PROGRESS NOTE ADULT - SUBJECTIVE AND OBJECTIVE BOX
HPI: pt seen and examined with no family at bedside patient more alert this AM was able to get OOB to chair with PT yesterday, patients states she is comfortable now, she remains confused. Follow up [facility] meeting today at 10AM     PAIN: ( )Yes   (x )No  DYSPNEA: ( ) Yes  (x ) No    Review of Systems:    Unable to obtain/Limited due to: limited due to garbled speech     PHYSICAL EXAM:    Vital Signs Last 24 Hrs  T(C): 36.4 (07 Oct 2022 08:57), Max: 36.7 (07 Oct 2022 00:36)  T(F): 97.6 (07 Oct 2022 08:57), Max: 98 (07 Oct 2022 00:36)  HR: 76 (07 Oct 2022 08:57) (76 - 80)  BP: 115/48 (07 Oct 2022 08:57) (115/48 - 139/52)  RR: 16 (07 Oct 2022 08:57) (16 - 16)  SpO2: 98% (07 Oct 2022 08:57) (98% - 98%)    Parameters below as of 07 Oct 2022 08:57  Patient On (Oxygen Delivery Method): room air    PPSV2: 30%  FAST: 6d    General: elderly frail female in bed, NAD   Mental Status: alert but unable to fully asses capacity   HEENT: dry oral mucosa, missing teeth   Lungs: diminished breath sounds b/l   Cardiac: S1S2 +   GI: soft, nontender, +BS  : no suprapubic tenderness   Ext: no edema   Neuro: alert and oriented to self, unable to further assess     LABS:                        9.2    7.27  )-----------( 414      ( 07 Oct 2022 08:03 )             28.2     10-07    144  |  114<H>  |  5<L>  ----------------------------<  115<H>  3.6   |  25  |  0.74    Ca    8.9      07 Oct 2022 08:03    Albumin:     Allergies    No Known Allergies    Intolerances    MEDICATIONS  (STANDING):  chlorhexidine 4% Liquid 1 Application(s) Topical <User Schedule>  dextrose 5% + sodium chloride 0.45%. 1000 milliLiter(s) (75 mL/Hr) IV Continuous <Continuous>  enoxaparin Injectable 40 milliGRAM(s) SubCutaneous every 24 hours  latanoprost 0.005% Ophthalmic Solution 1 Drop(s) Both EYES at bedtime  timolol 0.5% Solution 1 Drop(s) Both EYES daily  tranexamic acid IVPB 1000 milliGRAM(s) IV Intermittent once    MEDICATIONS  (PRN):  acetaminophen     Tablet .. 650 milliGRAM(s) Oral every 6 hours PRN Mild Pain (1 - 3)  acetaminophen  Suppository .. 650 milliGRAM(s) Rectal every 6 hours PRN Temp greater or equal to 38C (100.4F)  ketorolac   Injectable 15 milliGRAM(s) IV Push every 6 hours PRN Severe Pain (7 - 10)  morphine  - Injectable 2 milliGRAM(s) IV Push every 6 hours PRN breakthrough pain  ondansetron Injectable 4 milliGRAM(s) IV Push every 8 hours PRN Nausea and/or Vomiting  ondansetron Injectable 4 milliGRAM(s) IV Push every 6 hours PRN Nausea and/or Vomiting

## 2022-10-18 DIAGNOSIS — J69.0 PNEUMONITIS DUE TO INHALATION OF FOOD AND VOMIT: ICD-10-CM

## 2022-10-18 DIAGNOSIS — G30.9 ALZHEIMER'S DISEASE, UNSPECIFIED: ICD-10-CM

## 2022-10-18 DIAGNOSIS — E87.6 HYPOKALEMIA: ICD-10-CM

## 2022-10-18 DIAGNOSIS — E43 UNSPECIFIED SEVERE PROTEIN-CALORIE MALNUTRITION: ICD-10-CM
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
## 2022-10-18 DIAGNOSIS — E83.39 OTHER DISORDERS OF PHOSPHORUS METABOLISM: ICD-10-CM

## 2022-10-18 DIAGNOSIS — D62 ACUTE POSTHEMORRHAGIC ANEMIA: ICD-10-CM

## 2022-10-18 DIAGNOSIS — Z23 ENCOUNTER FOR IMMUNIZATION: ICD-10-CM

## 2022-10-18 DIAGNOSIS — E78.5 HYPERLIPIDEMIA, UNSPECIFIED: ICD-10-CM

## 2022-10-18 DIAGNOSIS — H40.9 UNSPECIFIED GLAUCOMA: ICD-10-CM

## 2022-10-18 DIAGNOSIS — G93.41 METABOLIC ENCEPHALOPATHY: ICD-10-CM

## 2022-10-18 DIAGNOSIS — R64 CACHEXIA: ICD-10-CM
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
## 2022-10-18 DIAGNOSIS — R13.10 DYSPHAGIA, UNSPECIFIED: ICD-10-CM

## 2022-10-18 DIAGNOSIS — R65.11 SYSTEMIC INFLAMMATORY RESPONSE SYNDROME (SIRS) OF NON-INFECTIOUS ORIGIN WITH ACUTE ORGAN DYSFUNCTION: ICD-10-CM

## 2022-10-18 DIAGNOSIS — M16.31 UNILATERAL OSTEOARTHRITIS RESULTING FROM HIP DYSPLASIA, RIGHT HIP: ICD-10-CM

## 2022-10-18 DIAGNOSIS — S72.002A FRACTURE OF UNSPECIFIED PART OF NECK OF LEFT FEMUR, INITIAL ENCOUNTER FOR CLOSED FRACTURE: ICD-10-CM

## 2022-10-18 DIAGNOSIS — F02.80 DEMENTIA IN OTHER DISEASES CLASSIFIED ELSEWHERE, UNSPECIFIED SEVERITY, WITHOUT BEHAVIORAL DISTURBANCE, PSYCHOTIC DISTURBANCE, MOOD DISTURBANCE, AND ANXIETY: ICD-10-CM

## 2022-10-18 DIAGNOSIS — N39.0 URINARY TRACT INFECTION, SITE NOT SPECIFIED: ICD-10-CM
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
## 2022-10-18 DIAGNOSIS — J96.01 ACUTE RESPIRATORY FAILURE WITH HYPOXIA: ICD-10-CM

## 2022-10-20 DIAGNOSIS — S72.002A FRACTURE OF UNSPECIFIED PART OF NECK OF LEFT FEMUR, INITIAL ENCOUNTER FOR CLOSED FRACTURE: ICD-10-CM

## 2023-01-17 RX ORDER — CEFPODOXIME PROXETIL 100 MG
1 TABLET ORAL
Qty: 0 | Refills: 0 | DISCHARGE
Start: 2023-01-17 | End: 2023-01-24

## 2023-02-02 ENCOUNTER — INPATIENT (INPATIENT)
Facility: HOSPITAL | Age: 88
LOS: 4 days | Discharge: NURSING, REHAB OR LONG-TERM CARE | DRG: 689 | End: 2023-02-07
Attending: FAMILY MEDICINE | Admitting: INTERNAL MEDICINE
Payer: MEDICARE

## 2023-02-02 VITALS
RESPIRATION RATE: 19 BRPM | HEART RATE: 68 BPM | TEMPERATURE: 98 F | OXYGEN SATURATION: 90 % | DIASTOLIC BLOOD PRESSURE: 60 MMHG | SYSTOLIC BLOOD PRESSURE: 98 MMHG

## 2023-02-02 DIAGNOSIS — Z98.89 OTHER SPECIFIED POSTPROCEDURAL STATES: Chronic | ICD-10-CM

## 2023-02-02 DIAGNOSIS — R62.7 ADULT FAILURE TO THRIVE: ICD-10-CM

## 2023-02-02 LAB
ALBUMIN SERPL ELPH-MCNC: 3.2 G/DL — LOW (ref 3.3–5)
ALP SERPL-CCNC: 117 U/L — SIGNIFICANT CHANGE UP (ref 40–120)
ALT FLD-CCNC: 18 U/L — SIGNIFICANT CHANGE UP (ref 12–78)
ANION GAP SERPL CALC-SCNC: 3 MMOL/L — LOW (ref 5–17)
APPEARANCE UR: CLEAR — SIGNIFICANT CHANGE UP
APTT BLD: 32.3 SEC — SIGNIFICANT CHANGE UP (ref 27.5–35.5)
AST SERPL-CCNC: 20 U/L — SIGNIFICANT CHANGE UP (ref 15–37)
BACTERIA # UR AUTO: ABNORMAL
BASOPHILS # BLD AUTO: 0.06 K/UL — SIGNIFICANT CHANGE UP (ref 0–0.2)
BASOPHILS NFR BLD AUTO: 0.7 % — SIGNIFICANT CHANGE UP (ref 0–2)
BILIRUB SERPL-MCNC: 0.4 MG/DL — SIGNIFICANT CHANGE UP (ref 0.2–1.2)
BILIRUB UR-MCNC: NEGATIVE — SIGNIFICANT CHANGE UP
BUN SERPL-MCNC: 11 MG/DL — SIGNIFICANT CHANGE UP (ref 7–23)
CALCIUM SERPL-MCNC: 10.2 MG/DL — HIGH (ref 8.5–10.1)
CHLORIDE SERPL-SCNC: 107 MMOL/L — SIGNIFICANT CHANGE UP (ref 96–108)
CO2 SERPL-SCNC: 28 MMOL/L — SIGNIFICANT CHANGE UP (ref 22–31)
COLOR SPEC: YELLOW — SIGNIFICANT CHANGE UP
CREAT SERPL-MCNC: 0.91 MG/DL — SIGNIFICANT CHANGE UP (ref 0.5–1.3)
DIFF PNL FLD: ABNORMAL
EGFR: 62 ML/MIN/1.73M2 — SIGNIFICANT CHANGE UP
EOSINOPHIL # BLD AUTO: 0.1 K/UL — SIGNIFICANT CHANGE UP (ref 0–0.5)
EOSINOPHIL NFR BLD AUTO: 1.2 % — SIGNIFICANT CHANGE UP (ref 0–6)
EPI CELLS # UR: ABNORMAL
GLUCOSE SERPL-MCNC: 114 MG/DL — HIGH (ref 70–99)
GLUCOSE UR QL: NEGATIVE — SIGNIFICANT CHANGE UP
HCT VFR BLD CALC: 45.3 % — HIGH (ref 34.5–45)
HGB BLD-MCNC: 15.2 G/DL — SIGNIFICANT CHANGE UP (ref 11.5–15.5)
IMM GRANULOCYTES NFR BLD AUTO: 0.1 % — SIGNIFICANT CHANGE UP (ref 0–0.9)
INR BLD: 1.01 RATIO — SIGNIFICANT CHANGE UP (ref 0.88–1.16)
KETONES UR-MCNC: NEGATIVE — SIGNIFICANT CHANGE UP
LACTATE SERPL-SCNC: 1.4 MMOL/L — SIGNIFICANT CHANGE UP (ref 0.7–2)
LEUKOCYTE ESTERASE UR-ACNC: ABNORMAL
LYMPHOCYTES # BLD AUTO: 3.12 K/UL — SIGNIFICANT CHANGE UP (ref 1–3.3)
LYMPHOCYTES # BLD AUTO: 38.1 % — SIGNIFICANT CHANGE UP (ref 13–44)
MCHC RBC-ENTMCNC: 33.6 GM/DL — SIGNIFICANT CHANGE UP (ref 32–36)
MCHC RBC-ENTMCNC: 33.9 PG — SIGNIFICANT CHANGE UP (ref 27–34)
MCV RBC AUTO: 100.9 FL — HIGH (ref 80–100)
MONOCYTES # BLD AUTO: 0.79 K/UL — SIGNIFICANT CHANGE UP (ref 0–0.9)
MONOCYTES NFR BLD AUTO: 9.7 % — SIGNIFICANT CHANGE UP (ref 2–14)
NEUTROPHILS # BLD AUTO: 4.1 K/UL — SIGNIFICANT CHANGE UP (ref 1.8–7.4)
NEUTROPHILS NFR BLD AUTO: 50.2 % — SIGNIFICANT CHANGE UP (ref 43–77)
NITRITE UR-MCNC: NEGATIVE — SIGNIFICANT CHANGE UP
PH UR: 6.5 — SIGNIFICANT CHANGE UP (ref 5–8)
PLATELET # BLD AUTO: 259 K/UL — SIGNIFICANT CHANGE UP (ref 150–400)
POTASSIUM SERPL-MCNC: 4.6 MMOL/L — SIGNIFICANT CHANGE UP (ref 3.5–5.3)
POTASSIUM SERPL-SCNC: 4.6 MMOL/L — SIGNIFICANT CHANGE UP (ref 3.5–5.3)
PROT SERPL-MCNC: 7.2 GM/DL — SIGNIFICANT CHANGE UP (ref 6–8.3)
PROT UR-MCNC: NEGATIVE — SIGNIFICANT CHANGE UP
PROTHROM AB SERPL-ACNC: 11.7 SEC — SIGNIFICANT CHANGE UP (ref 10.5–13.4)
RAPID RVP RESULT: SIGNIFICANT CHANGE UP
RBC # BLD: 4.49 M/UL — SIGNIFICANT CHANGE UP (ref 3.8–5.2)
RBC # FLD: 13.3 % — SIGNIFICANT CHANGE UP (ref 10.3–14.5)
RBC CASTS # UR COMP ASSIST: ABNORMAL /HPF (ref 0–4)
SARS-COV-2 RNA SPEC QL NAA+PROBE: SIGNIFICANT CHANGE UP
SODIUM SERPL-SCNC: 138 MMOL/L — SIGNIFICANT CHANGE UP (ref 135–145)
SP GR SPEC: 1.01 — SIGNIFICANT CHANGE UP (ref 1.01–1.02)
UROBILINOGEN FLD QL: NEGATIVE — SIGNIFICANT CHANGE UP
WBC # BLD: 8.18 K/UL — SIGNIFICANT CHANGE UP (ref 3.8–10.5)
WBC # FLD AUTO: 8.18 K/UL — SIGNIFICANT CHANGE UP (ref 3.8–10.5)
WBC UR QL: >50 /HPF (ref 0–5)

## 2023-02-02 PROCEDURE — 97163 PT EVAL HIGH COMPLEX 45 MIN: CPT | Mod: GP

## 2023-02-02 PROCEDURE — 70450 CT HEAD/BRAIN W/O DYE: CPT | Mod: 26,MA

## 2023-02-02 PROCEDURE — 82746 ASSAY OF FOLIC ACID SERUM: CPT

## 2023-02-02 PROCEDURE — 71045 X-RAY EXAM CHEST 1 VIEW: CPT | Mod: 26

## 2023-02-02 PROCEDURE — 97116 GAIT TRAINING THERAPY: CPT | Mod: GP

## 2023-02-02 PROCEDURE — 82607 VITAMIN B-12: CPT

## 2023-02-02 PROCEDURE — 36415 COLL VENOUS BLD VENIPUNCTURE: CPT

## 2023-02-02 PROCEDURE — 82140 ASSAY OF AMMONIA: CPT

## 2023-02-02 PROCEDURE — 80076 HEPATIC FUNCTION PANEL: CPT

## 2023-02-02 PROCEDURE — U0005: CPT

## 2023-02-02 PROCEDURE — 85027 COMPLETE CBC AUTOMATED: CPT

## 2023-02-02 PROCEDURE — 80048 BASIC METABOLIC PNL TOTAL CA: CPT

## 2023-02-02 PROCEDURE — 84443 ASSAY THYROID STIM HORMONE: CPT

## 2023-02-02 PROCEDURE — U0003: CPT

## 2023-02-02 PROCEDURE — 93010 ELECTROCARDIOGRAM REPORT: CPT

## 2023-02-02 PROCEDURE — 99285 EMERGENCY DEPT VISIT HI MDM: CPT

## 2023-02-02 RX ORDER — CEFTRIAXONE 500 MG/1
1000 INJECTION, POWDER, FOR SOLUTION INTRAMUSCULAR; INTRAVENOUS ONCE
Refills: 0 | Status: COMPLETED | OUTPATIENT
Start: 2023-02-02 | End: 2023-02-02

## 2023-02-02 RX ORDER — CEFTRIAXONE 500 MG/1
1000 INJECTION, POWDER, FOR SOLUTION INTRAMUSCULAR; INTRAVENOUS ONCE
Refills: 0 | Status: DISCONTINUED | OUTPATIENT
Start: 2023-02-02 | End: 2023-02-02

## 2023-02-02 RX ORDER — MEMANTINE HYDROCHLORIDE 10 MG/1
1 TABLET ORAL
Qty: 0 | Refills: 0 | DISCHARGE

## 2023-02-02 RX ORDER — SODIUM CHLORIDE 9 MG/ML
1000 INJECTION INTRAMUSCULAR; INTRAVENOUS; SUBCUTANEOUS ONCE
Refills: 0 | Status: COMPLETED | OUTPATIENT
Start: 2023-02-02 | End: 2023-02-02

## 2023-02-02 RX ADMIN — CEFTRIAXONE 1000 MILLIGRAM(S): 500 INJECTION, POWDER, FOR SOLUTION INTRAMUSCULAR; INTRAVENOUS at 20:16

## 2023-02-02 RX ADMIN — SODIUM CHLORIDE 1000 MILLILITER(S): 9 INJECTION INTRAMUSCULAR; INTRAVENOUS; SUBCUTANEOUS at 17:27

## 2023-02-02 RX ADMIN — SODIUM CHLORIDE 1000 MILLILITER(S): 9 INJECTION INTRAMUSCULAR; INTRAVENOUS; SUBCUTANEOUS at 16:27

## 2023-02-02 NOTE — ED ADULT TRIAGE NOTE - CHIEF COMPLAINT QUOTE
from [facility]. sent in for failure to thrive and declining mental status. [facility] UTIs. DNR/DNI

## 2023-02-02 NOTE — ED PROVIDER NOTE - CARE PLAN
1 Principal Discharge DX:	Adult failure to thrive   Principal Discharge DX:	Adult failure to thrive  Secondary Diagnosis:	Acute UTI

## 2023-02-02 NOTE — ED ADULT NURSE NOTE - NS ED NURSE RECORD ANOTHER VITAL SIGN
Patricia called and said that they will be changing insurance at the beginning of the year and with their new insurance the Norpace will be to expensive for her ($500 per month). Patricia said that she got a 30 day supply when she was discharged from the hospital but she would like to know if she can get another 2 month supply (to make it a 3 month prescription) sent into the [facility] in [place]. Patricia would also like to know if there is any kind of coupons or patient assistance available for this medication. Please call Patricia back at [phone].   Yes

## 2023-02-02 NOTE — ED ADULT NURSE NOTE - CHIEF COMPLAINT QUOTE
from [facility]. sent in for failure to thrive and declining mental status. [facility] UTIs. DNR/DNI

## 2023-02-02 NOTE — ED PROVIDER NOTE - PHYSICAL EXAMINATION
Constitutional: NAD AOxzero, moans to pain, answers some yes/no questions, thin, chronically ill appearing   Eyes: PERRL EOMI  Head: Normocephalic atraumatic  Mouth: dry MM  Cardiac: regular rate and rhythm  Resp: Lungs CTAB  GI: Abd s/nd/nt  Neuro: CN2-12 grossly intact, GRIFFIN x 4  Skin: No visible rashes

## 2023-02-02 NOTE — ED ADULT NURSE NOTE - OBJECTIVE STATEMENT
86 y/o female with hx dementia BIBA from [facility] c/o Altered mental status. As per son at bedside, pt has not been eating 2 days with increased weakness. + recent UTI with completed course of abx. Pt is DNR/DNI. MOLST in chart.

## 2023-02-02 NOTE — ED ADULT NURSE NOTE - NSIMPLEMENTINTERV_GEN_ALL_ED
Implemented All Fall with Harm Risk Interventions:  [place] to call system. Call bell, personal items and telephone within reach. Instruct patient to call for assistance. Room bathroom lighting operational. Non-slip footwear when patient is off stretcher. Physically safe environment: no spills, clutter or unnecessary equipment. Stretcher in lowest position, wheels locked, appropriate side rails in place. Provide visual cue, wrist band, yellow gown, etc. Monitor gait and stability. Monitor for mental status changes and reorient to person, place, and time. Review medications for side effects contributing to fall risk. Reinforce activity limits and safety measures with patient and family. Provide visual clues: red socks.

## 2023-02-02 NOTE — ED ADULT NURSE NOTE - NSICDXPASTMEDICALHX_GEN_ALL_CORE_FT
Malnutrition Notification
PAST MEDICAL HISTORY:  Alzheimer disease     Dementia     Glaucoma     Hypercholesteremia     Osteoarthritis of right hip

## 2023-02-02 NOTE — ED PROVIDER NOTE - OBJECTIVE STATEMENT
86 yo F from [facility] , with pmhx dementia, HLD with BIBEMS for ams. per son at bedside, pt has not been eating for 2 days and sleeping a lot. recent UTI, finished abx. was ok for few days. now lethargic again.   pt unbale to give hx. pt is DNR/DNI, MOLST in chart.

## 2023-02-02 NOTE — ED ADULT NURSE REASSESSMENT NOTE - NS ED NURSE REASSESS COMMENT FT1
Report received from CELSO Hollins. Pt contact made. Pt is resting comfortably in bed at this time. Pt's son at bedside. Pt is TBA with bed pending.

## 2023-02-03 LAB
ALBUMIN SERPL ELPH-MCNC: 2.7 G/DL — LOW (ref 3.3–5)
ALP SERPL-CCNC: 104 U/L — SIGNIFICANT CHANGE UP (ref 40–120)
ALT FLD-CCNC: 12 U/L — SIGNIFICANT CHANGE UP (ref 12–78)
AMMONIA BLD-MCNC: 22 UMOL/L — SIGNIFICANT CHANGE UP (ref 11–32)
ANION GAP SERPL CALC-SCNC: 4 MMOL/L — LOW (ref 5–17)
AST SERPL-CCNC: 14 U/L — LOW (ref 15–37)
BILIRUB DIRECT SERPL-MCNC: 0.1 MG/DL — SIGNIFICANT CHANGE UP (ref 0–0.3)
BILIRUB INDIRECT FLD-MCNC: 0.3 MG/DL — SIGNIFICANT CHANGE UP (ref 0.2–1)
BILIRUB SERPL-MCNC: 0.4 MG/DL — SIGNIFICANT CHANGE UP (ref 0.2–1.2)
BUN SERPL-MCNC: 11 MG/DL — SIGNIFICANT CHANGE UP (ref 7–23)
CALCIUM SERPL-MCNC: 9.6 MG/DL — SIGNIFICANT CHANGE UP (ref 8.5–10.1)
CHLORIDE SERPL-SCNC: 110 MMOL/L — HIGH (ref 96–108)
CO2 SERPL-SCNC: 27 MMOL/L — SIGNIFICANT CHANGE UP (ref 22–31)
CREAT SERPL-MCNC: 0.83 MG/DL — SIGNIFICANT CHANGE UP (ref 0.5–1.3)
EGFR: 69 ML/MIN/1.73M2 — SIGNIFICANT CHANGE UP
FOLATE SERPL-MCNC: 13.7 NG/ML — SIGNIFICANT CHANGE UP
GLUCOSE SERPL-MCNC: 89 MG/DL — SIGNIFICANT CHANGE UP (ref 70–99)
HCT VFR BLD CALC: 40 % — SIGNIFICANT CHANGE UP (ref 34.5–45)
HGB BLD-MCNC: 13.1 G/DL — SIGNIFICANT CHANGE UP (ref 11.5–15.5)
MCHC RBC-ENTMCNC: 32.8 GM/DL — SIGNIFICANT CHANGE UP (ref 32–36)
MCHC RBC-ENTMCNC: 33.6 PG — SIGNIFICANT CHANGE UP (ref 27–34)
MCV RBC AUTO: 102.6 FL — HIGH (ref 80–100)
PLATELET # BLD AUTO: 224 K/UL — SIGNIFICANT CHANGE UP (ref 150–400)
POTASSIUM SERPL-MCNC: 3.8 MMOL/L — SIGNIFICANT CHANGE UP (ref 3.5–5.3)
POTASSIUM SERPL-SCNC: 3.8 MMOL/L — SIGNIFICANT CHANGE UP (ref 3.5–5.3)
PROT SERPL-MCNC: 5.9 GM/DL — LOW (ref 6–8.3)
RBC # BLD: 3.9 M/UL — SIGNIFICANT CHANGE UP (ref 3.8–5.2)
RBC # FLD: 13.4 % — SIGNIFICANT CHANGE UP (ref 10.3–14.5)
SODIUM SERPL-SCNC: 141 MMOL/L — SIGNIFICANT CHANGE UP (ref 135–145)
TSH SERPL-MCNC: 1.96 UU/ML — SIGNIFICANT CHANGE UP (ref 0.34–4.82)
VIT B12 SERPL-MCNC: 645 PG/ML — SIGNIFICANT CHANGE UP (ref 232–1245)
WBC # BLD: 8.54 K/UL — SIGNIFICANT CHANGE UP (ref 3.8–10.5)
WBC # FLD AUTO: 8.54 K/UL — SIGNIFICANT CHANGE UP (ref 3.8–10.5)

## 2023-02-03 PROCEDURE — 99223 1ST HOSP IP/OBS HIGH 75: CPT

## 2023-02-03 PROCEDURE — 99222 1ST HOSP IP/OBS MODERATE 55: CPT

## 2023-02-03 PROCEDURE — 12345: CPT | Mod: NC

## 2023-02-03 RX ORDER — CEFTRIAXONE 500 MG/1
1000 INJECTION, POWDER, FOR SOLUTION INTRAMUSCULAR; INTRAVENOUS EVERY 24 HOURS
Refills: 0 | Status: DISCONTINUED | OUTPATIENT
Start: 2023-02-03 | End: 2023-02-04

## 2023-02-03 RX ORDER — LATANOPROST 0.05 MG/ML
1 SOLUTION/ DROPS OPHTHALMIC; TOPICAL AT BEDTIME
Refills: 0 | Status: DISCONTINUED | OUTPATIENT
Start: 2023-02-03 | End: 2023-02-03

## 2023-02-03 RX ORDER — HEPARIN SODIUM 5000 [USP'U]/ML
5000 INJECTION INTRAVENOUS; SUBCUTANEOUS EVERY 12 HOURS
Refills: 0 | Status: DISCONTINUED | OUTPATIENT
Start: 2023-02-03 | End: 2023-02-07

## 2023-02-03 RX ORDER — LIDOCAINE 4 G/100G
1 CREAM TOPICAL DAILY
Refills: 0 | Status: DISCONTINUED | OUTPATIENT
Start: 2023-02-03 | End: 2023-02-07

## 2023-02-03 RX ORDER — DONEPEZIL HYDROCHLORIDE 10 MG/1
10 TABLET, FILM COATED ORAL AT BEDTIME
Refills: 0 | Status: DISCONTINUED | OUTPATIENT
Start: 2023-02-03 | End: 2023-02-07

## 2023-02-03 RX ORDER — CITALOPRAM 10 MG/1
20 TABLET, FILM COATED ORAL DAILY
Refills: 0 | Status: DISCONTINUED | OUTPATIENT
Start: 2023-02-03 | End: 2023-02-07

## 2023-02-03 RX ORDER — SIMVASTATIN 20 MG/1
20 TABLET, FILM COATED ORAL AT BEDTIME
Refills: 0 | Status: DISCONTINUED | OUTPATIENT
Start: 2023-02-03 | End: 2023-02-07

## 2023-02-03 RX ORDER — ONDANSETRON 8 MG/1
4 TABLET, FILM COATED ORAL EVERY 8 HOURS
Refills: 0 | Status: DISCONTINUED | OUTPATIENT
Start: 2023-02-03 | End: 2023-02-07

## 2023-02-03 RX ORDER — CEFTRIAXONE 500 MG/1
1000 INJECTION, POWDER, FOR SOLUTION INTRAMUSCULAR; INTRAVENOUS EVERY 24 HOURS
Refills: 0 | Status: DISCONTINUED | OUTPATIENT
Start: 2023-02-03 | End: 2023-02-03

## 2023-02-03 RX ORDER — LATANOPROST 0.05 MG/ML
1 SOLUTION/ DROPS OPHTHALMIC; TOPICAL AT BEDTIME
Refills: 0 | Status: DISCONTINUED | OUTPATIENT
Start: 2023-02-03 | End: 2023-02-07

## 2023-02-03 RX ORDER — SODIUM CHLORIDE 9 MG/ML
1000 INJECTION INTRAMUSCULAR; INTRAVENOUS; SUBCUTANEOUS
Refills: 0 | Status: DISCONTINUED | OUTPATIENT
Start: 2023-02-03 | End: 2023-02-07

## 2023-02-03 RX ORDER — LACTOBACILLUS ACIDOPHILUS 100MM CELL
1 CAPSULE ORAL
Refills: 0 | Status: DISCONTINUED | OUTPATIENT
Start: 2023-02-03 | End: 2023-02-07

## 2023-02-03 RX ORDER — LANOLIN ALCOHOL/MO/W.PET/CERES
3 CREAM (GRAM) TOPICAL AT BEDTIME
Refills: 0 | Status: DISCONTINUED | OUTPATIENT
Start: 2023-02-03 | End: 2023-02-07

## 2023-02-03 RX ORDER — ACETAMINOPHEN 500 MG
650 TABLET ORAL EVERY 6 HOURS
Refills: 0 | Status: DISCONTINUED | OUTPATIENT
Start: 2023-02-03 | End: 2023-02-07

## 2023-02-03 RX ORDER — TIMOLOL 0.5 %
1 DROPS OPHTHALMIC (EYE) DAILY
Refills: 0 | Status: DISCONTINUED | OUTPATIENT
Start: 2023-02-03 | End: 2023-02-07

## 2023-02-03 RX ADMIN — HEPARIN SODIUM 5000 UNIT(S): 5000 INJECTION INTRAVENOUS; SUBCUTANEOUS at 22:11

## 2023-02-03 RX ADMIN — SIMVASTATIN 20 MILLIGRAM(S): 20 TABLET, FILM COATED ORAL at 22:11

## 2023-02-03 RX ADMIN — LATANOPROST 1 DROP(S): 0.05 SOLUTION/ DROPS OPHTHALMIC; TOPICAL at 22:28

## 2023-02-03 RX ADMIN — DONEPEZIL HYDROCHLORIDE 10 MILLIGRAM(S): 10 TABLET, FILM COATED ORAL at 22:11

## 2023-02-03 RX ADMIN — CEFTRIAXONE 100 MILLIGRAM(S): 500 INJECTION, POWDER, FOR SOLUTION INTRAMUSCULAR; INTRAVENOUS at 22:08

## 2023-02-03 NOTE — H&P ADULT - NSHPPHYSICALEXAM_GEN_ALL_CORE
T(C): 36.7 (02-03-23 @ 01:10), Max: 36.7 (02-03-23 @ 01:10)  HR: 65 (02-03-23 @ 01:10) (52 - 71)  BP: 119/36 (02-03-23 @ 01:10) (98/60 - 145/44)  RR: 17 (02-03-23 @ 01:10) (14 - 19)  SpO2: 100% (02-03-23 @ 01:10) (90% - 100%)    CONSTITUTIONAL: Well groomed, no apparent distress, non verbal now due to AMS  EYES: PERRLA and symmetric, EOMI, No conjunctival or scleral injection, non-icteric  ENMT: Oral mucosa with moist membranes. Normal dentition; no pharyngeal injection or exudates             NECK: Supple, symmetric and without tracheal deviation   RESP: No respiratory distress, no use of accessory muscles; CTA b/l, no WRR  CV: RRR, +S1S2, no MRG; no JVD; no peripheral edema  GI: Soft, NT, ND, no rebound, no guarding; no palpable masses; no hepatosplenomegaly; no hernia palpated  LYMPH: No cervical LAD or tenderness; no axillary LAD or tenderness;   MSK: unable to assess as patient is non verbal and demented  SKIN: ++Decubitus ulcer in lower back area.   NEURO: unable to assess as patient is non verbal and demented  PSYCH: unable to assess as patient is non verbal and demented

## 2023-02-03 NOTE — PATIENT PROFILE ADULT - FALL HARM RISK - HARM RISK INTERVENTIONS

## 2023-02-03 NOTE — H&P ADULT - HISTORY OF PRESENT ILLNESS
86 yo F from [facility] , with pmhx dementia, HLD with BIBEMS for ams. per son at bedside, pt has not been eating for 2 days and sleeping a lot. recent UTI, finished abx. was ok for few days. now lethargic again.   pt unbale to give hx. pt is DNR/DNI, MOLST in chart. 86 yo Female presented from [facility] , with pmhx dementia, BIBEMS for Altered mental status. Patient is a poor historian due to Dementia. Spoke with the Daughter, Shira , over the phone. As per the daughter, patient is lethargic and weak for the last few days. She is also has change in mental status. No fever. Patient is bed bound for a while.     Family Hx:  Unable to get any detail FH from the patient due to Dementia.

## 2023-02-03 NOTE — H&P ADULT - ASSESSMENT
A/P:    1.  Change in mental status  UTI  -started on IV Ceftriaxone  -follow cultures  -follow clinically  -follow Nutrition consult as patient will need special type diet     2.  SCD for DVT ppx     3.  Decubitus ulcer  -follow Wound care consult    4.  Code status: Patient is DNR/DNI,  MOLST form is in the chart. Confirmed with daughter again today.

## 2023-02-03 NOTE — PROGRESS NOTE ADULT - ASSESSMENT
Change in mental status  UTI  -started on IV Ceftriaxone  -follow cultures  -follow clinically  -follow Nutrition consult as patient will need special type diet     2.  SCD for DVT ppx     3.  Decubitus ulcer  -follow Wound care consult    4.  Code status: Patient is DNR/DNI,  MOLST form is in the chart. Confirmed with daughterChange in mental status   87 y.o female with PMH of Dementia, Anxiety,  HLD admitted for:     1. Toxic metabolic encephalopathy due to UTI   CT head neg for acute findings, + chronic microvascular changes   supportive care  Tx infection   IVF   CXR neg   Check B12/Folate/TSH/Ammonia/LFTs   D/w DR Childress          2. UTI, possible urinary retention   F/u BCX/UCX  C/w Ceftriaxone IV   Check bladder scan ( d/w RN)     3. Advanced dementia with cognitive impairment,  dysphagia and speech issues. Anxiety    supportive care  On Puree diet with mod thick liquids ordered,  trial when Pt is more awake   aspiration precautions   C/w Home meds when more awake: on Namenda and Aricept, also on Citalopram     4. Severe Protein calorie malnutrition   Monitor labs  Nutrition eval     5.  Decubitus ulcer  Wound care consult      6. Code status: Patient is DNR/DNI,   MOLST form is in the chart. Confirmed  by  daughter  on admission       7. DVT PPX; heparin SQ

## 2023-02-04 LAB
ANION GAP SERPL CALC-SCNC: 7 MMOL/L — SIGNIFICANT CHANGE UP (ref 5–17)
BUN SERPL-MCNC: 9 MG/DL — SIGNIFICANT CHANGE UP (ref 7–23)
CALCIUM SERPL-MCNC: 9.1 MG/DL — SIGNIFICANT CHANGE UP (ref 8.5–10.1)
CHLORIDE SERPL-SCNC: 110 MMOL/L — HIGH (ref 96–108)
CO2 SERPL-SCNC: 25 MMOL/L — SIGNIFICANT CHANGE UP (ref 22–31)
CREAT SERPL-MCNC: 0.69 MG/DL — SIGNIFICANT CHANGE UP (ref 0.5–1.3)
EGFR: 85 ML/MIN/1.73M2 — SIGNIFICANT CHANGE UP
GLUCOSE SERPL-MCNC: 92 MG/DL — SIGNIFICANT CHANGE UP (ref 70–99)
HCT VFR BLD CALC: 39.2 % — SIGNIFICANT CHANGE UP (ref 34.5–45)
HGB BLD-MCNC: 13.1 G/DL — SIGNIFICANT CHANGE UP (ref 11.5–15.5)
MCHC RBC-ENTMCNC: 33.4 GM/DL — SIGNIFICANT CHANGE UP (ref 32–36)
MCHC RBC-ENTMCNC: 33.8 PG — SIGNIFICANT CHANGE UP (ref 27–34)
MCV RBC AUTO: 101 FL — HIGH (ref 80–100)
PLATELET # BLD AUTO: 211 K/UL — SIGNIFICANT CHANGE UP (ref 150–400)
POTASSIUM SERPL-MCNC: 3.4 MMOL/L — LOW (ref 3.5–5.3)
POTASSIUM SERPL-SCNC: 3.4 MMOL/L — LOW (ref 3.5–5.3)
RBC # BLD: 3.88 M/UL — SIGNIFICANT CHANGE UP (ref 3.8–5.2)
RBC # FLD: 13.2 % — SIGNIFICANT CHANGE UP (ref 10.3–14.5)
SODIUM SERPL-SCNC: 142 MMOL/L — SIGNIFICANT CHANGE UP (ref 135–145)
WBC # BLD: 9.32 K/UL — SIGNIFICANT CHANGE UP (ref 3.8–10.5)
WBC # FLD AUTO: 9.32 K/UL — SIGNIFICANT CHANGE UP (ref 3.8–10.5)

## 2023-02-04 PROCEDURE — 99233 SBSQ HOSP IP/OBS HIGH 50: CPT

## 2023-02-04 RX ORDER — PIPERACILLIN AND TAZOBACTAM 4; .5 G/20ML; G/20ML
3.38 INJECTION, POWDER, LYOPHILIZED, FOR SOLUTION INTRAVENOUS EVERY 8 HOURS
Refills: 0 | Status: DISCONTINUED | OUTPATIENT
Start: 2023-02-05 | End: 2023-02-07

## 2023-02-04 RX ORDER — POTASSIUM CHLORIDE 20 MEQ
40 PACKET (EA) ORAL EVERY 4 HOURS
Refills: 0 | Status: COMPLETED | OUTPATIENT
Start: 2023-02-04 | End: 2023-02-04

## 2023-02-04 RX ORDER — PIPERACILLIN AND TAZOBACTAM 4; .5 G/20ML; G/20ML
3.38 INJECTION, POWDER, LYOPHILIZED, FOR SOLUTION INTRAVENOUS ONCE
Refills: 0 | Status: COMPLETED | OUTPATIENT
Start: 2023-02-04 | End: 2023-02-04

## 2023-02-04 RX ORDER — PIPERACILLIN AND TAZOBACTAM 4; .5 G/20ML; G/20ML
3.38 INJECTION, POWDER, LYOPHILIZED, FOR SOLUTION INTRAVENOUS ONCE
Refills: 0 | Status: COMPLETED | OUTPATIENT
Start: 2023-02-05 | End: 2023-02-05

## 2023-02-04 RX ADMIN — DONEPEZIL HYDROCHLORIDE 10 MILLIGRAM(S): 10 TABLET, FILM COATED ORAL at 21:52

## 2023-02-04 RX ADMIN — SIMVASTATIN 20 MILLIGRAM(S): 20 TABLET, FILM COATED ORAL at 21:52

## 2023-02-04 RX ADMIN — SODIUM CHLORIDE 75 MILLILITER(S): 9 INJECTION INTRAMUSCULAR; INTRAVENOUS; SUBCUTANEOUS at 04:30

## 2023-02-04 RX ADMIN — PIPERACILLIN AND TAZOBACTAM 200 GRAM(S): 4; .5 INJECTION, POWDER, LYOPHILIZED, FOR SOLUTION INTRAVENOUS at 14:38

## 2023-02-04 RX ADMIN — LATANOPROST 1 DROP(S): 0.05 SOLUTION/ DROPS OPHTHALMIC; TOPICAL at 21:52

## 2023-02-04 RX ADMIN — PIPERACILLIN AND TAZOBACTAM 25 GRAM(S): 4; .5 INJECTION, POWDER, LYOPHILIZED, FOR SOLUTION INTRAVENOUS at 18:29

## 2023-02-04 RX ADMIN — Medication 1 TABLET(S): at 18:30

## 2023-02-04 RX ADMIN — HEPARIN SODIUM 5000 UNIT(S): 5000 INJECTION INTRAVENOUS; SUBCUTANEOUS at 10:18

## 2023-02-04 RX ADMIN — HEPARIN SODIUM 5000 UNIT(S): 5000 INJECTION INTRAVENOUS; SUBCUTANEOUS at 21:52

## 2023-02-04 RX ADMIN — Medication 40 MILLIEQUIVALENT(S): at 14:39

## 2023-02-04 RX ADMIN — Medication 1 TABLET(S): at 10:18

## 2023-02-04 RX ADMIN — LIDOCAINE 1 PATCH: 4 CREAM TOPICAL at 10:20

## 2023-02-04 RX ADMIN — CITALOPRAM 20 MILLIGRAM(S): 10 TABLET, FILM COATED ORAL at 10:20

## 2023-02-04 RX ADMIN — Medication 3 MILLIGRAM(S): at 21:52

## 2023-02-04 RX ADMIN — Medication 40 MILLIEQUIVALENT(S): at 18:56

## 2023-02-04 NOTE — PROGRESS NOTE ADULT - SUBJECTIVE AND OBJECTIVE BOX
CC: UTI and Altered mental status    HPI: 86 yo Female presented from [facility] , with pmhx dementia, BIBEMS for Altered mental status. Patient is a poor historian due to Dementia. Spoke with the Daughter, Shira , over the phone. As per the daughter, patient is lethargic and weak for the last few days. She is also has change in mental status. No fever. Patient is bed bound for a while.       INTERVAL HPI/ OVERNIGHT EVENTS:  Pt was seen and examined,  awake and alert, confused, minimally verbal. Daughter at bedside, notes Pt improved, good  oral intake. Results and further plan discussed As per daughter Pt is not ambulatory  since Hip Sx, but is  getting OOB to chair     Vital Signs Last 24 Hrs  T(C): 36.3 (2023 08:32), Max: 36.7 (2023 22:29)  T(F): 97.4 (2023 08:32), Max: 98 (2023 22:29)  HR: 69 (2023 08:32) (69 - 73)  BP: 124/48 (2023 08:32) (120/50 - 124/48)  BP(mean): 62 (2023 22:29) (62 - 62)  RR: 17 (2023 08:32) (17 - 18)  SpO2: 97% (2023 08:32) (97% - 99%)    Parameters below as of 2023 08:32  Patient On (Oxygen Delivery Method): room air    REVIEW OF SYSTEMS:  All other review of systems is negative unless indicated above.      PHYSICAL EXAM:  General: Well developed; malnourished and frail,  in no acute distress  Eyes: EOMI, conjunctiva and sclera clear  Head: Normocephalic; atraumatic  ENMT: No nasal discharge; airway clear  Respiratory: No wheezes, rales or rhonchi  Cardiovascular: Regular rate and rhythm. S1 and S2 Normal;   Gastrointestinal: Soft non-tender non-distended; Normal bowel sounds  Genitourinary:+ no suprapubic   tenderness    Extremities: No edema  Vascular: Peripheral pulses palpable 2+ bilaterally  Neurological: AAA x 1? minimally verbal, follows simple commands. Non focal   Musculoskeletal: Normal muscle tone, without deformities      LABS:                         13.1   9.32  )-----------( 211      ( 2023 08:56 )             39.2     02-04    142  |  110<H>  |  9   ----------------------------<  92  3.4<L>   |  25  |  0.69    Ca    9.1      2023 08:56  TPro  5.9<L>  /  Alb  2.7<L>  /  TBili  0.4  /  DBili  0.1  /  AST  14<L>  /  ALT  12  /  AlkPhos  104  02-03  LIVER FUNCTIONS - ( 2023 14:07 )  Alb: 2.7 g/dL / Pro: 5.9 gm/dL / ALK PHOS: 104 U/L / ALT: 12 U/L / AST: 14 U/L / GGT: x                               15.2   8.18  )-----------( 259      ( 2023 16:04 )             45.3     2023 16:04    138    |  107    |  11     ----------------------------<  114    4.6     |  28     |  0.91     Ca    10.2       2023 16:04    TPro  7.2    /  Alb  3.2    /  TBili  0.4    /  DBili  x      /  AST  20     /  ALT  18     /  AlkPhos  117    2023 16:04  PT/INR - ( 2023 16:04 )   PT: 11.7 sec;   INR: 1.01 ratio    PTT - ( 2023 16:04 )  PTT:32.3 sec        LIVER FUNCTIONS - ( 2023 16:04 )  Alb: 3.2 g/dL / Pro: 7.2 gm/dL / ALK PHOS: 117 U/L / ALT: 18 U/L / AST: 20 U/L / GGT: x           Urinalysis Basic - ( 2023 16:04 )    Color: Yellow / Appearance: Clear / S.015 / pH: x  Gluc: x / Ketone: Negative  / Bili: Negative / Urobili: Negative   Blood: x / Protein: Negative / Nitrite: Negative   Leuk Esterase: Moderate / RBC: 11-25 /HPF / WBC >50 /HPF   Sq Epi: x / Non Sq Epi: Many / Bacteria: Many    Culture - Urine (23 @ 16:04)   Specimen Source: Clean Catch None   Culture Results:   >100,000 CFU/ml Gram Negative Rods   >100,000 CFU/ml Enterococcus species     MEDICATIONS  (STANDING):  citalopram 20 milliGRAM(s) Oral daily  donepezil 10 milliGRAM(s) Oral at bedtime  heparin   Injectable 5000 Unit(s) SubCutaneous every 12 hours  lactobacillus acidophilus 1 Tablet(s) Oral two times a day with meals  latanoprost 0.005% Ophthalmic Solution 1 Drop(s) Both EYES at bedtime  lidocaine   4% Patch 1 Patch Transdermal daily  piperacillin/tazobactam IVPB. 3.375 Gram(s) IV Intermittent once  piperacillin/tazobactam IVPB.- 3.375 Gram(s) IV Intermittent once  potassium chloride   Powder 40 milliEquivalent(s) Oral every 4 hours  simvastatin 20 milliGRAM(s) Oral at bedtime  sodium chloride 0.9%. 1000 milliLiter(s) (75 mL/Hr) IV Continuous <Continuous>  timolol 0.5% Solution 1 Drop(s) Both EYES daily    MEDICATIONS  (PRN):  acetaminophen     Tablet .. 650 milliGRAM(s) Oral every 6 hours PRN Temp greater or equal to 38C (100.4F), Mild Pain (1 - 3)  aluminum hydroxide/magnesium hydroxide/simethicone Suspension 30 milliLiter(s) Oral every 4 hours PRN Dyspepsia  melatonin 3 milliGRAM(s) Oral at bedtime PRN Insomnia  ondansetron Injectable 4 milliGRAM(s) IV Push every 8 hours PRN Nausea and/or Vomiting        RADIOLOGY & ADDITIONAL TESTS:      ACC: [id number] EXAM:  CT BRAIN   ORDERED BY: DORITA ALFONSO   PROCEDURE DATE:  2023        FINDINGS:  There is no acute intracranial hemorrhage, mass effect, midline shift,   extra-axial collection, hydrocephalus, or evidence of acute vascular   territorial infarction. Mild patchy hypodensities within the   periventricular and subcortical white matter, although nonspecific,   likely reflect chronic microvascular disease. Cerebral white loss   contributes to prominence of the ventricles and sulci.    Partial opacification of the left sphenoid sinus. Remainder the paranasal   sinuses and mastoid air cells are clear. Status post bilateral ocular   lens replacement. Calvarium is intact.    IMPRESSION:    No acute intracranial hemorrhage, mass effect, or evidence of acute   vascular territorial infarction. If clinical symptoms persist or worsen,   more sensitive evaluation with brain MRI may be obtained, if no   contraindications exist.    Partially opacified left sphenoid sinus.      < from: Xray Chest 1 View- PORTABLE-Urgent (23 @ 16:45) >    ACC: [id number] EXAM:  XR CHEST PORTABLE URGENT 1V   ORDERED BY: DORITA ALFONSO     PROCEDURE DATE:  2023          INTERPRETATION:  Portable chest radiograph    CLINICAL INFORMATION: Sepsis    TECHNIQUE:  Portable  AP chest radiograph.    COMPARISON: 2022 chest x-ray .    FINDINGS:  CATHETERS AND TUBES: None    PULMONARY: The visualized lungs are clear of airspace consolidations or   pleural effusions.   No pneumothorax.    HEART/VASCULAR: The heart and mediastinum size and configuration are   within normal limits.    BONES: Visualized osseous structures are intact.    IMPRESSION:   No radiographic evidence of active chest disease.

## 2023-02-04 NOTE — CONSULT NOTE ADULT - ASSESSMENT
86 yo F with a PMH of HLD,  Alzheimer's dementia, A, left hip hemiarthroplasty on 9/25/22 after a fall, has been residing at [facility] since, BIBEMS for Altered mental status, lethargy and weakness for the last few days, is noted to have UTI/ possible urinary retention    CT head showed mild patchy hypodensities within the periventricular and subcortical white matter, although nonspecific, likely reflect chronic microvascular disease. Cerebral white loss contributes to prominence of the ventricles and sulci.    # On exam, pt is arousable, opens eyes briefly, responds to her name, answers yes/no, drifts back, has minimal interaction. She is well known to me for over a decade, she had mildly progressive dementia for a long time, it has however worsened since past 2-3 years to the current  advanced stage, it does seem more profound since her last admission 9/2022.      # Metabolic encephalopathy / UTI related superimposed on advanced dementia    # Alzheimers dementia; currently advanced stage    - I had a d/w pts daughter, Shira, who is well known to me, I discussed CT head results - no acute findings are seen, at this stage it is not worthwhile ordering more aggressive tests as it is not going to change managemnet. Supportive care is needed, attention to be paid for hydration and nutritional supplementation if needed.    Above D/W Dr. English
86 y/o Female with h/o dementia, HL, glaucoma, right hip OA was admitted on 2/2 for increased confusion. Patient is a poor historian due to dementia. As per the daughter, patient is lethargic and weak for the last few days PTA. She is also has change in mental status. No fever. Patient is bed bound for a while. In ER she received ceftriaxone.     1. Pyuria. UTI with EN spp and GNR. Encephalopathy.   -obtain BC x 2, urine c/s  -cultures reviewed  -start zosyn 375 gm IV q8h  -reason for abx use and side effects reviewed with patient; monitor BMP   -f/u cultures  -old chart reviewed to assess prior cultures  -monitor temps  -f/u CBC  -supportive care  2. Other issues:   -care per medicine

## 2023-02-04 NOTE — DIETITIAN INITIAL EVALUATION ADULT - NSFNSPHYEXAMSKINFT_GEN_A_CORE
Pressure Injury 1: sacrum, Stage I  Pressure Injury 2: none, none  Pressure Injury 3: none, none  Pressure Injury 4: none, none  Pressure Injury 5: none, none  Pressure Injury 6: none, none  Pressure Injury 7: none, none  Pressure Injury 8: none, none  Pressure Injury 9: none, none  Pressure Injury 10: none, none  Pressure Injury 11: none, none, Pressure Injury 1: none, Stage I  Pressure Injury 2: none, none  Pressure Injury 3: none, none  Pressure Injury 4: none, none  Pressure Injury 5: none, none  Pressure Injury 6: none, none  Pressure Injury 7: none, none  Pressure Injury 8: none, none  Pressure Injury 9: none, none  Pressure Injury 10: none, none  Pressure Injury 11: none, none Pressure Injury 1: sacrum, Stage I    Mando Score 13.

## 2023-02-04 NOTE — PROGRESS NOTE ADULT - ASSESSMENT
87 y.o female with PMH of Dementia, Anxiety,  HLD admitted for:     1. Toxic metabolic encephalopathy due to UTI   CT head neg for acute findings, + chronic microvascular changes   supportive care  Tx infection   Hydration   CXR neg   Check B12/Folate/TSH/Ammonia/LFTs wnl    2. UTI,   F/u BCX: NGTD  UCX: + GNR, + Enterococcus   On  Ceftriaxone IV , switch to Zosyn IV    Check bladder scan neg, no retention   ID eval, d/w Dr Oden    3. Advanced dementia with cognitive impairment,  dysphagia and speech issues. Anxiety    supportive care  On Puree diet with mod thick liquids ordered, tolerating well   aspiration precautions   C/w Home meds: on Namenda and Aricept, also on Citalopram     4. Severe Protein calorie malnutrition   Monitor labs  supplements   Nutrition eval     5.  Decubitus ulcer  Wound care       6. Code status: Patient is DNR/DNI,   MOLST form is in the chart. Confirmed  by  daughter  on admission       7. DVT PPX; heparin SQ     POC discussed with daughter  PT eval 87 y.o female with PMH of Dementia, Anxiety,  HLD admitted for:     1. Toxic metabolic encephalopathy due to UTI   CT head neg for acute findings, + chronic microvascular changes   supportive care  Tx infection   Hydration   CXR neg   Check B12/Folate/TSH/Ammonia/LFTs wnl    2. UTI,   F/u BCX: NGTD  UCX: + GNR, + Enterococcus   On  Ceftriaxone IV , switch to Zosyn IV    Check bladder scan neg, no retention   ID eval, d/w Dr Oden    3. Advanced dementia with cognitive impairment,  dysphagia and speech issues. Anxiety    supportive care  On Puree diet with mod thick liquids ordered, tolerating well   aspiration precautions   C/w Home meds: on Namenda and Aricept, also on Citalopram     4. Severe Protein calorie malnutrition   Monitor labs  supplements   Nutrition eval     5. Hypokalemia  Replace PO  recheck in am      6. Decubitus ulcer  Wound care       7. Code status: Patient is DNR/DNI,   MOLST form is in the chart. Confirmed  by  daughter  on admission       8. DVT PPX; heparin SQ     POC discussed with daughter  PT farzad

## 2023-02-04 NOTE — DIETITIAN INITIAL EVALUATION ADULT - PERTINENT MEDS FT
MEDICATIONS  (STANDING):  cefTRIAXone   IVPB 1000 milliGRAM(s) IV Intermittent every 24 hours  citalopram 20 milliGRAM(s) Oral daily  donepezil 10 milliGRAM(s) Oral at bedtime  heparin   Injectable 5000 Unit(s) SubCutaneous every 12 hours  lactobacillus acidophilus 1 Tablet(s) Oral two times a day with meals  latanoprost 0.005% Ophthalmic Solution 1 Drop(s) Both EYES at bedtime  lidocaine   4% Patch 1 Patch Transdermal daily  simvastatin 20 milliGRAM(s) Oral at bedtime  sodium chloride 0.9%. 1000 milliLiter(s) (75 mL/Hr) IV Continuous <Continuous>  timolol 0.5% Solution 1 Drop(s) Both EYES daily    MEDICATIONS  (PRN):  acetaminophen     Tablet .. 650 milliGRAM(s) Oral every 6 hours PRN Temp greater or equal to 38C (100.4F), Mild Pain (1 - 3)  aluminum hydroxide/magnesium hydroxide/simethicone Suspension 30 milliLiter(s) Oral every 4 hours PRN Dyspepsia  melatonin 3 milliGRAM(s) Oral at bedtime PRN Insomnia  ondansetron Injectable 4 milliGRAM(s) IV Push every 8 hours PRN Nausea and/or Vomiting    Home Medications:  acetaminophen 325 mg oral tablet: 2 tab(s) orally every 6 hours, As needed, Mild Pain (1 - 3) (02 Feb 2023 18:16)  cefpodoxime 100 mg oral tablet: 1 tab(s) orally every 12 hours  ***course complete*** (02 Feb 2023 21:11)  citalopram 20 mg oral tablet: 1 tab(s) orally once a day (02 Feb 2023 18:16)  donepezil 10 mg oral tablet: 1 tab(s) orally once a day (at bedtime) (02 Feb 2023 18:16)  lactobacillus acidophilus oral capsule: 1 cap(s) orally 2 times a day (02 Feb 2023 21:11)  latanoprost 0.005% ophthalmic solution: 1 drop(s) to each affected eye once a day (at bedtime) (02 Feb 2023 18:16)  lidocaine 4% patch: Apply topically to affected area once a day (02 Feb 2023 21:11)  memantine 28 mg oral capsule, extended release: 1 cap(s) orally once a day (02 Feb 2023 21:11)  simvastatin 20 mg oral tablet: 1 tab(s) orally once a day (at bedtime) (02 Feb 2023 18:16)  timolol 0.5% ophthalmic solution: 1 drop(s) in each eye once a day (02 Feb 2023 18:16)  Vitamin D3 25 mcg (1000 intl units) oral tablet: 1 tab(s) orally once a day (02 Feb 2023 18:16)  Xalatan 0.005% ophthalmic solution: 1 drop(s) to each affected eye once a day (in the evening) (02 Feb 2023 21:11)

## 2023-02-04 NOTE — DIETITIAN INITIAL EVALUATION ADULT - ORAL INTAKE PTA/DIET HISTORY
Unable to obtain intake/ diet hx pta 2/2 dementia; on Pureed, Moderately Thickened Liquids at [facility]. Likely meeting </=65% of ENN chronically.

## 2023-02-04 NOTE — DIETITIAN INITIAL EVALUATION ADULT - REASON INDICATOR FOR ASSESSMENT
Assessment and Education (No Reason doc'd); Significant decrease oral intake >3 days prior to admission

## 2023-02-04 NOTE — CONSULT NOTE ADULT - SUBJECTIVE AND OBJECTIVE BOX
Patient is a 87y old  Female who presents with a chief complaint of UTI and Altered mental status (03 Feb 2023 12:58)      HPI:  86 yo F with a PMH of HLD,  Alzheimer's dementia, and OA, left hip fracture, underwent Left hip hemiarthroplasty on 9/25/22, has been residing at [facility] for Altered mental status. As per pts daughter Shira,  patient had been lethargic and weak for the last few days, she is bed bound, has not bounced back and is not walking as she did earlier since hip surgery.     Pt is noted to have UTI/ possible urinary retention, is on Ab's, she is lethargic, sleeping a lot, with min PO intake, daughter was worried about dehydration and dementia, requested neuro eval. CT head showed mild patchy hypodensities within the periventricular and subcortical white matter, although nonspecific, likely reflect chronic microvascular disease. Cerebral white loss contributes to prominence of the ventricles and sulci.    On exam, pt is arousable, opens eyes briefly, responds to her name, answers yes/no, drifts back, has minimal interaction. She is well known to me for over a decade, she had mildly progressive dementia for a long time, it has however worsened since past 2-3 years to the current  advanced stage, it does seem more profound since her last admission 9/2022.      PAST MEDICAL & SURGICAL HISTORY:  Osteoarthritis of right hip  Glaucoma  Alzheimer Dementia  Hypercholesteremia  S/P appendectomy childhood      FAMILY HISTORY:  Unable to obtain      Social Hx: Resident of [facility] , no priot history of smoking, no drug or alcohol use      MEDICATIONS  (STANDING):  cefTRIAXone   IVPB 1000 milliGRAM(s) IV Intermittent every 24 hours  citalopram 20 milliGRAM(s) Oral daily  donepezil 10 milliGRAM(s) Oral at bedtime  heparin   Injectable 5000 Unit(s) SubCutaneous every 12 hours  lactobacillus acidophilus 1 Tablet(s) Oral two times a day with meals  latanoprost 0.005% Ophthalmic Solution 1 Drop(s) Both EYES at bedtime  lidocaine   4% Patch 1 Patch Transdermal daily  simvastatin 20 milliGRAM(s) Oral at bedtime  sodium chloride 0.9%. 1000 milliLiter(s) (75 mL/Hr) IV Continuous <Continuous>  timolol 0.5% Solution 1 Drop(s) Both EYES daily       Allergies  No Known Allergies      ROS: Pertinent positives in HPI, all other ROS unable to obtain.        Vital Signs Last 24 Hrs  T(C): 36.7 (03 Feb 2023 08:06), Max: 36.7 (03 Feb 2023 01:10)  T(F): 98.1 (03 Feb 2023 08:06), Max: 98.1 (03 Feb 2023 08:06)  HR: 68 (03 Feb 2023 08:06) (52 - 71)  BP: 114/51 (03 Feb 2023 08:06) (111/68 - 123/47)  BP(mean): 69 (03 Feb 2023 00:00) (64 - 77)  RR: 18 (03 Feb 2023 08:06) (14 - 19)  SpO2: 99% (03 Feb 2023 08:06) (99% - 100%)    Parameters below as of 03 Feb 2023 08:06  Patient On (Oxygen Delivery Method): nasal cannula      Gen exam:  Normocephalic, frail looking, lethargic  HEENT: PERRLA, EOMI,   Neck: Supple.  Respiratory: Breath sounds are clear bilaterally  Cardiovascular: S1 and S2, regular  Extremities:  no edema  Vascular: Caritid Bruit - no  Musculoskeletal: no abnormal movements  Skin: No rashes    Neurological exam:  HF: Sleepy but arousable, opens eyes briefly, responds to her name, answers yes/no, does not recognize me, drifts back, gives irrelevant answer   CN: RAINER, EOMI, no gross NLFD, tongue midline,   Motor: Moves both UE anigravity,    Sens/Reflexes/coord: unable   Gait/Balance: cannot test          Labs:   02-03    141  |  110<H>  |  11  ----------------------------<  89  3.8   |  27  |  0.83    Ca    9.6      03 Feb 2023 14:07    TPro  5.9<L>  /  Alb  2.7<L>  /  TBili  0.4  /  DBili  0.1  /  AST  14<L>  /  ALT  12  /  AlkPhos  104  02-03                          13.1   8.54  )-----------( 224      ( 03 Feb 2023 14:07 )             40.0       Radiology:  < from: CT Head No Cont (02.02.23 @ 18:37) >  There is no acute intracranial hemorrhage, mass effect, midline shift,   extra-axial collection, hydrocephalus, or evidence of acute vascular   territorial infarction. Mild patchy hypodensities within the   periventricular and subcortical white matter, although nonspecific,   likely reflect chronic microvascular disease. Cerebral white loss   contributes to prominence of the ventricles and sulci.    Partial opacification of the left sphenoid sinus. Remainder the paranasal   sinuses and mastoid air cells are clear. Status post bilateral ocular   lens replacement. Calvarium is intact.    IMPRESSION:    No acute intracranial hemorrhage, mass effect, or evidence of acute   vascular territorial infarction. If clinical symptoms persist or worsen,   more sensitive evaluation with brain MRI may be obtained, if no   contraindications exist.    Partially opacified left sphenoid sinus.    
Patient is a 87y old  Female who presents with a chief complaint of Failure to thrive in adult    HPI:  86 y/o Female with h/o dementia, HL, glaucoma, right hip OA was admitted on  for increased confusion. Patient is a poor historian due to dementia. As per the daughter, patient is lethargic and weak for the last few days PTA. She is also has change in mental status. No fever. Patient is bed bound for a while. In ER she received ceftriaxone.     PAST MEDICAL HISTORY:  Alzheimer disease   Dementia   Glaucoma   Hypercholesteremia   Osteoarthritis of right hip.     PAST SURGICAL HISTORY:  S/P appendectomy childhood  S/P colonoscopy every 2 or 3 years  S/P D&C (status post dilation and curettage) age 32  S/P tonsillectomy childhood.     Meds: per reconciliation sheet, noted below  MEDICATIONS  (STANDING):  citalopram 20 milliGRAM(s) Oral daily  donepezil 10 milliGRAM(s) Oral at bedtime  heparin   Injectable 5000 Unit(s) SubCutaneous every 12 hours  lactobacillus acidophilus 1 Tablet(s) Oral two times a day with meals  latanoprost 0.005% Ophthalmic Solution 1 Drop(s) Both EYES at bedtime  lidocaine   4% Patch 1 Patch Transdermal daily  piperacillin/tazobactam IVPB. 3.375 Gram(s) IV Intermittent once  piperacillin/tazobactam IVPB.- 3.375 Gram(s) IV Intermittent once  potassium chloride   Powder 40 milliEquivalent(s) Oral every 4 hours  simvastatin 20 milliGRAM(s) Oral at bedtime  sodium chloride 0.9%. 1000 milliLiter(s) (75 mL/Hr) IV Continuous <Continuous>  timolol 0.5% Solution 1 Drop(s) Both EYES daily    MEDICATIONS  (PRN):  acetaminophen     Tablet .. 650 milliGRAM(s) Oral every 6 hours PRN Temp greater or equal to 38C (100.4F), Mild Pain (1 - 3)  aluminum hydroxide/magnesium hydroxide/simethicone Suspension 30 milliLiter(s) Oral every 4 hours PRN Dyspepsia  melatonin 3 milliGRAM(s) Oral at bedtime PRN Insomnia  ondansetron Injectable 4 milliGRAM(s) IV Push every 8 hours PRN Nausea and/or Vomiting    Allergies    No Known Allergies    Intolerances      Social: no smoking, no alcohol, no illegal drugs; no recent travel, no exposure to TB  FAMILY HISTORY:  Family history of colon cancer (Father)      no history of premature cardiovascular disease in first degree relatives    ROS: the patient is confused, limited  All other systems reviewed and are negative    Vital Signs Last 24 Hrs  T(C): 36.3 (2023 08:32), Max: 36.7 (2023 22:29)  T(F): 97.4 (2023 08:32), Max: 98 (2023 22:29)  HR: 69 (2023 08:32) (69 - 73)  BP: 124/48 (2023 08:32) (120/50 - 124/48)  BP(mean): 62 (2023 22:29) (62 - 62)  RR: 17 (2023 08:32) (17 - 18)  SpO2: 97% (2023 08:32) (97% - 99%)    Parameters below as of 2023 08:32  Patient On (Oxygen Delivery Method): room air    PE:    Constitutional:  No acute distress  HEENT: NC/AT, EOMI, PERRLA, conjunctivae clear; ears and nose atraumatic; pharynx benign  Neck: supple; thyroid not palpable  Back: no tenderness  Respiratory: respiratory effort normal; crackles at bases  Cardiovascular: S1S2 regular, no murmurs  Abdomen: soft, not tender, not distended, positive BS; no liver or spleen organomegaly  Genitourinary: no suprapubic tenderness  Lymphatic: no LN palpable  Musculoskeletal: no muscle tenderness, no joint swelling or tenderness  Extremities: no pedal edema  Neurological/ Psychiatric: confused, judgement and insight impaired; moving all extremities  Skin: no rashes; no palpable lesions    Labs: all available labs reviewed                        13.1   9.32  )-----------( 211      ( 2023 08:56 )             39.2     02-04    142  |  110<H>  |  9   ----------------------------<  92  3.4<L>   |  25  |  0.69    Ca    9.1      2023 08:56    TPro  5.9<L>  /  Alb  2.7<L>  /  TBili  0.4  /  DBili  0.1  /  AST  14<L>  /  ALT  12  /  AlkPhos  104  02-03     LIVER FUNCTIONS - ( 2023 14:07 )  Alb: 2.7 g/dL / Pro: 5.9 gm/dL / ALK PHOS: 104 U/L / ALT: 12 U/L / AST: 14 U/L / GGT: x           Urinalysis Basic - ( 2023 16:04 )    Color: Yellow / Appearance: Clear / S.015 / pH: x  Gluc: x / Ketone: Negative  / Bili: Negative / Urobili: Negative   Blood: x / Protein: Negative / Nitrite: Negative   Leuk Esterase: Moderate / RBC: 11-25 /HPF / WBC >50 /HPF   Sq Epi: x / Non Sq Epi: Many / Bacteria: Many    ( @ 16:04)  NotDetec      Culture - Urine (collected 2023 16:04)  Source: Clean Catch None  Preliminary Report (2023 08:57):    >100,000 CFU/ml Gram Negative Rods    >100,000 CFU/ml Enterococcus species    Culture - Blood (collected 2023 16:04)  Source: .Blood None  Preliminary Report (2023 20:02):    No growth to date.    Culture - Blood (collected 2023 16:04)  Source: .Blood None  Preliminary Report (2023 20:02):    No growth to date.    Radiology: all available radiological tests reviewed    < from: Xray Chest 1 View- PORTABLE-Urgent (23 @ 16:45) >  IMPRESSION:   No radiographic evidence of active chest disease.    < end of copied text >      Advanced directives addressed: full resuscitation

## 2023-02-04 NOTE — DIETITIAN INITIAL EVALUATION ADULT - PERTINENT LABORATORY DATA
02-04    142  |  110<H>  |  9   ----------------------------<  92  3.4<L>   |  25  |  0.69    Ca    9.1      04 Feb 2023 08:56    TPro  5.9<L>  /  Alb  2.7<L>  /  TBili  0.4  /  DBili  0.1  /  AST  14<L>  /  ALT  12  /  AlkPhos  104  02-03    Vitamin B12, Serum: 645 pg/mL (02-03-23 @ 14:07)  Folate, Serum: 13.7 ng/mL (02-03-23 @ 14:07)  Vitamin D, 25-Hydroxy: 41.4 ng/mL (09-25-22 @ 05:08)

## 2023-02-04 NOTE — DIETITIAN INITIAL EVALUATION ADULT - OTHER INFO
86 y/o F presented from [facility] with a PMHx of dementia for AMS. CTH: No acute findings. Admitted for toxic metabolic encephalopathy due to UTI. Hospitalist documenting severe protein calorie malnutrition.     Unable to obtain meaningful information 2/2 dementia. RD unable to obtain bedscale wt at time of visit (bedscale not working, kept stating pt's wt 2.2 kg). Weight hx reviewed: 99.8# (taken by RD on 9/29/22). No weight doc'd this admission. Pt thin, frail, bony appearing. NFPE reveals severe muscle/ fat wasting, pt meets criteria for PCM at this time. Will trial Moderately Thickened Ensure Plus High Protein in effort to optimize po intake. Please see additional recommendations below.

## 2023-02-04 NOTE — DIETITIAN INITIAL EVALUATION ADULT - ADD RECOMMEND
1) C/w Puree, Moderately Thickened Diet  2) Add moderately thickened ensure plus high protein TID to optimize PO intake (provides 350 kcal, 20g protein/ shake)  3) Consider adding appetite stimulant such as Remeron or Marinol 2/2 chronically poor appetite/ PO intake  4) Obtain vitamin D 25OH level to assess nutriture  5) Consider adding thiamine 100 mg daily 2/2 poor PO intake/ malnutrition  6) Recommend to add MVI w/minerals, Vit C 500 mg BID, add Zinc Sulfate 220 mg x 10 days to promote wound healing.  7) Please obtain weekly wts  8) Monitor and replete lytes PRN  9) Maintain STRICT aspiration precautions, back of bed >45 degrees.   10) Confirm goals of care regarding long-term nutrition support - Nutrition support is not recommended due to overall declining medical status which evidenced based studies indicate EN is not effective in prolonging survival and improving quality of life. It can also increase risk of aspiration pneumonia as well as other related issues.   RD will continue to monitor PO intake, labs, hydration, and wt prn.

## 2023-02-05 LAB
-  AMIKACIN: SIGNIFICANT CHANGE UP
-  AMOXICILLIN/CLAVULANIC ACID: SIGNIFICANT CHANGE UP
-  AMPICILLIN/SULBACTAM: SIGNIFICANT CHANGE UP
-  AMPICILLIN: SIGNIFICANT CHANGE UP
-  AMPICILLIN: SIGNIFICANT CHANGE UP
-  AZTREONAM: SIGNIFICANT CHANGE UP
-  CEFAZOLIN: SIGNIFICANT CHANGE UP
-  CEFEPIME: SIGNIFICANT CHANGE UP
-  CEFOXITIN: SIGNIFICANT CHANGE UP
-  CEFTRIAXONE: SIGNIFICANT CHANGE UP
-  CIPROFLOXACIN: SIGNIFICANT CHANGE UP
-  CIPROFLOXACIN: SIGNIFICANT CHANGE UP
-  ERTAPENEM: SIGNIFICANT CHANGE UP
-  LEVOFLOXACIN: SIGNIFICANT CHANGE UP
-  LEVOFLOXACIN: SIGNIFICANT CHANGE UP
-  MEROPENEM: SIGNIFICANT CHANGE UP
-  NITROFURANTOIN: SIGNIFICANT CHANGE UP
-  NITROFURANTOIN: SIGNIFICANT CHANGE UP
-  PIPERACILLIN/TAZOBACTAM: SIGNIFICANT CHANGE UP
-  TETRACYCLINE: SIGNIFICANT CHANGE UP
-  TRIMETHOPRIM/SULFAMETHOXAZOLE: SIGNIFICANT CHANGE UP
-  VANCOMYCIN: SIGNIFICANT CHANGE UP
ANION GAP SERPL CALC-SCNC: 5 MMOL/L — SIGNIFICANT CHANGE UP (ref 5–17)
BUN SERPL-MCNC: 4 MG/DL — LOW (ref 7–23)
CALCIUM SERPL-MCNC: 9.5 MG/DL — SIGNIFICANT CHANGE UP (ref 8.5–10.1)
CHLORIDE SERPL-SCNC: 110 MMOL/L — HIGH (ref 96–108)
CO2 SERPL-SCNC: 26 MMOL/L — SIGNIFICANT CHANGE UP (ref 22–31)
CREAT SERPL-MCNC: 0.8 MG/DL — SIGNIFICANT CHANGE UP (ref 0.5–1.3)
CULTURE RESULTS: SIGNIFICANT CHANGE UP
EGFR: 72 ML/MIN/1.73M2 — SIGNIFICANT CHANGE UP
GLUCOSE SERPL-MCNC: 104 MG/DL — HIGH (ref 70–99)
HCT VFR BLD CALC: 37.1 % — SIGNIFICANT CHANGE UP (ref 34.5–45)
HGB BLD-MCNC: 12.4 G/DL — SIGNIFICANT CHANGE UP (ref 11.5–15.5)
MCHC RBC-ENTMCNC: 33.2 PG — SIGNIFICANT CHANGE UP (ref 27–34)
MCHC RBC-ENTMCNC: 33.4 GM/DL — SIGNIFICANT CHANGE UP (ref 32–36)
MCV RBC AUTO: 99.5 FL — SIGNIFICANT CHANGE UP (ref 80–100)
METHOD TYPE: SIGNIFICANT CHANGE UP
METHOD TYPE: SIGNIFICANT CHANGE UP
ORGANISM # SPEC MICROSCOPIC CNT: SIGNIFICANT CHANGE UP
PLATELET # BLD AUTO: 216 K/UL — SIGNIFICANT CHANGE UP (ref 150–400)
POTASSIUM SERPL-MCNC: 3.8 MMOL/L — SIGNIFICANT CHANGE UP (ref 3.5–5.3)
POTASSIUM SERPL-SCNC: 3.8 MMOL/L — SIGNIFICANT CHANGE UP (ref 3.5–5.3)
RBC # BLD: 3.73 M/UL — LOW (ref 3.8–5.2)
RBC # FLD: 13.3 % — SIGNIFICANT CHANGE UP (ref 10.3–14.5)
SODIUM SERPL-SCNC: 141 MMOL/L — SIGNIFICANT CHANGE UP (ref 135–145)
SPECIMEN SOURCE: SIGNIFICANT CHANGE UP
WBC # BLD: 6.19 K/UL — SIGNIFICANT CHANGE UP (ref 3.8–10.5)
WBC # FLD AUTO: 6.19 K/UL — SIGNIFICANT CHANGE UP (ref 3.8–10.5)

## 2023-02-05 PROCEDURE — 99233 SBSQ HOSP IP/OBS HIGH 50: CPT

## 2023-02-05 RX ORDER — SODIUM CHLORIDE 9 MG/ML
1000 INJECTION INTRAMUSCULAR; INTRAVENOUS; SUBCUTANEOUS
Refills: 0 | Status: DISCONTINUED | OUTPATIENT
Start: 2023-02-05 | End: 2023-02-07

## 2023-02-05 RX ADMIN — HEPARIN SODIUM 5000 UNIT(S): 5000 INJECTION INTRAVENOUS; SUBCUTANEOUS at 21:01

## 2023-02-05 RX ADMIN — SIMVASTATIN 20 MILLIGRAM(S): 20 TABLET, FILM COATED ORAL at 21:01

## 2023-02-05 RX ADMIN — PIPERACILLIN AND TAZOBACTAM 25 GRAM(S): 4; .5 INJECTION, POWDER, LYOPHILIZED, FOR SOLUTION INTRAVENOUS at 05:30

## 2023-02-05 RX ADMIN — PIPERACILLIN AND TAZOBACTAM 25 GRAM(S): 4; .5 INJECTION, POWDER, LYOPHILIZED, FOR SOLUTION INTRAVENOUS at 21:01

## 2023-02-05 RX ADMIN — PIPERACILLIN AND TAZOBACTAM 25 GRAM(S): 4; .5 INJECTION, POWDER, LYOPHILIZED, FOR SOLUTION INTRAVENOUS at 13:25

## 2023-02-05 RX ADMIN — LIDOCAINE 1 PATCH: 4 CREAM TOPICAL at 18:26

## 2023-02-05 RX ADMIN — LATANOPROST 1 DROP(S): 0.05 SOLUTION/ DROPS OPHTHALMIC; TOPICAL at 21:24

## 2023-02-05 RX ADMIN — LIDOCAINE 1 PATCH: 4 CREAM TOPICAL at 09:31

## 2023-02-05 RX ADMIN — DONEPEZIL HYDROCHLORIDE 10 MILLIGRAM(S): 10 TABLET, FILM COATED ORAL at 21:02

## 2023-02-05 RX ADMIN — SODIUM CHLORIDE 75 MILLILITER(S): 9 INJECTION INTRAMUSCULAR; INTRAVENOUS; SUBCUTANEOUS at 16:15

## 2023-02-05 RX ADMIN — Medication 1 DROP(S): at 09:32

## 2023-02-05 RX ADMIN — LIDOCAINE 1 PATCH: 4 CREAM TOPICAL at 21:26

## 2023-02-05 RX ADMIN — HEPARIN SODIUM 5000 UNIT(S): 5000 INJECTION INTRAVENOUS; SUBCUTANEOUS at 09:31

## 2023-02-05 NOTE — PROGRESS NOTE ADULT - ASSESSMENT
86 y/o Female with h/o dementia, HL, glaucoma, right hip OA was admitted on 2/2 for increased confusion. Patient is a poor historian due to dementia. As per the daughter, patient is lethargic and weak for the last few days PTA. She is also has change in mental status. No fever. Patient is bed bound for a while. In ER she received ceftriaxone.     1. Pyuria. UTI with EN spp and PRST. Encephalopathy.   -lethargic, opens eyes  -cultures reviewed  -on zosyn 375 gm IV q8h # 2  -tolerating abx well so far; no side effects noted  -f/u cultures  -continue abx coverage   -monitor temps  -f/u CBC  -supportive care  2. Other issues:   -care per medicine

## 2023-02-05 NOTE — PROGRESS NOTE ADULT - SUBJECTIVE AND OBJECTIVE BOX
CC: UTI and Altered mental status    HPI: 88 yo Female presented from [facility] , with pmhx dementia, BIBEMS for Altered mental status. Patient is a poor historian due to Dementia. Spoke with the Daughter, Shira , over the phone. As per the daughter, patient is lethargic and weak for the last few days. She is also has change in mental status. No fever. Patient is bed bound for a while.       INTERVAL HPI/ OVERNIGHT EVENTS:  Pt was seen and examined,  awake and alert, confused, saiy no when asked about pain, said yes when asked if wants breakfast. No events overnight as per RN     Vital Signs Last 24 Hrs  T(C): 36.8 (2023 08:21), Max: 36.9 (2023 15:35)  T(F): 98.2 (2023 08:21), Max: 98.5 (2023 15:35)  HR: 67 (2023 08:21) (67 - 75)  BP: 100/51 (2023 08:21) (100/51 - 117/60)  BP(mean): 67 (2023 22:03) (67 - 67)  RR: 18 (2023 08:21) (18 - 18)  SpO2: 95% (2023 08:21) (95% - 100%)    Parameters below as of 2023 08:21  Patient On (Oxygen Delivery Method): room air        REVIEW OF SYSTEMS:  All other review of systems is negative unless indicated above.      PHYSICAL EXAM:  General: Well developed; malnourished and frail,  in no acute distress  Eyes: EOMI, conjunctiva and sclera clear  Head: Normocephalic; atraumatic  ENMT: No nasal discharge; airway clear  Respiratory: No wheezes, rales or rhonchi  Cardiovascular: Regular rate and rhythm. S1 and S2 Normal;   Gastrointestinal: Soft non-tender non-distended; Normal bowel sounds  Genitourinary:+ no suprapubic   tenderness    Extremities: No edema  Vascular: Peripheral pulses palpable 2+ bilaterally  Neurological: AAA x 1? minimally verbal, follows simple commands. Non focal   Musculoskeletal: Normal muscle tone, without deformities      LABS:                           13.1   9.32  )-----------( 211      ( 2023 08:56 )             39.2     02-04    142  |  110<H>  |  9   ----------------------------<  92  3.4<L>   |  25  |  0.69    Ca    9.1      2023 08:56  TPro  5.9<L>  /  Alb  2.7<L>  /  TBili  0.4  /  DBili  0.1  /  AST  14<L>  /  ALT  12  /  AlkPhos  104  02-03  LIVER FUNCTIONS - ( 2023 14:07 )  Alb: 2.7 g/dL / Pro: 5.9 gm/dL / ALK PHOS: 104 U/L / ALT: 12 U/L / AST: 14 U/L / GGT: x                               15.2   8.18  )-----------( 259      ( 2023 16:04 )             45.3     2023 16:04    138    |  107    |  11     ----------------------------<  114    4.6     |  28     |  0.91     Ca    10.2       2023 16:04    TPro  7.2    /  Alb  3.2    /  TBili  0.4    /  DBili  x      /  AST  20     /  ALT  18     /  AlkPhos  117    2023 16:04  PT/INR - ( 2023 16:04 )   PT: 11.7 sec;   INR: 1.01 ratio    PTT - ( 2023 16:04 )  PTT:32.3 sec        LIVER FUNCTIONS - ( 2023 16:04 )  Alb: 3.2 g/dL / Pro: 7.2 gm/dL / ALK PHOS: 117 U/L / ALT: 18 U/L / AST: 20 U/L / GGT: x           Urinalysis Basic - ( 2023 16:04 )    Color: Yellow / Appearance: Clear / S.015 / pH: x  Gluc: x / Ketone: Negative  / Bili: Negative / Urobili: Negative   Blood: x / Protein: Negative / Nitrite: Negative   Leuk Esterase: Moderate / RBC: 11-25 /HPF / WBC >50 /HPF   Sq Epi: x / Non Sq Epi: Many / Bacteria: Many    Culture - Urine (23 @ 16:04)   Specimen Source: Clean Catch None   Culture Results:   >100,000 CFU/ml Gram Negative Rods   >100,000 CFU/ml Enterococcus species     MEDICATIONS  (STANDING):  citalopram 20 milliGRAM(s) Oral daily  donepezil 10 milliGRAM(s) Oral at bedtime  heparin   Injectable 5000 Unit(s) SubCutaneous every 12 hours  lactobacillus acidophilus 1 Tablet(s) Oral two times a day with meals  latanoprost 0.005% Ophthalmic Solution 1 Drop(s) Both EYES at bedtime  lidocaine   4% Patch 1 Patch Transdermal daily  piperacillin/tazobactam IVPB. 3.375 Gram(s) IV Intermittent once  piperacillin/tazobactam IVPB.- 3.375 Gram(s) IV Intermittent once  potassium chloride   Powder 40 milliEquivalent(s) Oral every 4 hours  simvastatin 20 milliGRAM(s) Oral at bedtime  sodium chloride 0.9%. 1000 milliLiter(s) (75 mL/Hr) IV Continuous <Continuous>  timolol 0.5% Solution 1 Drop(s) Both EYES daily    MEDICATIONS  (PRN):  acetaminophen     Tablet .. 650 milliGRAM(s) Oral every 6 hours PRN Temp greater or equal to 38C (100.4F), Mild Pain (1 - 3)  aluminum hydroxide/magnesium hydroxide/simethicone Suspension 30 milliLiter(s) Oral every 4 hours PRN Dyspepsia  melatonin 3 milliGRAM(s) Oral at bedtime PRN Insomnia  ondansetron Injectable 4 milliGRAM(s) IV Push every 8 hours PRN Nausea and/or Vomiting        RADIOLOGY & ADDITIONAL TESTS:      ACC: [id number] EXAM:  CT BRAIN   ORDERED BY: DORITA ALFONSO   PROCEDURE DATE:  2023        FINDINGS:  There is no acute intracranial hemorrhage, mass effect, midline shift,   extra-axial collection, hydrocephalus, or evidence of acute vascular   territorial infarction. Mild patchy hypodensities within the   periventricular and subcortical white matter, although nonspecific,   likely reflect chronic microvascular disease. Cerebral white loss   contributes to prominence of the ventricles and sulci.    Partial opacification of the left sphenoid sinus. Remainder the paranasal   sinuses and mastoid air cells are clear. Status post bilateral ocular   lens replacement. Calvarium is intact.    IMPRESSION:    No acute intracranial hemorrhage, mass effect, or evidence of acute   vascular territorial infarction. If clinical symptoms persist or worsen,   more sensitive evaluation with brain MRI may be obtained, if no   contraindications exist.    Partially opacified left sphenoid sinus.      < from: Xray Chest 1 View- PORTABLE-Urgent (23 @ 16:45) >    ACC: [id number] EXAM:  XR CHEST PORTABLE URGENT 1V   ORDERED BY: DORITA ALFONSO     PROCEDURE DATE:  2023          INTERPRETATION:  Portable chest radiograph    CLINICAL INFORMATION: Sepsis    TECHNIQUE:  Portable  AP chest radiograph.    COMPARISON: 2022 chest x-ray .    FINDINGS:  CATHETERS AND TUBES: None    PULMONARY: The visualized lungs are clear of airspace consolidations or   pleural effusions.   No pneumothorax.    HEART/VASCULAR: The heart and mediastinum size and configuration are   within normal limits.    BONES: Visualized osseous structures are intact.    IMPRESSION:   No radiographic evidence of active chest disease.

## 2023-02-05 NOTE — PROGRESS NOTE ADULT - ASSESSMENT
87 y.o female with PMH of Dementia, Anxiety,  HLD admitted for:     1. Toxic metabolic encephalopathy due to UTI   CT head neg for acute findings, + chronic microvascular changes   supportive care  Tx infection   Hydration   CXR neg   Check B12/Folate/TSH/Ammonia/LFTs wnl    2. UTI,   F/u BCX: NGTD  UCX: + GNR, + Enterococcus , f/u sensitivities   S/p  Ceftriaxone IV , switched to Zosyn IV    Check bladder scan neg, no retention   d/w Dr Oden    3. Advanced dementia with cognitive impairment,  dysphagia and speech issues. Anxiety    supportive care  On Puree diet with mod thick liquids ordered, tolerating well   aspiration precautions   C/w Home meds: on Namenda and Aricept, also on Citalopram     4. Severe Protein calorie malnutrition   Monitor labs  supplements   Nutrition eval     5. Hypokalemia  Replace PO  am  labs pending     6. Decubitus ulcer  Wound care       7. Code status: Patient is DNR/DNI,   MOLST form is in the chart. Confirmed  by  daughter  on admission       8. DVT PPX; heparin SQ       PT eval    Dispo; OOB to chair, am labs pending

## 2023-02-05 NOTE — PROGRESS NOTE ADULT - SUBJECTIVE AND OBJECTIVE BOX
Date of service: 23 @ 13:18    Lying in bed in NAD  Weak looking  Poorly verbal  Dose not seem in pain    ROS: no fever or chills; limited    MEDICATIONS  (STANDING):  citalopram 20 milliGRAM(s) Oral daily  donepezil 10 milliGRAM(s) Oral at bedtime  heparin   Injectable 5000 Unit(s) SubCutaneous every 12 hours  lactobacillus acidophilus 1 Tablet(s) Oral two times a day with meals  latanoprost 0.005% Ophthalmic Solution 1 Drop(s) Both EYES at bedtime  lidocaine   4% Patch 1 Patch Transdermal daily  piperacillin/tazobactam IVPB.. 3.375 Gram(s) IV Intermittent every 8 hours  simvastatin 20 milliGRAM(s) Oral at bedtime  sodium chloride 0.9%. 1000 milliLiter(s) (75 mL/Hr) IV Continuous <Continuous>  timolol 0.5% Solution 1 Drop(s) Both EYES daily    Vital Signs Last 24 Hrs  T(C): 36.8 (2023 08:21), Max: 36.9 (2023 15:35)  T(F): 98.2 (2023 08:21), Max: 98.5 (2023 15:35)  HR: 67 (2023 08:21) (67 - 75)  BP: 100/51 (2023 08:21) (100/51 - 117/60)  BP(mean): 67 (2023 22:03) (67 - 67)  RR: 18 (2023 08:21) (18 - 18)  SpO2: 95% (2023 08:21) (95% - 100%)    Parameters below as of 2023 08:21  Patient On (Oxygen Delivery Method): room air     Physical exam:    Constitutional:  No acute distress  HEENT: NC/AT, EOMI, PERRLA, conjunctivae clear; ears and nose atraumatic  Neck: supple; thyroid not palpable  Back: no tenderness  Respiratory: respiratory effort normal; crackles at bases  Cardiovascular: S1S2 regular, no murmurs  Abdomen: soft, not tender, not distended, positive BS  Genitourinary: no suprapubic tenderness  Lymphatic: no LN palpable  Musculoskeletal: no muscle tenderness, no joint swelling or tenderness  Extremities: no pedal edema  Neurological/ Psychiatric: confused, moving all extremities  Skin: no rashes; no palpable lesions    Labs: all available labs reviewed                        13.1   9.32  )-----------( 211      ( 2023 08:56 )             39.2     02-04    142  |  110<H>  |  9   ----------------------------<  92  3.4<L>   |  25  |  0.69    Ca    9.1      2023 08:56    TPro  5.9<L>  /  Alb  2.7<L>  /  TBili  0.4  /  DBili  0.1  /  AST  14<L>  /  ALT  12  /  AlkPhos  104  02-03     LIVER FUNCTIONS - ( 2023 14:07 )  Alb: 2.7 g/dL / Pro: 5.9 gm/dL / ALK PHOS: 104 U/L / ALT: 12 U/L / AST: 14 U/L / GGT: x           Urinalysis Basic - ( 2023 16:04 )    Color: Yellow / Appearance: Clear / S.015 / pH: x  Gluc: x / Ketone: Negative  / Bili: Negative / Urobili: Negative   Blood: x / Protein: Negative / Nitrite: Negative   Leuk Esterase: Moderate / RBC: 11-25 /HPF / WBC >50 /HPF   Sq Epi: x / Non Sq Epi: Many / Bacteria: Many    ( @ 16:04)  NotDete    Culture - Urine (collected 2023 16:04)  Source: Clean Catch None  Preliminary Report (2023 14:36):    >100,000 CFU/ml Providencia stuartii    >100,000 CFU/ml Enterococcus species  Organism: Providencia stuartii (2023 11:00)  Organism: Providencia stuartii (2023 11:00)      -  Amikacin: S <=16      -  Amoxicillin/Clavulanic Acid: R >16/8      -  Ampicillin: R >16 These ampicillin results predict results for amoxicillin      -  Ampicillin/Sulbactam: R >16/8 Enterobacter, Klebsiella aerogenes, Citrobacter, and Serratia may develop resistance during prolonged therapy (3-4 days)      -  Aztreonam: S <=4      -  Cefazolin: R >16      -  Cefepime: S <=2      -  Cefoxitin: S <=8      -  Ceftriaxone: S <=1 Enterobacter, Klebsiella aerogenes, Citrobacter, and Serratia may develop resistance during prolonged therapy      -  Ciprofloxacin: R 2      -  Ertapenem: S <=0.5      -  Levofloxacin: R 2      -  Meropenem: S <=1      -  Nitrofurantoin: R >64 Should not be used to treat pyelonephritis      -  Piperacillin/Tazobactam: S <=8      -  Trimethoprim/Sulfamethoxazole: S <=0.5/9.5      Method Type: PATRICIA    Culture - Blood (collected 2023 16:04)  Source: .Blood None  Preliminary Report (2023 20:02):    No growth to date.    Culture - Blood (collected 2023 16:04)  Source: .Blood None  Preliminary Report (2023 20:02):    No growth to date.    Radiology: all available radiological tests reviewed    < from: Xray Chest 1 View- PORTABLE-Urgent (23 @ 16:45) >  IMPRESSION:   No radiographic evidence of active chest disease.    < end of copied text >      Advanced directives addressed: full resuscitation

## 2023-02-06 LAB
ANION GAP SERPL CALC-SCNC: 6 MMOL/L — SIGNIFICANT CHANGE UP (ref 5–17)
BUN SERPL-MCNC: 5 MG/DL — LOW (ref 7–23)
CALCIUM SERPL-MCNC: 9.6 MG/DL — SIGNIFICANT CHANGE UP (ref 8.5–10.1)
CHLORIDE SERPL-SCNC: 110 MMOL/L — HIGH (ref 96–108)
CO2 SERPL-SCNC: 25 MMOL/L — SIGNIFICANT CHANGE UP (ref 22–31)
CREAT SERPL-MCNC: 0.88 MG/DL — SIGNIFICANT CHANGE UP (ref 0.5–1.3)
EGFR: 64 ML/MIN/1.73M2 — SIGNIFICANT CHANGE UP
GLUCOSE SERPL-MCNC: 101 MG/DL — HIGH (ref 70–99)
POTASSIUM SERPL-MCNC: 3.7 MMOL/L — SIGNIFICANT CHANGE UP (ref 3.5–5.3)
POTASSIUM SERPL-SCNC: 3.7 MMOL/L — SIGNIFICANT CHANGE UP (ref 3.5–5.3)
SARS-COV-2 RNA SPEC QL NAA+PROBE: SIGNIFICANT CHANGE UP
SODIUM SERPL-SCNC: 141 MMOL/L — SIGNIFICANT CHANGE UP (ref 135–145)

## 2023-02-06 PROCEDURE — 99232 SBSQ HOSP IP/OBS MODERATE 35: CPT

## 2023-02-06 RX ADMIN — Medication 1 DROP(S): at 10:34

## 2023-02-06 RX ADMIN — HEPARIN SODIUM 5000 UNIT(S): 5000 INJECTION INTRAVENOUS; SUBCUTANEOUS at 10:34

## 2023-02-06 RX ADMIN — Medication 1 TABLET(S): at 16:56

## 2023-02-06 RX ADMIN — LIDOCAINE 1 PATCH: 4 CREAM TOPICAL at 02:20

## 2023-02-06 RX ADMIN — LIDOCAINE 1 PATCH: 4 CREAM TOPICAL at 10:34

## 2023-02-06 RX ADMIN — CITALOPRAM 20 MILLIGRAM(S): 10 TABLET, FILM COATED ORAL at 10:34

## 2023-02-06 RX ADMIN — HEPARIN SODIUM 5000 UNIT(S): 5000 INJECTION INTRAVENOUS; SUBCUTANEOUS at 20:36

## 2023-02-06 RX ADMIN — PIPERACILLIN AND TAZOBACTAM 25 GRAM(S): 4; .5 INJECTION, POWDER, LYOPHILIZED, FOR SOLUTION INTRAVENOUS at 20:37

## 2023-02-06 RX ADMIN — SIMVASTATIN 20 MILLIGRAM(S): 20 TABLET, FILM COATED ORAL at 20:37

## 2023-02-06 RX ADMIN — PIPERACILLIN AND TAZOBACTAM 25 GRAM(S): 4; .5 INJECTION, POWDER, LYOPHILIZED, FOR SOLUTION INTRAVENOUS at 05:43

## 2023-02-06 RX ADMIN — PIPERACILLIN AND TAZOBACTAM 25 GRAM(S): 4; .5 INJECTION, POWDER, LYOPHILIZED, FOR SOLUTION INTRAVENOUS at 13:48

## 2023-02-06 RX ADMIN — Medication 1 TABLET(S): at 10:34

## 2023-02-06 RX ADMIN — LATANOPROST 1 DROP(S): 0.05 SOLUTION/ DROPS OPHTHALMIC; TOPICAL at 20:37

## 2023-02-06 RX ADMIN — DONEPEZIL HYDROCHLORIDE 10 MILLIGRAM(S): 10 TABLET, FILM COATED ORAL at 20:37

## 2023-02-06 RX ADMIN — LIDOCAINE 1 PATCH: 4 CREAM TOPICAL at 20:09

## 2023-02-06 NOTE — PHYSICAL THERAPY INITIAL EVALUATION ADULT - PERSONAL SAFETY AND JUDGMENT, REHAB EVAL
Date & Time: 10/4/2022, 3:03 PM  Patient: Ramon Negrete  Encounter Provider(s):    Carla Hall MD       To Whom It May Concern:    Ramon Negrete was seen and treated in our department on 10/4/2022. She should not return to work until 10/7/2022.     If you have any questions or concerns, please do not hesitate to call.        _____________________________  Physician/APC Signature impaired

## 2023-02-06 NOTE — PROGRESS NOTE ADULT - ASSESSMENT
87 y.o female with PMH of Dementia, Anxiety,  HLD admitted for:     1. Toxic metabolic encephalopathy due to UTI   CT head neg for acute findings, + chronic microvascular changes   supportive care  Tx infection   Hydration   CXR neg   Check B12/Folate/TSH/Ammonia/LFTs wnl    2. UTI,   F/u BCX: NGTD  UCX: + GNR, + Enterococcus , f/u sensitivities   S/p  Ceftriaxone IV , switched to Zosyn IV    Check bladder scan neg, no retention   d/w Dr Oden    3. Advanced dementia with cognitive impairment,  dysphagia and speech issues. Anxiety    supportive care  On Puree diet with mod thick liquids ordered, tolerating well   aspiration precautions   C/w Home meds: on Namenda and Aricept, also on Citalopram     4. Severe Protein calorie malnutrition   Monitor labs  supplements   Nutrition eval     5. Hypokalemia  Replace PO  am  labs pending     6. Decubitus ulcer  Wound care       7. Code status: Patient is DNR/DNI,   MOLST form is in the chart. Confirmed  by  daughter  on admission       8. DVT PPX; heparin SQ       PT eval    Dispo; OOB to chair, am labs pending  87 y.o female with PMH of Dementia, Anxiety,  HLD admitted for:     1. Toxic metabolic encephalopathy due to UTI, improved   CT head neg for acute findings, + chronic microvascular changes   supportive care  Tx infection   Hydration   CXR neg   Check B12/Folate/TSH/Ammonia/LFTs wnl    2. Providencia/ Enterococcus UTI,   F/u BCX: NGTD  UCX: + GNR, + Enterococcus , f/u sensitivities   S/p  Ceftriaxone IV , switched to Zosyn IV  day 2-3  Check bladder scan neg, no retention   d/w Dr Oden    3. Advanced dementia with cognitive impairment,  dysphagia and speech issues. Anxiety    supportive care  On Puree diet with mod thick liquids ordered, tolerating well   aspiration precautions   C/w Home meds: on Namenda and Aricept, also on Citalopram     4. Severe Protein calorie malnutrition   Monitor labs  supplements   Nutrition eval     5. Hypokalemia  Replace PO  am  labs pending     6. Decubitus ulcer  Wound care       7. Code status: Patient is DNR/DNI,   MOLST form is in the chart. Confirmed  by  daughter  on admission       8. DVT PPX; heparin SQ       PT eval    Dispo; OOB to chair, encourage oral intake. LAbs in am, d/c planning

## 2023-02-06 NOTE — PHYSICAL THERAPY INITIAL EVALUATION ADULT - GAIT DISTANCE, PT EVAL
[FreeTextEntry3] : I, Phan Klein MD, ordering physician, have read and attest that all the information, medical decision making and discharge instructions within are true and accurate. 
2 feet

## 2023-02-06 NOTE — PROGRESS NOTE ADULT - REASON FOR ADMISSION
UTI and Altered mental status

## 2023-02-06 NOTE — PROGRESS NOTE ADULT - SUBJECTIVE AND OBJECTIVE BOX
CC: UTI and Altered mental status    HPI: 88 yo Female presented from [facility] , with pmhx dementia, BIBEMS for Altered mental status. Patient is a poor historian due to Dementia. Spoke with the Daughter, Shira , over the phone. As per the daughter, patient is lethargic and weak for the last few days. She is also has change in mental status. No fever. Patient is bed bound for a while.       INTERVAL HPI/ OVERNIGHT EVENTS:  Pt was seen and examined,    Vital Signs Last 24 Hrs  T(C): 36.4 (2023 07:40), Max: 36.4 (2023 15:34)  T(F): 97.6 (2023 07:40), Max: 97.6 (2023 15:34)  HR: 70 (2023 07:40) (64 - 76)  BP: 117/52 (2023 07:40) (93/42 - 117/52)  RR: 17 (2023 07:40) (17 - 18)  SpO2: 98% (2023 07:40) (96% - 99%)    Parameters below as of 2023 07:40  Patient On (Oxygen Delivery Method): room air        REVIEW OF SYSTEMS:  All other review of systems is negative unless indicated above.      PHYSICAL EXAM:  General: Well developed; malnourished and frail,  in no acute distress  Eyes: EOMI, conjunctiva and sclera clear  Head: Normocephalic; atraumatic  ENMT: No nasal discharge; airway clear  Respiratory: No wheezes, rales or rhonchi  Cardiovascular: Regular rate and rhythm. S1 and S2 Normal;   Gastrointestinal: Soft non-tender non-distended; Normal bowel sounds  Genitourinary:+ no suprapubic   tenderness    Extremities: No edema  Vascular: Peripheral pulses palpable 2+ bilaterally  Neurological: AAA x 1? minimally verbal, follows simple commands. Non focal   Musculoskeletal: Normal muscle tone, without deformities      LABS:                         12.4   6.19  )-----------( 216      ( 2023 08:30 )             37.1     02-06    141  |  110<H>  |  5<L>  ----------------------------<  101<H>  3.7   |  25  |  0.88    Ca    9.6      2023 07:40                          13.1   9.32  )-----------( 211      ( 2023 08:56 )             39.2     02-04    142  |  110<H>  |  9   ----------------------------<  92  3.4<L>   |  25  |  0.69    Ca    9.1      2023 08:56  TPro  5.9<L>  /  Alb  2.7<L>  /  TBili  0.4  /  DBili  0.1  /  AST  14<L>  /  ALT  12  /  AlkPhos  104  02-03  LIVER FUNCTIONS - ( 2023 14:07 )  Alb: 2.7 g/dL / Pro: 5.9 gm/dL / ALK PHOS: 104 U/L / ALT: 12 U/L / AST: 14 U/L / GGT: x                               15.2   8.18  )-----------( 259      ( 2023 16:04 )             45.3     2023 16:04    138    |  107    |  11     ----------------------------<  114    4.6     |  28     |  0.91     Ca    10.2       2023 16:04    TPro  7.2    /  Alb  3.2    /  TBili  0.4    /  DBili  x      /  AST  20     /  ALT  18     /  AlkPhos  117    2023 16:04  PT/INR - ( 2023 16:04 )   PT: 11.7 sec;   INR: 1.01 ratio    PTT - ( 2023 16:04 )  PTT:32.3 sec        LIVER FUNCTIONS - ( 2023 16:04 )  Alb: 3.2 g/dL / Pro: 7.2 gm/dL / ALK PHOS: 117 U/L / ALT: 18 U/L / AST: 20 U/L / GGT: x           Urinalysis Basic - ( 2023 16:04 )    Color: Yellow / Appearance: Clear / S.015 / pH: x  Gluc: x / Ketone: Negative  / Bili: Negative / Urobili: Negative   Blood: x / Protein: Negative / Nitrite: Negative   Leuk Esterase: Moderate / RBC: 11-25 /HPF / WBC >50 /HPF   Sq Epi: x / Non Sq Epi: Many / Bacteria: Many    Culture - Urine (23 @ 16:04)   Specimen Source: Clean Catch None   Culture Results:   >100,000 CFU/ml Gram Negative Rods   >100,000 CFU/ml Enterococcus species     MEDICATIONS  (STANDING):  citalopram 20 milliGRAM(s) Oral daily  donepezil 10 milliGRAM(s) Oral at bedtime  heparin   Injectable 5000 Unit(s) SubCutaneous every 12 hours  lactobacillus acidophilus 1 Tablet(s) Oral two times a day with meals  latanoprost 0.005% Ophthalmic Solution 1 Drop(s) Both EYES at bedtime  lidocaine   4% Patch 1 Patch Transdermal daily  piperacillin/tazobactam IVPB. 3.375 Gram(s) IV Intermittent once  piperacillin/tazobactam IVPB.- 3.375 Gram(s) IV Intermittent once  potassium chloride   Powder 40 milliEquivalent(s) Oral every 4 hours  simvastatin 20 milliGRAM(s) Oral at bedtime  sodium chloride 0.9%. 1000 milliLiter(s) (75 mL/Hr) IV Continuous <Continuous>  timolol 0.5% Solution 1 Drop(s) Both EYES daily    MEDICATIONS  (PRN):  acetaminophen     Tablet .. 650 milliGRAM(s) Oral every 6 hours PRN Temp greater or equal to 38C (100.4F), Mild Pain (1 - 3)  aluminum hydroxide/magnesium hydroxide/simethicone Suspension 30 milliLiter(s) Oral every 4 hours PRN Dyspepsia  melatonin 3 milliGRAM(s) Oral at bedtime PRN Insomnia  ondansetron Injectable 4 milliGRAM(s) IV Push every 8 hours PRN Nausea and/or Vomiting        RADIOLOGY & ADDITIONAL TESTS:      ACC: [id number] EXAM:  CT BRAIN   ORDERED BY: DORITA ALFONSO   PROCEDURE DATE:  2023        FINDINGS:  There is no acute intracranial hemorrhage, mass effect, midline shift,   extra-axial collection, hydrocephalus, or evidence of acute vascular   territorial infarction. Mild patchy hypodensities within the   periventricular and subcortical white matter, although nonspecific,   likely reflect chronic microvascular disease. Cerebral white loss   contributes to prominence of the ventricles and sulci.    Partial opacification of the left sphenoid sinus. Remainder the paranasal   sinuses and mastoid air cells are clear. Status post bilateral ocular   lens replacement. Calvarium is intact.    IMPRESSION:    No acute intracranial hemorrhage, mass effect, or evidence of acute   vascular territorial infarction. If clinical symptoms persist or worsen,   more sensitive evaluation with brain MRI may be obtained, if no   contraindications exist.    Partially opacified left sphenoid sinus.      < from: Xray Chest 1 View- PORTABLE-Urgent (23 @ 16:45) >    ACC: [id number] EXAM:  XR CHEST PORTABLE URGENT 1V   ORDERED BY: DORITA ALFONSO     PROCEDURE DATE:  2023          INTERPRETATION:  Portable chest radiograph    CLINICAL INFORMATION: Sepsis    TECHNIQUE:  Portable  AP chest radiograph.    COMPARISON: 2022 chest x-ray .    FINDINGS:  CATHETERS AND TUBES: None    PULMONARY: The visualized lungs are clear of airspace consolidations or   pleural effusions.   No pneumothorax.    HEART/VASCULAR: The heart and mediastinum size and configuration are   within normal limits.    BONES: Visualized osseous structures are intact.    IMPRESSION:   No radiographic evidence of active chest disease.       CC: UTI and Altered mental status    HPI: 88 yo Female presented from [facility] , with pmhx dementia, BIBEMS for Altered mental status. Patient is a poor historian due to Dementia. Spoke with the Daughter, Shira , over the phone. As per the daughter, patient is lethargic and weak for the last few days. She is also has change in mental status. No fever. Patient is bed bound for a while.       INTERVAL HPI/ OVERNIGHT EVENTS:  Pt was seen and examined,  awake and alert, confused,, denies pain pr difficulty breathing.         Vital Signs Last 24 Hrs  T(C): 36.4 (2023 07:40), Max: 36.4 (2023 15:34)  T(F): 97.6 (2023 07:40), Max: 97.6 (2023 15:34)  HR: 70 (2023 07:40) (64 - 76)  BP: 117/52 (2023 07:40) (93/42 - 117/52)  RR: 17 (2023 07:40) (17 - 18)  SpO2: 98% (2023 07:40) (96% - 99%)    Parameters below as of 2023 07:40  Patient On (Oxygen Delivery Method): room air        REVIEW OF SYSTEMS:  All other review of systems is negative unless indicated above.      PHYSICAL EXAM:  General: Well developed; malnourished and frail,  in no acute distress  Eyes: EOMI, conjunctiva and sclera clear  Head: Normocephalic; atraumatic  ENMT: No nasal discharge; airway clear  Respiratory: No wheezes, rales or rhonchi  Cardiovascular: Regular rate and rhythm. S1 and S2 Normal;   Gastrointestinal: Soft non-tender non-distended; Normal bowel sounds  Genitourinary:+ no suprapubic   tenderness    Extremities: No edema  Vascular: Peripheral pulses palpable 2+ bilaterally  Neurological: AAA x 1? minimally verbal, follows simple commands. Non focal   Musculoskeletal: Normal muscle tone, without deformities      LABS:                         12.4   6.19  )-----------( 216      ( 2023 08:30 )             37.1     02-06    141  |  110<H>  |  5<L>  ----------------------------<  101<H>  3.7   |  25  |  0.88    Ca    9.6      2023 07:40                          12.4   6.19  )-----------( 216      ( 2023 08:30 )             37.1     02-06    141  |  110<H>  |  5<L>  ----------------------------<  101<H>  3.7   |  25  |  0.88    Ca    9.6      2023 07:40                          13.1   9.32  )-----------( 211      ( 2023 08:56 )             39.2     02-04    142  |  110<H>  |  9   ----------------------------<  92  3.4<L>   |  25  |  0.69    Ca    9.1      2023 08:56  TPro  5.9<L>  /  Alb  2.7<L>  /  TBili  0.4  /  DBili  0.1  /  AST  14<L>  /  ALT  12  /  AlkPhos  104  02-03  LIVER FUNCTIONS - ( 2023 14:07 )  Alb: 2.7 g/dL / Pro: 5.9 gm/dL / ALK PHOS: 104 U/L / ALT: 12 U/L / AST: 14 U/L / GGT: x                               15.2   8.18  )-----------( 259      ( 2023 16:04 )             45.3     2023 16:04    138    |  107    |  11     ----------------------------<  114    4.6     |  28     |  0.91     Ca    10.2       2023 16:04    TPro  7.2    /  Alb  3.2    /  TBili  0.4    /  DBili  x      /  AST  20     /  ALT  18     /  AlkPhos  117    2023 16:04  PT/INR - ( 2023 16:04 )   PT: 11.7 sec;   INR: 1.01 ratio    PTT - ( 2023 16:04 )  PTT:32.3 sec        LIVER FUNCTIONS - ( 2023 16:04 )  Alb: 3.2 g/dL / Pro: 7.2 gm/dL / ALK PHOS: 117 U/L / ALT: 18 U/L / AST: 20 U/L / GGT: x           Urinalysis Basic - ( 2023 16:04 )    Color: Yellow / Appearance: Clear / S.015 / pH: x  Gluc: x / Ketone: Negative  / Bili: Negative / Urobili: Negative   Blood: x / Protein: Negative / Nitrite: Negative   Leuk Esterase: Moderate / RBC: 11-25 /HPF / WBC >50 /HPF   Sq Epi: x / Non Sq Epi: Many / Bacteria: Many  Culture - Urine (23 @ 16:04)   - Meropenem: S <=1   - Nitrofurantoin: R >64 Should not be used to treat pyelonephritis   - Nitrofurantoin: S <=32 Should not be used to treat pyelonephritis.   - Tetracycline: R >8   - Trimethoprim/Sulfamethoxazole: S <=0.5/9.5   - Vancomycin: S 2   - Piperacillin/Tazobactam: S <=8   - Ampicillin/Sulbactam: R >16/8 Enterobacter, Klebsiella aerogenes, Citrobacter, and Serratia may develop resistance during prolonged therapy (3-4 days)   - Aztreonam: S <=4   - Cefazolin: R >16   - Cefepime: S <=2   - Cefoxitin: S <=8   - Ceftriaxone: S <=1 Enterobacter, Klebsiella aerogenes, Citrobacter, and Serratia may develop resistance during prolonged therapy   - Ciprofloxacin: R 2   - Ciprofloxacin: S <=1   - Amikacin: S <=16   - Amoxicillin/Clavulanic Acid: R >16/8   - Ampicillin: R >16 These ampicillin results predict results for amoxicillin   - Ampicillin: S <=2 Predicts results to ampicillin/sulbactam, amoxacillin-clavulanate and piperacillin-tazobactam.   - Ertapenem: S <=0.5   - Levofloxacin: R 2   - Levofloxacin: S <=1   Specimen Source: Clean Catch None   Culture Results:   >100,000 CFU/ml Providencia stuartii   >100,000 CFU/ml Enterococcus faecalis   Organism Identification: Providencia stuartii   Enterococcus faecalis   Organism: Providencia stuartii   Organism: Enterococcus faecalis     MEDICATIONS  (STANDING):  citalopram 20 milliGRAM(s) Oral daily  donepezil 10 milliGRAM(s) Oral at bedtime  heparin   Injectable 5000 Unit(s) SubCutaneous every 12 hours  lactobacillus acidophilus 1 Tablet(s) Oral two times a day with meals  latanoprost 0.005% Ophthalmic Solution 1 Drop(s) Both EYES at bedtime  lidocaine   4% Patch 1 Patch Transdermal daily  piperacillin/tazobactam IVPB.. 3.375 Gram(s) IV Intermittent every 8 hours  simvastatin 20 milliGRAM(s) Oral at bedtime  sodium chloride 0.9%. 1000 milliLiter(s) (75 mL/Hr) IV Continuous <Continuous>  sodium chloride 0.9%. 1000 milliLiter(s) (75 mL/Hr) IV Continuous <Continuous>  timolol 0.5% Solution 1 Drop(s) Both EYES daily    MEDICATIONS  (PRN):  acetaminophen     Tablet .. 650 milliGRAM(s) Oral every 6 hours PRN Temp greater or equal to 38C (100.4F), Mild Pain (1 - 3)  aluminum hydroxide/magnesium hydroxide/simethicone Suspension 30 milliLiter(s) Oral every 4 hours PRN Dyspepsia  melatonin 3 milliGRAM(s) Oral at bedtime PRN Insomnia  ondansetron Injectable 4 milliGRAM(s) IV Push every 8 hours PRN Nausea and/or Vomiting      RADIOLOGY & ADDITIONAL TESTS:      ACC: [id number] EXAM:  CT BRAIN   ORDERED BY: DORITA ALFONSO   PROCEDURE DATE:  2023        FINDINGS:  There is no acute intracranial hemorrhage, mass effect, midline shift,   extra-axial collection, hydrocephalus, or evidence of acute vascular   territorial infarction. Mild patchy hypodensity within the   periventricular and subcortical white matter, although nonspecific,   likely reflect chronic microvascular disease. Cerebral white loss   contributes to prominence of the ventricles and sulci.    Partial opacification of the left sphenoid sinus. Remainder the paranasal   sinuses and mastoid air cells are clear. Status post bilateral ocular   lens replacement. Calvarium is intact.    IMPRESSION:    No acute intracranial hemorrhage, mass effect, or evidence of acute   vascular territorial infarction. If clinical symptoms persist or worsen,   more sensitive evaluation with brain MRI may be obtained, if no   contraindications exist.    Partially opacified left sphenoid sinus.      < from: Xray Chest 1 View- PORTABLE-Urgent (23 @ 16:45) >    ACC: [id number] EXAM:  XR CHEST PORTABLE URGENT 1V   ORDERED BY: DORITA ALFONSO     PROCEDURE DATE:  2023          INTERPRETATION:  Portable chest radiograph    CLINICAL INFORMATION: Sepsis    TECHNIQUE:  Portable  AP chest radiograph.    COMPARISON: 2022 chest x-ray .    FINDINGS:  CATHETERS AND TUBES: None    PULMONARY: The visualized lungs are clear of airspace consolidations or   pleural effusions.   No pneumothorax.    HEART/VASCULAR: The heart and mediastinum size and configuration are   within normal limits.    BONES: Visualized osseous structures are intact.    IMPRESSION:   No radiographic evidence of active chest disease.

## 2023-02-06 NOTE — PROGRESS NOTE ADULT - NUTRITIONAL ASSESSMENT
This patient has been assessed with a concern for Malnutrition and has been determined to have a diagnosis/diagnoses of Severe protein-calorie malnutrition and Underweight (BMI < 19).    This patient is being managed with:   Diet Pureed-  Moderately Thick Liquids (MODTHICKLIQS)  No Dairy  Entered: Feb  3 2023  1:23PM    

## 2023-02-06 NOTE — PHYSICAL THERAPY INITIAL EVALUATION ADULT - MODALITIES TREATMENT COMMENTS
pt left seated in recliner post Eval; chair alarm in place; IV, flowtrons intact; callbell in reach; pt instructed not to get up alone; call nursing for assist; den well; denied pain; RN Natali made aware pt OOB

## 2023-02-06 NOTE — PROGRESS NOTE ADULT - SUBJECTIVE AND OBJECTIVE BOX
Date of service: 23 @ 14:09    Lying in bed in NAD  Alert and confused  Dose not seem in pain    ROS: no fever or chills; unobtainable    MEDICATIONS  (STANDING):  citalopram 20 milliGRAM(s) Oral daily  donepezil 10 milliGRAM(s) Oral at bedtime  heparin   Injectable 5000 Unit(s) SubCutaneous every 12 hours  lactobacillus acidophilus 1 Tablet(s) Oral two times a day with meals  latanoprost 0.005% Ophthalmic Solution 1 Drop(s) Both EYES at bedtime  lidocaine   4% Patch 1 Patch Transdermal daily  piperacillin/tazobactam IVPB.. 3.375 Gram(s) IV Intermittent every 8 hours  simvastatin 20 milliGRAM(s) Oral at bedtime  sodium chloride 0.9%. 1000 milliLiter(s) (75 mL/Hr) IV Continuous <Continuous>  sodium chloride 0.9%. 1000 milliLiter(s) (75 mL/Hr) IV Continuous <Continuous>  timolol 0.5% Solution 1 Drop(s) Both EYES daily    Vital Signs Last 24 Hrs  T(C): 36.4 (2023 07:40), Max: 36.4 (2023 15:34)  T(F): 97.6 (2023 07:40), Max: 97.6 (2023 15:34)  HR: 70 (2023 07:40) (64 - 76)  BP: 117/52 (2023 07:40) (93/42 - 117/52)  BP(mean): --  RR: 17 (2023 07:40) (17 - 18)  SpO2: 98% (2023 07:40) (96% - 99%)    Parameters below as of 2023 07:40  Patient On (Oxygen Delivery Method): room air     Physical exam:    Constitutional:  No acute distress  HEENT: NC/AT, EOMI, PERRLA, conjunctivae clear; ears and nose atraumatic  Neck: supple; thyroid not palpable  Back: no tenderness  Respiratory: respiratory effort normal; crackles at bases  Cardiovascular: S1S2 regular, no murmurs  Abdomen: soft, not tender, not distended, positive BS  Genitourinary: no suprapubic tenderness  Lymphatic: no LN palpable  Musculoskeletal: no muscle tenderness, no joint swelling or tenderness  Extremities: no pedal edema  Neurological/ Psychiatric: confused, moving all extremities  Skin: no rashes; no palpable lesions    Labs: reviewed                        12.4   6.19  )-----------( 216      ( 2023 08:30 )             37.1     02-06    141  |  110<H>  |  5<L>  ----------------------------<  101<H>  3.7   |  25  |  0.88    Ca    9.6      2023 07:40                        13.1   9.32  )-----------( 211      ( 2023 08:56 )             39.2     02-    142  |  110<H>  |  9   ----------------------------<  92  3.4<L>   |  25  |  0.69    Ca    9.1      2023 08:56    TPro  5.9<L>  /  Alb  2.7<L>  /  TBili  0.4  /  DBili  0.1  /  AST  14<L>  /  ALT  12  /  AlkPhos  104  02-03     LIVER FUNCTIONS - ( 2023 14:07 )  Alb: 2.7 g/dL / Pro: 5.9 gm/dL / ALK PHOS: 104 U/L / ALT: 12 U/L / AST: 14 U/L / GGT: x           Urinalysis Basic - ( 2023 16:04 )    Color: Yellow / Appearance: Clear / S.015 / pH: x  Gluc: x / Ketone: Negative  / Bili: Negative / Urobili: Negative   Blood: x / Protein: Negative / Nitrite: Negative   Leuk Esterase: Moderate / RBC: 11-25 /HPF / WBC >50 /HPF   Sq Epi: x / Non Sq Epi: Many / Bacteria: Many    ( @ 16:04)  [facility]    Culture - Urine (collected 2023 16:04)  Source: Clean Catch None  Preliminary Report (2023 14:36):    >100,000 CFU/ml Providencia stuartii    >100,000 CFU/ml Enterococcus species  Organism: Providencia stuartii (2023 11:00)  Organism: Providencia stuartii (2023 11:00)      -  Amikacin: S <=16      -  Amoxicillin/Clavulanic Acid: R >16/8      -  Ampicillin: R >16 These ampicillin results predict results for amoxicillin      -  Ampicillin/Sulbactam: R >16/8 Enterobacter, Klebsiella aerogenes, Citrobacter, and Serratia may develop resistance during prolonged therapy (3-4 days)      -  Aztreonam: S <=4      -  Cefazolin: R >16      -  Cefepime: S <=2      -  Cefoxitin: S <=8      -  Ceftriaxone: S <=1 Enterobacter, Klebsiella aerogenes, Citrobacter, and Serratia may develop resistance during prolonged therapy      -  Ciprofloxacin: R 2      -  Ertapenem: S <=0.5      -  Levofloxacin: R 2      -  Meropenem: S <=1      -  Nitrofurantoin: R >64 Should not be used to treat pyelonephritis      -  Piperacillin/Tazobactam: S <=8      -  Trimethoprim/Sulfamethoxazole: S <=0.5/9.5      Method Type: PATRICIA    Culture - Blood (collected 2023 16:04)  Source: .Blood None  Preliminary Report (2023 20:02):    No growth to date.    Culture - Blood (collected 2023 16:04)  Source: .Blood None  Preliminary Report (2023 20:02):    No growth to date.    Radiology: all available radiological tests reviewed    < from: Xray Chest 1 View- PORTABLE-Urgent (23 @ 16:45) >  IMPRESSION:   No radiographic evidence of active chest disease.    < end of copied text >      Advanced directives addressed: full resuscitation

## 2023-02-06 NOTE — PROGRESS NOTE ADULT - ASSESSMENT
86 y/o Female with h/o dementia, HL, glaucoma, right hip OA was admitted on 2/2 for increased confusion. Patient is a poor historian due to dementia. As per the daughter, patient is lethargic and weak for the last few days PTA. She is also has change in mental status. No fever. Patient is bed bound for a while. In ER she received ceftriaxone.     1. Pyuria. UTI with EN spp and PRST. Encephalopathy.   -lethargic, opens eyes  -cultures reviewed  -on zosyn 375 gm IV q8h # 3  -tolerating abx well so far; no side effects noted  -f/u cultures  -complete 3 days of abx coverage   -monitor temps  -f/u CBC  -supportive care  2. Other issues:   -care per medicine

## 2023-02-07 ENCOUNTER — TRANSCRIPTION ENCOUNTER (OUTPATIENT)
Age: 88
End: 2023-02-07

## 2023-02-07 VITALS
HEART RATE: 66 BPM | RESPIRATION RATE: 17 BRPM | TEMPERATURE: 98 F | DIASTOLIC BLOOD PRESSURE: 45 MMHG | SYSTOLIC BLOOD PRESSURE: 99 MMHG | OXYGEN SATURATION: 96 %

## 2023-02-07 LAB
ANION GAP SERPL CALC-SCNC: 4 MMOL/L — LOW (ref 5–17)
BUN SERPL-MCNC: 5 MG/DL — LOW (ref 7–23)
CALCIUM SERPL-MCNC: 9.3 MG/DL — SIGNIFICANT CHANGE UP (ref 8.5–10.1)
CHLORIDE SERPL-SCNC: 112 MMOL/L — HIGH (ref 96–108)
CO2 SERPL-SCNC: 25 MMOL/L — SIGNIFICANT CHANGE UP (ref 22–31)
CREAT SERPL-MCNC: 0.9 MG/DL — SIGNIFICANT CHANGE UP (ref 0.5–1.3)
CULTURE RESULTS: SIGNIFICANT CHANGE UP
CULTURE RESULTS: SIGNIFICANT CHANGE UP
EGFR: 63 ML/MIN/1.73M2 — SIGNIFICANT CHANGE UP
GLUCOSE SERPL-MCNC: 95 MG/DL — SIGNIFICANT CHANGE UP (ref 70–99)
HCT VFR BLD CALC: 36.6 % — SIGNIFICANT CHANGE UP (ref 34.5–45)
HGB BLD-MCNC: 12.3 G/DL — SIGNIFICANT CHANGE UP (ref 11.5–15.5)
MCHC RBC-ENTMCNC: 33.2 PG — SIGNIFICANT CHANGE UP (ref 27–34)
MCHC RBC-ENTMCNC: 33.6 GM/DL — SIGNIFICANT CHANGE UP (ref 32–36)
MCV RBC AUTO: 98.9 FL — SIGNIFICANT CHANGE UP (ref 80–100)
PLATELET # BLD AUTO: 217 K/UL — SIGNIFICANT CHANGE UP (ref 150–400)
POTASSIUM SERPL-MCNC: 3.5 MMOL/L — SIGNIFICANT CHANGE UP (ref 3.5–5.3)
POTASSIUM SERPL-SCNC: 3.5 MMOL/L — SIGNIFICANT CHANGE UP (ref 3.5–5.3)
RBC # BLD: 3.7 M/UL — LOW (ref 3.8–5.2)
RBC # FLD: 13.4 % — SIGNIFICANT CHANGE UP (ref 10.3–14.5)
SODIUM SERPL-SCNC: 141 MMOL/L — SIGNIFICANT CHANGE UP (ref 135–145)
SPECIMEN SOURCE: SIGNIFICANT CHANGE UP
SPECIMEN SOURCE: SIGNIFICANT CHANGE UP
WBC # BLD: 7.25 K/UL — SIGNIFICANT CHANGE UP (ref 3.8–10.5)
WBC # FLD AUTO: 7.25 K/UL — SIGNIFICANT CHANGE UP (ref 3.8–10.5)

## 2023-02-07 PROCEDURE — 99239 HOSP IP/OBS DSCHRG MGMT >30: CPT

## 2023-02-07 RX ORDER — POTASSIUM CHLORIDE 20 MEQ
20 PACKET (EA) ORAL ONCE
Refills: 0 | Status: COMPLETED | OUTPATIENT
Start: 2023-02-07 | End: 2023-02-07

## 2023-02-07 RX ADMIN — PIPERACILLIN AND TAZOBACTAM 25 GRAM(S): 4; .5 INJECTION, POWDER, LYOPHILIZED, FOR SOLUTION INTRAVENOUS at 05:31

## 2023-02-07 RX ADMIN — Medication 1 DROP(S): at 10:17

## 2023-02-07 RX ADMIN — HEPARIN SODIUM 5000 UNIT(S): 5000 INJECTION INTRAVENOUS; SUBCUTANEOUS at 10:18

## 2023-02-07 RX ADMIN — Medication 1 TABLET(S): at 10:18

## 2023-02-07 RX ADMIN — LIDOCAINE 1 PATCH: 4 CREAM TOPICAL at 10:16

## 2023-02-07 RX ADMIN — CITALOPRAM 20 MILLIGRAM(S): 10 TABLET, FILM COATED ORAL at 10:18

## 2023-02-07 RX ADMIN — Medication 20 MILLIEQUIVALENT(S): at 10:16

## 2023-02-07 NOTE — DISCHARGE NOTE PROVIDER - NSDCMRMEDTOKEN_GEN_ALL_CORE_FT
acetaminophen 325 mg oral tablet: 2 tab(s) orally every 6 hours, As needed, Mild Pain (1 - 3)  citalopram 20 mg oral tablet: 1 tab(s) orally once a day  donepezil 10 mg oral tablet: 1 tab(s) orally once a day (at bedtime)  lactobacillus acidophilus oral capsule: 1 cap(s) orally 2 times a day  latanoprost 0.005% ophthalmic solution: 1 drop(s) to each affected eye once a day (at bedtime)  lidocaine 4% patch: Apply topically to affected area once a day  memantine 28 mg oral capsule, extended release: 1 cap(s) orally once a day  simvastatin 20 mg oral tablet: 1 tab(s) orally once a day (at bedtime)  timolol 0.5% ophthalmic solution: 1 drop(s) in each eye once a day  Vitamin D3 25 mcg (1000 intl units) oral tablet: 1 tab(s) orally once a day  Xalatan 0.005% ophthalmic solution: 1 drop(s) to each affected eye once a day (in the evening)

## 2023-02-07 NOTE — DISCHARGE NOTE NURSING/CASE MANAGEMENT/SOCIAL WORK - NSDCPEFALRISK_GEN_ALL_CORE
For information on Fall & Injury Prevention, visit: [email or web address] OR  [email or web address] OR  https://www.cdc.gov/steadi/patient.html

## 2023-02-07 NOTE — DISCHARGE NOTE PROVIDER - HOSPITAL COURSE
86 yo Female presented from [facility] , with pmhx dementia, BIBEMS for Altered mental status. Patient is a poor historian due to Dementia. Spoke with the Daughter, Shira , over the phone. As per the daughter, patient is lethargic and weak for the last few days. She is also has change in mental status. No fever. Patient is bed bound for a while.   In ED:   BP low normal. labs with mildly elevated calcium, resolved with IVF. UA +. CXR neg. Pt was started on IV Abxs. UCX + Enterococcus and Providencia, Abxs changed to Zosyn, received 3 days course. Was followed by ID, no further Abxs needed. Pt Has CT head and no acute findings noted. Pt was seen  by Dr Childress, follows Pt in the office, Pt has  advanced dementia, likely encephalopathy related to UTI. Pts lethargy improved with Tx. Received IVF for dehydration and hypercalcemia. Now Oral intake improved.   Today Pt is awake, alert, reports no complains. Unable to obtain ROS due to advanced dementia. No events as per RN    Vital Signs Last 24 Hrs  T(C): 36.7 (07 Feb 2023 07:33), Max: 36.7 (07 Feb 2023 01:05)  T(F): 98.1 (07 Feb 2023 07:33), Max: 98.1 (07 Feb 2023 01:05)  HR: 66 (07 Feb 2023 07:33) (62 - 66)  BP: 99/45 (07 Feb 2023 07:33) (96/50 - 118/49)  RR: 17 (07 Feb 2023 07:33) (16 - 18)  SpO2: 96% (07 Feb 2023 07:33) (96% - 99%)    Parameters below as of 07 Feb 2023 07:33  Patient On (Oxygen Delivery Method): room air    PHYSICAL EXAM:  General: Well developed; malnourished and frail,  in no acute distress  Eyes: EOMI, conjunctiva and sclera clear  Head: Normocephalic; atraumatic  ENMT: No nasal discharge; airway clear  Respiratory: No wheezes, rales or rhonchi  Cardiovascular: Regular rate and rhythm. S1 and S2 Normal;   Gastrointestinal: Soft non-tender non-distended; Normal bowel sounds  Genitourinary:+ no suprapubic   tenderness    Extremities: No edema  Vascular: Peripheral pulses palpable 2+ bilaterally  Neurological: AAA x 1? minimally verbal, follows simple commands. Non focal   Musculoskeletal: Normal muscle tone, without deformities    87 y.o female with PMH of Dementia, Anxiety,  HLD admitted for:     1. Toxic metabolic encephalopathy due to UTI, improved   CT head neg for acute findings, + chronic microvascular changes   supportive care  Tx infection   Hydration   CXR neg   Check B12/Folate/TSH/Ammonia/LFTs wnl    2. Providencia/ Enterococcus UTI,   F/u BCX: NGTD  UCX: + GNR, + Enterococcus , f/u sensitivities   S/p  Ceftriaxone IV , switched to Zosyn IV  completed 3 days course   Check bladder scan neg, no retention   d/w Dr Oden    3. Advanced dementia with cognitive impairment,  dysphagia and speech issues. Anxiety .   supportive care  On Puree diet with mod thick liquids ordered, tolerating well   aspiration precautions   C/w Home meds: on Namenda and Aricept, also on Citalopram     4. Severe Protein calorie malnutrition   Monitor labs  supplements   Nutrition eval     5. Hypokalemia  Replaced  PO      6. Decubitus ulcer  Wound care     7. Code status: Patient is DNR/DNI,   MOLST form is in the chart. Confirmed  by  daughter  on admission     8. DVT PPX; heparin SQ       PT eval    Dispo:  stable for d/c to SNF  D/w CM  Fax d/c summary to PCP  Total time 42 min

## 2023-02-07 NOTE — DISCHARGE NOTE PROVIDER - CARE PROVIDER_API CALL
Alejandro Horne)  Internal Medicine  [address] [place] [address]  Phone: [phone]  Fax: [phone]  Follow Up Time: 1-3 days

## 2023-02-07 NOTE — DISCHARGE NOTE NURSING/CASE MANAGEMENT/SOCIAL WORK - PATIENT PORTAL LINK FT
You can access the FollowMyHealth Patient Portal offered by [facility] by registering at the following website: [email or web address]. By joining [employer]’s FollowMyHealth portal, you will also be able to view your health information using other applications (apps) compatible with our system.

## 2023-02-07 NOTE — DISCHARGE NOTE PROVIDER - NSDCCPCAREPLAN_GEN_ALL_CORE_FT
PRINCIPAL DISCHARGE DIAGNOSIS  Diagnosis: UTI (urinary tract infection)  Assessment and Plan of Treatment: due to Enterococcus and Providencia   Completed IV Zosyn course      SECONDARY DISCHARGE DIAGNOSES  Diagnosis: Toxic metabolic encephalopathy  Assessment and Plan of Treatment: Due to infection an dehydration   S/p IVF and Abxs   F/u with PCP

## 2023-02-07 NOTE — DISCHARGE NOTE PROVIDER - DETAILS OF MALNUTRITION DIAGNOSIS/DIAGNOSES
This patient has been assessed with a concern for Malnutrition and was treated during this hospitalization for the following Nutrition diagnosis/diagnoses:     -  02/04/2023: Severe protein-calorie malnutrition   -  02/04/2023: Underweight (BMI < 19)

## 2023-02-07 NOTE — DISCHARGE NOTE NURSING/CASE MANAGEMENT/SOCIAL WORK - NSDCVIVACCINE_GEN_ALL_CORE_FT
Moderna COVID-19 Vaccine, Bivalent; 07-Oct-2022 13:40; Gudelia Fraire (RN); [employer] [id number] (Exp. Date: 27-May-2023); IntraMuscular; Deltoid Left.; 0.5 milliLiter(s);

## 2023-02-10 DIAGNOSIS — E43 UNSPECIFIED SEVERE PROTEIN-CALORIE MALNUTRITION: ICD-10-CM

## 2023-02-10 DIAGNOSIS — E83.52 HYPERCALCEMIA: ICD-10-CM

## 2023-02-10 DIAGNOSIS — G92.8 OTHER TOXIC ENCEPHALOPATHY: ICD-10-CM

## 2023-02-10 DIAGNOSIS — N39.0 URINARY TRACT INFECTION, SITE NOT SPECIFIED: ICD-10-CM

## 2023-02-10 DIAGNOSIS — B95.2 ENTEROCOCCUS AS THE CAUSE OF DISEASES CLASSIFIED ELSEWHERE: ICD-10-CM

## 2023-02-10 DIAGNOSIS — G30.9 ALZHEIMER'S DISEASE, UNSPECIFIED: ICD-10-CM

## 2023-02-10 DIAGNOSIS — F02.80 DEMENTIA IN OTHER DISEASES CLASSIFIED ELSEWHERE, UNSPECIFIED SEVERITY, WITHOUT BEHAVIORAL DISTURBANCE, PSYCHOTIC DISTURBANCE, MOOD DISTURBANCE, AND ANXIETY: ICD-10-CM

## 2023-02-10 DIAGNOSIS — E86.0 DEHYDRATION: ICD-10-CM

## 2023-02-10 DIAGNOSIS — F41.9 ANXIETY DISORDER, UNSPECIFIED: ICD-10-CM

## 2023-02-10 DIAGNOSIS — E87.6 HYPOKALEMIA: ICD-10-CM

## 2023-02-10 DIAGNOSIS — L89.151 PRESSURE ULCER OF SACRAL REGION, STAGE 1: ICD-10-CM

## 2023-02-10 DIAGNOSIS — B96.89 OTHER SPECIFIED BACTERIAL AGENTS AS THE CAUSE OF DISEASES CLASSIFIED ELSEWHERE: ICD-10-CM

## 2023-03-07 ENCOUNTER — EMERGENCY (EMERGENCY)
Facility: HOSPITAL | Age: 88
LOS: 0 days | Discharge: HOME | End: 2023-03-08
Attending: EMERGENCY MEDICINE
Payer: MEDICARE

## 2023-03-07 VITALS
TEMPERATURE: 98 F | DIASTOLIC BLOOD PRESSURE: 92 MMHG | RESPIRATION RATE: 18 BRPM | SYSTOLIC BLOOD PRESSURE: 137 MMHG | WEIGHT: 110.01 LBS | HEART RATE: 67 BPM | OXYGEN SATURATION: 97 % | HEIGHT: 62 IN

## 2023-03-07 DIAGNOSIS — Z98.89 OTHER SPECIFIED POSTPROCEDURAL STATES: Chronic | ICD-10-CM

## 2023-03-07 DIAGNOSIS — R41.82 ALTERED MENTAL STATUS, UNSPECIFIED: ICD-10-CM

## 2023-03-07 DIAGNOSIS — Z20.822 CONTACT WITH AND (SUSPECTED) EXPOSURE TO COVID-19: ICD-10-CM

## 2023-03-07 DIAGNOSIS — N39.0 URINARY TRACT INFECTION, SITE NOT SPECIFIED: ICD-10-CM

## 2023-03-07 DIAGNOSIS — F02.80 DEMENTIA IN OTHER DISEASES CLASSIFIED ELSEWHERE, UNSPECIFIED SEVERITY, WITHOUT BEHAVIORAL DISTURBANCE, PSYCHOTIC DISTURBANCE, MOOD DISTURBANCE, AND ANXIETY: ICD-10-CM

## 2023-03-07 DIAGNOSIS — Z90.89 ACQUIRED ABSENCE OF OTHER ORGANS: ICD-10-CM

## 2023-03-07 DIAGNOSIS — G30.9 ALZHEIMER'S DISEASE, UNSPECIFIED: ICD-10-CM

## 2023-03-07 DIAGNOSIS — M16.11 UNILATERAL PRIMARY OSTEOARTHRITIS, RIGHT HIP: ICD-10-CM

## 2023-03-07 DIAGNOSIS — H40.9 UNSPECIFIED GLAUCOMA: ICD-10-CM

## 2023-03-07 DIAGNOSIS — E78.00 PURE HYPERCHOLESTEROLEMIA, UNSPECIFIED: ICD-10-CM

## 2023-03-07 DIAGNOSIS — Z90.49 ACQUIRED ABSENCE OF OTHER SPECIFIED PARTS OF DIGESTIVE TRACT: ICD-10-CM

## 2023-03-07 DIAGNOSIS — R53.1 WEAKNESS: ICD-10-CM

## 2023-03-07 LAB
ALBUMIN SERPL ELPH-MCNC: 3.2 G/DL — LOW (ref 3.3–5)
ALP SERPL-CCNC: 131 U/L — HIGH (ref 40–120)
ALT FLD-CCNC: 19 U/L — SIGNIFICANT CHANGE UP (ref 12–78)
ANION GAP SERPL CALC-SCNC: 2 MMOL/L — LOW (ref 5–17)
APPEARANCE UR: ABNORMAL
APTT BLD: 29.9 SEC — SIGNIFICANT CHANGE UP (ref 27.5–35.5)
AST SERPL-CCNC: 15 U/L — SIGNIFICANT CHANGE UP (ref 15–37)
BACTERIA # UR AUTO: ABNORMAL
BASOPHILS # BLD AUTO: 0.06 K/UL — SIGNIFICANT CHANGE UP (ref 0–0.2)
BASOPHILS NFR BLD AUTO: 0.9 % — SIGNIFICANT CHANGE UP (ref 0–2)
BILIRUB SERPL-MCNC: 0.2 MG/DL — SIGNIFICANT CHANGE UP (ref 0.2–1.2)
BILIRUB UR-MCNC: NEGATIVE — SIGNIFICANT CHANGE UP
BUN SERPL-MCNC: 19 MG/DL — SIGNIFICANT CHANGE UP (ref 7–23)
CALCIUM SERPL-MCNC: 10.1 MG/DL — SIGNIFICANT CHANGE UP (ref 8.5–10.1)
CHLORIDE SERPL-SCNC: 109 MMOL/L — HIGH (ref 96–108)
CO2 SERPL-SCNC: 29 MMOL/L — SIGNIFICANT CHANGE UP (ref 22–31)
COD CRY URNS QL: NEGATIVE — SIGNIFICANT CHANGE UP
COLOR SPEC: YELLOW — SIGNIFICANT CHANGE UP
CREAT SERPL-MCNC: 0.89 MG/DL — SIGNIFICANT CHANGE UP (ref 0.5–1.3)
DIFF PNL FLD: ABNORMAL
EGFR: 63 ML/MIN/1.73M2 — SIGNIFICANT CHANGE UP
EOSINOPHIL # BLD AUTO: 0.11 K/UL — SIGNIFICANT CHANGE UP (ref 0–0.5)
EOSINOPHIL NFR BLD AUTO: 1.6 % — SIGNIFICANT CHANGE UP (ref 0–6)
EPI CELLS # UR: ABNORMAL
FLUAV AG NPH QL: SIGNIFICANT CHANGE UP
FLUBV AG NPH QL: SIGNIFICANT CHANGE UP
GLUCOSE SERPL-MCNC: 96 MG/DL — SIGNIFICANT CHANGE UP (ref 70–99)
GLUCOSE UR QL: NEGATIVE — SIGNIFICANT CHANGE UP
HCT VFR BLD CALC: 40.1 % — SIGNIFICANT CHANGE UP (ref 34.5–45)
HGB BLD-MCNC: 13.5 G/DL — SIGNIFICANT CHANGE UP (ref 11.5–15.5)
HYALINE CASTS # UR AUTO: NEGATIVE /LPF — SIGNIFICANT CHANGE UP
IMM GRANULOCYTES NFR BLD AUTO: 0.3 % — SIGNIFICANT CHANGE UP (ref 0–0.9)
INR BLD: 0.98 RATIO — SIGNIFICANT CHANGE UP (ref 0.88–1.16)
KETONES UR-MCNC: NEGATIVE — SIGNIFICANT CHANGE UP
LACTATE SERPL-SCNC: 1.2 MMOL/L — SIGNIFICANT CHANGE UP (ref 0.7–2)
LEUKOCYTE ESTERASE UR-ACNC: ABNORMAL
LYMPHOCYTES # BLD AUTO: 2.66 K/UL — SIGNIFICANT CHANGE UP (ref 1–3.3)
LYMPHOCYTES # BLD AUTO: 38.8 % — SIGNIFICANT CHANGE UP (ref 13–44)
MCHC RBC-ENTMCNC: 33.7 GM/DL — SIGNIFICANT CHANGE UP (ref 32–36)
MCHC RBC-ENTMCNC: 33.7 PG — SIGNIFICANT CHANGE UP (ref 27–34)
MCV RBC AUTO: 100 FL — SIGNIFICANT CHANGE UP (ref 80–100)
MONOCYTES # BLD AUTO: 0.72 K/UL — SIGNIFICANT CHANGE UP (ref 0–0.9)
MONOCYTES NFR BLD AUTO: 10.5 % — SIGNIFICANT CHANGE UP (ref 2–14)
NEUTROPHILS # BLD AUTO: 3.28 K/UL — SIGNIFICANT CHANGE UP (ref 1.8–7.4)
NEUTROPHILS NFR BLD AUTO: 47.9 % — SIGNIFICANT CHANGE UP (ref 43–77)
NITRITE UR-MCNC: NEGATIVE — SIGNIFICANT CHANGE UP
PH UR: 5 — SIGNIFICANT CHANGE UP (ref 5–8)
PLATELET # BLD AUTO: 259 K/UL — SIGNIFICANT CHANGE UP (ref 150–400)
POTASSIUM SERPL-MCNC: 4.1 MMOL/L — SIGNIFICANT CHANGE UP (ref 3.5–5.3)
POTASSIUM SERPL-SCNC: 4.1 MMOL/L — SIGNIFICANT CHANGE UP (ref 3.5–5.3)
PROT SERPL-MCNC: 7.3 GM/DL — SIGNIFICANT CHANGE UP (ref 6–8.3)
PROT UR-MCNC: 30 MG/DL
PROTHROM AB SERPL-ACNC: 11.4 SEC — SIGNIFICANT CHANGE UP (ref 10.5–13.4)
RBC # BLD: 4.01 M/UL — SIGNIFICANT CHANGE UP (ref 3.8–5.2)
RBC # FLD: 13.5 % — SIGNIFICANT CHANGE UP (ref 10.3–14.5)
RBC CASTS # UR COMP ASSIST: ABNORMAL /HPF (ref 0–4)
RSV RNA NPH QL NAA+NON-PROBE: SIGNIFICANT CHANGE UP
SARS-COV-2 RNA SPEC QL NAA+PROBE: SIGNIFICANT CHANGE UP
SODIUM SERPL-SCNC: 140 MMOL/L — SIGNIFICANT CHANGE UP (ref 135–145)
SP GR SPEC: 1.02 — SIGNIFICANT CHANGE UP (ref 1.01–1.02)
UROBILINOGEN FLD QL: NEGATIVE — SIGNIFICANT CHANGE UP
WBC # BLD: 6.85 K/UL — SIGNIFICANT CHANGE UP (ref 3.8–10.5)
WBC # FLD AUTO: 6.85 K/UL — SIGNIFICANT CHANGE UP (ref 3.8–10.5)
WBC UR QL: >50 /HPF (ref 0–5)

## 2023-03-07 PROCEDURE — 36415 COLL VENOUS BLD VENIPUNCTURE: CPT

## 2023-03-07 PROCEDURE — 93010 ELECTROCARDIOGRAM REPORT: CPT

## 2023-03-07 PROCEDURE — 0241U: CPT

## 2023-03-07 PROCEDURE — 71045 X-RAY EXAM CHEST 1 VIEW: CPT

## 2023-03-07 PROCEDURE — 81001 URINALYSIS AUTO W/SCOPE: CPT

## 2023-03-07 PROCEDURE — 87086 URINE CULTURE/COLONY COUNT: CPT

## 2023-03-07 PROCEDURE — 99285 EMERGENCY DEPT VISIT HI MDM: CPT | Mod: 25

## 2023-03-07 PROCEDURE — 85610 PROTHROMBIN TIME: CPT

## 2023-03-07 PROCEDURE — 80053 COMPREHEN METABOLIC PANEL: CPT

## 2023-03-07 PROCEDURE — 87040 BLOOD CULTURE FOR BACTERIA: CPT | Mod: 59

## 2023-03-07 PROCEDURE — 85730 THROMBOPLASTIN TIME PARTIAL: CPT

## 2023-03-07 PROCEDURE — 82962 GLUCOSE BLOOD TEST: CPT

## 2023-03-07 PROCEDURE — 93005 ELECTROCARDIOGRAM TRACING: CPT

## 2023-03-07 PROCEDURE — 96374 THER/PROPH/DIAG INJ IV PUSH: CPT

## 2023-03-07 PROCEDURE — 70450 CT HEAD/BRAIN W/O DYE: CPT | Mod: MG

## 2023-03-07 PROCEDURE — G1004: CPT

## 2023-03-07 PROCEDURE — 83605 ASSAY OF LACTIC ACID: CPT

## 2023-03-07 PROCEDURE — 71045 X-RAY EXAM CHEST 1 VIEW: CPT | Mod: 26

## 2023-03-07 PROCEDURE — 85025 COMPLETE CBC W/AUTO DIFF WBC: CPT

## 2023-03-07 PROCEDURE — 99285 EMERGENCY DEPT VISIT HI MDM: CPT

## 2023-03-07 PROCEDURE — 70450 CT HEAD/BRAIN W/O DYE: CPT | Mod: 26,MG

## 2023-03-07 NOTE — ED PROVIDER NOTE - PROGRESS NOTE DETAILS
family told of results.   agree with plan for abx in ED and d/c back to [facility].   family concerned about left lacrimal gland.   no visible trauma.   will write in d/c papers may obtain eye consult at [facility] .

## 2023-03-07 NOTE — ED PROVIDER NOTE - NSFOLLOWUPINSTRUCTIONS_ED_ALL_ED_FT
please follow up with your doctor in 2-3 days.   family concerned about patient's left lacrimal gland.   Amanda PALACIOS may make referral for ophthalmology referral.   take medication as prescribed.   may crush into apple sauce.   return to ED for any concerns

## 2023-03-07 NOTE — ED PROVIDER NOTE - PATIENT PORTAL LINK FT
You can access the FollowMyHealth Patient Portal offered by [facility] by registering at the following website: [email or web address]. By joining [employer]’s FollowMyHealth portal, you will also be able to view your health information using other applications (apps) compatible with our system.

## 2023-03-07 NOTE — ED ADULT TRIAGE NOTE - CHIEF COMPLAINT QUOTE
as per son, he saw patient at 1pm and when he returned at 5pm, patient was noticeably different. son reports increase lethargy and difficulty articulating, with tremors. patient evaluated by Dr. Rico at [facility], no code stroke.

## 2023-03-07 NOTE — ED PROVIDER NOTE - CLINICAL SUMMARY MEDICAL DECISION MAKING FREE TEXT BOX
Pt with global weakness from Nursing Facility. Plan: sepsis labs, CT head, and anticipate admission.

## 2023-03-07 NOTE — ED ADULT NURSE NOTE - OBJECTIVE STATEMENT
pt bib ems with tremors, ams. son at bedside stating " I visited her at 1 this afternoon and when I came back at 5 pm she was different. she was trembling, shaking, lethargic and having difficulty articulating. pt responds appropriate with short words. and sentences now she was unable to". pt c/o headache today.pt has hx dementia. sons at bedside with aide.

## 2023-03-07 NOTE — ED PROVIDER NOTE - PHYSICAL EXAMINATION
GEN - NAD; chronically ill appearing; A+O x3   HEAD - NC/AT     EYES - EOMI, no conjunctival pallor, no scleral icterus  ENT -   mucous membranes dry, no discharge      NECK - Neck supple  PULM - CTA b/l,  symmetric breath sounds  COR -  RRR, S1 S2, no murmurs  ABD - , ND, NT, soft, no guarding, no rebound, no masses    BACK - no CVA tenderness, nontender spine     EXTREMS - no edema, no deformity, warm and well perfused    SKIN - no rash or bruising      NEUROLOGIC -  awake, moving all extremities, appears lethargic

## 2023-03-07 NOTE — ED PROVIDER NOTE - OBJECTIVE STATEMENT
87 year old female with PMHx of dementia, frequent UTIs, and HLD presents to the ED BIBEMS for AMS and weakness. Son states that he went to visit his mom at 1pm and noticed that she had trouble articulating words. Son went back to visit again at 5pm and saw pt was considerably worse, with lethargy and tremors. No recent sickness. Denies incontinence. Pt is a poor historian secondary to dementia.

## 2023-03-07 NOTE — ED ADULT NURSE NOTE - CHIEF COMPLAINT QUOTE
as per son, he saw patient at 1pm and when he returned at 5pm, patient was noticeably different. son reports increase lethargy and difficulty articulating, with tremors. patient evaluated by Dr. Rico at [facility], no code stroke.

## 2023-03-07 NOTE — ED ADULT NURSE NOTE - NSIMPLEMENTINTERV_GEN_ALL_ED
Implemented All Fall with Harm Risk Interventions:  [place] to call system. Call bell, personal items and telephone within reach. Instruct patient to call for assistance. Room bathroom lighting operational. Non-slip footwear when patient is off stretcher. Physically safe environment: no spills, clutter or unnecessary equipment. Stretcher in lowest position, wheels locked, appropriate side rails in place. Provide visual cue, wrist band, yellow gown, etc. Monitor gait and stability. Monitor for mental status changes and reorient to person, place, and time. Review medications for side effects contributing to fall risk. Reinforce activity limits and safety measures with patient and family. Provide visual clues: red socks.

## 2023-03-08 VITALS
SYSTOLIC BLOOD PRESSURE: 103 MMHG | HEART RATE: 72 BPM | OXYGEN SATURATION: 96 % | TEMPERATURE: 98 F | RESPIRATION RATE: 18 BRPM | DIASTOLIC BLOOD PRESSURE: 56 MMHG

## 2023-03-08 RX ORDER — CHOLECALCIFEROL (VITAMIN D3) 125 MCG
1 CAPSULE ORAL
Qty: 0 | Refills: 0 | DISCHARGE
Start: 2023-03-08

## 2023-03-08 RX ORDER — LATANOPROST 0.05 MG/ML
1 SOLUTION/ DROPS OPHTHALMIC; TOPICAL
Qty: 0 | Refills: 0 | DISCHARGE

## 2023-03-08 RX ORDER — CEFTRIAXONE 500 MG/1
1000 INJECTION, POWDER, FOR SOLUTION INTRAMUSCULAR; INTRAVENOUS ONCE
Refills: 0 | Status: COMPLETED | OUTPATIENT
Start: 2023-03-08 | End: 2023-03-08

## 2023-03-08 RX ORDER — CHOLECALCIFEROL (VITAMIN D3) 125 MCG
1 CAPSULE ORAL
Qty: 0 | Refills: 0 | DISCHARGE

## 2023-03-08 RX ADMIN — CEFTRIAXONE 1000 MILLIGRAM(S): 500 INJECTION, POWDER, FOR SOLUTION INTRAMUSCULAR; INTRAVENOUS at 00:26

## 2023-03-08 NOTE — PHARMACOTHERAPY INTERVENTION NOTE - COMMENTS
Medication reconciliation completed.  Reviewed Medication list and confirmed med allergies with list from Care Facility "[facility]"; confirmed with Dr. First MedHx.

## 2023-03-09 LAB
CULTURE RESULTS: SIGNIFICANT CHANGE UP
SPECIMEN SOURCE: SIGNIFICANT CHANGE UP

## 2023-03-10 NOTE — ED POST DISCHARGE NOTE - DETAILS
spoke to pt Shira pt daughter aware to have urine culture repeated today at nursing home she will call doc at facility to expedite this. she states pt is doing so much better on Bactrim. ROSANNA MCKEON

## 2023-03-15 ENCOUNTER — INPATIENT (INPATIENT)
Facility: HOSPITAL | Age: 88
LOS: 4 days | Discharge: NURSING, REHAB OR LONG-TERM CARE | DRG: 689 | End: 2023-03-20
Attending: INTERNAL MEDICINE | Admitting: INTERNAL MEDICINE
Payer: MEDICARE

## 2023-03-15 VITALS
HEART RATE: 69 BPM | SYSTOLIC BLOOD PRESSURE: 111 MMHG | WEIGHT: 95.02 LBS | OXYGEN SATURATION: 96 % | HEIGHT: 62 IN | DIASTOLIC BLOOD PRESSURE: 79 MMHG | RESPIRATION RATE: 20 BRPM

## 2023-03-15 DIAGNOSIS — N39.0 URINARY TRACT INFECTION, SITE NOT SPECIFIED: ICD-10-CM

## 2023-03-15 DIAGNOSIS — Z98.89 OTHER SPECIFIED POSTPROCEDURAL STATES: Chronic | ICD-10-CM

## 2023-03-15 LAB
ALBUMIN SERPL ELPH-MCNC: 3.1 G/DL — LOW (ref 3.3–5)
ALP SERPL-CCNC: 116 U/L — SIGNIFICANT CHANGE UP (ref 40–120)
ALT FLD-CCNC: 24 U/L — SIGNIFICANT CHANGE UP (ref 12–78)
ANION GAP SERPL CALC-SCNC: 2 MMOL/L — LOW (ref 5–17)
APPEARANCE UR: ABNORMAL
APTT BLD: 29.4 SEC — SIGNIFICANT CHANGE UP (ref 27.5–35.5)
AST SERPL-CCNC: 31 U/L — SIGNIFICANT CHANGE UP (ref 15–37)
BACTERIA # UR AUTO: ABNORMAL
BASOPHILS # BLD AUTO: 0.12 K/UL — SIGNIFICANT CHANGE UP (ref 0–0.2)
BASOPHILS NFR BLD AUTO: 1.4 % — SIGNIFICANT CHANGE UP (ref 0–2)
BILIRUB SERPL-MCNC: 0.3 MG/DL — SIGNIFICANT CHANGE UP (ref 0.2–1.2)
BILIRUB UR-MCNC: NEGATIVE — SIGNIFICANT CHANGE UP
BUN SERPL-MCNC: 26 MG/DL — HIGH (ref 7–23)
CALCIUM SERPL-MCNC: 10.2 MG/DL — HIGH (ref 8.5–10.1)
CHLORIDE SERPL-SCNC: 111 MMOL/L — HIGH (ref 96–108)
CO2 SERPL-SCNC: 25 MMOL/L — SIGNIFICANT CHANGE UP (ref 22–31)
COLOR SPEC: YELLOW — SIGNIFICANT CHANGE UP
CREAT SERPL-MCNC: 1.36 MG/DL — HIGH (ref 0.5–1.3)
DIFF PNL FLD: ABNORMAL
EGFR: 38 ML/MIN/1.73M2 — LOW
EOSINOPHIL # BLD AUTO: 0.1 K/UL — SIGNIFICANT CHANGE UP (ref 0–0.5)
EOSINOPHIL NFR BLD AUTO: 1.2 % — SIGNIFICANT CHANGE UP (ref 0–6)
EPI CELLS # UR: SIGNIFICANT CHANGE UP
GLUCOSE SERPL-MCNC: 101 MG/DL — HIGH (ref 70–99)
GLUCOSE UR QL: NEGATIVE — SIGNIFICANT CHANGE UP
HCT VFR BLD CALC: 42.3 % — SIGNIFICANT CHANGE UP (ref 34.5–45)
HGB BLD-MCNC: 13.6 G/DL — SIGNIFICANT CHANGE UP (ref 11.5–15.5)
IMM GRANULOCYTES NFR BLD AUTO: 0.2 % — SIGNIFICANT CHANGE UP (ref 0–0.9)
INR BLD: 1.06 RATIO — SIGNIFICANT CHANGE UP (ref 0.88–1.16)
KETONES UR-MCNC: NEGATIVE — SIGNIFICANT CHANGE UP
LACTATE SERPL-SCNC: 1.3 MMOL/L — SIGNIFICANT CHANGE UP (ref 0.7–2)
LEUKOCYTE ESTERASE UR-ACNC: ABNORMAL
LYMPHOCYTES # BLD AUTO: 2.58 K/UL — SIGNIFICANT CHANGE UP (ref 1–3.3)
LYMPHOCYTES # BLD AUTO: 30 % — SIGNIFICANT CHANGE UP (ref 13–44)
MCHC RBC-ENTMCNC: 32.2 GM/DL — SIGNIFICANT CHANGE UP (ref 32–36)
MCHC RBC-ENTMCNC: 33.1 PG — SIGNIFICANT CHANGE UP (ref 27–34)
MCV RBC AUTO: 102.9 FL — HIGH (ref 80–100)
MONOCYTES # BLD AUTO: 0.82 K/UL — SIGNIFICANT CHANGE UP (ref 0–0.9)
MONOCYTES NFR BLD AUTO: 9.5 % — SIGNIFICANT CHANGE UP (ref 2–14)
NEUTROPHILS # BLD AUTO: 4.95 K/UL — SIGNIFICANT CHANGE UP (ref 1.8–7.4)
NEUTROPHILS NFR BLD AUTO: 57.7 % — SIGNIFICANT CHANGE UP (ref 43–77)
NITRITE UR-MCNC: NEGATIVE — SIGNIFICANT CHANGE UP
PH UR: 6.5 — SIGNIFICANT CHANGE UP (ref 5–8)
PLATELET # BLD AUTO: 266 K/UL — SIGNIFICANT CHANGE UP (ref 150–400)
POTASSIUM SERPL-MCNC: 4.4 MMOL/L — SIGNIFICANT CHANGE UP (ref 3.5–5.3)
POTASSIUM SERPL-SCNC: 4.4 MMOL/L — SIGNIFICANT CHANGE UP (ref 3.5–5.3)
PROT SERPL-MCNC: 7.2 GM/DL — SIGNIFICANT CHANGE UP (ref 6–8.3)
PROT UR-MCNC: 15
PROTHROM AB SERPL-ACNC: 12.3 SEC — SIGNIFICANT CHANGE UP (ref 10.5–13.4)
RAPID RVP RESULT: SIGNIFICANT CHANGE UP
RBC # BLD: 4.11 M/UL — SIGNIFICANT CHANGE UP (ref 3.8–5.2)
RBC # FLD: 13.8 % — SIGNIFICANT CHANGE UP (ref 10.3–14.5)
RBC CASTS # UR COMP ASSIST: SIGNIFICANT CHANGE UP /HPF (ref 0–4)
SARS-COV-2 RNA SPEC QL NAA+PROBE: SIGNIFICANT CHANGE UP
SODIUM SERPL-SCNC: 138 MMOL/L — SIGNIFICANT CHANGE UP (ref 135–145)
SP GR SPEC: 1.01 — SIGNIFICANT CHANGE UP (ref 1.01–1.02)
TROPONIN I, HIGH SENSITIVITY RESULT: 5.82 NG/L — SIGNIFICANT CHANGE UP
UROBILINOGEN FLD QL: NEGATIVE — SIGNIFICANT CHANGE UP
WBC # BLD: 8.59 K/UL — SIGNIFICANT CHANGE UP (ref 3.8–10.5)
WBC # FLD AUTO: 8.59 K/UL — SIGNIFICANT CHANGE UP (ref 3.8–10.5)
WBC UR QL: >50 /HPF (ref 0–5)

## 2023-03-15 PROCEDURE — 97530 THERAPEUTIC ACTIVITIES: CPT | Mod: GP

## 2023-03-15 PROCEDURE — 99285 EMERGENCY DEPT VISIT HI MDM: CPT

## 2023-03-15 PROCEDURE — 80048 BASIC METABOLIC PNL TOTAL CA: CPT

## 2023-03-15 PROCEDURE — 93010 ELECTROCARDIOGRAM REPORT: CPT

## 2023-03-15 PROCEDURE — 71045 X-RAY EXAM CHEST 1 VIEW: CPT | Mod: 26

## 2023-03-15 PROCEDURE — 70450 CT HEAD/BRAIN W/O DYE: CPT | Mod: 26,MA

## 2023-03-15 PROCEDURE — 87635 SARS-COV-2 COVID-19 AMP PRB: CPT

## 2023-03-15 PROCEDURE — 97163 PT EVAL HIGH COMPLEX 45 MIN: CPT | Mod: GP

## 2023-03-15 PROCEDURE — 36415 COLL VENOUS BLD VENIPUNCTURE: CPT

## 2023-03-15 PROCEDURE — 74177 CT ABD & PELVIS W/CONTRAST: CPT | Mod: 26,MA

## 2023-03-15 PROCEDURE — 85025 COMPLETE CBC W/AUTO DIFF WBC: CPT

## 2023-03-15 RX ORDER — CEFTRIAXONE 500 MG/1
1000 INJECTION, POWDER, FOR SOLUTION INTRAMUSCULAR; INTRAVENOUS ONCE
Refills: 0 | Status: COMPLETED | OUTPATIENT
Start: 2023-03-15 | End: 2023-03-15

## 2023-03-15 RX ORDER — ACETAMINOPHEN 500 MG
650 TABLET ORAL EVERY 6 HOURS
Refills: 0 | Status: DISCONTINUED | OUTPATIENT
Start: 2023-03-15 | End: 2023-03-20

## 2023-03-15 RX ORDER — SODIUM CHLORIDE 9 MG/ML
1000 INJECTION INTRAMUSCULAR; INTRAVENOUS; SUBCUTANEOUS
Refills: 0 | Status: DISCONTINUED | OUTPATIENT
Start: 2023-03-15 | End: 2023-03-18

## 2023-03-15 RX ORDER — SODIUM CHLORIDE 9 MG/ML
500 INJECTION INTRAMUSCULAR; INTRAVENOUS; SUBCUTANEOUS ONCE
Refills: 0 | Status: COMPLETED | OUTPATIENT
Start: 2023-03-15 | End: 2023-03-15

## 2023-03-15 RX ADMIN — SODIUM CHLORIDE 500 MILLILITER(S): 9 INJECTION INTRAMUSCULAR; INTRAVENOUS; SUBCUTANEOUS at 16:50

## 2023-03-15 RX ADMIN — CEFTRIAXONE 1000 MILLIGRAM(S): 500 INJECTION, POWDER, FOR SOLUTION INTRAMUSCULAR; INTRAVENOUS at 18:53

## 2023-03-15 RX ADMIN — SODIUM CHLORIDE 75 MILLILITER(S): 9 INJECTION INTRAMUSCULAR; INTRAVENOUS; SUBCUTANEOUS at 23:49

## 2023-03-15 NOTE — ED ADULT NURSE REASSESSMENT NOTE - NS ED NURSE REASSESS COMMENT FT1
Pt straigth cathed for urine using sterlie technique, pt tolerated well, 300cc of urine output observed at this time, urine specimen sent as ordered.

## 2023-03-15 NOTE — ED ADULT NURSE REASSESSMENT NOTE - NS ED NURSE REASSESS COMMENT FT1
pt appears comfortable, daughter and son at bedside, pt tolerating small spoonful of honey thickened baby food in ED, VSS, awaiting orders

## 2023-03-15 NOTE — ED ADULT NURSE NOTE - NSIMPLEMENTINTERV_GEN_ALL_ED
Implemented All Fall with Harm Risk Interventions:  [place] to call system. Call bell, personal items and telephone within reach. Instruct patient to call for assistance. Room bathroom lighting operational. Non-slip footwear when patient is off stretcher. Physically safe environment: no spills, clutter or unnecessary equipment. Stretcher in lowest position, wheels locked, appropriate side rails in place. Provide visual cue, wrist band, yellow gown, etc. Monitor gait and stability. Monitor for mental status changes and reorient to person, place, and time. Review medications for side effects contributing to fall risk. Reinforce activity limits and safety measures with patient and family. Provide visual clues: red socks.

## 2023-03-15 NOTE — ED ADULT NURSE NOTE - CHIEF COMPLAINT QUOTE
Pt presents to ED from [facility] for generalized weakness, lethargy x 1 day. hx UTI 1 week ago. dementia at baseline.

## 2023-03-15 NOTE — PATIENT PROFILE ADULT - FUNCTIONAL ASSESSMENT - BASIC MOBILITY 6.
1-calculated by average/Not able to assess (calculate score using [facility] averaging method)

## 2023-03-15 NOTE — PATIENT PROFILE ADULT - FALL HARM RISK - HARM RISK INTERVENTIONS

## 2023-03-15 NOTE — ED PROVIDER NOTE - OBJECTIVE STATEMENT
86 y/o female with PMHx of Dementia, UTIs, HLD, glaucoma presents to the ED with daughter at bedside c/o lethargy and weakness. Per daughter, pt had a recent UTI and daughter thinks it might be back because she has had similar symptoms with prior UTIs. Pt has not been drinking enough fluids per family. Pt is unable to provide a complete history as she is demented at baseline.

## 2023-03-15 NOTE — ED PROVIDER NOTE - ENMT, MLM
Airway patent, +Dry mucous membranes. Throat has no vesicles, no oropharyngeal exudates and uvula is midline.

## 2023-03-15 NOTE — ED ADULT TRIAGE NOTE - CHIEF COMPLAINT QUOTE
Pt presents to ED from [facility] for generalized weakness, lethargy x 1 day. hx UTI 1 week ago. dementia at baseline.

## 2023-03-15 NOTE — ED PROVIDER NOTE - CLINICAL SUMMARY MEDICAL DECISION MAKING FREE TEXT BOX
Patient with UTI on UA, RAJIV with elevation since the 3/7/23.  CT head WNL.  CT abdomen stool filled rectum, urinary distension.  Given IVF, Rocephin, admit to medicine.  Discussed with Dr. Good.

## 2023-03-16 LAB
ANION GAP SERPL CALC-SCNC: 5 MMOL/L — SIGNIFICANT CHANGE UP (ref 5–17)
BASOPHILS # BLD AUTO: 0.1 K/UL — SIGNIFICANT CHANGE UP (ref 0–0.2)
BASOPHILS NFR BLD AUTO: 1.3 % — SIGNIFICANT CHANGE UP (ref 0–2)
BUN SERPL-MCNC: 18 MG/DL — SIGNIFICANT CHANGE UP (ref 7–23)
CALCIUM SERPL-MCNC: 9.2 MG/DL — SIGNIFICANT CHANGE UP (ref 8.5–10.1)
CHLORIDE SERPL-SCNC: 115 MMOL/L — HIGH (ref 96–108)
CO2 SERPL-SCNC: 24 MMOL/L — SIGNIFICANT CHANGE UP (ref 22–31)
CREAT SERPL-MCNC: 1.14 MG/DL — SIGNIFICANT CHANGE UP (ref 0.5–1.3)
EGFR: 47 ML/MIN/1.73M2 — LOW
EOSINOPHIL # BLD AUTO: 0.12 K/UL — SIGNIFICANT CHANGE UP (ref 0–0.5)
EOSINOPHIL NFR BLD AUTO: 1.6 % — SIGNIFICANT CHANGE UP (ref 0–6)
GLUCOSE SERPL-MCNC: 89 MG/DL — SIGNIFICANT CHANGE UP (ref 70–99)
HCT VFR BLD CALC: 35.1 % — SIGNIFICANT CHANGE UP (ref 34.5–45)
HGB BLD-MCNC: 11.6 G/DL — SIGNIFICANT CHANGE UP (ref 11.5–15.5)
IMM GRANULOCYTES NFR BLD AUTO: 0.1 % — SIGNIFICANT CHANGE UP (ref 0–0.9)
LYMPHOCYTES # BLD AUTO: 2.88 K/UL — SIGNIFICANT CHANGE UP (ref 1–3.3)
LYMPHOCYTES # BLD AUTO: 38 % — SIGNIFICANT CHANGE UP (ref 13–44)
MCHC RBC-ENTMCNC: 33 GM/DL — SIGNIFICANT CHANGE UP (ref 32–36)
MCHC RBC-ENTMCNC: 33.8 PG — SIGNIFICANT CHANGE UP (ref 27–34)
MCV RBC AUTO: 102.3 FL — HIGH (ref 80–100)
MONOCYTES # BLD AUTO: 0.73 K/UL — SIGNIFICANT CHANGE UP (ref 0–0.9)
MONOCYTES NFR BLD AUTO: 9.6 % — SIGNIFICANT CHANGE UP (ref 2–14)
NEUTROPHILS # BLD AUTO: 3.74 K/UL — SIGNIFICANT CHANGE UP (ref 1.8–7.4)
NEUTROPHILS NFR BLD AUTO: 49.4 % — SIGNIFICANT CHANGE UP (ref 43–77)
PLATELET # BLD AUTO: 217 K/UL — SIGNIFICANT CHANGE UP (ref 150–400)
POTASSIUM SERPL-MCNC: 4 MMOL/L — SIGNIFICANT CHANGE UP (ref 3.5–5.3)
POTASSIUM SERPL-SCNC: 4 MMOL/L — SIGNIFICANT CHANGE UP (ref 3.5–5.3)
RBC # BLD: 3.43 M/UL — LOW (ref 3.8–5.2)
RBC # FLD: 13.7 % — SIGNIFICANT CHANGE UP (ref 10.3–14.5)
SODIUM SERPL-SCNC: 144 MMOL/L — SIGNIFICANT CHANGE UP (ref 135–145)
WBC # BLD: 7.58 K/UL — SIGNIFICANT CHANGE UP (ref 3.8–10.5)
WBC # FLD AUTO: 7.58 K/UL — SIGNIFICANT CHANGE UP (ref 3.8–10.5)

## 2023-03-16 PROCEDURE — 99233 SBSQ HOSP IP/OBS HIGH 50: CPT

## 2023-03-16 RX ORDER — CHOLECALCIFEROL (VITAMIN D3) 125 MCG
1000 CAPSULE ORAL DAILY
Refills: 0 | Status: DISCONTINUED | OUTPATIENT
Start: 2023-03-16 | End: 2023-03-20

## 2023-03-16 RX ORDER — LATANOPROST 0.05 MG/ML
1 SOLUTION/ DROPS OPHTHALMIC; TOPICAL AT BEDTIME
Refills: 0 | Status: DISCONTINUED | OUTPATIENT
Start: 2023-03-16 | End: 2023-03-20

## 2023-03-16 RX ORDER — DONEPEZIL HYDROCHLORIDE 10 MG/1
10 TABLET, FILM COATED ORAL AT BEDTIME
Refills: 0 | Status: DISCONTINUED | OUTPATIENT
Start: 2023-03-16 | End: 2023-03-20

## 2023-03-16 RX ORDER — LANOLIN ALCOHOL/MO/W.PET/CERES
3 CREAM (GRAM) TOPICAL AT BEDTIME
Refills: 0 | Status: DISCONTINUED | OUTPATIENT
Start: 2023-03-16 | End: 2023-03-20

## 2023-03-16 RX ORDER — POLYETHYLENE GLYCOL 3350 17 G/17G
17 POWDER, FOR SOLUTION ORAL
Refills: 0 | Status: COMPLETED | OUTPATIENT
Start: 2023-03-16 | End: 2023-03-16

## 2023-03-16 RX ORDER — SIMVASTATIN 20 MG/1
20 TABLET, FILM COATED ORAL AT BEDTIME
Refills: 0 | Status: DISCONTINUED | OUTPATIENT
Start: 2023-03-16 | End: 2023-03-20

## 2023-03-16 RX ORDER — ONDANSETRON 8 MG/1
4 TABLET, FILM COATED ORAL EVERY 8 HOURS
Refills: 0 | Status: DISCONTINUED | OUTPATIENT
Start: 2023-03-16 | End: 2023-03-20

## 2023-03-16 RX ORDER — ENOXAPARIN SODIUM 100 MG/ML
30 INJECTION SUBCUTANEOUS EVERY 24 HOURS
Refills: 0 | Status: DISCONTINUED | OUTPATIENT
Start: 2023-03-16 | End: 2023-03-20

## 2023-03-16 RX ORDER — CITALOPRAM 10 MG/1
20 TABLET, FILM COATED ORAL DAILY
Refills: 0 | Status: DISCONTINUED | OUTPATIENT
Start: 2023-03-16 | End: 2023-03-20

## 2023-03-16 RX ORDER — LACTOBACILLUS ACIDOPHILUS 100MM CELL
1 CAPSULE ORAL
Refills: 0 | Status: DISCONTINUED | OUTPATIENT
Start: 2023-03-16 | End: 2023-03-20

## 2023-03-16 RX ORDER — TIMOLOL 0.5 %
1 DROPS OPHTHALMIC (EYE) DAILY
Refills: 0 | Status: DISCONTINUED | OUTPATIENT
Start: 2023-03-16 | End: 2023-03-20

## 2023-03-16 RX ORDER — ENOXAPARIN SODIUM 100 MG/ML
40 INJECTION SUBCUTANEOUS EVERY 24 HOURS
Refills: 0 | Status: DISCONTINUED | OUTPATIENT
Start: 2023-03-16 | End: 2023-03-16

## 2023-03-16 RX ORDER — CEFTRIAXONE 500 MG/1
1000 INJECTION, POWDER, FOR SOLUTION INTRAMUSCULAR; INTRAVENOUS EVERY 24 HOURS
Refills: 0 | Status: COMPLETED | OUTPATIENT
Start: 2023-03-16 | End: 2023-03-18

## 2023-03-16 RX ORDER — SENNA PLUS 8.6 MG/1
2 TABLET ORAL AT BEDTIME
Refills: 0 | Status: DISCONTINUED | OUTPATIENT
Start: 2023-03-16 | End: 2023-03-20

## 2023-03-16 RX ADMIN — Medication 1000 UNIT(S): at 16:07

## 2023-03-16 RX ADMIN — POLYETHYLENE GLYCOL 3350 17 GRAM(S): 17 POWDER, FOR SOLUTION ORAL at 21:53

## 2023-03-16 RX ADMIN — ENOXAPARIN SODIUM 30 MILLIGRAM(S): 100 INJECTION SUBCUTANEOUS at 10:14

## 2023-03-16 RX ADMIN — LATANOPROST 1 DROP(S): 0.05 SOLUTION/ DROPS OPHTHALMIC; TOPICAL at 21:22

## 2023-03-16 RX ADMIN — SIMVASTATIN 20 MILLIGRAM(S): 20 TABLET, FILM COATED ORAL at 21:20

## 2023-03-16 RX ADMIN — Medication 1 DROP(S): at 16:06

## 2023-03-16 RX ADMIN — Medication 1 TABLET(S): at 17:59

## 2023-03-16 RX ADMIN — SENNA PLUS 2 TABLET(S): 8.6 TABLET ORAL at 21:20

## 2023-03-16 RX ADMIN — CITALOPRAM 20 MILLIGRAM(S): 10 TABLET, FILM COATED ORAL at 16:07

## 2023-03-16 RX ADMIN — POLYETHYLENE GLYCOL 3350 17 GRAM(S): 17 POWDER, FOR SOLUTION ORAL at 10:15

## 2023-03-16 RX ADMIN — Medication 3 MILLIGRAM(S): at 21:21

## 2023-03-16 RX ADMIN — SODIUM CHLORIDE 75 MILLILITER(S): 9 INJECTION INTRAMUSCULAR; INTRAVENOUS; SUBCUTANEOUS at 13:06

## 2023-03-16 RX ADMIN — CEFTRIAXONE 1000 MILLIGRAM(S): 500 INJECTION, POWDER, FOR SOLUTION INTRAMUSCULAR; INTRAVENOUS at 17:58

## 2023-03-16 RX ADMIN — DONEPEZIL HYDROCHLORIDE 10 MILLIGRAM(S): 10 TABLET, FILM COATED ORAL at 21:21

## 2023-03-16 NOTE — DIETITIAN INITIAL EVALUATION ADULT - ADD RECOMMEND
1) C/w Pureed, Moderately Thickened Liquids  2) Add ensure plus high protein TID to optimize PO intake (provides 350 kcal, 20g protein/ shake)   3) Obtain vitamin D 25OH level to assess nutriture  4) Please obtain weekly weights  5) Consider adding thiamine 100 mg daily 2/2 poor PO intake/ malnutrition  6) Recommend to add MVI w/minerals, Vit C 500 mg BID, add Zinc Sulfate 220 mg x 10 days to promote wound healing.  7) Monitor bowel movements, if no BM for >3 days, consider implementing bowel regimen.  8) Encourage protein-rich foods, maximize food preferences  9) Consider adding appetite stimulant such as Remeron or Marinol 2/2 chronically poor appetite/ PO intake  10) Confirm goals of care regarding nutrition support - Nutrition support is not recommended due to overall declining medical status which evidenced based studies indicate EN is not effective in prolonging survival and improving quality of life. It can also increase risk of aspiration pneumonia as well as other related issues.  RD will continue to monitor PO intake, labs, hydration, and wt prn.

## 2023-03-16 NOTE — DIETITIAN INITIAL EVALUATION ADULT - PERTINENT LABORATORY DATA
03-16    144  |  115<H>  |  18  ----------------------------<  89  4.0   |  24  |  1.14    Ca    9.2      16 Mar 2023 08:43    TPro  7.2  /  Alb  3.1<L>  /  TBili  0.3  /  DBili  x   /  AST  31  /  ALT  24  /  AlkPhos  116  03-15    Vitamin B12, Serum: 645 pg/mL (02-03-23 @ 14:07)  Folate, Serum: 13.7 ng/mL (02-03-23 @ 14:07)  Vitamin D, 25-Hydroxy: 41.4 ng/mL (09-25-22 @ 05:08)

## 2023-03-16 NOTE — DIETITIAN INITIAL EVALUATION ADULT - PERTINENT MEDS FT
MEDICATIONS  (STANDING):  cefTRIAXone Injectable. 1000 milliGRAM(s) IV Push every 24 hours  enoxaparin Injectable 30 milliGRAM(s) SubCutaneous every 24 hours  polyethylene glycol 3350 17 Gram(s) Oral two times a day  senna 2 Tablet(s) Oral at bedtime  sodium chloride 0.9%. 1000 milliLiter(s) (75 mL/Hr) IV Continuous <Continuous>    MEDICATIONS  (PRN):  acetaminophen     Tablet .. 650 milliGRAM(s) Oral every 6 hours PRN Temp greater or equal to 38C (100.4F), Mild Pain (1 - 3)  aluminum hydroxide/magnesium hydroxide/simethicone Suspension 30 milliLiter(s) Oral every 4 hours PRN Dyspepsia  melatonin 3 milliGRAM(s) Oral at bedtime PRN Insomnia  ondansetron Injectable 4 milliGRAM(s) IV Push every 8 hours PRN Nausea and/or Vomiting    Home Medications:  acetaminophen 325 mg oral tablet: 2 tab(s) orally every 6 hours, As needed, Mild Pain (1 - 3) (15 Mar 2023 19:57)  citalopram 20 mg oral tablet: 1 tab(s) orally once a day (15 Mar 2023 19:57)  Decara 1250 mcg (50,000 intl units) oral capsule: 1 cap(s) orally every 4 weeks (15 Mar 2023 19:57)  donepezil 10 mg oral tablet: 1 tab(s) orally once a day (at bedtime) (15 Mar 2023 19:57)  lactobacillus acidophilus oral capsule: 1 cap(s) orally 2 times a day (15 Mar 2023 19:57)  latanoprost 0.005% ophthalmic solution: 1 drop(s) in each eye once a day (at bedtime) (15 Mar 2023 19:57)  lidocaine 4% patch: Apply topically to affected area once a day (15 Mar 2023 19:57)  memantine 28 mg oral capsule, extended release: 1 cap(s) orally once a day (15 Mar 2023 19:57)  simvastatin 20 mg oral tablet: 1 tab(s) orally once a day (at bedtime) (15 Mar 2023 19:57)  timolol 0.5% ophthalmic solution: 1 drop(s) in each eye once a day (15 Mar 2023 19:57)

## 2023-03-16 NOTE — DIETITIAN INITIAL EVALUATION ADULT - NS FNS DIET ORDER
Diet, Pureed:   Moderately Thick Liquids (MODTHICKLIQS)  Supplement Feeding Modality:  Oral  Ensure Plus High Protein Cans or Servings Per Day:  1       Frequency:  Two Times a day (03-16-23 @ 09:41)

## 2023-03-16 NOTE — DIETITIAN INITIAL EVALUATION ADULT - NSFNSPHYEXAMSKINFT_GEN_A_CORE
Pressure Injury 1: coccyx,sacrum, Stage I  Pressure Injury 2: Right:,HEEL, Stage I  Pressure Injury 3: Left:,HEEL, Stage I    Mando Score 16.

## 2023-03-16 NOTE — DIETITIAN INITIAL EVALUATION ADULT - ORAL INTAKE PTA/DIET HISTORY
Unable to obtain intake/ diet hx pta 2/2 dementia. Pt on Pureed, Moderately Thickened Liquids at [facility]. Likely meeting </=65% of ENN chronically.

## 2023-03-16 NOTE — PHARMACOTHERAPY INTERVENTION NOTE - COMMENTS
Medication reconciliation completed.  Reviewed Medication list and confirmed med allergies with list from Care Facility "[facility]"; confirmed with Dr. First MedHx. 
MD ordered enoxaparin 40mg SQ daily for VTE ppx. Patient's CrCl=25. Recommended reducing enoxaparin dose to 30mg SQ daily.

## 2023-03-16 NOTE — H&P ADULT - NSHPPHYSICALEXAM_GEN_ALL_CORE
Vital Signs Last 24 Hrs  T(C): 36.3 (15 Mar 2023 23:30), Max: 37.1 (15 Mar 2023 14:01)  T(F): 97.3 (15 Mar 2023 23:30), Max: 98.8 (15 Mar 2023 21:18)  HR: 63 (15 Mar 2023 23:30) (63 - 69)  BP: 102/46 (16 Mar 2023 08:12) (98/52 - 116/70)  BP(mean): 63 (15 Mar 2023 23:30) (63 - 79)  RR: 17 (15 Mar 2023 23:30) (17 - 20)  SpO2: 96% (15 Mar 2023 23:30) (95% - 99%)    Parameters below as of 15 Mar 2023 23:30  Patient On (Oxygen Delivery Method): room air    PHYSICAL EXAM:  GENERAL: NAD, lying in bed comfortably  HEAD:  Atraumatic, Normocephalic  EYES:  conjunctiva and sclera clear  ENT: Moist mucous membranes  NECK: Supple, No JVD  CHEST/LUNG: Clear to auscultation bilaterally; No rales, rhonchi, wheezing. Unlabored respirations  HEART: Regular rate and rhythm; No murmurs, rubs, or gallops  ABDOMEN: Bowel sounds present; Soft, Nontender, Nondistended.   EXTREMITIES:  2+ Peripheral Pulses, brisk capillary refill. No clubbing, cyanosis, or edema  NERVOUS SYSTEM:  Awake, alert, knows name only   MSK: FROM all 4 extremities

## 2023-03-16 NOTE — DIETITIAN NUTRITION RISK NOTIFICATION - TREATMENT: THE FOLLOWING DIET HAS BEEN RECOMMENDED
Diet, Pureed:   Moderately Thick Liquids (MODTHICKLIQS)  Supplement Feeding Modality:  Oral  Ensure Plus High Protein Cans or Servings Per Day:  1       Frequency:  Two Times a day (03-16-23 @ 09:41) [Active]

## 2023-03-16 NOTE — H&P ADULT - ASSESSMENT
87 F with PMHx Dementia, recurrent UTIs, HLD, glaucoma, from [facility], presented to ER for weakness and lethargy for the past day. Patient was seen in ER 3/7/23 diagnosed with UTI and treated with bactrim. Urine Cx at that time was contaminated with > 3 organisms. Patient was recently admitted 2/2/23-2/7/23 for encephalopathy due to UTI. In ER, vitals were stable, afebrile, CT abdomen/pelvis noted with distended bladder and distended rectum with stool. CT head was unremarkable for acute changes. Treated with ceftriaxone in ER. Blood and urine cultures were collected.     Acute UTI  -Admit to medicine  -UA noted   -S/P ceftriaxone in ER, will continue --> day #2  -F/u blood and urine cultures   -Encourage adequate hydration   -Bladder scan q6h, if no spontaneous voiding     Constipation   -Patient had small BM in ER  -Bowel regimen as per orders    Dementia  -Continue memantine, donepezil, citalopram  -Supportive care   -Redirect as needed  -She is non ambulatory   -Fall and aspiration precautions     HLD  -Continue simvastatin     Glaucoma   -Continue timolol, latanoprost     VTE ppx  -Lovenox 30mg q24h    Advanced directives  -DNR/DNI  -MOLST in chart     Discussed with patient's daughter this AM

## 2023-03-16 NOTE — DIETITIAN INITIAL EVALUATION ADULT - OTHER INFO
88 y/o F presented from [facility] with a PMHx of dementia for generalized weakness, lethargy x 1 day. Per daughter, pt had a recent UTI and daughter thinks it might be back because she has had similar symptoms with prior UTIs. Pt has not been drinking enough fluids per family. Pt is unable to provide a complete history as she is demented at baseline.    Unable to obtain meaningful information 2/2 dementia. Pt known to RD from previous admission; met criteria for severe malnutrition on multiple admissions. RD obtained bedscale wt on 3/16 - 99#. Weight hx reviewed: 99.8# (taken by RD on 9/29/22); wt appears stable x 6 months. Pt thin, frail, yaa, and emaciated. NFPE reveals severe muscle/ fat wasting, pt continues to meet criteria for PCM at this time. Will trial Ensure Plus High Protein TID in effort to optimize PO intake and wound healing. Consider adding appetite stimulant such as Remeron or Marinol 2/2 chronically poor appetite/ PO intake. Nutrition support is not recommended due to overall declining medical status which evidenced based studies indicate EN is not effective in prolonging survival and improving quality of life. It can also increase risk of aspiration pneumonia as well as other related issues. Please see additional recommendations below.

## 2023-03-16 NOTE — H&P ADULT - HISTORY OF PRESENT ILLNESS
87 F with PMHx Dementia, recurrent UTIs, HLD, glaucoma, from [facility], presented to ER for weakness and lethargy for the past day. Patient was seen in ER 3/7/23 diagnosed with UTI and treated with bactrim. Urine Cx at that time was contaminated with > 3 organisms. Patient was recently admitted 2/2/23-2/7/23 for encephalopathy due to UTI. In ER, vitals were stable, afebrile, CT abdomen/pelvis noted with distended bladder and distended rectum with stool. CT head was unremarkable for acute changes. Treated with ceftriaxone in ER. Blood and urine cultures were collected.

## 2023-03-17 LAB
ANION GAP SERPL CALC-SCNC: 5 MMOL/L — SIGNIFICANT CHANGE UP (ref 5–17)
BASOPHILS # BLD AUTO: 0.09 K/UL — SIGNIFICANT CHANGE UP (ref 0–0.2)
BASOPHILS NFR BLD AUTO: 1.2 % — SIGNIFICANT CHANGE UP (ref 0–2)
BUN SERPL-MCNC: 9 MG/DL — SIGNIFICANT CHANGE UP (ref 7–23)
CALCIUM SERPL-MCNC: 8.8 MG/DL — SIGNIFICANT CHANGE UP (ref 8.5–10.1)
CHLORIDE SERPL-SCNC: 115 MMOL/L — HIGH (ref 96–108)
CO2 SERPL-SCNC: 23 MMOL/L — SIGNIFICANT CHANGE UP (ref 22–31)
CREAT SERPL-MCNC: 0.85 MG/DL — SIGNIFICANT CHANGE UP (ref 0.5–1.3)
EGFR: 67 ML/MIN/1.73M2 — SIGNIFICANT CHANGE UP
EOSINOPHIL # BLD AUTO: 0.15 K/UL — SIGNIFICANT CHANGE UP (ref 0–0.5)
EOSINOPHIL NFR BLD AUTO: 2 % — SIGNIFICANT CHANGE UP (ref 0–6)
GLUCOSE SERPL-MCNC: 92 MG/DL — SIGNIFICANT CHANGE UP (ref 70–99)
HCT VFR BLD CALC: 37.3 % — SIGNIFICANT CHANGE UP (ref 34.5–45)
HGB BLD-MCNC: 12.3 G/DL — SIGNIFICANT CHANGE UP (ref 11.5–15.5)
IMM GRANULOCYTES NFR BLD AUTO: 0.3 % — SIGNIFICANT CHANGE UP (ref 0–0.9)
LYMPHOCYTES # BLD AUTO: 2.59 K/UL — SIGNIFICANT CHANGE UP (ref 1–3.3)
LYMPHOCYTES # BLD AUTO: 35.4 % — SIGNIFICANT CHANGE UP (ref 13–44)
MCHC RBC-ENTMCNC: 33 GM/DL — SIGNIFICANT CHANGE UP (ref 32–36)
MCHC RBC-ENTMCNC: 33.7 PG — SIGNIFICANT CHANGE UP (ref 27–34)
MCV RBC AUTO: 102.2 FL — HIGH (ref 80–100)
MONOCYTES # BLD AUTO: 0.72 K/UL — SIGNIFICANT CHANGE UP (ref 0–0.9)
MONOCYTES NFR BLD AUTO: 9.8 % — SIGNIFICANT CHANGE UP (ref 2–14)
NEUTROPHILS # BLD AUTO: 3.75 K/UL — SIGNIFICANT CHANGE UP (ref 1.8–7.4)
NEUTROPHILS NFR BLD AUTO: 51.3 % — SIGNIFICANT CHANGE UP (ref 43–77)
PLATELET # BLD AUTO: 236 K/UL — SIGNIFICANT CHANGE UP (ref 150–400)
POTASSIUM SERPL-MCNC: 3.7 MMOL/L — SIGNIFICANT CHANGE UP (ref 3.5–5.3)
POTASSIUM SERPL-SCNC: 3.7 MMOL/L — SIGNIFICANT CHANGE UP (ref 3.5–5.3)
RBC # BLD: 3.65 M/UL — LOW (ref 3.8–5.2)
RBC # FLD: 13.2 % — SIGNIFICANT CHANGE UP (ref 10.3–14.5)
SODIUM SERPL-SCNC: 143 MMOL/L — SIGNIFICANT CHANGE UP (ref 135–145)
WBC # BLD: 7.32 K/UL — SIGNIFICANT CHANGE UP (ref 3.8–10.5)
WBC # FLD AUTO: 7.32 K/UL — SIGNIFICANT CHANGE UP (ref 3.8–10.5)

## 2023-03-17 PROCEDURE — 99232 SBSQ HOSP IP/OBS MODERATE 35: CPT

## 2023-03-17 RX ADMIN — SIMVASTATIN 20 MILLIGRAM(S): 20 TABLET, FILM COATED ORAL at 21:49

## 2023-03-17 RX ADMIN — Medication 1 DROP(S): at 11:53

## 2023-03-17 RX ADMIN — Medication 1000 UNIT(S): at 09:22

## 2023-03-17 RX ADMIN — CITALOPRAM 20 MILLIGRAM(S): 10 TABLET, FILM COATED ORAL at 09:22

## 2023-03-17 RX ADMIN — CEFTRIAXONE 1000 MILLIGRAM(S): 500 INJECTION, POWDER, FOR SOLUTION INTRAMUSCULAR; INTRAVENOUS at 18:12

## 2023-03-17 RX ADMIN — Medication 1 TABLET(S): at 18:14

## 2023-03-17 RX ADMIN — Medication 1 TABLET(S): at 09:22

## 2023-03-17 RX ADMIN — ENOXAPARIN SODIUM 30 MILLIGRAM(S): 100 INJECTION SUBCUTANEOUS at 09:22

## 2023-03-17 RX ADMIN — SODIUM CHLORIDE 75 MILLILITER(S): 9 INJECTION INTRAMUSCULAR; INTRAVENOUS; SUBCUTANEOUS at 16:52

## 2023-03-17 RX ADMIN — LATANOPROST 1 DROP(S): 0.05 SOLUTION/ DROPS OPHTHALMIC; TOPICAL at 21:49

## 2023-03-17 RX ADMIN — SENNA PLUS 2 TABLET(S): 8.6 TABLET ORAL at 21:47

## 2023-03-17 RX ADMIN — DONEPEZIL HYDROCHLORIDE 10 MILLIGRAM(S): 10 TABLET, FILM COATED ORAL at 21:49

## 2023-03-18 PROCEDURE — 99232 SBSQ HOSP IP/OBS MODERATE 35: CPT

## 2023-03-18 RX ADMIN — SENNA PLUS 2 TABLET(S): 8.6 TABLET ORAL at 21:33

## 2023-03-18 RX ADMIN — CEFTRIAXONE 1000 MILLIGRAM(S): 500 INJECTION, POWDER, FOR SOLUTION INTRAMUSCULAR; INTRAVENOUS at 18:47

## 2023-03-18 RX ADMIN — ENOXAPARIN SODIUM 30 MILLIGRAM(S): 100 INJECTION SUBCUTANEOUS at 12:27

## 2023-03-18 RX ADMIN — SIMVASTATIN 20 MILLIGRAM(S): 20 TABLET, FILM COATED ORAL at 21:32

## 2023-03-18 RX ADMIN — CITALOPRAM 20 MILLIGRAM(S): 10 TABLET, FILM COATED ORAL at 12:27

## 2023-03-18 RX ADMIN — DONEPEZIL HYDROCHLORIDE 10 MILLIGRAM(S): 10 TABLET, FILM COATED ORAL at 21:32

## 2023-03-18 RX ADMIN — Medication 1 TABLET(S): at 08:32

## 2023-03-18 RX ADMIN — Medication 3 MILLIGRAM(S): at 21:34

## 2023-03-19 PROCEDURE — 99232 SBSQ HOSP IP/OBS MODERATE 35: CPT

## 2023-03-19 RX ADMIN — Medication 1 TABLET(S): at 17:15

## 2023-03-19 RX ADMIN — Medication 1 TABLET(S): at 21:11

## 2023-03-19 RX ADMIN — Medication 1 TABLET(S): at 10:58

## 2023-03-19 RX ADMIN — SENNA PLUS 2 TABLET(S): 8.6 TABLET ORAL at 21:12

## 2023-03-19 RX ADMIN — Medication 650 MILLIGRAM(S): at 20:03

## 2023-03-19 RX ADMIN — Medication 650 MILLIGRAM(S): at 20:33

## 2023-03-19 RX ADMIN — ENOXAPARIN SODIUM 30 MILLIGRAM(S): 100 INJECTION SUBCUTANEOUS at 10:58

## 2023-03-19 RX ADMIN — SIMVASTATIN 20 MILLIGRAM(S): 20 TABLET, FILM COATED ORAL at 21:12

## 2023-03-19 RX ADMIN — LATANOPROST 1 DROP(S): 0.05 SOLUTION/ DROPS OPHTHALMIC; TOPICAL at 21:12

## 2023-03-19 RX ADMIN — Medication 1000 UNIT(S): at 10:59

## 2023-03-19 RX ADMIN — DONEPEZIL HYDROCHLORIDE 10 MILLIGRAM(S): 10 TABLET, FILM COATED ORAL at 21:12

## 2023-03-19 RX ADMIN — CITALOPRAM 20 MILLIGRAM(S): 10 TABLET, FILM COATED ORAL at 10:59

## 2023-03-19 RX ADMIN — Medication 1 TABLET(S): at 11:42

## 2023-03-19 NOTE — PROGRESS NOTE ADULT - SUBJECTIVE AND OBJECTIVE BOX
HPI: 87 F with PMHx Dementia, recurrent UTIs, HLD, glaucoma, from [facility], presented to ER for weakness and lethargy for the past day. Patient was seen in ER 3/7/23 diagnosed with UTI and treated with bactrim. Urine Cx at that time was contaminated with > 3 organisms. Patient was recently admitted 23-23 for encephalopathy due to UTI. In ER, vitals were stable, afebrile, CT abdomen/pelvis noted with distended bladder and distended rectum with stool. CT head was unremarkable for acute changes. Treated with ceftriaxone in ER. Blood and urine cultures were collected.  (16 Mar 2023 11:06)    Subjective: Patient seen and examined at bedside, doing well, no overnight issues, started Augmentin as per sensitivities.       MEDICATIONS  (STANDING):  amoxicillin  875 milliGRAM(s)/clavulanate 1 Tablet(s) Oral two times a day  cholecalciferol 1000 Unit(s) Oral daily  citalopram 20 milliGRAM(s) Oral daily  donepezil 10 milliGRAM(s) Oral at bedtime  enoxaparin Injectable 30 milliGRAM(s) SubCutaneous every 24 hours  lactobacillus acidophilus 1 Tablet(s) Oral two times a day with meals  latanoprost 0.005% Ophthalmic Solution 1 Drop(s) Both EYES at bedtime  senna 2 Tablet(s) Oral at bedtime  simvastatin 20 milliGRAM(s) Oral at bedtime  timolol 0.5% Solution 1 Drop(s) Both EYES daily    MEDICATIONS  (PRN):  acetaminophen     Tablet .. 650 milliGRAM(s) Oral every 6 hours PRN Temp greater or equal to 38C (100.4F), Mild Pain (1 - 3)  aluminum hydroxide/magnesium hydroxide/simethicone Suspension 30 milliLiter(s) Oral every 4 hours PRN Dyspepsia  melatonin 3 milliGRAM(s) Oral at bedtime PRN Insomnia  ondansetron Injectable 4 milliGRAM(s) IV Push every 8 hours PRN Nausea and/or Vomiting    Vital Signs Last 24 Hrs  T(C): 36.4 (19 Mar 2023 08:24), Max: 36.8 (18 Mar 2023 16:47)  T(F): 97.5 (19 Mar 2023 08:24), Max: 98.2 (18 Mar 2023 16:47)  HR: 77 (19 Mar 2023 08:24) (62 - 79)  BP: 118/85 (19 Mar 2023 08:24) (113/86 - 118/85)  BP(mean): 96 (18 Mar 2023 21:30) (96 - 96)  RR: 19 (19 Mar 2023 08:24) (18 - 19)  SpO2: 99% (19 Mar 2023 08:24) (98% - 99%)    Parameters below as of 19 Mar 2023 08:24  Patient On (Oxygen Delivery Method): room air        PHYSICAL EXAM:  GENERAL: NAD, lying in bed comfortably  HEAD:  Atraumatic, Normocephalic  EYES: conjunctiva and sclera clear  ENT: Moist mucous membranes  NECK: Supple, No JVD  CHEST/LUNG: Clear to auscultation bilaterally; No rales, rhonchi, wheezing. Unlabored respirations  HEART: Regular rate and rhythm; No murmurs  ABDOMEN: Bowel sounds present; Soft, Nontender, Nondistended.   EXTREMITIES:  2+ Peripheral Pulses, brisk capillary refill. No clubbing, cyanosis, or edema  NERVOUS SYSTEM:  Awake/alert. No deficits   MSK: FROM all 4 extremities, full and equal strength          LABS:                            12.3   7.32  )-----------( 236      ( 17 Mar 2023 07:04 )             37.3   03-17    143  |  115<H>  |  9   ----------------------------<  92  3.7   |  23  |  0.85    Ca    8.8      17 Mar 2023 07:04            Urinalysis Basic - ( 15 Mar 2023 17:54 )    Color: Yellow / Appearance: Slightly Turbid / S.015 / pH: x  Gluc: x / Ketone: Negative  / Bili: Negative / Urobili: Negative   Blood: x / Protein: 15 / Nitrite: Negative   Leuk Esterase: Moderate / RBC: 0-2 /HPF / WBC >50 /HPF   Sq Epi: x / Non Sq Epi: Occasional / Bacteria: Many        RADIOLOGY:        
HPI: 87 F with PMHx Dementia, recurrent UTIs, HLD, glaucoma, from [facility], presented to ER for weakness and lethargy for the past day. Patient was seen in ER 3/7/23 diagnosed with UTI and treated with bactrim. Urine Cx at that time was contaminated with > 3 organisms. Patient was recently admitted 23-23 for encephalopathy due to UTI. In ER, vitals were stable, afebrile, CT abdomen/pelvis noted with distended bladder and distended rectum with stool. CT head was unremarkable for acute changes. Treated with ceftriaxone in ER. Blood and urine cultures were collected.  (16 Mar 2023 11:06)    Subjective: Patient seen and examined at bedside, daughter present, no overnight issues reported.       MEDICATIONS  (STANDING):  cefTRIAXone Injectable. 1000 milliGRAM(s) IV Push every 24 hours  cholecalciferol 1000 Unit(s) Oral daily  citalopram 20 milliGRAM(s) Oral daily  donepezil 10 milliGRAM(s) Oral at bedtime  enoxaparin Injectable 30 milliGRAM(s) SubCutaneous every 24 hours  lactobacillus acidophilus 1 Tablet(s) Oral two times a day with meals  latanoprost 0.005% Ophthalmic Solution 1 Drop(s) Both EYES at bedtime  senna 2 Tablet(s) Oral at bedtime  simvastatin 20 milliGRAM(s) Oral at bedtime  sodium chloride 0.9%. 1000 milliLiter(s) (75 mL/Hr) IV Continuous <Continuous>  timolol 0.5% Solution 1 Drop(s) Both EYES daily    MEDICATIONS  (PRN):  acetaminophen     Tablet .. 650 milliGRAM(s) Oral every 6 hours PRN Temp greater or equal to 38C (100.4F), Mild Pain (1 - 3)  aluminum hydroxide/magnesium hydroxide/simethicone Suspension 30 milliLiter(s) Oral every 4 hours PRN Dyspepsia  melatonin 3 milliGRAM(s) Oral at bedtime PRN Insomnia  ondansetron Injectable 4 milliGRAM(s) IV Push every 8 hours PRN Nausea and/or Vomiting    Vital Signs Last 24 Hrs  T(C): 36.8 (18 Mar 2023 16:47), Max: 36.8 (18 Mar 2023 16:47)  T(F): 98.2 (18 Mar 2023 16:47), Max: 98.2 (18 Mar 2023 16:47)  HR: 62 (18 Mar 2023 16:47) (62 - 68)  BP: 114/58 (18 Mar 2023 16:47) (86/46 - 116/84)  BP(mean): 60 (17 Mar 2023 21:26) (60 - 60)  RR: 18 (18 Mar 2023 16:47) (18 - 19)  SpO2: 98% (18 Mar 2023 16:47) (98% - 98%)    Parameters below as of 18 Mar 2023 16:47  Patient On (Oxygen Delivery Method): room air    PHYSICAL EXAM:  GENERAL: NAD, lying in bed comfortably  HEAD:  Atraumatic, Normocephalic  EYES: conjunctiva and sclera clear  ENT: Moist mucous membranes  NECK: Supple, No JVD  CHEST/LUNG: Clear to auscultation bilaterally; No rales, rhonchi, wheezing. Unlabored respirations  HEART: Regular rate and rhythm; No murmurs  ABDOMEN: Bowel sounds present; Soft, Nontender, Nondistended.   EXTREMITIES:  2+ Peripheral Pulses, brisk capillary refill. No clubbing, cyanosis, or edema  NERVOUS SYSTEM:  Awake/alert. No deficits   MSK: FROM all 4 extremities, full and equal strength          LABS:                            12.3   7.32  )-----------( 236      ( 17 Mar 2023 07:04 )             37.3   03-17    143  |  115<H>  |  9   ----------------------------<  92  3.7   |  23  |  0.85    Ca    8.8      17 Mar 2023 07:04            Urinalysis Basic - ( 15 Mar 2023 17:54 )    Color: Yellow / Appearance: Slightly Turbid / S.015 / pH: x  Gluc: x / Ketone: Negative  / Bili: Negative / Urobili: Negative   Blood: x / Protein: 15 / Nitrite: Negative   Leuk Esterase: Moderate / RBC: 0-2 /HPF / WBC >50 /HPF   Sq Epi: x / Non Sq Epi: Occasional / Bacteria: Many        RADIOLOGY:        
HPI: 87 F with PMHx Dementia, recurrent UTIs, HLD, glaucoma, from [facility], presented to ER for weakness and lethargy for the past day. Patient was seen in ER 3/7/23 diagnosed with UTI and treated with bactrim. Urine Cx at that time was contaminated with > 3 organisms. Patient was recently admitted 23-23 for encephalopathy due to UTI. In ER, vitals were stable, afebrile, CT abdomen/pelvis noted with distended bladder and distended rectum with stool. CT head was unremarkable for acute changes. Treated with ceftriaxone in ER. Blood and urine cultures were collected.  (16 Mar 2023 11:06)    Subjective: Patient seen and examined at bedside, daughter present, doing well, tolerating orally. At baseline.       MEDICATIONS  (STANDING):  cefTRIAXone Injectable. 1000 milliGRAM(s) IV Push every 24 hours  cholecalciferol 1000 Unit(s) Oral daily  citalopram 20 milliGRAM(s) Oral daily  donepezil 10 milliGRAM(s) Oral at bedtime  enoxaparin Injectable 30 milliGRAM(s) SubCutaneous every 24 hours  lactobacillus acidophilus 1 Tablet(s) Oral two times a day with meals  latanoprost 0.005% Ophthalmic Solution 1 Drop(s) Both EYES at bedtime  senna 2 Tablet(s) Oral at bedtime  simvastatin 20 milliGRAM(s) Oral at bedtime  sodium chloride 0.9%. 1000 milliLiter(s) (75 mL/Hr) IV Continuous <Continuous>  timolol 0.5% Solution 1 Drop(s) Both EYES daily    MEDICATIONS  (PRN):  acetaminophen     Tablet .. 650 milliGRAM(s) Oral every 6 hours PRN Temp greater or equal to 38C (100.4F), Mild Pain (1 - 3)  aluminum hydroxide/magnesium hydroxide/simethicone Suspension 30 milliLiter(s) Oral every 4 hours PRN Dyspepsia  melatonin 3 milliGRAM(s) Oral at bedtime PRN Insomnia  ondansetron Injectable 4 milliGRAM(s) IV Push every 8 hours PRN Nausea and/or Vomiting    Vital Signs Last 24 Hrs  T(C): 36.3 (17 Mar 2023 08:07), Max: 36.4 (16 Mar 2023 15:07)  T(F): 97.3 (17 Mar 2023 08:07), Max: 97.5 (16 Mar 2023 15:07)  HR: 66 (17 Mar 2023 08:07) (66 - 78)  BP: 115/66 (17 Mar 2023 08:07) (100/51 - 129/70)  RR: 19 (17 Mar 2023 08:07) (18 - 19)  SpO2: 99% (17 Mar 2023 08:07) (99% - 99%)    Parameters below as of 17 Mar 2023 08:07  Patient On (Oxygen Delivery Method): room air    PHYSICAL EXAM:  GENERAL: NAD, lying in bed comfortably  HEAD:  Atraumatic, Normocephalic  EYES: conjunctiva and sclera clear  ENT: Moist mucous membranes  NECK: Supple, No JVD  CHEST/LUNG: Clear to auscultation bilaterally; No rales, rhonchi, wheezing. Unlabored respirations  HEART: Regular rate and rhythm; No murmurs  ABDOMEN: Bowel sounds present; Soft, Nontender, Nondistended.   EXTREMITIES:  2+ Peripheral Pulses, brisk capillary refill. No clubbing, cyanosis, or edema  NERVOUS SYSTEM:  Awake/alert. No deficits   MSK: FROM all 4 extremities, full and equal strength          LABS:                          12.3   7.32  )-----------( 236      ( 17 Mar 2023 07:04 )             37.3     17 Mar 2023 07:04    143    |  115    |  9      ----------------------------<  92     3.7     |  23     |  0.85     Ca    8.8        17 Mar 2023 07:04    TPro  7.2    /  Alb  3.1    /  TBili  0.3    /  DBili  x      /  AST  31     /  ALT  24     /  AlkPhos  116    15 Mar 2023 15:01    LIVER FUNCTIONS - ( 15 Mar 2023 15:01 )  Alb: 3.1 g/dL / Pro: 7.2 gm/dL / ALK PHOS: 116 U/L / ALT: 24 U/L / AST: 31 U/L / GGT: x           PT/INR - ( 15 Mar 2023 15:01 )   PT: 12.3 sec;   INR: 1.06 ratio         PTT - ( 15 Mar 2023 15:01 )  PTT:29.4 sec  CAPILLARY BLOOD GLUCOSE            Urinalysis Basic - ( 15 Mar 2023 17:54 )    Color: Yellow / Appearance: Slightly Turbid / S.015 / pH: x  Gluc: x / Ketone: Negative  / Bili: Negative / Urobili: Negative   Blood: x / Protein: 15 / Nitrite: Negative   Leuk Esterase: Moderate / RBC: 0-2 /HPF / WBC >50 /HPF   Sq Epi: x / Non Sq Epi: Occasional / Bacteria: Many        RADIOLOGY:

## 2023-03-19 NOTE — PROGRESS NOTE ADULT - NUTRITIONAL ASSESSMENT
This patient has been assessed with a concern for Malnutrition and has been determined to have a diagnosis/diagnoses of Severe protein-calorie malnutrition and Underweight (BMI < 19).    This patient is being managed with:   Diet Pureed-  Moderately Thick Liquids (MODTHICKLIQS)  Supplement Feeding Modality:  Oral  Ensure Plus High Protein Cans or Servings Per Day:  1       Frequency:  Two Times a day  Entered: Mar 16 2023  9:40AM    

## 2023-03-19 NOTE — PROGRESS NOTE ADULT - PROVIDER SPECIALTY LIST ADULT
Hospitalist
[email or web address]

## 2023-03-19 NOTE — PROGRESS NOTE ADULT - ASSESSMENT
87 F with PMHx Dementia, recurrent UTIs, HLD, glaucoma, from [facility], presented to ER for weakness and lethargy for the past day. Patient was seen in ER 3/7/23 diagnosed with UTI and treated with bactrim. Urine Cx at that time was contaminated with > 3 organisms. Patient was recently admitted 2/2/23-2/7/23 for encephalopathy due to UTI. In ER, vitals were stable, afebrile, CT abdomen/pelvis noted with distended bladder and distended rectum with stool. CT head was unremarkable for acute changes. Treated with ceftriaxone in ER. Blood and urine cultures were collected.     Acute UTI  -UA noted, culture: Enterococcus f/u sensitivities    -S/P ceftriaxone in ER, will continue --> day #4  -Blood cultures NGTD   -Encourage adequate hydration   -Bladder scan q6h, if no spontaneous voiding     Constipation   -Patient had small BM in ER  -Bowel regimen as per orders    Dementia  -Continue memantine, donepezil, citalopram  -Supportive care   -Redirect as needed  -She is non ambulatory   -Fall and aspiration precautions     HLD  -Continue simvastatin     Glaucoma   -Continue timolol, latanoprost     VTE ppx  -Lovenox 30mg q24h    Severe protein-calorie malnutrition and Underweight (BMI < 19)  -Nutrition consult appreciated    Advanced directives  -DNR/DNI  -MOLST in chart     Discussed with patient's daughter at bedside     PT eval    await sensitivities for urine cx  
87 F with PMHx Dementia, recurrent UTIs, HLD, glaucoma, from [facility], presented to ER for weakness and lethargy for the past day. Patient was seen in ER 3/7/23 diagnosed with UTI and treated with bactrim. Urine Cx at that time was contaminated with > 3 organisms. Patient was recently admitted 2/2/23-2/7/23 for encephalopathy due to UTI. In ER, vitals were stable, afebrile, CT abdomen/pelvis noted with distended bladder and distended rectum with stool. CT head was unremarkable for acute changes. Treated with ceftriaxone in ER. Blood and urine cultures were collected.     Acute UTI  -UA noted, culture: Enterococcus f/u sensitivities    -S/P ceftriaxone   -Blood cultures NGTD   -Encourage adequate hydration   -Bladder scan q6h, if no spontaneous voiding   -Started Augmentin     Constipation   -Patient had small BM in ER  -Bowel regimen as per orders    Dementia  -Continue memantine, donepezil, citalopram  -Supportive care   -Redirect as needed  -She is non ambulatory   -Fall and aspiration precautions     HLD  -Continue simvastatin     Glaucoma   -Continue timolol, latanoprost     VTE ppx  -Lovenox 30mg q24h    Severe protein-calorie malnutrition and Underweight (BMI < 19)  -Nutrition consult appreciated    Advanced directives  -DNR/DNI  -MOLST in chart     Discussed with patient's daughter at bedside     PT eval    DISPO: discharge tomorrow   
87 F with PMHx Dementia, recurrent UTIs, HLD, glaucoma, from [facility], presented to ER for weakness and lethargy for the past day. Patient was seen in ER 3/7/23 diagnosed with UTI and treated with bactrim. Urine Cx at that time was contaminated with > 3 organisms. Patient was recently admitted 2/2/23-2/7/23 for encephalopathy due to UTI. In ER, vitals were stable, afebrile, CT abdomen/pelvis noted with distended bladder and distended rectum with stool. CT head was unremarkable for acute changes. Treated with ceftriaxone in ER. Blood and urine cultures were collected.     Acute UTI  -UA noted, culture: Enterococcus f/u sensitivities    -S/P ceftriaxone in ER, will continue --> day #3  -Blood cultures NGTD   -Encourage adequate hydration   -Bladder scan q6h, if no spontaneous voiding     Constipation   -Patient had small BM in ER  -Bowel regimen as per orders  -Pt had 2 BMs    Dementia  -Continue memantine, donepezil, citalopram  -Supportive care   -Redirect as needed  -She is non ambulatory   -Fall and aspiration precautions     HLD  -Continue simvastatin     Glaucoma   -Continue timolol, latanoprost     VTE ppx  -Lovenox 30mg q24h    Advanced directives  -DNR/DNI  -MOLST in chart     Discussed with patient's daughter at bedside     PT eval    await sensitivities for urine cx

## 2023-03-20 ENCOUNTER — TRANSCRIPTION ENCOUNTER (OUTPATIENT)
Age: 88
End: 2023-03-20

## 2023-03-20 VITALS
HEART RATE: 78 BPM | RESPIRATION RATE: 18 BRPM | OXYGEN SATURATION: 98 % | SYSTOLIC BLOOD PRESSURE: 111 MMHG | DIASTOLIC BLOOD PRESSURE: 64 MMHG | TEMPERATURE: 98 F

## 2023-03-20 LAB
CULTURE RESULTS: SIGNIFICANT CHANGE UP
CULTURE RESULTS: SIGNIFICANT CHANGE UP
SARS-COV-2 RNA SPEC QL NAA+PROBE: SIGNIFICANT CHANGE UP
SPECIMEN SOURCE: SIGNIFICANT CHANGE UP
SPECIMEN SOURCE: SIGNIFICANT CHANGE UP

## 2023-03-20 PROCEDURE — 99239 HOSP IP/OBS DSCHRG MGMT >30: CPT

## 2023-03-20 RX ORDER — SENNA PLUS 8.6 MG/1
2 TABLET ORAL
Qty: 0 | Refills: 0 | DISCHARGE
Start: 2023-03-20

## 2023-03-20 RX ADMIN — CITALOPRAM 20 MILLIGRAM(S): 10 TABLET, FILM COATED ORAL at 10:02

## 2023-03-20 RX ADMIN — ENOXAPARIN SODIUM 30 MILLIGRAM(S): 100 INJECTION SUBCUTANEOUS at 10:02

## 2023-03-20 RX ADMIN — Medication 1000 UNIT(S): at 10:02

## 2023-03-20 RX ADMIN — Medication 1 TABLET(S): at 10:02

## 2023-03-20 NOTE — DISCHARGE NOTE PROVIDER - HOSPITAL COURSE
87 F with PMHx Dementia, recurrent UTIs, HLD, glaucoma, from [facility], presented to ER for weakness and lethargy for the past day. Patient was seen in ER 3/7/23 diagnosed with UTI and treated with bactrim. Urine Cx at that time was contaminated with > 3 organisms. Patient was recently admitted 2/2/23-2/7/23 for encephalopathy due to UTI. In ER, vitals were stable, afebrile, CT abdomen/pelvis noted with distended bladder and distended rectum with stool. CT head was unremarkable for acute changes. Treated with ceftriaxone in ER. Blood and urine cultures were collected.     Acute UTI  -UA noted, culture: Enterococcus f/u sensitivities    -S/P ceftriaxone   -Blood cultures NGTD   -Encourage adequate hydration   -Bladder scan q6h, if no spontaneous voiding   -Started Augmentin     Constipation   -Patient had small BM in ER  -Bowel regimen as per orders    Dementia  -Continue memantine, donepezil, citalopram  -Supportive care   -Redirect as needed  -She is non ambulatory   -Fall and aspiration precautions     HLD  -Continue simvastatin     Glaucoma   -Continue timolol, latanoprost     VTE ppx  -Lovenox 30mg q24h    Severe protein-calorie malnutrition and Underweight (BMI < 19)  -Nutrition consult appreciated    Advanced directives  -DNR/DNI  -MOLST in chart

## 2023-03-20 NOTE — DISCHARGE NOTE NURSING/CASE MANAGEMENT/SOCIAL WORK - NSDCVIVACCINE_GEN_ALL_CORE_FT
Moderna COVID-19 Vaccine, Bivalent; 07-Oct-2022 13:40; Gudelia Fraire (RN); [employer] [id number] (Exp. Date: 27-May-2023); IntraMuscular; Deltoid Left.; 0.5 milliLiter(s);

## 2023-03-20 NOTE — PHYSICAL THERAPY INITIAL EVALUATION ADULT - PERTINENT HX OF CURRENT PROBLEM, REHAB EVAL
7 F with PMHx Dementia, recurrent UTIs, HLD, glaucoma, from [facility], presented to ER for weakness and lethargy for the past day. Pt. s/p UTI, CT head was unremarkable for acute changes. Patient was recently admitted 2/2/23-2/7/23 for encephalopathy due to UTI. 87 F with PMHx Dementia, recurrent UTIs, HLD, glaucoma, from [facility], presented to ER for weakness and lethargy for the past day. Pt. s/p UTI, CT head was unremarkable for acute changes. Patient was recently admitted 2/2/23-2/7/23 for encephalopathy due to UTI.

## 2023-03-20 NOTE — DISCHARGE NOTE PROVIDER - NSDCMRMEDTOKEN_GEN_ALL_CORE_FT
acetaminophen 325 mg oral tablet: 2 tab(s) orally every 6 hours, As needed, Mild Pain (1 - 3)  amoxicillin-clavulanate 875 mg-125 mg oral tablet: 1 tab(s) orally 2 times a day  citalopram 20 mg oral tablet: 1 tab(s) orally once a day  Decara 1250 mcg (50,000 intl units) oral capsule: 1 cap(s) orally every 4 weeks  donepezil 10 mg oral tablet: 1 tab(s) orally once a day (at bedtime)  lactobacillus acidophilus oral capsule: 1 cap(s) orally 2 times a day  latanoprost 0.005% ophthalmic solution: 1 drop(s) in each eye once a day (at bedtime)  lidocaine 4% patch: Apply topically to affected area once a day  memantine 28 mg oral capsule, extended release: 1 cap(s) orally once a day  senna leaf extract oral tablet: 2 tab(s) orally once a day (at bedtime)  simvastatin 20 mg oral tablet: 1 tab(s) orally once a day (at bedtime)  timolol 0.5% ophthalmic solution: 1 drop(s) in each eye once a day

## 2023-03-20 NOTE — DISCHARGE NOTE PROVIDER - NSDCCPCAREPLAN_GEN_ALL_CORE_FT
PRINCIPAL DISCHARGE DIAGNOSIS  Diagnosis: Acute UTI  Assessment and Plan of Treatment: Admitted for urinary tract infection, treated initially with ceftriaxone then changed to augmentin. Plan to continue with augmentin. Ensure adequate hydration, hygiene, ensure she is treated for constipation.      SECONDARY DISCHARGE DIAGNOSES  Diagnosis: RAJIV (acute kidney injury)  Assessment and Plan of Treatment: No resolved.

## 2023-03-20 NOTE — DISCHARGE NOTE PROVIDER - DETAILS OF MALNUTRITION DIAGNOSIS/DIAGNOSES
This patient has been assessed with a concern for Malnutrition and was treated during this hospitalization for the following Nutrition diagnosis/diagnoses:     -  03/16/2023: Severe protein-calorie malnutrition   -  03/16/2023: Underweight (BMI < 19)

## 2023-03-20 NOTE — DISCHARGE NOTE PROVIDER - CARE PROVIDER_API CALL
Fior Hughes)  Internal Medicine  [address]\[place] [address]  Phone: [phone]  Fax: [phone]  Follow Up Time:

## 2023-03-20 NOTE — DISCHARGE NOTE PROVIDER - ATTENDING DISCHARGE PHYSICAL EXAMINATION:
Patient seen thia morning, daughter at bedside, has no complaints.     Vital Signs Last 24 Hrs  T(C): 36.3 (20 Mar 2023 07:44), Max: 36.5 (19 Mar 2023 21:33)  T(F): 97.3 (20 Mar 2023 07:44), Max: 97.7 (19 Mar 2023 21:33)  HR: 73 (20 Mar 2023 07:44) (73 - 88)  BP: 98/58 (20 Mar 2023 07:44) (98/58 - 103/52)  RR: 18 (20 Mar 2023 07:44) (17 - 18)  SpO2: 98% (20 Mar 2023 07:44) (95% - 98%)    Parameters below as of 20 Mar 2023 07:44  Patient On (Oxygen Delivery Method): room air    PHYSICAL EXAM:  GENERAL: NAD, lying in bed comfortably  HEAD:  Atraumatic, Normocephalic  EYES: conjunctiva and sclera clear  ENT: Moist mucous membranes  NECK: Supple, No JVD  CHEST/LUNG: Clear to auscultation bilaterally; No rales, rhonchi, wheezing. Unlabored respirations  HEART: Regular rate and rhythm; No murmurs  ABDOMEN: Bowel sounds present; Soft, Nontender, Nondistended.   EXTREMITIES:  2+ Peripheral Pulses, brisk capillary refill. No clubbing, cyanosis, or edema  NERVOUS SYSTEM:  Awake/alert. No deficits   MSK: FROM all 4 extremities, full and equal strength

## 2023-03-20 NOTE — PHYSICAL THERAPY INITIAL EVALUATION ADULT - GENERAL OBSERVATIONS, REHAB EVAL
Pt. received supine in bed A+Ox1( first name only), Bed mob with Mod A, st by EOB with Mod A, Pt. attempted to stand Pt. to RW then Pt. suddenly Pt. flung herself back to bed. Pt. refuses to follow commands.

## 2023-03-20 NOTE — DISCHARGE NOTE PROVIDER - CARE PROVIDERS DIRECT ADDRESSES
[email or web address]

## 2023-03-20 NOTE — DISCHARGE NOTE NURSING/CASE MANAGEMENT/SOCIAL WORK - PATIENT PORTAL LINK FT
You can access the FollowMyHealth Patient Portal offered by [facility] by registering at the following website: [email or web address]. By joining [employer]’s FollowMyHealth portal, you will also be able to view your health information using other applications (apps) compatible with our system.

## 2023-03-20 NOTE — PHYSICAL THERAPY INITIAL EVALUATION ADULT - ADDITIONAL COMMENTS
Pt. is from [facility] but prior to that was living with Private aides at home.

## 2023-03-24 DIAGNOSIS — N17.9 ACUTE KIDNEY FAILURE, UNSPECIFIED: ICD-10-CM

## 2023-03-24 DIAGNOSIS — E43 UNSPECIFIED SEVERE PROTEIN-CALORIE MALNUTRITION: ICD-10-CM

## 2023-03-24 DIAGNOSIS — Z80.0 FAMILY HISTORY OF MALIGNANT NEOPLASM OF DIGESTIVE ORGANS: ICD-10-CM

## 2023-03-24 DIAGNOSIS — E78.5 HYPERLIPIDEMIA, UNSPECIFIED: ICD-10-CM

## 2023-03-24 DIAGNOSIS — N39.0 URINARY TRACT INFECTION, SITE NOT SPECIFIED: ICD-10-CM

## 2023-03-24 DIAGNOSIS — H40.9 UNSPECIFIED GLAUCOMA: ICD-10-CM

## 2023-03-24 DIAGNOSIS — B95.2 ENTEROCOCCUS AS THE CAUSE OF DISEASES CLASSIFIED ELSEWHERE: ICD-10-CM

## 2023-03-24 DIAGNOSIS — Z66 DO NOT RESUSCITATE: ICD-10-CM

## 2023-03-24 DIAGNOSIS — F02.80 DEMENTIA IN OTHER DISEASES CLASSIFIED ELSEWHERE, UNSPECIFIED SEVERITY, WITHOUT BEHAVIORAL DISTURBANCE, PSYCHOTIC DISTURBANCE, MOOD DISTURBANCE, AND ANXIETY: ICD-10-CM

## 2023-03-24 DIAGNOSIS — G30.9 ALZHEIMER'S DISEASE, UNSPECIFIED: ICD-10-CM

## 2023-03-24 DIAGNOSIS — K59.00 CONSTIPATION, UNSPECIFIED: ICD-10-CM

## 2023-03-24 DIAGNOSIS — M16.11 UNILATERAL PRIMARY OSTEOARTHRITIS, RIGHT HIP: ICD-10-CM

## 2023-05-28 ENCOUNTER — INPATIENT (INPATIENT)
Facility: HOSPITAL | Age: 88
LOS: 4 days | Discharge: NURSING, REHAB OR LONG-TERM CARE | DRG: 177 | End: 2023-06-02
Attending: EMERGENCY MEDICINE | Admitting: STUDENT IN AN ORGANIZED HEALTH CARE EDUCATION/TRAINING PROGRAM
Payer: MEDICARE

## 2023-05-28 VITALS
OXYGEN SATURATION: 96 % | HEIGHT: 61 IN | SYSTOLIC BLOOD PRESSURE: 95 MMHG | HEART RATE: 71 BPM | WEIGHT: 95.02 LBS | DIASTOLIC BLOOD PRESSURE: 57 MMHG | RESPIRATION RATE: 18 BRPM

## 2023-05-28 DIAGNOSIS — J69.0 PNEUMONITIS DUE TO INHALATION OF FOOD AND VOMIT: ICD-10-CM

## 2023-05-28 DIAGNOSIS — Z98.89 OTHER SPECIFIED POSTPROCEDURAL STATES: Chronic | ICD-10-CM

## 2023-05-28 LAB
ALBUMIN SERPL ELPH-MCNC: 3.4 G/DL — SIGNIFICANT CHANGE UP (ref 3.3–5)
ALP SERPL-CCNC: 132 U/L — HIGH (ref 40–120)
ALT FLD-CCNC: 30 U/L — SIGNIFICANT CHANGE UP (ref 12–78)
ANION GAP SERPL CALC-SCNC: 4 MMOL/L — LOW (ref 5–17)
APTT BLD: 30.4 SEC — SIGNIFICANT CHANGE UP (ref 27.5–35.5)
AST SERPL-CCNC: 33 U/L — SIGNIFICANT CHANGE UP (ref 15–37)
BASOPHILS # BLD AUTO: 0.08 K/UL — SIGNIFICANT CHANGE UP (ref 0–0.2)
BASOPHILS NFR BLD AUTO: 0.8 % — SIGNIFICANT CHANGE UP (ref 0–2)
BILIRUB SERPL-MCNC: 0.5 MG/DL — SIGNIFICANT CHANGE UP (ref 0.2–1.2)
BUN SERPL-MCNC: 18 MG/DL — SIGNIFICANT CHANGE UP (ref 7–23)
CALCIUM SERPL-MCNC: 10.3 MG/DL — HIGH (ref 8.5–10.1)
CHLORIDE SERPL-SCNC: 108 MMOL/L — SIGNIFICANT CHANGE UP (ref 96–108)
CO2 SERPL-SCNC: 28 MMOL/L — SIGNIFICANT CHANGE UP (ref 22–31)
CREAT SERPL-MCNC: 0.87 MG/DL — SIGNIFICANT CHANGE UP (ref 0.5–1.3)
EGFR: 65 ML/MIN/1.73M2 — SIGNIFICANT CHANGE UP
EOSINOPHIL # BLD AUTO: 0.21 K/UL — SIGNIFICANT CHANGE UP (ref 0–0.5)
EOSINOPHIL NFR BLD AUTO: 2.2 % — SIGNIFICANT CHANGE UP (ref 0–6)
FLUAV AG NPH QL: SIGNIFICANT CHANGE UP
FLUBV AG NPH QL: SIGNIFICANT CHANGE UP
GLUCOSE SERPL-MCNC: 103 MG/DL — HIGH (ref 70–99)
HCT VFR BLD CALC: 43.5 % — SIGNIFICANT CHANGE UP (ref 34.5–45)
HGB BLD-MCNC: 14.4 G/DL — SIGNIFICANT CHANGE UP (ref 11.5–15.5)
IMM GRANULOCYTES NFR BLD AUTO: 0.2 % — SIGNIFICANT CHANGE UP (ref 0–0.9)
INR BLD: 0.96 RATIO — SIGNIFICANT CHANGE UP (ref 0.88–1.16)
LYMPHOCYTES # BLD AUTO: 1.1 K/UL — SIGNIFICANT CHANGE UP (ref 1–3.3)
LYMPHOCYTES # BLD AUTO: 11.6 % — LOW (ref 13–44)
MCHC RBC-ENTMCNC: 33.1 GM/DL — SIGNIFICANT CHANGE UP (ref 32–36)
MCHC RBC-ENTMCNC: 33.6 PG — SIGNIFICANT CHANGE UP (ref 27–34)
MCV RBC AUTO: 101.4 FL — HIGH (ref 80–100)
MONOCYTES # BLD AUTO: 0.83 K/UL — SIGNIFICANT CHANGE UP (ref 0–0.9)
MONOCYTES NFR BLD AUTO: 8.7 % — SIGNIFICANT CHANGE UP (ref 2–14)
NEUTROPHILS # BLD AUTO: 7.28 K/UL — SIGNIFICANT CHANGE UP (ref 1.8–7.4)
NEUTROPHILS NFR BLD AUTO: 76.5 % — SIGNIFICANT CHANGE UP (ref 43–77)
PLATELET # BLD AUTO: 252 K/UL — SIGNIFICANT CHANGE UP (ref 150–400)
POTASSIUM SERPL-MCNC: 4.4 MMOL/L — SIGNIFICANT CHANGE UP (ref 3.5–5.3)
POTASSIUM SERPL-SCNC: 4.4 MMOL/L — SIGNIFICANT CHANGE UP (ref 3.5–5.3)
PROT SERPL-MCNC: 7.4 GM/DL — SIGNIFICANT CHANGE UP (ref 6–8.3)
PROTHROM AB SERPL-ACNC: 11.1 SEC — SIGNIFICANT CHANGE UP (ref 10.5–13.4)
RBC # BLD: 4.29 M/UL — SIGNIFICANT CHANGE UP (ref 3.8–5.2)
RBC # FLD: 13 % — SIGNIFICANT CHANGE UP (ref 10.3–14.5)
RSV RNA NPH QL NAA+NON-PROBE: SIGNIFICANT CHANGE UP
SARS-COV-2 RNA SPEC QL NAA+PROBE: DETECTED
SODIUM SERPL-SCNC: 140 MMOL/L — SIGNIFICANT CHANGE UP (ref 135–145)
WBC # BLD: 9.52 K/UL — SIGNIFICANT CHANGE UP (ref 3.8–10.5)
WBC # FLD AUTO: 9.52 K/UL — SIGNIFICANT CHANGE UP (ref 3.8–10.5)

## 2023-05-28 PROCEDURE — 80076 HEPATIC FUNCTION PANEL: CPT

## 2023-05-28 PROCEDURE — 83550 IRON BINDING TEST: CPT

## 2023-05-28 PROCEDURE — 99285 EMERGENCY DEPT VISIT HI MDM: CPT

## 2023-05-28 PROCEDURE — 83540 ASSAY OF IRON: CPT

## 2023-05-28 PROCEDURE — 93005 ELECTROCARDIOGRAM TRACING: CPT

## 2023-05-28 PROCEDURE — 80048 BASIC METABOLIC PNL TOTAL CA: CPT

## 2023-05-28 PROCEDURE — 85025 COMPLETE CBC W/AUTO DIFF WBC: CPT

## 2023-05-28 PROCEDURE — 84100 ASSAY OF PHOSPHORUS: CPT

## 2023-05-28 PROCEDURE — 82728 ASSAY OF FERRITIN: CPT

## 2023-05-28 PROCEDURE — 83880 ASSAY OF NATRIURETIC PEPTIDE: CPT

## 2023-05-28 PROCEDURE — 85652 RBC SED RATE AUTOMATED: CPT

## 2023-05-28 PROCEDURE — 82550 ASSAY OF CK (CPK): CPT

## 2023-05-28 PROCEDURE — 36415 COLL VENOUS BLD VENIPUNCTURE: CPT

## 2023-05-28 PROCEDURE — 85027 COMPLETE CBC AUTOMATED: CPT

## 2023-05-28 PROCEDURE — 87635 SARS-COV-2 COVID-19 AMP PRB: CPT

## 2023-05-28 PROCEDURE — 83615 LACTATE (LD) (LDH) ENZYME: CPT

## 2023-05-28 PROCEDURE — 80061 LIPID PANEL: CPT

## 2023-05-28 PROCEDURE — 80053 COMPREHEN METABOLIC PANEL: CPT

## 2023-05-28 PROCEDURE — 82962 GLUCOSE BLOOD TEST: CPT

## 2023-05-28 PROCEDURE — 83036 HEMOGLOBIN GLYCOSYLATED A1C: CPT

## 2023-05-28 PROCEDURE — 82565 ASSAY OF CREATININE: CPT

## 2023-05-28 PROCEDURE — 85379 FIBRIN DEGRADATION QUANT: CPT

## 2023-05-28 PROCEDURE — 83605 ASSAY OF LACTIC ACID: CPT

## 2023-05-28 PROCEDURE — C9113: CPT

## 2023-05-28 PROCEDURE — 71045 X-RAY EXAM CHEST 1 VIEW: CPT | Mod: 26

## 2023-05-28 PROCEDURE — 84443 ASSAY THYROID STIM HORMONE: CPT

## 2023-05-28 PROCEDURE — 85384 FIBRINOGEN ACTIVITY: CPT

## 2023-05-28 PROCEDURE — 99223 1ST HOSP IP/OBS HIGH 75: CPT

## 2023-05-28 PROCEDURE — 85610 PROTHROMBIN TIME: CPT

## 2023-05-28 PROCEDURE — 83735 ASSAY OF MAGNESIUM: CPT

## 2023-05-28 PROCEDURE — 82746 ASSAY OF FOLIC ACID SERUM: CPT

## 2023-05-28 RX ORDER — ACETAMINOPHEN 500 MG
650 TABLET ORAL ONCE
Refills: 0 | Status: COMPLETED | OUTPATIENT
Start: 2023-05-28 | End: 2023-05-28

## 2023-05-28 RX ORDER — MEMANTINE HYDROCHLORIDE 10 MG/1
10 TABLET ORAL EVERY 12 HOURS
Refills: 0 | Status: DISCONTINUED | OUTPATIENT
Start: 2023-05-28 | End: 2023-06-02

## 2023-05-28 RX ORDER — LIDOCAINE 4 G/100G
1 CREAM TOPICAL EVERY 24 HOURS
Refills: 0 | Status: DISCONTINUED | OUTPATIENT
Start: 2023-05-28 | End: 2023-06-02

## 2023-05-28 RX ORDER — ACETAMINOPHEN 500 MG
975 TABLET ORAL EVERY 6 HOURS
Refills: 0 | Status: DISCONTINUED | OUTPATIENT
Start: 2023-05-28 | End: 2023-06-02

## 2023-05-28 RX ORDER — AZITHROMYCIN 500 MG/1
500 TABLET, FILM COATED ORAL EVERY 24 HOURS
Refills: 0 | Status: DISCONTINUED | OUTPATIENT
Start: 2023-05-29 | End: 2023-05-29

## 2023-05-28 RX ORDER — ACETAMINOPHEN 500 MG
650 TABLET ORAL EVERY 6 HOURS
Refills: 0 | Status: DISCONTINUED | OUTPATIENT
Start: 2023-05-28 | End: 2023-06-02

## 2023-05-28 RX ORDER — LATANOPROST 0.05 MG/ML
1 SOLUTION/ DROPS OPHTHALMIC; TOPICAL AT BEDTIME
Refills: 0 | Status: DISCONTINUED | OUTPATIENT
Start: 2023-05-28 | End: 2023-06-02

## 2023-05-28 RX ORDER — SENNA PLUS 8.6 MG/1
2 TABLET ORAL AT BEDTIME
Refills: 0 | Status: DISCONTINUED | OUTPATIENT
Start: 2023-05-28 | End: 2023-06-02

## 2023-05-28 RX ORDER — ENOXAPARIN SODIUM 100 MG/ML
40 INJECTION SUBCUTANEOUS EVERY 24 HOURS
Refills: 0 | Status: DISCONTINUED | OUTPATIENT
Start: 2023-05-28 | End: 2023-06-02

## 2023-05-28 RX ORDER — DONEPEZIL HYDROCHLORIDE 10 MG/1
10 TABLET, FILM COATED ORAL AT BEDTIME
Refills: 0 | Status: DISCONTINUED | OUTPATIENT
Start: 2023-05-28 | End: 2023-06-02

## 2023-05-28 RX ORDER — AZITHROMYCIN 500 MG/1
TABLET, FILM COATED ORAL
Refills: 0 | Status: DISCONTINUED | OUTPATIENT
Start: 2023-05-28 | End: 2023-05-29

## 2023-05-28 RX ORDER — AZITHROMYCIN 500 MG/1
500 TABLET, FILM COATED ORAL ONCE
Refills: 0 | Status: COMPLETED | OUTPATIENT
Start: 2023-05-28 | End: 2023-05-28

## 2023-05-28 RX ORDER — TIMOLOL 0.5 %
1 DROPS OPHTHALMIC (EYE) DAILY
Refills: 0 | Status: DISCONTINUED | OUTPATIENT
Start: 2023-05-28 | End: 2023-06-02

## 2023-05-28 RX ORDER — CITALOPRAM 10 MG/1
20 TABLET, FILM COATED ORAL DAILY
Refills: 0 | Status: DISCONTINUED | OUTPATIENT
Start: 2023-05-28 | End: 2023-06-02

## 2023-05-28 RX ADMIN — Medication 650 MILLIGRAM(S): at 19:38

## 2023-05-28 RX ADMIN — Medication 260 MILLIGRAM(S): at 19:23

## 2023-05-28 RX ADMIN — AZITHROMYCIN 255 MILLIGRAM(S): 500 TABLET, FILM COATED ORAL at 20:54

## 2023-05-28 RX ADMIN — LATANOPROST 1 DROP(S): 0.05 SOLUTION/ DROPS OPHTHALMIC; TOPICAL at 20:53

## 2023-05-28 RX ADMIN — ENOXAPARIN SODIUM 40 MILLIGRAM(S): 100 INJECTION SUBCUTANEOUS at 20:52

## 2023-05-28 RX ADMIN — LIDOCAINE 1 PATCH: 4 CREAM TOPICAL at 20:51

## 2023-05-28 NOTE — ED ADULT NURSE NOTE - CHIEF COMPLAINT QUOTE
pt presents to Ed with complaints of congestion and cough. pt is from [facility]. family requested pt be brought to Ed for eval of pna. unable to obtain temp due to pt being unable to close mouth.

## 2023-05-28 NOTE — ED ADULT NURSE NOTE - NSFALLHARMRISKINTERV_ED_ALL_ED
Assistance OOB with selected safe patient handling equipment if applicable/Communicate risk of Fall with Harm to all staff, patient, and family/Provide visual cue: red socks, yellow wristband, yellow gown, etc/Reinforce activity limits and safety measures with patient and family/Bed in lowest position, wheels locked, appropriate side rails in place/Call bell, personal items and telephone in reach/Instruct patient to call for assistance before getting out of bed/chair/stretcher/Non-slip footwear applied when patient is off stretcher/[place] to call system/Physically safe environment - no spills, clutter or unnecessary equipment/Purposeful Proactive Rounding/Room/bathroom lighting operational, light cord in reach

## 2023-05-28 NOTE — H&P ADULT - ASSESSMENT
86 yo F from [facility] with hx of advancing dementia and lower back pain presents with aspiration episode from [facility]. Per daughter Shira at bedside, she had an episode of thickened secretions that she could not clear in the AM today that appeared to happen when she was about to eat. She was concerned that the gurgling sound she heard was an aspiration event and insisted her mother go to [place] to get evaluated. She was found to be covid positive in the ED and to have presumed aspiration pneumonitis in ED.     Aspiration pneumonitis in setting of COVID infection and dementia with aspiration risk ,with witnessed episode  Unremarkable labs, Finding of covid + with facial rubor on exam and   Swallow eval  Aspiration and fall precautions  Monitoring for aspiration, start azithromycin empirically  Avoid steroids/nsaids in setting of prior PUD with bleed at one point per daughter ~8 yrs ago or so  COVID labs - ddimer, ldh, other acute phase reactants    Acute on chronic Dementia with AMS  Toxic metabolic encephalopathy  Continue donepezil and memantine     VTE ppx lovenox qd    GOC discussed, nelson on file DNR DNI no feeding tubes

## 2023-05-28 NOTE — PATIENT PROFILE ADULT - FALL HARM RISK - HARM RISK INTERVENTIONS

## 2023-05-28 NOTE — ED PROVIDER NOTE - OBJECTIVE STATEMENT
86 y/o female with PMHx of dementia, hypercholesteremia, Alzheimer disease, glaucoma, osteoarthritis of right hip, colonoscopy, appendectomy presents to the ED from [facility] with family c/o congestion without cough x1 day. Family reports swallowing issues in patient and was concerned because patient was not coughing up phlegm, but was "gurgling." Patient nonverbal at baseline as per family, but seemed off today. NKDA.

## 2023-05-28 NOTE — ED PROVIDER NOTE - CLINICAL SUMMARY MEDICAL DECISION MAKING FREE TEXT BOX
Elderly female with congestion and concern for aspiration event today. Also with more fatigue yesterday. Vital signs stable, patient nontoxic appearing and congested, lungs clear. Differential diagnosis includes but not limited to pneumonia, aspiration pneumonia, viral syndrome. Plan for labs, CXR, to be admitted. Discussed with patient's daughter at length who would prefer to have patient admitted for concern of aspiration.

## 2023-05-28 NOTE — H&P ADULT - HISTORY OF PRESENT ILLNESS
88 yo F from [facility] with hx of advancing dementia and lower back pain presents with aspiration episode from [facility]. Per daughter Shira at bedside, she had an episode of thickened secretions that she could not clear in the AM today that appeared to happen when she was about to eat. She was concerned that the gurgling sound she heard was an aspiration event and insisted her mother go to [place] to get evaluated. She was found to be covid positive in the ED and to have presumed aspiration pneumonitis in ED.   ROS negative per patient, and chronic pain noted in lumbar spine as well as some minor aches and pains in joints today and yesterday.    Osteoarthritis of right hip    Glaucoma    Alzheimer disease    Hypercholesteremia    Dementia    S/P appendectomy    S/P tonsillectomy    S/P D&C (status post dilation and curettage)    S/P colonoscopy      	  Vitals:  T(C): 37.6 (05-28-23 @ 18:47), Max: 38 (05-28-23 @ 17:15)  HR: 93 (05-28-23 @ 18:47) (71 - 95)  BP: 114/65 (05-28-23 @ 18:47) (95/57 - 116/100)  RR: 16 (05-28-23 @ 18:47) (15 - 18)  SpO2: 96% (05-28-23 @ 18:47) (94% - 98%)  Wt(kg): --  I&O's Summary    Height (cm): 154.9 (05-28 @ 13:13)  Weight (kg): 43.1 (05-28 @ 13:13)  BMI (kg/m2): 18 (05-28 @ 13:13)    PHYSICAL EXAM:  Appearance: Comfortable. No acute distress  HEENT:  Head and neck: Atraumatic. Normocephalic.  Normal oral mucosa, PERRL, Neck is supple. No JVD, No carotid bruit.   Neurologic: A & O x 1, no focal deficits. EOMI , Cranial nerves are intact.  Lymphatic: No cervical lymphadenopathy  Cardiovascular: Normal S1 S2, No murmur, rubs/gallops. No JVD, No edema  Respiratory: L side crackle similar to covid related rales with R side gurgle  Gastrointestinal:  Soft, Non-tender, + BS  Lower Extremities: No edema  Psychiatry: Patient is calm. No agitation. Mood & affect appropriate  Skin: No rashes/ ecchymoses/cyanosis/ulcers visualized on the face, hands or feet. Face is erythematous    CURRENT MEDICATIONS:    latanoprost 0.005% Ophthalmic Solution 1 Both EYES  lidocaine   4% Patch 1 Transdermal  timolol 0.5% Solution 1 Both EYES  citalopram  donepezil  memantine  senna  enoxaparin Injectable  latanoprost 0.005% Ophthalmic Solution  lidocaine   4% Patch  timolol 0.5% Solution      LABS:	 	                              14.4   9.52  )-----------( 252      ( 28 May 2023 14:16 )             43.5     05-28    140  |  108  |  18  ----------------------------<  103<H>  4.4   |  28  |  0.87    Ca    10.3<H>      28 May 2023 14:16    TPro  7.4  /  Alb  3.4  /  TBili  0.5  /  DBili  x   /  AST  33  /  ALT  30  /  AlkPhos  132<H>  05-28    CXR without evidence of infiltrate or consolidation.

## 2023-05-28 NOTE — ED PROVIDER NOTE - PHYSICAL EXAMINATION
I have reviewed the triage vital signs.  Const: AAOx0. Frail, elderly.   Eyes: no conjunctival injection  HENT: NCAT, Neck supple. +Congested.  CV: RRR, +S1, S2  Resp: CTAB, no respiratory distress  GI: Abdomen soft, NTND, no guarding  : no CVA tenderness  Extremities: No peripheral edema,  2+ radial and DP pulses  Skin: Warm, well perfused, no rash  MSK: No gross deformities appreciated  Neuro: No focal sensory or motor deficits  Psych: Appropriate mood and affect

## 2023-05-28 NOTE — PHARMACOTHERAPY INTERVENTION NOTE - COMMENTS
Medication history complete. Medications and allergies reviewed with list provided by Amanda Mata and confirmed with .

## 2023-05-28 NOTE — ED PROVIDER NOTE - IV ALTEPLASE EXCL REL HIDDEN
Do you want a 24 or 48 hr holter on this pt in 4 weeks? Please clarify    ----- Message from Kallie Encarnacion sent at 3/1/2017 11:31 AM CST -----  Contact: [place]  Nurse from [facility]  wants to see the pt in  6 weeks from today for hospital follow up ,4/12 and  In 4 weeks for a holter monitor appointment..[phone] (home)       
show

## 2023-05-28 NOTE — ED ADULT NURSE NOTE - OBJECTIVE STATEMENT
pt presents to Ed with complaints of congestion and cough. pt is from [facility]. family requested pt be brought to Ed for eval of PNA. Pt in treatment room now, cardiac monitor applied. Pt is non-verbal, dementia at baseline, dtr Shira at bedside.

## 2023-05-29 LAB
A1C WITH ESTIMATED AVERAGE GLUCOSE RESULT: 5.4 % — SIGNIFICANT CHANGE UP (ref 4–5.6)
ADD ON TEST-SPECIMEN IN LAB: SIGNIFICANT CHANGE UP
ALBUMIN SERPL ELPH-MCNC: 2.9 G/DL — LOW (ref 3.3–5)
ALBUMIN SERPL ELPH-MCNC: 2.9 G/DL — LOW (ref 3.3–5)
ALP SERPL-CCNC: 113 U/L — SIGNIFICANT CHANGE UP (ref 40–120)
ALP SERPL-CCNC: 115 U/L — SIGNIFICANT CHANGE UP (ref 40–120)
ALT FLD-CCNC: 26 U/L — SIGNIFICANT CHANGE UP (ref 12–78)
ALT FLD-CCNC: 26 U/L — SIGNIFICANT CHANGE UP (ref 12–78)
ANION GAP SERPL CALC-SCNC: 6 MMOL/L — SIGNIFICANT CHANGE UP (ref 5–17)
AST SERPL-CCNC: 20 U/L — SIGNIFICANT CHANGE UP (ref 15–37)
AST SERPL-CCNC: 23 U/L — SIGNIFICANT CHANGE UP (ref 15–37)
BASOPHILS # BLD AUTO: 0.05 K/UL — SIGNIFICANT CHANGE UP (ref 0–0.2)
BASOPHILS NFR BLD AUTO: 0.8 % — SIGNIFICANT CHANGE UP (ref 0–2)
BILIRUB DIRECT SERPL-MCNC: <0.1 MG/DL — SIGNIFICANT CHANGE UP (ref 0–0.3)
BILIRUB INDIRECT FLD-MCNC: >0.2 MG/DL — SIGNIFICANT CHANGE UP (ref 0.2–1)
BILIRUB SERPL-MCNC: 0.3 MG/DL — SIGNIFICANT CHANGE UP (ref 0.2–1.2)
BILIRUB SERPL-MCNC: 0.4 MG/DL — SIGNIFICANT CHANGE UP (ref 0.2–1.2)
BUN SERPL-MCNC: 18 MG/DL — SIGNIFICANT CHANGE UP (ref 7–23)
CALCIUM SERPL-MCNC: 9.7 MG/DL — SIGNIFICANT CHANGE UP (ref 8.5–10.1)
CHLORIDE SERPL-SCNC: 110 MMOL/L — HIGH (ref 96–108)
CHOLEST SERPL-MCNC: 160 MG/DL — SIGNIFICANT CHANGE UP
CK SERPL-CCNC: 31 U/L — SIGNIFICANT CHANGE UP (ref 26–192)
CO2 SERPL-SCNC: 25 MMOL/L — SIGNIFICANT CHANGE UP (ref 22–31)
CREAT SERPL-MCNC: 0.88 MG/DL — SIGNIFICANT CHANGE UP (ref 0.5–1.3)
CREAT SERPL-MCNC: 1.13 MG/DL — SIGNIFICANT CHANGE UP (ref 0.5–1.3)
D DIMER BLD IA.RAPID-MCNC: 292 NG/ML DDU — HIGH
EGFR: 48 ML/MIN/1.73M2 — LOW
EGFR: 64 ML/MIN/1.73M2 — SIGNIFICANT CHANGE UP
EOSINOPHIL # BLD AUTO: 0.04 K/UL — SIGNIFICANT CHANGE UP (ref 0–0.5)
EOSINOPHIL NFR BLD AUTO: 0.7 % — SIGNIFICANT CHANGE UP (ref 0–6)
ERYTHROCYTE [SEDIMENTATION RATE] IN BLOOD: 25 MM/HR — HIGH (ref 0–20)
ESTIMATED AVERAGE GLUCOSE: 108 MG/DL — SIGNIFICANT CHANGE UP (ref 68–114)
FERRITIN SERPL-MCNC: 281 NG/ML — HIGH (ref 15–150)
FIBRINOGEN PPP-MCNC: 400 MG/DL — SIGNIFICANT CHANGE UP (ref 200–435)
FOLATE SERPL-MCNC: 9.4 NG/ML — SIGNIFICANT CHANGE UP
GLUCOSE BLDC GLUCOMTR-MCNC: 119 MG/DL — HIGH (ref 70–99)
GLUCOSE BLDC GLUCOMTR-MCNC: 140 MG/DL — HIGH (ref 70–99)
GLUCOSE SERPL-MCNC: 95 MG/DL — SIGNIFICANT CHANGE UP (ref 70–99)
HCT VFR BLD CALC: 37.2 % — SIGNIFICANT CHANGE UP (ref 34.5–45)
HDLC SERPL-MCNC: 34 MG/DL — LOW
HGB BLD-MCNC: 12.3 G/DL — SIGNIFICANT CHANGE UP (ref 11.5–15.5)
IMM GRANULOCYTES NFR BLD AUTO: 0.2 % — SIGNIFICANT CHANGE UP (ref 0–0.9)
INR BLD: 1.04 RATIO — SIGNIFICANT CHANGE UP (ref 0.88–1.16)
IRON SATN MFR SERPL: 12 % — LOW (ref 14–50)
IRON SATN MFR SERPL: 23 UG/DL — LOW (ref 30–160)
LACTATE SERPL-SCNC: 0.8 MMOL/L — SIGNIFICANT CHANGE UP (ref 0.7–2)
LDH SERPL L TO P-CCNC: 163 U/L — SIGNIFICANT CHANGE UP (ref 84–241)
LIPID PNL WITH DIRECT LDL SERPL: 99 MG/DL — SIGNIFICANT CHANGE UP
LYMPHOCYTES # BLD AUTO: 0.84 K/UL — LOW (ref 1–3.3)
LYMPHOCYTES # BLD AUTO: 13.7 % — SIGNIFICANT CHANGE UP (ref 13–44)
MAGNESIUM SERPL-MCNC: 2.2 MG/DL — SIGNIFICANT CHANGE UP (ref 1.6–2.6)
MCHC RBC-ENTMCNC: 33.1 GM/DL — SIGNIFICANT CHANGE UP (ref 32–36)
MCHC RBC-ENTMCNC: 34.1 PG — HIGH (ref 27–34)
MCV RBC AUTO: 103 FL — HIGH (ref 80–100)
MONOCYTES # BLD AUTO: 0.84 K/UL — SIGNIFICANT CHANGE UP (ref 0–0.9)
MONOCYTES NFR BLD AUTO: 13.7 % — SIGNIFICANT CHANGE UP (ref 2–14)
NEUTROPHILS # BLD AUTO: 4.36 K/UL — SIGNIFICANT CHANGE UP (ref 1.8–7.4)
NEUTROPHILS NFR BLD AUTO: 70.9 % — SIGNIFICANT CHANGE UP (ref 43–77)
NON HDL CHOLESTEROL: 125 MG/DL — SIGNIFICANT CHANGE UP
NT-PROBNP SERPL-SCNC: 555 PG/ML — HIGH (ref 0–450)
PHOSPHATE SERPL-MCNC: 3.1 MG/DL — SIGNIFICANT CHANGE UP (ref 2.5–4.5)
PLATELET # BLD AUTO: 214 K/UL — SIGNIFICANT CHANGE UP (ref 150–400)
POTASSIUM SERPL-MCNC: 3.8 MMOL/L — SIGNIFICANT CHANGE UP (ref 3.5–5.3)
POTASSIUM SERPL-SCNC: 3.8 MMOL/L — SIGNIFICANT CHANGE UP (ref 3.5–5.3)
PROT SERPL-MCNC: 6.4 GM/DL — SIGNIFICANT CHANGE UP (ref 6–8.3)
PROT SERPL-MCNC: 6.7 GM/DL — SIGNIFICANT CHANGE UP (ref 6–8.3)
PROTHROM AB SERPL-ACNC: 12.1 SEC — SIGNIFICANT CHANGE UP (ref 10.5–13.4)
RBC # BLD: 3.61 M/UL — LOW (ref 3.8–5.2)
RBC # FLD: 13.2 % — SIGNIFICANT CHANGE UP (ref 10.3–14.5)
SODIUM SERPL-SCNC: 141 MMOL/L — SIGNIFICANT CHANGE UP (ref 135–145)
TIBC SERPL-MCNC: 199 UG/DL — LOW (ref 220–430)
TRIGL SERPL-MCNC: 132 MG/DL — SIGNIFICANT CHANGE UP
TSH SERPL-MCNC: 2.26 UU/ML — SIGNIFICANT CHANGE UP (ref 0.34–4.82)
UIBC SERPL-MCNC: 176 UG/DL — SIGNIFICANT CHANGE UP (ref 110–370)
WBC # BLD: 6.14 K/UL — SIGNIFICANT CHANGE UP (ref 3.8–10.5)
WBC # FLD AUTO: 6.14 K/UL — SIGNIFICANT CHANGE UP (ref 3.8–10.5)

## 2023-05-29 PROCEDURE — 99233 SBSQ HOSP IP/OBS HIGH 50: CPT

## 2023-05-29 PROCEDURE — 93010 ELECTROCARDIOGRAM REPORT: CPT

## 2023-05-29 RX ORDER — PANTOPRAZOLE SODIUM 20 MG/1
40 TABLET, DELAYED RELEASE ORAL DAILY
Refills: 0 | Status: DISCONTINUED | OUTPATIENT
Start: 2023-05-29 | End: 2023-06-01

## 2023-05-29 RX ORDER — DEXAMETHASONE 0.5 MG/5ML
6 ELIXIR ORAL DAILY
Refills: 0 | Status: DISCONTINUED | OUTPATIENT
Start: 2023-05-29 | End: 2023-06-01

## 2023-05-29 RX ORDER — SODIUM CHLORIDE 9 MG/ML
1000 INJECTION, SOLUTION INTRAVENOUS
Refills: 0 | Status: DISCONTINUED | OUTPATIENT
Start: 2023-05-29 | End: 2023-05-31

## 2023-05-29 RX ORDER — REMDESIVIR 5 MG/ML
200 INJECTION INTRAVENOUS EVERY 24 HOURS
Refills: 0 | Status: COMPLETED | OUTPATIENT
Start: 2023-05-29 | End: 2023-05-29

## 2023-05-29 RX ORDER — REMDESIVIR 5 MG/ML
100 INJECTION INTRAVENOUS EVERY 24 HOURS
Refills: 0 | Status: COMPLETED | OUTPATIENT
Start: 2023-05-30 | End: 2023-06-02

## 2023-05-29 RX ORDER — ACETAMINOPHEN 500 MG
650 TABLET ORAL ONCE
Refills: 0 | Status: COMPLETED | OUTPATIENT
Start: 2023-05-29 | End: 2023-05-29

## 2023-05-29 RX ORDER — REMDESIVIR 5 MG/ML
INJECTION INTRAVENOUS
Refills: 0 | Status: COMPLETED | OUTPATIENT
Start: 2023-05-29 | End: 2023-06-02

## 2023-05-29 RX ORDER — DEXAMETHASONE 0.5 MG/5ML
6 ELIXIR ORAL DAILY
Refills: 0 | Status: DISCONTINUED | OUTPATIENT
Start: 2023-05-29 | End: 2023-05-29

## 2023-05-29 RX ADMIN — Medication 260 MILLIGRAM(S): at 19:03

## 2023-05-29 RX ADMIN — Medication 650 MILLIGRAM(S): at 19:33

## 2023-05-29 RX ADMIN — Medication 1 DROP(S): at 11:11

## 2023-05-29 RX ADMIN — SODIUM CHLORIDE 50 MILLILITER(S): 9 INJECTION, SOLUTION INTRAVENOUS at 19:09

## 2023-05-29 RX ADMIN — ENOXAPARIN SODIUM 40 MILLIGRAM(S): 100 INJECTION SUBCUTANEOUS at 20:30

## 2023-05-29 RX ADMIN — REMDESIVIR 200 MILLIGRAM(S): 5 INJECTION INTRAVENOUS at 14:19

## 2023-05-29 RX ADMIN — MEMANTINE HYDROCHLORIDE 10 MILLIGRAM(S): 10 TABLET ORAL at 10:45

## 2023-05-29 RX ADMIN — LIDOCAINE 1 PATCH: 4 CREAM TOPICAL at 20:31

## 2023-05-29 RX ADMIN — Medication 650 MILLIGRAM(S): at 00:57

## 2023-05-29 RX ADMIN — PANTOPRAZOLE SODIUM 40 MILLIGRAM(S): 20 TABLET, DELAYED RELEASE ORAL at 19:02

## 2023-05-29 RX ADMIN — Medication 6 MILLIGRAM(S): at 13:28

## 2023-05-29 RX ADMIN — Medication 650 MILLIGRAM(S): at 13:29

## 2023-05-29 RX ADMIN — Medication 650 MILLIGRAM(S): at 14:00

## 2023-05-29 RX ADMIN — Medication 975 MILLIGRAM(S): at 17:33

## 2023-05-29 RX ADMIN — CITALOPRAM 20 MILLIGRAM(S): 10 TABLET, FILM COATED ORAL at 10:45

## 2023-05-29 NOTE — CONSULT NOTE ADULT - ASSESSMENT
86 yo F from [facility] with hx of advancing dementia and lower back pain presents with aspiration episode from [facility]. Per daughter Shira at bedside, she had an episode of thickened secretions that she could not clear in the AM  that appeared to happen when she was about to eat. She was concerned that the gurgling sound she heard was an aspiration event and insisted her mother go to [place] to get evaluated. She was found to be covid positive in the ED and to have presumed aspiration pneumonitis in ED. Tested positive for COVID19, sats low 90s on RA, started on steroids/remdesivir, given azithromycin as well for asp pneumonitis.     1. Acute respiratory failure. Coivd-19 Viral Syndrome. Aspiration pneumonitis. Advanced dementia   -imaging reviewed, no clear infiltrate  - dc azithromycin  - on remdesivir, decadron for covid  - monitor renal/hepatic function closely on therapy  - f/u cultures  - isolation precautions  - aspiration precautions  - fu cbc  - tolerating abx well so far; no side effects noted  - reason for abx use and side effects reviewed with patient    2. other issues - care per medicine

## 2023-05-29 NOTE — PROGRESS NOTE ADULT - ASSESSMENT
Acute hypoxic respiratory failure secondary 2/2 asp pneumonia/ covid-19  Acute toxic-metabolic encephalopathy in setting of acute infection   Aspiration pneumonitis in setting of witnesses aspiration episode   COVID-19 infection   SpO2: 92% on RA  Aspiration precautions, puree diet (pt on puree diet)  Monitoring for aspiration  Start remdesivir + dexamethasone --> day #1   Will start IV PPI 2/2 Hx PUD/GI bleed in past and she is also on VTE ppx   ID consult -- spoke with Dr. Cook   Supportive care: Tylenol PRN, Robitussin PRN   Pulse Oximetry   Speech and swallow eval when appropiate  No po intake -- start gentle IVF D5 1/2NS     Dementia with AMS  Continue donepezil and memantine   Aspiration/fall precautions     VTE ppx lovenox qd    GOC discussed, nelson on file DNR DNI no feeding tubes

## 2023-05-29 NOTE — CONSULT NOTE ADULT - SUBJECTIVE AND OBJECTIVE BOX
Patient is a 87y old  Female who presents with a chief complaint of aspiration pneumonitis (29 May 2023 18:18)    HPI:  86 yo F from [facility] with hx of advancing dementia and lower back pain presents with aspiration episode from [facility]. Per daughter Shira at bedside, she had an episode of thickened secretions that she could not clear in the AM  that appeared to happen when she was about to eat. She was concerned that the gurgling sound she heard was an aspiration event and insisted her mother go to [place] to get evaluated. She was found to be covid positive in the ED and to have presumed aspiration pneumonitis in ED. Tested positive for COVID19, sats low 90s on RA, started on steroids/remdesivir, given azithromycin as well for asp pneumonitis.     PMH/PSH  Osteoarthritis of right hip    Glaucoma    Alzheimer disease    Hypercholesteremia    Dementia    S/P appendectomy    S/P tonsillectomy    S/P D&C (status post dilation and curettage)    S/P colonoscopy      Meds: per reconciliation sheet, noted below  MEDICATIONS  (STANDING):  citalopram 20 milliGRAM(s) Oral daily  dexAMETHasone  Injectable 6 milliGRAM(s) IV Push daily  dextrose 5% + sodium chloride 0.45%. 1000 milliLiter(s) (50 mL/Hr) IV Continuous <Continuous>  donepezil 10 milliGRAM(s) Oral at bedtime  enoxaparin Injectable 40 milliGRAM(s) SubCutaneous every 24 hours  latanoprost 0.005% Ophthalmic Solution 1 Drop(s) Both EYES at bedtime  lidocaine   4% Patch 1 Patch Transdermal every 24 hours  memantine 10 milliGRAM(s) Oral every 12 hours  pantoprazole  Injectable 40 milliGRAM(s) IV Push daily  remdesivir  IVPB   IV Intermittent   senna 2 Tablet(s) Oral at bedtime  timolol 0.5% Solution 1 Drop(s) Both EYES daily      Allergies    No Known Allergies    Intolerances      Social: no smoking, no alcohol, no illegal drugs; no recent travel, no exposure to TB  FAMILY HISTORY:  Family history of colon cancer (Father)       no history of premature cardiovascular disease in first degree relatives    ROS: unable to obtain d/t medical condition    All other systems reviewed and are negative    Vital Signs Last 24 Hrs  T(C): 37.9 (29 May 2023 17:18), Max: 38.1 (29 May 2023 00:43)  T(F): 100.2 (29 May 2023 17:18), Max: 100.6 (29 May 2023 00:43)  HR: 80 (29 May 2023 17:18) (80 - 114)  BP: 100/54 (29 May 2023 17:18) (93/50 - 130/51)  BP(mean): 63 (29 May 2023 13:07) (63 - 63)  RR: 18 (29 May 2023 17:18) (16 - 18)  SpO2: 94% (29 May 2023 17:18) (92% - 95%)    Parameters below as of 29 May 2023 17:18  Patient On (Oxygen Delivery Method): room air      Daily     Daily     PE:  Constitutional: frail looking  HEENT: NC/AT, EOMI, PERRLA, conjunctivae clear; ears and nose atraumatic; pharynx benign  Neck: supple; thyroid not palpable  Back: no tenderness  Respiratory: decreased breath sounds   Cardiovascular: S1S2 regular, no murmurs  Abdomen: soft, not tender, not distended, positive BS; liver and spleen WNL  Genitourinary: no suprapubic tenderness  Lymphatic: no LN palpable  Musculoskeletal: no muscle tenderness, no joint swelling or tenderness  Extremities: no pedal edema  Neurological/ Psychiatric: no focal deficits   Skin: no rashes; no palpable lesions    Labs: all available labs reviewed                        12.3   6.14  )-----------( 214      ( 29 May 2023 07:54 )             37.2     05-29    x   |  x   |  x   ----------------------------<  x   x    |  x   |  1.13    Ca    9.7      29 May 2023 07:54  Phos  3.1     05-29  Mg     2.2     05-29    TPro  6.7  /  Alb  2.9<L>  /  TBili  0.3  /  DBili  <0.1  /  AST  20  /  ALT  26  /  AlkPhos  115  05-29     LIVER FUNCTIONS - ( 29 May 2023 17:39 )  Alb: 2.9 g/dL / Pro: 6.7 gm/dL / ALK PHOS: 115 U/L / ALT: 26 U/L / AST: 20 U/L / GGT: x             Radiology: all available radiological tests reviewed    ACC: [id number] EXAM:  XR CHEST PORTABLE URGENT 1V   ORDERED BY: MIGNON DIXON     PROCEDURE DATE:  05/28/2023          INTERPRETATION:  CLINICAL STATEMENT: Evaluate for pneumonia    TECHNIQUE: AP view of the chest.      COMPARISON: 3/15/2023    Issues rotated.    The cardiomediastinal silhouette is not enlarged. There is no focal lung   consolidation or sizable pleural effusion. No significant osseous   abnormality.    IMPRESSION:    Unremarkable frontal chest x ray        < end of copied text >    Advanced directives addressed: full resuscitation

## 2023-05-30 LAB
ALBUMIN SERPL ELPH-MCNC: 2.7 G/DL — LOW (ref 3.3–5)
ALP SERPL-CCNC: 97 U/L — SIGNIFICANT CHANGE UP (ref 40–120)
ALT FLD-CCNC: 21 U/L — SIGNIFICANT CHANGE UP (ref 12–78)
ANION GAP SERPL CALC-SCNC: 4 MMOL/L — LOW (ref 5–17)
AST SERPL-CCNC: 21 U/L — SIGNIFICANT CHANGE UP (ref 15–37)
BILIRUB DIRECT SERPL-MCNC: <0.1 MG/DL — SIGNIFICANT CHANGE UP (ref 0–0.3)
BILIRUB INDIRECT FLD-MCNC: >0.1 MG/DL — LOW (ref 0.2–1)
BILIRUB SERPL-MCNC: 0.2 MG/DL — SIGNIFICANT CHANGE UP (ref 0.2–1.2)
BUN SERPL-MCNC: 22 MG/DL — SIGNIFICANT CHANGE UP (ref 7–23)
CALCIUM SERPL-MCNC: 9.7 MG/DL — SIGNIFICANT CHANGE UP (ref 8.5–10.1)
CHLORIDE SERPL-SCNC: 113 MMOL/L — HIGH (ref 96–108)
CO2 SERPL-SCNC: 27 MMOL/L — SIGNIFICANT CHANGE UP (ref 22–31)
CREAT SERPL-MCNC: 0.96 MG/DL — SIGNIFICANT CHANGE UP (ref 0.5–1.3)
EGFR: 58 ML/MIN/1.73M2 — LOW
GLUCOSE SERPL-MCNC: 109 MG/DL — HIGH (ref 70–99)
HCT VFR BLD CALC: 35.3 % — SIGNIFICANT CHANGE UP (ref 34.5–45)
HGB BLD-MCNC: 11.8 G/DL — SIGNIFICANT CHANGE UP (ref 11.5–15.5)
INR BLD: 1.1 RATIO — SIGNIFICANT CHANGE UP (ref 0.88–1.16)
MCHC RBC-ENTMCNC: 33.4 GM/DL — SIGNIFICANT CHANGE UP (ref 32–36)
MCHC RBC-ENTMCNC: 34.1 PG — HIGH (ref 27–34)
MCV RBC AUTO: 102 FL — HIGH (ref 80–100)
PLATELET # BLD AUTO: 216 K/UL — SIGNIFICANT CHANGE UP (ref 150–400)
POTASSIUM SERPL-MCNC: 3.5 MMOL/L — SIGNIFICANT CHANGE UP (ref 3.5–5.3)
POTASSIUM SERPL-SCNC: 3.5 MMOL/L — SIGNIFICANT CHANGE UP (ref 3.5–5.3)
PROT SERPL-MCNC: 6.3 GM/DL — SIGNIFICANT CHANGE UP (ref 6–8.3)
PROTHROM AB SERPL-ACNC: 12.8 SEC — SIGNIFICANT CHANGE UP (ref 10.5–13.4)
RBC # BLD: 3.46 M/UL — LOW (ref 3.8–5.2)
RBC # FLD: 13.3 % — SIGNIFICANT CHANGE UP (ref 10.3–14.5)
SODIUM SERPL-SCNC: 144 MMOL/L — SIGNIFICANT CHANGE UP (ref 135–145)
WBC # BLD: 6.17 K/UL — SIGNIFICANT CHANGE UP (ref 3.8–10.5)
WBC # FLD AUTO: 6.17 K/UL — SIGNIFICANT CHANGE UP (ref 3.8–10.5)

## 2023-05-30 PROCEDURE — 99233 SBSQ HOSP IP/OBS HIGH 50: CPT

## 2023-05-30 RX ADMIN — DONEPEZIL HYDROCHLORIDE 10 MILLIGRAM(S): 10 TABLET, FILM COATED ORAL at 22:56

## 2023-05-30 RX ADMIN — ENOXAPARIN SODIUM 40 MILLIGRAM(S): 100 INJECTION SUBCUTANEOUS at 22:44

## 2023-05-30 RX ADMIN — LATANOPROST 1 DROP(S): 0.05 SOLUTION/ DROPS OPHTHALMIC; TOPICAL at 22:56

## 2023-05-30 RX ADMIN — MEMANTINE HYDROCHLORIDE 10 MILLIGRAM(S): 10 TABLET ORAL at 11:33

## 2023-05-30 RX ADMIN — SENNA PLUS 2 TABLET(S): 8.6 TABLET ORAL at 22:16

## 2023-05-30 RX ADMIN — REMDESIVIR 200 MILLIGRAM(S): 5 INJECTION INTRAVENOUS at 13:18

## 2023-05-30 RX ADMIN — Medication 1 DROP(S): at 11:33

## 2023-05-30 RX ADMIN — CITALOPRAM 20 MILLIGRAM(S): 10 TABLET, FILM COATED ORAL at 11:33

## 2023-05-30 RX ADMIN — MEMANTINE HYDROCHLORIDE 10 MILLIGRAM(S): 10 TABLET ORAL at 22:56

## 2023-05-30 RX ADMIN — Medication 6 MILLIGRAM(S): at 11:32

## 2023-05-30 RX ADMIN — PANTOPRAZOLE SODIUM 40 MILLIGRAM(S): 20 TABLET, DELAYED RELEASE ORAL at 11:32

## 2023-05-30 NOTE — PROGRESS NOTE ADULT - ASSESSMENT
88 yo F from [facility] with hx of advancing dementia and lower back pain presents with aspiration episode from [facility]. Per daughter Shira at bedside, she had an episode of thickened secretions that she could not clear in the AM  that appeared to happen when she was about to eat. She was concerned that the gurgling sound she heard was an aspiration event and insisted her mother go to [place] to get evaluated. She was found to be covid positive in the ED and to have presumed aspiration pneumonitis in ED. Tested positive for COVID19, sats low 90s on RA, started on steroids/remdesivir, given azithromycin as well for asp pneumonitis.     1. Acute respiratory failure. Coivd-19 Viral Syndrome. Aspiration pneumonitis. Advanced dementia   -imaging reviewed, no clear infiltrate  - on remdesivir, decadron #2 for covid  - monitor renal/hepatic function closely on therapy  - f/u cultures - no growth   - isolation precautions  - aspiration precautions  - fu cbc    2. other issues - care per medicine

## 2023-05-30 NOTE — PROGRESS NOTE ADULT - ASSESSMENT
Acute hypoxic respiratory failure secondary 2/2 asp pneumonia/ covid-19  Acute toxic-metabolic encephalopathy in setting of acute infection   Aspiration pneumonitis in setting of witnesses aspiration episode   COVID-19 infection   SpO2: 92% on RA  Aspiration precautions, puree diet (pt on puree diet)  Monitoring for aspiration  Start remdesivir + dexamethasone --> day #2  Will start IV PPI 2/2 Hx PUD/GI bleed in past and she is also on VTE ppx   ID consult -- spoke with Dr. Cook   Supportive care: Tylenol PRN, Robitussin PRN   Pulse Oximetry   Speech and swallow eval when appropiate  No po intake -- start gentle IVF D5 1/2NS     Dementia with AMS  Continue donepezil and memantine   Aspiration/fall precautions     VTE ppx lovenox qd    GOC discussed, nelson on file DNR DNI no feeding tubes

## 2023-05-31 LAB
ALBUMIN SERPL ELPH-MCNC: 2.6 G/DL — LOW (ref 3.3–5)
ALP SERPL-CCNC: 96 U/L — SIGNIFICANT CHANGE UP (ref 40–120)
ALT FLD-CCNC: 23 U/L — SIGNIFICANT CHANGE UP (ref 12–78)
AST SERPL-CCNC: 19 U/L — SIGNIFICANT CHANGE UP (ref 15–37)
BILIRUB DIRECT SERPL-MCNC: <0.1 MG/DL — SIGNIFICANT CHANGE UP (ref 0–0.3)
BILIRUB INDIRECT FLD-MCNC: >0.1 MG/DL — LOW (ref 0.2–1)
BILIRUB SERPL-MCNC: 0.2 MG/DL — SIGNIFICANT CHANGE UP (ref 0.2–1.2)
CREAT SERPL-MCNC: 1.02 MG/DL — SIGNIFICANT CHANGE UP (ref 0.5–1.3)
EGFR: 54 ML/MIN/1.73M2 — LOW
INR BLD: 1.08 RATIO — SIGNIFICANT CHANGE UP (ref 0.88–1.16)
PROT SERPL-MCNC: 6.1 GM/DL — SIGNIFICANT CHANGE UP (ref 6–8.3)
PROTHROM AB SERPL-ACNC: 12.5 SEC — SIGNIFICANT CHANGE UP (ref 10.5–13.4)

## 2023-05-31 PROCEDURE — 99233 SBSQ HOSP IP/OBS HIGH 50: CPT

## 2023-05-31 RX ADMIN — DONEPEZIL HYDROCHLORIDE 10 MILLIGRAM(S): 10 TABLET, FILM COATED ORAL at 22:42

## 2023-05-31 RX ADMIN — MEMANTINE HYDROCHLORIDE 10 MILLIGRAM(S): 10 TABLET ORAL at 10:56

## 2023-05-31 RX ADMIN — PANTOPRAZOLE SODIUM 40 MILLIGRAM(S): 20 TABLET, DELAYED RELEASE ORAL at 10:56

## 2023-05-31 RX ADMIN — REMDESIVIR 200 MILLIGRAM(S): 5 INJECTION INTRAVENOUS at 13:31

## 2023-05-31 RX ADMIN — Medication 6 MILLIGRAM(S): at 10:56

## 2023-05-31 RX ADMIN — SENNA PLUS 2 TABLET(S): 8.6 TABLET ORAL at 22:42

## 2023-05-31 RX ADMIN — Medication 1 DROP(S): at 10:56

## 2023-05-31 RX ADMIN — ENOXAPARIN SODIUM 40 MILLIGRAM(S): 100 INJECTION SUBCUTANEOUS at 22:42

## 2023-05-31 RX ADMIN — LATANOPROST 1 DROP(S): 0.05 SOLUTION/ DROPS OPHTHALMIC; TOPICAL at 22:49

## 2023-05-31 RX ADMIN — MEMANTINE HYDROCHLORIDE 10 MILLIGRAM(S): 10 TABLET ORAL at 22:43

## 2023-05-31 RX ADMIN — CITALOPRAM 20 MILLIGRAM(S): 10 TABLET, FILM COATED ORAL at 10:56

## 2023-05-31 NOTE — DISCHARGE NOTE ADULT - VISION (WITH CORRECTIVE LENSES IF THE PATIENT USUALLY WEARS THEM):
utilizes glasses/Normal vision: sees adequately in most situations; can see medication labels, newsprint
No

## 2023-05-31 NOTE — PROGRESS NOTE ADULT - ASSESSMENT
Acute hypoxic respiratory failure secondary 2/2 asp pneumonia/ covid-19  Acute toxic-metabolic encephalopathy in setting of acute infection   Aspiration pneumonitis in setting of witnesses aspiration episode   COVID-19 infection   SpO2: 92% on RA  Aspiration precautions, puree diet (pt on puree diet)  Monitoring for aspiration  Start remdesivir + dexamethasone --> day #3  Will start IV PPI 2/2 Hx PUD/GI bleed in past and she is also on VTE ppx   ID consult -- spoke with Dr. Cook   Supportive care: Tylenol PRN, Robitussin PRN   Pulse Oximetry   Speech and swallow eval when appropiate  No po intake -- start gentle IVF D5 1/2NS     Dementia with AMS  Continue donepezil and memantine   Aspiration/fall precautions     VTE ppx lovenox qd    GOC discussed, nelson on file DNR DNI no feeding tubes    DISPO: D/C Friday      Acute hypoxic respiratory failure secondary 2/2 asp pneumonia/ covid-19  Acute toxic-metabolic encephalopathy in setting of acute infection   Aspiration pneumonitis in setting of witnesses aspiration episode   COVID-19 infection   SpO2: 92% on RA  Aspiration precautions, puree diet (pt on puree diet)  Monitoring for aspiration  Start remdesivir + dexamethasone --> day #3  Will start IV PPI 2/2 Hx PUD/GI bleed in past and she is also on VTE ppx   ID consult -- spoke with Dr. Cook   Supportive care: Tylenol PRN, Robitussin PRN   Pulse Oximetry   Speech and swallow eval when appropiate  No po intake -- S/p gentle IVF D5 1/2NS     Dementia with AMS  Continue donepezil and memantine   Aspiration/fall precautions     VTE ppx lovenox qd    GOC discussed, nelson on file DNR DNI no feeding tubes    DISPO: D/C Friday

## 2023-06-01 LAB
ADD ON TEST-SPECIMEN IN LAB: SIGNIFICANT CHANGE UP
ALBUMIN SERPL ELPH-MCNC: 2.7 G/DL — LOW (ref 3.3–5)
ALP SERPL-CCNC: 84 U/L — SIGNIFICANT CHANGE UP (ref 40–120)
ALT FLD-CCNC: 24 U/L — SIGNIFICANT CHANGE UP (ref 12–78)
ANION GAP SERPL CALC-SCNC: 7 MMOL/L — SIGNIFICANT CHANGE UP (ref 5–17)
AST SERPL-CCNC: 29 U/L — SIGNIFICANT CHANGE UP (ref 15–37)
BILIRUB DIRECT SERPL-MCNC: <0.1 MG/DL — SIGNIFICANT CHANGE UP (ref 0–0.3)
BILIRUB INDIRECT FLD-MCNC: >0.1 MG/DL — LOW (ref 0.2–1)
BILIRUB SERPL-MCNC: 0.2 MG/DL — SIGNIFICANT CHANGE UP (ref 0.2–1.2)
BUN SERPL-MCNC: 21 MG/DL — SIGNIFICANT CHANGE UP (ref 7–23)
CALCIUM SERPL-MCNC: 9 MG/DL — SIGNIFICANT CHANGE UP (ref 8.5–10.1)
CHLORIDE SERPL-SCNC: 114 MMOL/L — HIGH (ref 96–108)
CO2 SERPL-SCNC: 24 MMOL/L — SIGNIFICANT CHANGE UP (ref 22–31)
CREAT SERPL-MCNC: 0.76 MG/DL — SIGNIFICANT CHANGE UP (ref 0.5–1.3)
CREAT SERPL-MCNC: 0.78 MG/DL — SIGNIFICANT CHANGE UP (ref 0.5–1.3)
EGFR: 74 ML/MIN/1.73M2 — SIGNIFICANT CHANGE UP
EGFR: 77 ML/MIN/1.73M2 — SIGNIFICANT CHANGE UP
GLUCOSE SERPL-MCNC: 85 MG/DL — SIGNIFICANT CHANGE UP (ref 70–99)
HCT VFR BLD CALC: 33.8 % — LOW (ref 34.5–45)
HGB BLD-MCNC: 11.1 G/DL — LOW (ref 11.5–15.5)
INR BLD: 1.14 RATIO — SIGNIFICANT CHANGE UP (ref 0.88–1.16)
MAGNESIUM SERPL-MCNC: 2 MG/DL — SIGNIFICANT CHANGE UP (ref 1.6–2.6)
MCHC RBC-ENTMCNC: 32.8 GM/DL — SIGNIFICANT CHANGE UP (ref 32–36)
MCHC RBC-ENTMCNC: 33.6 PG — SIGNIFICANT CHANGE UP (ref 27–34)
MCV RBC AUTO: 102.4 FL — HIGH (ref 80–100)
PHOSPHATE SERPL-MCNC: 2.6 MG/DL — SIGNIFICANT CHANGE UP (ref 2.5–4.5)
PLATELET # BLD AUTO: 210 K/UL — SIGNIFICANT CHANGE UP (ref 150–400)
POTASSIUM SERPL-MCNC: 3.7 MMOL/L — SIGNIFICANT CHANGE UP (ref 3.5–5.3)
POTASSIUM SERPL-SCNC: 3.7 MMOL/L — SIGNIFICANT CHANGE UP (ref 3.5–5.3)
PROT SERPL-MCNC: 6.1 GM/DL — SIGNIFICANT CHANGE UP (ref 6–8.3)
PROTHROM AB SERPL-ACNC: 13.3 SEC — SIGNIFICANT CHANGE UP (ref 10.5–13.4)
RBC # BLD: 3.3 M/UL — LOW (ref 3.8–5.2)
RBC # FLD: 13.4 % — SIGNIFICANT CHANGE UP (ref 10.3–14.5)
SARS-COV-2 RNA SPEC QL NAA+PROBE: DETECTED
SODIUM SERPL-SCNC: 145 MMOL/L — SIGNIFICANT CHANGE UP (ref 135–145)
WBC # BLD: 5.26 K/UL — SIGNIFICANT CHANGE UP (ref 3.8–10.5)
WBC # FLD AUTO: 5.26 K/UL — SIGNIFICANT CHANGE UP (ref 3.8–10.5)

## 2023-06-01 PROCEDURE — 99232 SBSQ HOSP IP/OBS MODERATE 35: CPT

## 2023-06-01 RX ORDER — MIDODRINE HYDROCHLORIDE 2.5 MG/1
2.5 TABLET ORAL THREE TIMES A DAY
Refills: 0 | Status: DISCONTINUED | OUTPATIENT
Start: 2023-06-01 | End: 2023-06-02

## 2023-06-01 RX ORDER — DEXAMETHASONE 0.5 MG/5ML
6 ELIXIR ORAL DAILY
Refills: 0 | Status: COMPLETED | OUTPATIENT
Start: 2023-06-02 | End: 2023-06-02

## 2023-06-01 RX ORDER — PANTOPRAZOLE SODIUM 20 MG/1
40 TABLET, DELAYED RELEASE ORAL
Refills: 0 | Status: DISCONTINUED | OUTPATIENT
Start: 2023-06-02 | End: 2023-06-02

## 2023-06-01 RX ORDER — SODIUM CHLORIDE 9 MG/ML
250 INJECTION, SOLUTION INTRAVENOUS ONCE
Refills: 0 | Status: COMPLETED | OUTPATIENT
Start: 2023-06-01 | End: 2023-06-01

## 2023-06-01 RX ADMIN — SENNA PLUS 2 TABLET(S): 8.6 TABLET ORAL at 21:36

## 2023-06-01 RX ADMIN — CITALOPRAM 20 MILLIGRAM(S): 10 TABLET, FILM COATED ORAL at 09:22

## 2023-06-01 RX ADMIN — MEMANTINE HYDROCHLORIDE 10 MILLIGRAM(S): 10 TABLET ORAL at 21:36

## 2023-06-01 RX ADMIN — DONEPEZIL HYDROCHLORIDE 10 MILLIGRAM(S): 10 TABLET, FILM COATED ORAL at 21:36

## 2023-06-01 RX ADMIN — PANTOPRAZOLE SODIUM 40 MILLIGRAM(S): 20 TABLET, DELAYED RELEASE ORAL at 09:21

## 2023-06-01 RX ADMIN — LIDOCAINE 1 PATCH: 4 CREAM TOPICAL at 21:35

## 2023-06-01 RX ADMIN — REMDESIVIR 200 MILLIGRAM(S): 5 INJECTION INTRAVENOUS at 13:17

## 2023-06-01 RX ADMIN — LATANOPROST 1 DROP(S): 0.05 SOLUTION/ DROPS OPHTHALMIC; TOPICAL at 21:36

## 2023-06-01 RX ADMIN — Medication 1 DROP(S): at 09:22

## 2023-06-01 RX ADMIN — Medication 6 MILLIGRAM(S): at 09:21

## 2023-06-01 RX ADMIN — SODIUM CHLORIDE 250 MILLILITER(S): 9 INJECTION, SOLUTION INTRAVENOUS at 09:21

## 2023-06-01 RX ADMIN — ENOXAPARIN SODIUM 40 MILLIGRAM(S): 100 INJECTION SUBCUTANEOUS at 21:35

## 2023-06-01 RX ADMIN — MEMANTINE HYDROCHLORIDE 10 MILLIGRAM(S): 10 TABLET ORAL at 09:21

## 2023-06-01 NOTE — PROGRESS NOTE ADULT - REASON FOR ADMISSION
aspiration pneumonitis

## 2023-06-01 NOTE — ED PROVIDER NOTE - GASTROINTESTINAL, MLM
Left detailed message regarding results/recommendations.  Pt to call if questions.    Recommendations: watch diet     Abdomen soft, non-tender, no guarding.

## 2023-06-01 NOTE — PROGRESS NOTE ADULT - ASSESSMENT
88 yo F from [facility] with hx of advancing dementia and lower back pain presents with aspiration episode from [facility]. Per daughter Shira at bedside, she had an episode of thickened secretions that she could not clear in the AM  that appeared to happen when she was about to eat. She was concerned that the gurgling sound she heard was an aspiration event and insisted her mother go to [place] to get evaluated. She was found to be covid positive in the ED and to have presumed aspiration pneumonitis in ED. Tested positive for COVID19, sats low 90s on RA, started on steroids/remdesivir, given azithromycin as well for asp pneumonitis.     1. Acute respiratory failure. Coivd-19 Viral Syndrome. Aspiration pneumonitis. Advanced dementia  - imaging reviewed, no clear infiltrate, improving  - on remdesivir, decadron #3 for covid  - monitor renal/hepatic function closely on therapy  - f/u cultures - no growth   - isolation precautions  - aspiration precautions  - fu cbc    2. other issues - care per medicine

## 2023-06-01 NOTE — PROGRESS NOTE ADULT - ASSESSMENT
Acute hypoxic respiratory failure secondary 2/2 asp pneumonia/ covid-19  Acute toxic-metabolic encephalopathy in setting of acute infection - improved   Aspiration pneumonitis in setting of witnessed aspiration episode   COVID-19 infection   SpO2: 92% on RA  Aspiration precautions, puree diet (pt on puree diet)  Monitoring for aspiration  Start remdesivir + dexamethasone --> day #4  Will start IV PPI 2/2 Hx PUD/GI bleed in past and she is also on VTE ppx   ID consult -- spoke with Dr. Cook   Supportive care: Tylenol PRN, Robitussin PRN   Pulse Oximetry   Speech and swallow eval when appropriate    Dementia   Continue donepezil and memantine   Aspiration/fall precautions     Hypotension  -Po intake improved  -Do not want to fluid overload  -Pt asymptomatic  -MAP > 65  -Discussed midodrine with dtr, agreeable to low dose if necessary     VTE ppx lovenox qd    GOC discussed, molst on file DNR DNI no feeding tubes    DISPO: D/C Friday

## 2023-06-01 NOTE — PROGRESS NOTE ADULT - SUBJECTIVE AND OBJECTIVE BOX
Date of service: 05-30-23 @ 16:24    pt seen and examined  febrile to 100.2 overnight  weak appearing  no resp distress     ROS: unable to obtain d/t medical condition    MEDICATIONS  (STANDING):  citalopram 20 milliGRAM(s) Oral daily  dexAMETHasone  Injectable 6 milliGRAM(s) IV Push daily  dextrose 5% + sodium chloride 0.45%. 1000 milliLiter(s) (50 mL/Hr) IV Continuous <Continuous>  donepezil 10 milliGRAM(s) Oral at bedtime  enoxaparin Injectable 40 milliGRAM(s) SubCutaneous every 24 hours  latanoprost 0.005% Ophthalmic Solution 1 Drop(s) Both EYES at bedtime  lidocaine   4% Patch 1 Patch Transdermal every 24 hours  memantine 10 milliGRAM(s) Oral every 12 hours  pantoprazole  Injectable 40 milliGRAM(s) IV Push daily  remdesivir  IVPB   IV Intermittent   remdesivir  IVPB 100 milliGRAM(s) IV Intermittent every 24 hours  senna 2 Tablet(s) Oral at bedtime  timolol 0.5% Solution 1 Drop(s) Both EYES daily    Vital Signs Last 24 Hrs  T(C): 36.5 (30 May 2023 08:31), Max: 37.9 (29 May 2023 17:18)  T(F): 97.7 (30 May 2023 08:31), Max: 100.2 (29 May 2023 17:18)  HR: 72 (30 May 2023 08:31) (69 - 81)  BP: 105/55 (30 May 2023 08:31) (88/51 - 105/55)  BP(mean): --  RR: 18 (30 May 2023 08:31) (17 - 18)  SpO2: 96% (30 May 2023 08:31) (93% - 96%)    Parameters below as of 30 May 2023 08:31  Patient On (Oxygen Delivery Method): room air      PE:  Constitutional: frail looking  HEENT: NC/AT, EOMI, PERRLA, conjunctivae clear; ears and nose atraumatic; pharynx benign  Neck: supple; thyroid not palpable  Back: no tenderness  Respiratory: decreased breath sounds   Cardiovascular: S1S2 regular, no murmurs  Abdomen: soft, not tender, not distended, positive BS; liver and spleen WNL  Genitourinary: no suprapubic tenderness  Lymphatic: no LN palpable  Musculoskeletal: no muscle tenderness, no joint swelling or tenderness  Extremities: no pedal edema  Neurological/ Psychiatric: no focal deficits   Skin: no rashes; no palpable lesions    Labs: all available labs reviewed                                   11.8   6.17  )-----------( 216      ( 30 May 2023 09:35 )             35.3     05-30    144  |  113<H>  |  22  ----------------------------<  109<H>  3.5   |  27  |  0.96    Ca    9.7      30 May 2023 09:35  Phos  3.1     05-29  Mg     2.2     05-29    TPro  6.3  /  Alb  2.7<L>  /  TBili  0.2  /  DBili  <0.1  /  AST  21  /  ALT  21  /  AlkPhos  97  05-30          LIVER FUNCTIONS - ( 29 May 2023 17:39 )  Alb: 2.9 g/dL / Pro: 6.7 gm/dL / ALK PHOS: 115 U/L / ALT: 26 U/L / AST: 20 U/L / GGT: x           Culture - Blood (05.28.23 @ 14:16)   Specimen Source: .Blood Blood-Peripheral  Culture Results:   No growth to date.  Culture - Blood (05.28.23 @ 14:16)   Specimen Source: .Blood Blood-Peripheral  Culture Results:   No growth to date.    Radiology: all available radiological tests reviewed    ACC: [id number] EXAM:  XR CHEST PORTABLE URGENT 1V   ORDERED BY: MIGNON DIXON     PROCEDURE DATE:  05/28/2023          INTERPRETATION:  CLINICAL STATEMENT: Evaluate for pneumonia    TECHNIQUE: AP view of the chest.      COMPARISON: 3/15/2023    Issues rotated.    The cardiomediastinal silhouette is not enlarged. There is no focal lung   consolidation or sizable pleural effusion. No significant osseous   abnormality.    IMPRESSION:    Unremarkable frontal chest x ray        < end of copied text >    Advanced directives addressed: full resuscitation
HPI: 86 yo F from [facility] with hx of advancing dementia and lower back pain presents with aspiration episode from [facility]. Per daughter Shira at bedside, she had an episode of thickened secretions that she could not clear in the AM today that appeared to happen when she was about to eat. She was concerned that the gurgling sound she heard was an aspiration event and insisted her mother go to [place] to get evaluated. She was found to be covid positive in the ED and to have presumed aspiration pneumonitis in ED.   ROS negative per patient, and chronic pain noted in lumbar spine as well as some minor aches and pains in joints today and yesterday.    Subjective: Patient seen today, dtr at bedside, more awake today, although intermittently, has no complaints.     Vital Signs Last 24 Hrs  T(C): 36.6 (30 May 2023 16:04), Max: 36.8 (30 May 2023 00:04)  T(F): 97.9 (30 May 2023 16:04), Max: 98.2 (30 May 2023 00:04)  HR: 79 (30 May 2023 16:04) (69 - 79)  BP: 104/55 (30 May 2023 16:04) (88/51 - 105/55)  RR: 18 (30 May 2023 16:04) (17 - 18)  SpO2: 95% (30 May 2023 16:04) (94% - 96%)    Parameters below as of 30 May 2023 16:04  Patient On (Oxygen Delivery Method): room air    PHYSICAL EXAM:  GENERAL: sick appearing, lethargic   HEAD:  Atraumatic, Normocephalic  EYES: conjunctiva and sclera clear  ENT: Moist mucous membranes  NECK: Supple, No JVD  CHEST/LUNG: decreased breath sounds; No rales, rhonchi, wheezing. Unlabored respirations  HEART: Regular rate and rhythm; No murmurs, rubs, or gallops  ABDOMEN: Bowel sounds present; Soft, Nontender, Nondistended.   EXTREMITIES:  2+ Peripheral Pulses, brisk capillary refill. No clubbing, cyanosis, or edema  NERVOUS SYSTEM: lethargic     MEDICATIONS  (STANDING):  acetaminophen   IVPB .. 650 milliGRAM(s) IV Intermittent once  azithromycin  IVPB 500 milliGRAM(s) IV Intermittent every 24 hours  azithromycin  IVPB      citalopram 20 milliGRAM(s) Oral daily  dexAMETHasone  Injectable 6 milliGRAM(s) IV Push daily  dextrose 5% + sodium chloride 0.45%. 1000 milliLiter(s) (50 mL/Hr) IV Continuous <Continuous>  donepezil 10 milliGRAM(s) Oral at bedtime  enoxaparin Injectable 40 milliGRAM(s) SubCutaneous every 24 hours  latanoprost 0.005% Ophthalmic Solution 1 Drop(s) Both EYES at bedtime  lidocaine   4% Patch 1 Patch Transdermal every 24 hours  memantine 10 milliGRAM(s) Oral every 12 hours  remdesivir  IVPB   IV Intermittent   senna 2 Tablet(s) Oral at bedtime  timolol 0.5% Solution 1 Drop(s) Both EYES daily    MEDICATIONS  (PRN):  acetaminophen     Tablet .. 650 milliGRAM(s) Oral every 6 hours PRN Temp greater or equal to 38C (100.4F), Mild Pain (1 - 3), Moderate Pain (4 - 6)  acetaminophen     Tablet .. 975 milliGRAM(s) Oral every 6 hours PRN Severe Pain (7 - 10)                LABS:                                     11.8   6.17  )-----------( 216      ( 30 May 2023 09:35 )             35.3   05-30    144  |  113<H>  |  22  ----------------------------<  109<H>  3.5   |  27  |  0.96    Ca    9.7      30 May 2023 09:35  Phos  3.1     05-29  Mg     2.2     05-29    TPro  6.3  /  Alb  2.7<L>  /  TBili  0.2  /  DBili  <0.1  /  AST  21  /  ALT  21  /  AlkPhos  97  05-30            RADIOLOGY:    < from: Xray Chest 1 View- PORTABLE-Urgent (05.28.23 @ 15:32) >  COMPARISON: 3/15/2023    Issues rotated.    The cardiomediastinal silhouette is not enlarged. There is no focal lung   consolidation or sizable pleural effusion. No significant osseous   abnormality.    IMPRESSION:    Unremarkable frontal chest x ray          
HPI: 88 yo F from [facility] with hx of advancing dementia and lower back pain presents with aspiration episode from [facility]. Per daughter Shira at bedside, she had an episode of thickened secretions that she could not clear in the AM today that appeared to happen when she was about to eat. She was concerned that the gurgling sound she heard was an aspiration event and insisted her mother go to [place] to get evaluated. She was found to be covid positive in the ED and to have presumed aspiration pneumonitis in ED.   ROS negative per patient, and chronic pain noted in lumbar spine as well as some minor aches and pains in joints today and yesterday.    Subjective: Patient seen today, dtr at bedside, she is lethargic.     ROS: unable to obtain 2/2 pt's condition     Vital Signs Last 24 Hrs  T(C): 37.9 (29 May 2023 17:18), Max: 38.1 (29 May 2023 00:43)  T(F): 100.2 (29 May 2023 17:18), Max: 100.6 (29 May 2023 00:43)  HR: 80 (29 May 2023 17:18) (80 - 114)  BP: 100/54 (29 May 2023 17:18) (93/50 - 130/51)  BP(mean): 63 (29 May 2023 13:07) (63 - 63)  RR: 18 (29 May 2023 17:18) (16 - 18)  SpO2: 94% (29 May 2023 17:18) (92% - 96%)    Parameters below as of 29 May 2023 17:18  Patient On (Oxygen Delivery Method): room air    PHYSICAL EXAM:  GENERAL: sick appearing, lethargic   HEAD:  Atraumatic, Normocephalic  EYES: conjunctiva and sclera clear  ENT: Moist mucous membranes  NECK: Supple, No JVD  CHEST/LUNG: Clear to auscultation bilaterally; No rales, rhonchi, wheezing. Unlabored respirations  HEART: Regular rate and rhythm; No murmurs, rubs, or gallops  ABDOMEN: Bowel sounds present; Soft, Nontender, Nondistended.   EXTREMITIES:  2+ Peripheral Pulses, brisk capillary refill. No clubbing, cyanosis, or edema  NERVOUS SYSTEM: lethargic     MEDICATIONS  (STANDING):  acetaminophen   IVPB .. 650 milliGRAM(s) IV Intermittent once  azithromycin  IVPB 500 milliGRAM(s) IV Intermittent every 24 hours  azithromycin  IVPB      citalopram 20 milliGRAM(s) Oral daily  dexAMETHasone  Injectable 6 milliGRAM(s) IV Push daily  dextrose 5% + sodium chloride 0.45%. 1000 milliLiter(s) (50 mL/Hr) IV Continuous <Continuous>  donepezil 10 milliGRAM(s) Oral at bedtime  enoxaparin Injectable 40 milliGRAM(s) SubCutaneous every 24 hours  latanoprost 0.005% Ophthalmic Solution 1 Drop(s) Both EYES at bedtime  lidocaine   4% Patch 1 Patch Transdermal every 24 hours  memantine 10 milliGRAM(s) Oral every 12 hours  remdesivir  IVPB   IV Intermittent   senna 2 Tablet(s) Oral at bedtime  timolol 0.5% Solution 1 Drop(s) Both EYES daily    MEDICATIONS  (PRN):  acetaminophen     Tablet .. 650 milliGRAM(s) Oral every 6 hours PRN Temp greater or equal to 38C (100.4F), Mild Pain (1 - 3), Moderate Pain (4 - 6)  acetaminophen     Tablet .. 975 milliGRAM(s) Oral every 6 hours PRN Severe Pain (7 - 10)                LABS:                          12.3   6.14  )-----------( 214      ( 29 May 2023 07:54 )             37.2     29 May 2023 17:39    x      |  x      |  x      ----------------------------<  x      x       |  x      |  1.13     Ca    9.7        29 May 2023 07:54  Phos  3.1       29 May 2023 07:54  Mg     2.2       29 May 2023 07:54    TPro  6.7    /  Alb  2.9    /  TBili  0.3    /  DBili  <0.1   /  AST  20     /  ALT  26     /  AlkPhos  115    29 May 2023 17:39    LIVER FUNCTIONS - ( 29 May 2023 17:39 )  Alb: 2.9 g/dL / Pro: 6.7 gm/dL / ALK PHOS: 115 U/L / ALT: 26 U/L / AST: 20 U/L / GGT: x           PT/INR - ( 29 May 2023 17:39 )   PT: 12.1 sec;   INR: 1.04 ratio         PTT - ( 28 May 2023 14:16 )  PTT:30.4 sec  CAPILLARY BLOOD GLUCOSE      POCT Blood Glucose.: 140 mg/dL (29 May 2023 18:11)  POCT Blood Glucose.: 119 mg/dL (29 May 2023 03:09)    CARDIAC MARKERS ( 29 May 2023 07:54 )  x     / x     / 31 U/L / x     / x              RADIOLOGY:    < from: Xray Chest 1 View- PORTABLE-Urgent (05.28.23 @ 15:32) >  COMPARISON: 3/15/2023    Issues rotated.    The cardiomediastinal silhouette is not enlarged. There is no focal lung   consolidation or sizable pleural effusion. No significant osseous   abnormality.    IMPRESSION:    Unremarkable frontal chest x ray          
HPI: 88 yo F from [facility] with hx of advancing dementia and lower back pain presents with aspiration episode from [facility]. Per daughter Shira at bedside, she had an episode of thickened secretions that she could not clear in the AM today that appeared to happen when she was about to eat. She was concerned that the gurgling sound she heard was an aspiration event and insisted her mother go to [place] to get evaluated. She was found to be covid positive in the ED and to have presumed aspiration pneumonitis in ED.   ROS negative per patient, and chronic pain noted in lumbar spine as well as some minor aches and pains in joints today and yesterday.    Subjective: Patient seen today, dtr at bedside, no overnight issues reported.     Vital Signs Last 24 Hrs  T(C): 36.5 (01 Jun 2023 15:33), Max: 36.7 (01 Jun 2023 15:10)  T(F): 97.7 (01 Jun 2023 15:33), Max: 98.1 (01 Jun 2023 15:10)  HR: 69 (01 Jun 2023 15:33) (57 - 73)  BP: 93/59 (01 Jun 2023 15:33) (90/42 - 124/49)  RR: 18 (01 Jun 2023 15:33) (18 - 19)  SpO2: 96% (01 Jun 2023 15:33) (95% - 100%)    Parameters below as of 01 Jun 2023 15:33  Patient On (Oxygen Delivery Method): room air    PHYSICAL EXAM:  GENERAL: sick appearing, lethargic   HEAD:  Atraumatic, Normocephalic  EYES: conjunctiva and sclera clear  ENT: Moist mucous membranes  NECK: Supple, No JVD  CHEST/LUNG: decreased breath sounds; No rales, rhonchi, wheezing. Unlabored respirations  HEART: Regular rate and rhythm; No murmurs, rubs, or gallops  ABDOMEN: Bowel sounds present; Soft, Nontender, Nondistended.   EXTREMITIES:  2+ Peripheral Pulses, brisk capillary refill. No clubbing, cyanosis, or edema  NERVOUS SYSTEM: lethargic     MEDICATIONS  (STANDING):  acetaminophen   IVPB .. 650 milliGRAM(s) IV Intermittent once  azithromycin  IVPB 500 milliGRAM(s) IV Intermittent every 24 hours  azithromycin  IVPB      citalopram 20 milliGRAM(s) Oral daily  dexAMETHasone  Injectable 6 milliGRAM(s) IV Push daily  dextrose 5% + sodium chloride 0.45%. 1000 milliLiter(s) (50 mL/Hr) IV Continuous <Continuous>  donepezil 10 milliGRAM(s) Oral at bedtime  enoxaparin Injectable 40 milliGRAM(s) SubCutaneous every 24 hours  latanoprost 0.005% Ophthalmic Solution 1 Drop(s) Both EYES at bedtime  lidocaine   4% Patch 1 Patch Transdermal every 24 hours  memantine 10 milliGRAM(s) Oral every 12 hours  remdesivir  IVPB   IV Intermittent   senna 2 Tablet(s) Oral at bedtime  timolol 0.5% Solution 1 Drop(s) Both EYES daily    MEDICATIONS  (PRN):  acetaminophen     Tablet .. 650 milliGRAM(s) Oral every 6 hours PRN Temp greater or equal to 38C (100.4F), Mild Pain (1 - 3), Moderate Pain (4 - 6)  acetaminophen     Tablet .. 975 milliGRAM(s) Oral every 6 hours PRN Severe Pain (7 - 10)                LABS:                                     11.8   6.17  )-----------( 216      ( 30 May 2023 09:35 )             35.3   05-31    x   |  x   |  x   ----------------------------<  x   x    |  x   |  1.02    Ca    9.7      30 May 2023 09:35    TPro  6.1  /  Alb  2.6<L>  /  TBili  0.2  /  DBili  <0.1  /  AST  19  /  ALT  23  /  AlkPhos  96  05-31              RADIOLOGY:    < from: Xray Chest 1 View- PORTABLE-Urgent (05.28.23 @ 15:32) >  COMPARISON: 3/15/2023    Issues rotated.    The cardiomediastinal silhouette is not enlarged. There is no focal lung   consolidation or sizable pleural effusion. No significant osseous   abnormality.    IMPRESSION:    Unremarkable frontal chest x ray          
Date of service: 06-01-23 @ 12:19    pt seen and examined  temps down  doing better  ms much improved  on RA      ROS: no fever or chills; denies dizziness, no HA,  no abdominal pain, no diarrhea or constipation; no dysuria, no urinary frequency, no legs pain, no rashes    MEDICATIONS  (STANDING):  citalopram 20 milliGRAM(s) Oral daily  donepezil 10 milliGRAM(s) Oral at bedtime  enoxaparin Injectable 40 milliGRAM(s) SubCutaneous every 24 hours  latanoprost 0.005% Ophthalmic Solution 1 Drop(s) Both EYES at bedtime  lidocaine   4% Patch 1 Patch Transdermal every 24 hours  memantine 10 milliGRAM(s) Oral every 12 hours  pantoprazole    Tablet 40 milliGRAM(s) Oral before breakfast  remdesivir  IVPB   IV Intermittent   remdesivir  IVPB 100 milliGRAM(s) IV Intermittent every 24 hours  senna 2 Tablet(s) Oral at bedtime  timolol 0.5% Solution 1 Drop(s) Both EYES daily    Vital Signs Last 24 Hrs  T(C): 36.5 (01 Jun 2023 15:33), Max: 36.7 (01 Jun 2023 15:10)  T(F): 97.7 (01 Jun 2023 15:33), Max: 98.1 (01 Jun 2023 15:10)  HR: 69 (01 Jun 2023 15:33) (57 - 73)  BP: 93/59 (01 Jun 2023 15:33) (90/42 - 124/49)  RR: 18 (01 Jun 2023 15:33) (18 - 19)  SpO2: 96% (01 Jun 2023 15:33) (95% - 100%)    Parameters below as of 01 Jun 2023 15:33  Patient On (Oxygen Delivery Method): room air    PE:  Constitutional: frail looking  HEENT: NC/AT, EOMI, PERRLA, conjunctivae clear; ears and nose atraumatic; pharynx benign  Neck: supple; thyroid not palpable  Back: no tenderness  Respiratory: decreased breath sounds   Cardiovascular: S1S2 regular, no murmurs  Abdomen: soft, not tender, not distended, positive BS; liver and spleen WNL  Genitourinary: no suprapubic tenderness  Lymphatic: no LN palpable  Musculoskeletal: no muscle tenderness, no joint swelling or tenderness  Extremities: no pedal edema  Neurological/ Psychiatric: no focal deficits   Skin: no rashes; no palpable lesions    Labs: all available labs reviewed                                   11.1   5.26  )-----------( 210      ( 01 Jun 2023 08:39 )             33.8     06-02    x   |  x   |  x   ----------------------------<  x   x    |  x   |  0.71    Ca    9.0      01 Jun 2023 06:39  Phos  2.6     06-01  Mg     2.0     06-01    TPro  5.7<L>  /  Alb  2.5<L>  /  TBili  0.3  /  DBili  0.1  /  AST  22  /  ALT  24  /  AlkPhos  77  06-02          LIVER FUNCTIONS - ( 29 May 2023 17:39 )  Alb: 2.9 g/dL / Pro: 6.7 gm/dL / ALK PHOS: 115 U/L / ALT: 26 U/L / AST: 20 U/L / GGT: x           Culture - Blood (05.28.23 @ 14:16)   Specimen Source: .Blood Blood-Peripheral  Culture Results:   No growth to date.  Culture - Blood (05.28.23 @ 14:16)   Specimen Source: .Blood Blood-Peripheral  Culture Results:   No growth to date.    Radiology: all available radiological tests reviewed    ACC: [id number] EXAM:  XR CHEST PORTABLE URGENT 1V   ORDERED BY: MIGNON DIXON     PROCEDURE DATE:  05/28/2023          INTERPRETATION:  CLINICAL STATEMENT: Evaluate for pneumonia    TECHNIQUE: AP view of the chest.      COMPARISON: 3/15/2023    Issues rotated.    The cardiomediastinal silhouette is not enlarged. There is no focal lung   consolidation or sizable pleural effusion. No significant osseous   abnormality.    IMPRESSION:    Unremarkable frontal chest x ray        < end of copied text >    Advanced directives addressed: full resuscitation
HPI: 86 yo F from [facility] with hx of advancing dementia and lower back pain presents with aspiration episode from [facility]. Per daughter Shira at bedside, she had an episode of thickened secretions that she could not clear in the AM today that appeared to happen when she was about to eat. She was concerned that the gurgling sound she heard was an aspiration event and insisted her mother go to [place] to get evaluated. She was found to be covid positive in the ED and to have presumed aspiration pneumonitis in ED.   ROS negative per patient, and chronic pain noted in lumbar spine as well as some minor aches and pains in joints today and yesterday.    Subjective: Patient seen today, dtr at bedside, awake, alert, feels better today.     Vital Signs Last 24 Hrs  T(C): 36.4 (31 May 2023 15:24), Max: 36.6 (31 May 2023 07:33)  T(F): 97.5 (31 May 2023 15:24), Max: 97.9 (31 May 2023 07:33)  HR: 74 (31 May 2023 15:24) (67 - 74)  BP: 122/51 (31 May 2023 15:24) (93/62 - 138/54)  BP(mean): 73 (30 May 2023 22:50) (73 - 73)  RR: 17 (31 May 2023 15:24) (16 - 18)  SpO2: 95% (31 May 2023 15:24) (95% - 98%)    Parameters below as of 31 May 2023 15:24  Patient On (Oxygen Delivery Method): room air        PHYSICAL EXAM:  GENERAL: sick appearing, lethargic   HEAD:  Atraumatic, Normocephalic  EYES: conjunctiva and sclera clear  ENT: Moist mucous membranes  NECK: Supple, No JVD  CHEST/LUNG: decreased breath sounds; No rales, rhonchi, wheezing. Unlabored respirations  HEART: Regular rate and rhythm; No murmurs, rubs, or gallops  ABDOMEN: Bowel sounds present; Soft, Nontender, Nondistended.   EXTREMITIES:  2+ Peripheral Pulses, brisk capillary refill. No clubbing, cyanosis, or edema  NERVOUS SYSTEM: lethargic     MEDICATIONS  (STANDING):  acetaminophen   IVPB .. 650 milliGRAM(s) IV Intermittent once  azithromycin  IVPB 500 milliGRAM(s) IV Intermittent every 24 hours  azithromycin  IVPB      citalopram 20 milliGRAM(s) Oral daily  dexAMETHasone  Injectable 6 milliGRAM(s) IV Push daily  dextrose 5% + sodium chloride 0.45%. 1000 milliLiter(s) (50 mL/Hr) IV Continuous <Continuous>  donepezil 10 milliGRAM(s) Oral at bedtime  enoxaparin Injectable 40 milliGRAM(s) SubCutaneous every 24 hours  latanoprost 0.005% Ophthalmic Solution 1 Drop(s) Both EYES at bedtime  lidocaine   4% Patch 1 Patch Transdermal every 24 hours  memantine 10 milliGRAM(s) Oral every 12 hours  remdesivir  IVPB   IV Intermittent   senna 2 Tablet(s) Oral at bedtime  timolol 0.5% Solution 1 Drop(s) Both EYES daily    MEDICATIONS  (PRN):  acetaminophen     Tablet .. 650 milliGRAM(s) Oral every 6 hours PRN Temp greater or equal to 38C (100.4F), Mild Pain (1 - 3), Moderate Pain (4 - 6)  acetaminophen     Tablet .. 975 milliGRAM(s) Oral every 6 hours PRN Severe Pain (7 - 10)                LABS:                                     11.8   6.17  )-----------( 216      ( 30 May 2023 09:35 )             35.3   05-31    x   |  x   |  x   ----------------------------<  x   x    |  x   |  1.02    Ca    9.7      30 May 2023 09:35    TPro  6.1  /  Alb  2.6<L>  /  TBili  0.2  /  DBili  <0.1  /  AST  19  /  ALT  23  /  AlkPhos  96  05-31              RADIOLOGY:    < from: Xray Chest 1 View- PORTABLE-Urgent (05.28.23 @ 15:32) >  COMPARISON: 3/15/2023    Issues rotated.    The cardiomediastinal silhouette is not enlarged. There is no focal lung   consolidation or sizable pleural effusion. No significant osseous   abnormality.    IMPRESSION:    Unremarkable frontal chest x ray

## 2023-06-02 ENCOUNTER — TRANSCRIPTION ENCOUNTER (OUTPATIENT)
Age: 88
End: 2023-06-02

## 2023-06-02 VITALS — SYSTOLIC BLOOD PRESSURE: 90 MMHG | HEART RATE: 65 BPM | DIASTOLIC BLOOD PRESSURE: 57 MMHG

## 2023-06-02 LAB
ALBUMIN SERPL ELPH-MCNC: 2.5 G/DL — LOW (ref 3.3–5)
ALP SERPL-CCNC: 77 U/L — SIGNIFICANT CHANGE UP (ref 40–120)
ALT FLD-CCNC: 24 U/L — SIGNIFICANT CHANGE UP (ref 12–78)
AST SERPL-CCNC: 22 U/L — SIGNIFICANT CHANGE UP (ref 15–37)
BILIRUB DIRECT SERPL-MCNC: 0.1 MG/DL — SIGNIFICANT CHANGE UP (ref 0–0.3)
BILIRUB INDIRECT FLD-MCNC: 0.2 MG/DL — SIGNIFICANT CHANGE UP (ref 0.2–1)
BILIRUB SERPL-MCNC: 0.3 MG/DL — SIGNIFICANT CHANGE UP (ref 0.2–1.2)
CREAT SERPL-MCNC: 0.71 MG/DL — SIGNIFICANT CHANGE UP (ref 0.5–1.3)
CULTURE RESULTS: SIGNIFICANT CHANGE UP
CULTURE RESULTS: SIGNIFICANT CHANGE UP
EGFR: 83 ML/MIN/1.73M2 — SIGNIFICANT CHANGE UP
INR BLD: 1.1 RATIO — SIGNIFICANT CHANGE UP (ref 0.88–1.16)
PROT SERPL-MCNC: 5.7 GM/DL — LOW (ref 6–8.3)
PROTHROM AB SERPL-ACNC: 12.8 SEC — SIGNIFICANT CHANGE UP (ref 10.5–13.4)
SPECIMEN SOURCE: SIGNIFICANT CHANGE UP
SPECIMEN SOURCE: SIGNIFICANT CHANGE UP

## 2023-06-02 PROCEDURE — 99239 HOSP IP/OBS DSCHRG MGMT >30: CPT

## 2023-06-02 RX ORDER — PANTOPRAZOLE SODIUM 20 MG/1
1 TABLET, DELAYED RELEASE ORAL
Qty: 0 | Refills: 0 | DISCHARGE
Start: 2023-06-02

## 2023-06-02 RX ORDER — MIDODRINE HYDROCHLORIDE 2.5 MG/1
1 TABLET ORAL
Qty: 0 | Refills: 0 | DISCHARGE
Start: 2023-06-02

## 2023-06-02 RX ORDER — DEXAMETHASONE 0.5 MG/5ML
1 ELIXIR ORAL
Qty: 5 | Refills: 0
Start: 2023-06-02 | End: 2023-06-06

## 2023-06-02 RX ORDER — SODIUM CHLORIDE 9 MG/ML
250 INJECTION INTRAMUSCULAR; INTRAVENOUS; SUBCUTANEOUS ONCE
Refills: 0 | Status: COMPLETED | OUTPATIENT
Start: 2023-06-02 | End: 2023-06-02

## 2023-06-02 RX ADMIN — CITALOPRAM 20 MILLIGRAM(S): 10 TABLET, FILM COATED ORAL at 09:38

## 2023-06-02 RX ADMIN — Medication 1 DROP(S): at 10:42

## 2023-06-02 RX ADMIN — PANTOPRAZOLE SODIUM 40 MILLIGRAM(S): 20 TABLET, DELAYED RELEASE ORAL at 05:41

## 2023-06-02 RX ADMIN — Medication 6 MILLIGRAM(S): at 09:38

## 2023-06-02 RX ADMIN — LIDOCAINE 1 PATCH: 4 CREAM TOPICAL at 09:00

## 2023-06-02 RX ADMIN — REMDESIVIR 200 MILLIGRAM(S): 5 INJECTION INTRAVENOUS at 12:30

## 2023-06-02 RX ADMIN — SODIUM CHLORIDE 250 MILLILITER(S): 9 INJECTION INTRAMUSCULAR; INTRAVENOUS; SUBCUTANEOUS at 14:24

## 2023-06-02 RX ADMIN — MEMANTINE HYDROCHLORIDE 10 MILLIGRAM(S): 10 TABLET ORAL at 09:38

## 2023-06-02 RX ADMIN — LIDOCAINE 1 PATCH: 4 CREAM TOPICAL at 06:28

## 2023-06-02 NOTE — DISCHARGE NOTE NURSING/CASE MANAGEMENT/SOCIAL WORK - NSDCVIVACCINE_GEN_ALL_CORE_FT
Moderna COVID-19 Vaccine, Bivalent; 07-Oct-2022 13:40; Gudelia Fraire (RN); [employer] [id number] (Exp. Date: 27-May-2023); IntraMuscular; Deltoid Left.; 0.5 milliLiter(s);

## 2023-06-02 NOTE — DISCHARGE NOTE PROVIDER - NSDCMRMEDTOKEN_GEN_ALL_CORE_FT
acetaminophen 325 mg oral tablet: 2 tab(s) orally every 6 hours, As needed, Mild Pain (1 - 3)  citalopram 20 mg oral tablet: 1 tab(s) orally once a day  Decara 1250 mcg (50,000 intl units) oral capsule: 1 cap(s) orally every 4 weeks  dexAMETHasone 6 mg oral tablet: 1 tab(s) orally once a day  donepezil 10 mg oral tablet: 1 tab(s) orally once a day (at bedtime)  lactobacillus acidophilus oral capsule: 1 cap(s) orally 2 times a day  latanoprost 0.005% ophthalmic solution: 1 drop(s) in each eye once a day (at bedtime)  lidocaine 4% patch: Apply topically to affected area once a day  memantine 28 mg oral capsule, extended release: 1 cap(s) orally once a day  midodrine 2.5 mg oral tablet: 1 tab(s) orally 3 times a day As needed SBP &lt;100  pantoprazole 40 mg oral delayed release tablet: 1 tab(s) orally once a day (before a meal)  senna leaf extract oral tablet: 2 tab(s) orally once a day (at bedtime)  timolol 0.5% ophthalmic solution: 1 drop(s) in each eye once a day

## 2023-06-02 NOTE — DISCHARGE NOTE PROVIDER - NSDCCPCAREPLAN_GEN_ALL_CORE_FT
PRINCIPAL DISCHARGE DIAGNOSIS  Diagnosis: Acute respiratory failure with hypoxia  Assessment and Plan of Treatment: Admitted for encephalopathy and hypoxia due to covid-19, treated with remdesivir and dexamethasone with improvement. Completed 5 days of treatment. Breathing well on room air.      SECONDARY DISCHARGE DIAGNOSES  Diagnosis: Toxic metabolic encephalopathy  Assessment and Plan of Treatment: Now improved    Diagnosis: Hypotension  Assessment and Plan of Treatment: Found with low blood pressure readings, improving with hydration, continue midodrine 2.5mg as needed for SBP <100.

## 2023-06-02 NOTE — DISCHARGE NOTE PROVIDER - ATTENDING DISCHARGE PHYSICAL EXAMINATION:
Telephone Encounter by Kp Guzman MD at 06/14/17 10:31 AM     Author:  Kp Guzman MD Service:  (none) Author Type:  Physician     Filed:  06/14/17 10:32 AM Encounter Date:  6/14/2017 Status:  Signed     :  Kp Guzman MD (Physician)            please fax face sheet, demographics, insurance info, and MRI brain report from 6/12/17 to Dr. Lopez's office at [facility] neurosurgery [phone][MP1.1M]      Revision History        User Key Date/Time User Provider Type Action    > MP1.1 06/14/17 10:32 AM Kp Guzman MD Physician Sign    M - Manual             Patient seen this AM, sitting up in bed, awake, alert, feels well. Still with poor appetite.     PHYSICAL EXAM:  GENERAL: sick appearing, lethargic   HEAD:  Atraumatic, Normocephalic  EYES: conjunctiva and sclera clear  ENT: Moist mucous membranes  NECK: Supple, No JVD  CHEST/LUNG: decreased breath sounds; No rales, rhonchi, wheezing. Unlabored respirations  HEART: Regular rate and rhythm; No murmurs, rubs, or gallops  ABDOMEN: Bowel sounds present; Soft, Nontender, Nondistended.   EXTREMITIES:  2+ Peripheral Pulses, brisk capillary refill. No clubbing, cyanosis, or edema  NERVOUS SYSTEM: awake, alert, verbal

## 2023-06-02 NOTE — DISCHARGE NOTE NURSING/CASE MANAGEMENT/SOCIAL WORK - PATIENT PORTAL LINK FT
You can access the FollowMyHealth Patient Portal offered by [facility] by registering at the following website: [email or web address]. By joining [employer]’s FollowMyHealth portal, you will also be able to view your health information using other applications (apps) compatible with our system.

## 2023-06-02 NOTE — DISCHARGE NOTE PROVIDER - CARE PROVIDER_API CALL
Fior Hughes.  Internal Medicine  [address] [place] [address]  Phone: [phone]  Fax: [phone]  Follow Up Time:
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
## 2023-06-02 NOTE — DISCHARGE NOTE PROVIDER - HOSPITAL COURSE
88 yo F from [facility] with hx of advancing dementia and lower back pain presents with aspiration episode from [facility]. Per daughter Shira at bedside, she had an episode of thickened secretions that she could not clear in the AM today that appeared to happen when she was about to eat. She was concerned that the gurgling sound she heard was an aspiration event and insisted her mother go to [place] to get evaluated. She was found to be covid positive in the ED and to have presumed aspiration pneumonitis in ED.   ROS negative per patient, and chronic pain noted in lumbar spine as well as some minor aches and pains in joints today and yesterday.    Acute hypoxic respiratory failure secondary 2/2 asp pneumonia/ covid-19  Acute toxic-metabolic encephalopathy in setting of acute infection - improved   Aspiration pneumonitis in setting of witnessed aspiration episode   COVID-19 infection   SpO2: 92% on RA  Aspiration precautions, puree diet (pt on puree diet)  Monitoring for aspiration  S/p remdesivir + dexamethasone --> day #5  Will start IV PPI 2/2 Hx PUD/GI bleed in past and she is also on VTE ppx   ID consult -- spoke with Dr. Cook   Supportive care: Tylenol PRN, Robitussin PRN   Pulse Oximetry   Speech and swallow eval when appropriate    Dementia   Continue donepezil and memantine   Aspiration/fall precautions     Hypotension  -Po intake improved  -Do not want to fluid overload  -Pt asymptomatic  -MAP > 65  -Discussed midodrine with dtr, agreeable to low dose if necessary     VTE ppx lovenox qd

## 2023-06-09 DIAGNOSIS — M16.11 UNILATERAL PRIMARY OSTEOARTHRITIS, RIGHT HIP: ICD-10-CM

## 2023-06-09 DIAGNOSIS — I95.9 HYPOTENSION, UNSPECIFIED: ICD-10-CM

## 2023-06-09 DIAGNOSIS — Z66 DO NOT RESUSCITATE: ICD-10-CM

## 2023-06-09 DIAGNOSIS — G30.9 ALZHEIMER'S DISEASE, UNSPECIFIED: ICD-10-CM

## 2023-06-09 DIAGNOSIS — J69.0 PNEUMONITIS DUE TO INHALATION OF FOOD AND VOMIT: ICD-10-CM

## 2023-06-09 DIAGNOSIS — F02.80 DEMENTIA IN OTHER DISEASES CLASSIFIED ELSEWHERE, UNSPECIFIED SEVERITY, WITHOUT BEHAVIORAL DISTURBANCE, PSYCHOTIC DISTURBANCE, MOOD DISTURBANCE, AND ANXIETY: ICD-10-CM

## 2023-06-09 DIAGNOSIS — G92.8 OTHER TOXIC ENCEPHALOPATHY: ICD-10-CM

## 2023-06-09 DIAGNOSIS — E78.5 HYPERLIPIDEMIA, UNSPECIFIED: ICD-10-CM

## 2023-06-09 DIAGNOSIS — J96.01 ACUTE RESPIRATORY FAILURE WITH HYPOXIA: ICD-10-CM

## 2023-06-09 DIAGNOSIS — U07.1 COVID-19: ICD-10-CM

## 2023-07-05 NOTE — H&P ADULT - NSHPPOASURGSITEINCISION_GEN_ALL_CORE
Show Applicator Variable?: Yes Render Note In Bullet Format When Appropriate: No Duration Of Freeze Thaw-Cycle (Seconds): 0 Post-Care Instructions: I reviewed with the patient in detail post-care instructions. Patient is to wear sunprotection, and avoid picking at any of the treated lesions. Pt may apply Vaseline to crusted or scabbing areas. Consent: The patient's consent was obtained including but not limited to risks of crusting, scabbing, blistering, scarring, darker or lighter pigmentary change, recurrence, incomplete removal and infection. Detail Level: Detailed no

## 2023-07-14 RX ORDER — NYSTATIN/TRIAMCINOLONE ACET
1 OINTMENT (GRAM) TOPICAL
Refills: 0 | DISCHARGE
Start: 2023-07-14

## 2023-07-24 ENCOUNTER — INPATIENT (INPATIENT)
Facility: HOSPITAL | Age: 88
LOS: 8 days | Discharge: NURSING, REHAB OR LONG-TERM CARE | DRG: 177 | End: 2023-08-02
Attending: FAMILY MEDICINE | Admitting: STUDENT IN AN ORGANIZED HEALTH CARE EDUCATION/TRAINING PROGRAM
Payer: MEDICARE

## 2023-07-24 VITALS
HEART RATE: 78 BPM | SYSTOLIC BLOOD PRESSURE: 95 MMHG | WEIGHT: 85.1 LBS | TEMPERATURE: 98 F | DIASTOLIC BLOOD PRESSURE: 52 MMHG | RESPIRATION RATE: 22 BRPM | OXYGEN SATURATION: 98 % | HEIGHT: 61 IN

## 2023-07-24 DIAGNOSIS — Z98.89 OTHER SPECIFIED POSTPROCEDURAL STATES: Chronic | ICD-10-CM

## 2023-07-24 LAB
ALBUMIN SERPL ELPH-MCNC: 2.5 G/DL — LOW (ref 3.3–5)
ALP SERPL-CCNC: 95 U/L — SIGNIFICANT CHANGE UP (ref 40–120)
ALT FLD-CCNC: 13 U/L — SIGNIFICANT CHANGE UP (ref 12–78)
ANION GAP SERPL CALC-SCNC: 3 MMOL/L — LOW (ref 5–17)
APPEARANCE UR: ABNORMAL
APTT BLD: 28.5 SEC — SIGNIFICANT CHANGE UP (ref 27.5–35.5)
AST SERPL-CCNC: 32 U/L — SIGNIFICANT CHANGE UP (ref 15–37)
BACTERIA # UR AUTO: ABNORMAL
BASOPHILS # BLD AUTO: 0.07 K/UL — SIGNIFICANT CHANGE UP (ref 0–0.2)
BASOPHILS NFR BLD AUTO: 0.7 % — SIGNIFICANT CHANGE UP (ref 0–2)
BILIRUB SERPL-MCNC: 0.4 MG/DL — SIGNIFICANT CHANGE UP (ref 0.2–1.2)
BILIRUB UR-MCNC: NEGATIVE — SIGNIFICANT CHANGE UP
BUN SERPL-MCNC: 9 MG/DL — SIGNIFICANT CHANGE UP (ref 7–23)
CALCIUM SERPL-MCNC: 9.3 MG/DL — SIGNIFICANT CHANGE UP (ref 8.5–10.1)
CHLORIDE SERPL-SCNC: 109 MMOL/L — HIGH (ref 96–108)
CO2 SERPL-SCNC: 27 MMOL/L — SIGNIFICANT CHANGE UP (ref 22–31)
COLOR SPEC: YELLOW — SIGNIFICANT CHANGE UP
CREAT SERPL-MCNC: 0.82 MG/DL — SIGNIFICANT CHANGE UP (ref 0.5–1.3)
DIFF PNL FLD: ABNORMAL
EGFR: 70 ML/MIN/1.73M2 — SIGNIFICANT CHANGE UP
EOSINOPHIL # BLD AUTO: 0.1 K/UL — SIGNIFICANT CHANGE UP (ref 0–0.5)
EOSINOPHIL NFR BLD AUTO: 1 % — SIGNIFICANT CHANGE UP (ref 0–6)
EPI CELLS # UR: SIGNIFICANT CHANGE UP
GLUCOSE SERPL-MCNC: 93 MG/DL — SIGNIFICANT CHANGE UP (ref 70–99)
GLUCOSE UR QL: NEGATIVE — SIGNIFICANT CHANGE UP
HCT VFR BLD CALC: 36.1 % — SIGNIFICANT CHANGE UP (ref 34.5–45)
HGB BLD-MCNC: 12.5 G/DL — SIGNIFICANT CHANGE UP (ref 11.5–15.5)
IMM GRANULOCYTES NFR BLD AUTO: 0.3 % — SIGNIFICANT CHANGE UP (ref 0–0.9)
INR BLD: 1.02 RATIO — SIGNIFICANT CHANGE UP (ref 0.88–1.16)
KETONES UR-MCNC: NEGATIVE — SIGNIFICANT CHANGE UP
LACTATE SERPL-SCNC: 1.1 MMOL/L — SIGNIFICANT CHANGE UP (ref 0.7–2)
LEUKOCYTE ESTERASE UR-ACNC: ABNORMAL
LYMPHOCYTES # BLD AUTO: 2.5 K/UL — SIGNIFICANT CHANGE UP (ref 1–3.3)
LYMPHOCYTES # BLD AUTO: 24.9 % — SIGNIFICANT CHANGE UP (ref 13–44)
MCHC RBC-ENTMCNC: 34.6 GM/DL — SIGNIFICANT CHANGE UP (ref 32–36)
MCHC RBC-ENTMCNC: 34.7 PG — HIGH (ref 27–34)
MCV RBC AUTO: 100.3 FL — HIGH (ref 80–100)
MONOCYTES # BLD AUTO: 0.77 K/UL — SIGNIFICANT CHANGE UP (ref 0–0.9)
MONOCYTES NFR BLD AUTO: 7.7 % — SIGNIFICANT CHANGE UP (ref 2–14)
NEUTROPHILS # BLD AUTO: 6.56 K/UL — SIGNIFICANT CHANGE UP (ref 1.8–7.4)
NEUTROPHILS NFR BLD AUTO: 65.4 % — SIGNIFICANT CHANGE UP (ref 43–77)
NITRITE UR-MCNC: POSITIVE
PH UR: 5 — SIGNIFICANT CHANGE UP (ref 5–8)
PLATELET # BLD AUTO: 252 K/UL — SIGNIFICANT CHANGE UP (ref 150–400)
POTASSIUM SERPL-MCNC: 4.6 MMOL/L — SIGNIFICANT CHANGE UP (ref 3.5–5.3)
POTASSIUM SERPL-SCNC: 4.6 MMOL/L — SIGNIFICANT CHANGE UP (ref 3.5–5.3)
PROT SERPL-MCNC: 6.3 GM/DL — SIGNIFICANT CHANGE UP (ref 6–8.3)
PROT UR-MCNC: 15
PROTHROM AB SERPL-ACNC: 11.8 SEC — SIGNIFICANT CHANGE UP (ref 10.5–13.4)
RBC # BLD: 3.6 M/UL — LOW (ref 3.8–5.2)
RBC # FLD: 13.3 % — SIGNIFICANT CHANGE UP (ref 10.3–14.5)
RBC CASTS # UR COMP ASSIST: ABNORMAL /HPF (ref 0–4)
SODIUM SERPL-SCNC: 139 MMOL/L — SIGNIFICANT CHANGE UP (ref 135–145)
SP GR SPEC: 1.01 — SIGNIFICANT CHANGE UP (ref 1.01–1.02)
UROBILINOGEN FLD QL: NEGATIVE — SIGNIFICANT CHANGE UP
WBC # BLD: 10.03 K/UL — SIGNIFICANT CHANGE UP (ref 3.8–10.5)
WBC # FLD AUTO: 10.03 K/UL — SIGNIFICANT CHANGE UP (ref 3.8–10.5)
WBC UR QL: >50 /HPF (ref 0–5)

## 2023-07-24 PROCEDURE — 71045 X-RAY EXAM CHEST 1 VIEW: CPT | Mod: 26

## 2023-07-24 PROCEDURE — 99285 EMERGENCY DEPT VISIT HI MDM: CPT

## 2023-07-24 PROCEDURE — 93010 ELECTROCARDIOGRAM REPORT: CPT

## 2023-07-24 RX ORDER — CEFEPIME 1 G/1
1000 INJECTION, POWDER, FOR SOLUTION INTRAMUSCULAR; INTRAVENOUS ONCE
Refills: 0 | Status: DISCONTINUED | OUTPATIENT
Start: 2023-07-24 | End: 2023-07-24

## 2023-07-24 RX ORDER — CEFEPIME 1 G/1
1000 INJECTION, POWDER, FOR SOLUTION INTRAMUSCULAR; INTRAVENOUS ONCE
Refills: 0 | Status: COMPLETED | OUTPATIENT
Start: 2023-07-24 | End: 2023-07-24

## 2023-07-24 RX ORDER — SODIUM CHLORIDE 9 MG/ML
500 INJECTION INTRAMUSCULAR; INTRAVENOUS; SUBCUTANEOUS ONCE
Refills: 0 | Status: COMPLETED | OUTPATIENT
Start: 2023-07-24 | End: 2023-07-24

## 2023-07-24 RX ADMIN — CEFEPIME 1000 MILLIGRAM(S): 1 INJECTION, POWDER, FOR SOLUTION INTRAMUSCULAR; INTRAVENOUS at 23:31

## 2023-07-24 RX ADMIN — SODIUM CHLORIDE 500 MILLILITER(S): 9 INJECTION INTRAMUSCULAR; INTRAVENOUS; SUBCUTANEOUS at 22:52

## 2023-07-24 NOTE — ED PROVIDER NOTE - NEUROLOGICAL LEVEL OF CONSCIOUSNESS
none
Chronic contractures of bilateral lower extremities.  Patient follows basic commands/alert/follows commands

## 2023-07-24 NOTE — ED ADULT TRIAGE NOTE - CHIEF COMPLAINT QUOTE
pt BIB EMS for cough. as per daughter at bedside, pt with hx of dementia and baseline dysphagia. noted to have a cough for a few days. daughter concerned for aspiration PNA which pt has had in the past. denies known fevers/chills.

## 2023-07-24 NOTE — ED PROVIDER NOTE - OBJECTIVE STATEMENT
87-year-old female with history of advanced dementia, hypercholesterolemia, recent admission to [facility] for aspiration pneumonitis presents for evaluation of cough and "gurgling sounds in her throat" that was noted by her daughter when she visited her at the assisted living facility.  Patient is unable to provide a full HPI or review of systems due to her baseline mental status.  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  This is status post previous hip fracture.  Patient states that she has "pain everywhere".  Patient is noted to have a low blood pressure at baseline according to her son and normally her systolic blood pressure runs between 90 and 100.

## 2023-07-24 NOTE — ED ADULT NURSE NOTE - OBJECTIVE STATEMENT
87y female presents to the ED from [facility] for cough. Daughter at bedside reports pt has a thick puree diet, PMH of dysphasia. Daughter reports pt started to develop a cough today and is concerned for aspiration PNA. Daughter wanted pt to be evaluated. Pt is A/OX4, has no complaints at this time. Non productive cough noted on assessment. Pt Iv established, blood cultures sent with blood work, urine culture and analysis collected.

## 2023-07-24 NOTE — ED ADULT NURSE NOTE - NSFALLUNIVINTERV_ED_ALL_ED
Bed/Stretcher in lowest position, wheels locked, appropriate side rails in place/Call bell, personal items and telephone in reach/Instruct patient to call for assistance before getting out of bed/chair/stretcher/Non-slip footwear applied when patient is off stretcher/Peck to call system/Physically safe environment - no spills, clutter or unnecessary equipment/Purposeful proactive rounding/Room/bathroom lighting operational, light cord in reach

## 2023-07-24 NOTE — ED PROVIDER NOTE - CLINICAL SUMMARY MEDICAL DECISION MAKING FREE TEXT BOX
87-year-old female with history of advanced dementia, hypercholesterolemia, recent admission to [facility] for aspiration pneumonitis presents for evaluation of cough and "gurgling sounds in her throat" that was noted by her daughter when she visited her at the assisted living facility.  Patient is unable to provide a full HPI or review of systems due to her baseline mental status.  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  This is status post previous hip fracture.  Patient states that she has "pain everywhere".  Patient is noted to have a low blood pressure at baseline according to her son and normally her systolic blood pressure runs between 90 and 100.   patient is at risk for repeat episode of aspiration pneumonitis/pneumonia.  Patient is afebrile in the emergency department.  Patient blood pressure normally runs low but will monitor closely.  We will get septic work-up to include blood cultures, lactate, CBC, chest x-ray, RVP.

## 2023-07-25 DIAGNOSIS — J18.9 PNEUMONIA, UNSPECIFIED ORGANISM: ICD-10-CM

## 2023-07-25 DIAGNOSIS — F03.90 UNSPECIFIED DEMENTIA WITHOUT BEHAVIORAL DISTURBANCE: ICD-10-CM

## 2023-07-25 LAB
RAPID RVP RESULT: DETECTED
SARS-COV-2 RNA SPEC QL NAA+PROBE: DETECTED

## 2023-07-25 PROCEDURE — 80053 COMPREHEN METABOLIC PANEL: CPT

## 2023-07-25 PROCEDURE — 85025 COMPLETE CBC W/AUTO DIFF WBC: CPT

## 2023-07-25 PROCEDURE — 84100 ASSAY OF PHOSPHORUS: CPT

## 2023-07-25 PROCEDURE — 83735 ASSAY OF MAGNESIUM: CPT

## 2023-07-25 PROCEDURE — 36415 COLL VENOUS BLD VENIPUNCTURE: CPT

## 2023-07-25 PROCEDURE — 92610 EVALUATE SWALLOWING FUNCTION: CPT | Mod: GN

## 2023-07-25 PROCEDURE — 99223 1ST HOSP IP/OBS HIGH 75: CPT

## 2023-07-25 PROCEDURE — 92523 SPEECH SOUND LANG COMPREHEN: CPT | Mod: GN

## 2023-07-25 PROCEDURE — 80048 BASIC METABOLIC PNL TOTAL CA: CPT

## 2023-07-25 PROCEDURE — 71250 CT THORAX DX C-: CPT | Mod: 26,MA

## 2023-07-25 PROCEDURE — 85027 COMPLETE CBC AUTOMATED: CPT

## 2023-07-25 PROCEDURE — 83036 HEMOGLOBIN GLYCOSYLATED A1C: CPT

## 2023-07-25 RX ORDER — TIMOLOL 0.5 %
1 DROPS OPHTHALMIC (EYE) DAILY
Refills: 0 | Status: DISCONTINUED | OUTPATIENT
Start: 2023-07-25 | End: 2023-08-02

## 2023-07-25 RX ORDER — CEFTRIAXONE 500 MG/1
INJECTION, POWDER, FOR SOLUTION INTRAMUSCULAR; INTRAVENOUS
Refills: 0 | Status: DISCONTINUED | OUTPATIENT
Start: 2023-07-25 | End: 2023-07-25

## 2023-07-25 RX ORDER — VANCOMYCIN HCL 1 G
750 VIAL (EA) INTRAVENOUS ONCE
Refills: 0 | Status: COMPLETED | OUTPATIENT
Start: 2023-07-25 | End: 2023-07-25

## 2023-07-25 RX ORDER — LIDOCAINE 4 G/100G
1 CREAM TOPICAL DAILY
Refills: 0 | Status: DISCONTINUED | OUTPATIENT
Start: 2023-07-25 | End: 2023-07-26

## 2023-07-25 RX ORDER — TRAMADOL HYDROCHLORIDE 50 MG/1
25 TABLET ORAL EVERY 4 HOURS
Refills: 0 | Status: DISCONTINUED | OUTPATIENT
Start: 2023-07-25 | End: 2023-07-25

## 2023-07-25 RX ORDER — ACETAMINOPHEN 500 MG
650 TABLET ORAL EVERY 6 HOURS
Refills: 0 | Status: DISCONTINUED | OUTPATIENT
Start: 2023-07-25 | End: 2023-08-02

## 2023-07-25 RX ORDER — DONEPEZIL HYDROCHLORIDE 10 MG/1
10 TABLET, FILM COATED ORAL AT BEDTIME
Refills: 0 | Status: DISCONTINUED | OUTPATIENT
Start: 2023-07-25 | End: 2023-08-02

## 2023-07-25 RX ORDER — LATANOPROST 0.05 MG/ML
1 SOLUTION/ DROPS OPHTHALMIC; TOPICAL AT BEDTIME
Refills: 0 | Status: DISCONTINUED | OUTPATIENT
Start: 2023-07-25 | End: 2023-08-02

## 2023-07-25 RX ORDER — SODIUM CHLORIDE 9 MG/ML
1000 INJECTION INTRAMUSCULAR; INTRAVENOUS; SUBCUTANEOUS
Refills: 0 | Status: DISCONTINUED | OUTPATIENT
Start: 2023-07-25 | End: 2023-07-25

## 2023-07-25 RX ORDER — SODIUM CHLORIDE 9 MG/ML
1000 INJECTION INTRAMUSCULAR; INTRAVENOUS; SUBCUTANEOUS ONCE
Refills: 0 | Status: COMPLETED | OUTPATIENT
Start: 2023-07-25 | End: 2023-07-25

## 2023-07-25 RX ORDER — SENNA PLUS 8.6 MG/1
2 TABLET ORAL AT BEDTIME
Refills: 0 | Status: DISCONTINUED | OUTPATIENT
Start: 2023-07-25 | End: 2023-08-02

## 2023-07-25 RX ORDER — CEFTRIAXONE 500 MG/1
1000 INJECTION, POWDER, FOR SOLUTION INTRAMUSCULAR; INTRAVENOUS ONCE
Refills: 0 | Status: DISCONTINUED | OUTPATIENT
Start: 2023-07-25 | End: 2023-07-25

## 2023-07-25 RX ORDER — ENOXAPARIN SODIUM 100 MG/ML
30 INJECTION SUBCUTANEOUS EVERY 24 HOURS
Refills: 0 | Status: DISCONTINUED | OUTPATIENT
Start: 2023-07-25 | End: 2023-08-02

## 2023-07-25 RX ORDER — CITALOPRAM 10 MG/1
20 TABLET, FILM COATED ORAL DAILY
Refills: 0 | Status: DISCONTINUED | OUTPATIENT
Start: 2023-07-25 | End: 2023-08-02

## 2023-07-25 RX ORDER — NYSTATIN/TRIAMCINOLONE ACET
1 OINTMENT (GRAM) TOPICAL
Refills: 0 | Status: DISCONTINUED | OUTPATIENT
Start: 2023-07-25 | End: 2023-08-02

## 2023-07-25 RX ORDER — MIDODRINE HYDROCHLORIDE 2.5 MG/1
2.5 TABLET ORAL THREE TIMES A DAY
Refills: 0 | Status: DISCONTINUED | OUTPATIENT
Start: 2023-07-25 | End: 2023-08-02

## 2023-07-25 RX ORDER — LANOLIN ALCOHOL/MO/W.PET/CERES
3 CREAM (GRAM) TOPICAL AT BEDTIME
Refills: 0 | Status: DISCONTINUED | OUTPATIENT
Start: 2023-07-25 | End: 2023-08-02

## 2023-07-25 RX ORDER — SODIUM CHLORIDE 9 MG/ML
1000 INJECTION INTRAMUSCULAR; INTRAVENOUS; SUBCUTANEOUS
Refills: 0 | Status: DISCONTINUED | OUTPATIENT
Start: 2023-07-25 | End: 2023-07-26

## 2023-07-25 RX ORDER — MEMANTINE HYDROCHLORIDE 10 MG/1
10 TABLET ORAL
Refills: 0 | Status: DISCONTINUED | OUTPATIENT
Start: 2023-07-25 | End: 2023-08-02

## 2023-07-25 RX ORDER — PANTOPRAZOLE SODIUM 20 MG/1
40 TABLET, DELAYED RELEASE ORAL
Refills: 0 | Status: DISCONTINUED | OUTPATIENT
Start: 2023-07-25 | End: 2023-07-28

## 2023-07-25 RX ORDER — CEFEPIME 1 G/1
2000 INJECTION, POWDER, FOR SOLUTION INTRAMUSCULAR; INTRAVENOUS
Refills: 0 | Status: DISCONTINUED | OUTPATIENT
Start: 2023-07-25 | End: 2023-07-30

## 2023-07-25 RX ORDER — ONDANSETRON 8 MG/1
4 TABLET, FILM COATED ORAL EVERY 8 HOURS
Refills: 0 | Status: DISCONTINUED | OUTPATIENT
Start: 2023-07-25 | End: 2023-08-02

## 2023-07-25 RX ORDER — KETOROLAC TROMETHAMINE 30 MG/ML
15 SYRINGE (ML) INJECTION EVERY 6 HOURS
Refills: 0 | Status: DISCONTINUED | OUTPATIENT
Start: 2023-07-25 | End: 2023-07-25

## 2023-07-25 RX ORDER — MORPHINE SULFATE 50 MG/1
2 CAPSULE, EXTENDED RELEASE ORAL EVERY 4 HOURS
Refills: 0 | Status: DISCONTINUED | OUTPATIENT
Start: 2023-07-25 | End: 2023-07-25

## 2023-07-25 RX ORDER — LACTOBACILLUS ACIDOPHILUS 100MM CELL
1 CAPSULE ORAL
Refills: 0 | Status: DISCONTINUED | OUTPATIENT
Start: 2023-07-25 | End: 2023-08-02

## 2023-07-25 RX ADMIN — ENOXAPARIN SODIUM 30 MILLIGRAM(S): 100 INJECTION SUBCUTANEOUS at 16:22

## 2023-07-25 RX ADMIN — DONEPEZIL HYDROCHLORIDE 10 MILLIGRAM(S): 10 TABLET, FILM COATED ORAL at 22:22

## 2023-07-25 RX ADMIN — Medication 1 DROP(S): at 22:30

## 2023-07-25 RX ADMIN — CEFEPIME 100 MILLIGRAM(S): 1 INJECTION, POWDER, FOR SOLUTION INTRAMUSCULAR; INTRAVENOUS at 10:43

## 2023-07-25 RX ADMIN — SODIUM CHLORIDE 1000 MILLILITER(S): 9 INJECTION INTRAMUSCULAR; INTRAVENOUS; SUBCUTANEOUS at 05:23

## 2023-07-25 RX ADMIN — Medication 15 MILLIGRAM(S): at 13:47

## 2023-07-25 RX ADMIN — LATANOPROST 1 DROP(S): 0.05 SOLUTION/ DROPS OPHTHALMIC; TOPICAL at 22:19

## 2023-07-25 RX ADMIN — SODIUM CHLORIDE 70 MILLILITER(S): 9 INJECTION INTRAMUSCULAR; INTRAVENOUS; SUBCUTANEOUS at 11:37

## 2023-07-25 RX ADMIN — Medication 3 MILLIGRAM(S): at 22:19

## 2023-07-25 RX ADMIN — Medication 1 APPLICATION(S): at 22:20

## 2023-07-25 RX ADMIN — Medication 15 MILLIGRAM(S): at 12:30

## 2023-07-25 RX ADMIN — Medication 250 MILLIGRAM(S): at 03:39

## 2023-07-25 RX ADMIN — CEFEPIME 100 MILLIGRAM(S): 1 INJECTION, POWDER, FOR SOLUTION INTRAMUSCULAR; INTRAVENOUS at 22:21

## 2023-07-25 RX ADMIN — MEMANTINE HYDROCHLORIDE 10 MILLIGRAM(S): 10 TABLET ORAL at 22:45

## 2023-07-25 RX ADMIN — SENNA PLUS 2 TABLET(S): 8.6 TABLET ORAL at 22:19

## 2023-07-25 RX ADMIN — Medication 1 TABLET(S): at 17:04

## 2023-07-25 NOTE — SWALLOW BEDSIDE ASSESSMENT ADULT - SWALLOW EVAL: DIAGNOSIS
1) Pt exhibits chronic Oropharyngeal Dysphagia which subjectively appeared to be a grossly functional condition with a restricted inventory of modified food consistencies. NO behavioral aspiration signs demonstrated with puree food/moderately thick liquids. Pt is at high risk to aspirate all liquids that are less viscous than moderately thickened/honey thickened liquids. Pt also as increased risk for saliva aspiration. Dysphagia is a chronic pre-existing condition associated with Dementia. Note that pt appears to overtly enjoy being fed the modified food textures within her food inventory. Oropharyngeal swallow integrity at suspected usual state.

## 2023-07-25 NOTE — H&P ADULT - HISTORY OF PRESENT ILLNESS
87-year-old female with history of advanced dementia, hypercholesterolemia, recent admission to [facility] for aspiration pneumonitis presents for evaluation of cough and "gurgling sounds in her throat" that was noted by her daughter when she visited her at the assisted living facility.  Patient is unable to provide a full HPI or review of systems due to her baseline mental status.  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  This is status post previous hip fracture.  Patient states that she has "pain everywhere". 87-year-old female from [facility]  with history of advanced dementia, hypercholesterolemia, recent admission to [facility] for aspiration pneumonitis presents for evaluation of cough and "gurgling sounds in her throat". Patient is unable to provide a full HPI or review of systems due to her baseline mental status.  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 Patient resides at [facility] with 24/7 private aide there. Daughter and family very involved. patient unable to provide any ROS due to lethargy and dementia.

## 2023-07-25 NOTE — SWALLOW BEDSIDE ASSESSMENT ADULT - ORAL PHASE
Bolus formation/transfer were achieved via mildly to moderately prolonged/discontinuous lingual pumping actions which were felt to be grossly mechanically functional with the above restricted inventory of modified food consistencies. Piecemeal deglutition was evident. Mild tongue debris noted with puree food.

## 2023-07-25 NOTE — CONSULT NOTE ADULT - SUBJECTIVE AND OBJECTIVE BOX
Patient is a 87y old  Female who presents with a chief complaint of Pneumonia due to infectious organism     (26 Jul 2023 13:38)    HPI:  87-year-old female from [facility]  with history of advanced dementia, hypercholesterolemia, recent admission to [facility] for aspiration pneumonitis presents for evaluation of cough and "gurgling sounds in her throat". Patient is unable to provide a full HPI or review of systems due to her baseline mental status.  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 Patient resides at [facility] with 24/7 private aide there. Daughter and family very involved. patient unable to provide any ROS due to lethargy and dementia.  Ua positive, CT chest with LLL pneumonia. Covid positive had covid prior in June. No fevers, not hypoxic.       PMH: as above  PSH: as above  Meds: per reconciliation sheet, noted below  MEDICATIONS  (STANDING):  cefepime   IVPB 2000 milliGRAM(s) IV Intermittent two times a day  citalopram 20 milliGRAM(s) Oral daily  donepezil 10 milliGRAM(s) Oral at bedtime  enoxaparin Injectable 30 milliGRAM(s) SubCutaneous every 24 hours  guaiFENesin  milliGRAM(s) Oral every 12 hours  lactobacillus acidophilus 1 Tablet(s) Oral two times a day with meals  latanoprost 0.005% Ophthalmic Solution 1 Drop(s) Both EYES at bedtime  lidocaine   4% Patch 1 Patch Transdermal daily  memantine 10 milliGRAM(s) Oral two times a day  nystatin/triamcinolone Cream 1 Application(s) Topical two times a day  pantoprazole    Tablet 40 milliGRAM(s) Oral before breakfast  senna 2 Tablet(s) Oral at bedtime  sodium chloride 0.9%. 1000 milliLiter(s) (70 mL/Hr) IV Continuous <Continuous>  timolol 0.5% Solution 1 Drop(s) Both EYES daily    Allergies    No Known Allergies    Intolerances      Social: no smoking, no alcohol, no illegal drugs; no recent travel, no exposure to TB  FAMILY HISTORY:  Family history of colon cancer (Father)       no history of premature cardiovascular disease in first degree relatives    ROS: unable to obtain d/t medical condition    All other systems reviewed and are negative      PE:  Constitutional: frail looking  HEENT: NC/AT, EOMI, PERRLA, conjunctivae clear; ears and nose atraumatic; pharynx benign  Neck: supple; thyroid not palpable  Back: no tenderness  Respiratory: decreased breath sounds  Cardiovascular: S1S2 regular, no murmurs  Abdomen: soft, not tender, not distended, positive BS; liver and spleen WNL  Genitourinary: no suprapubic tenderness  Lymphatic: no LN palpable  Musculoskeletal: no muscle tenderness, no joint swelling or tenderness  Extremities: no pedal edema  Neurological/ Psychiatric: moving all extremities  Skin: no rashes; no palpable lesions    Labs: all available labs reviewed       Urinalysis Basic - ( 26 Jul 2023 08:38 )    Color: x / Appearance: x / SG: x / pH: x  Gluc: 88 mg/dL / Ketone: x  / Bili: x / Urobili: x   Blood: x / Protein: x / Nitrite: x   Leuk Esterase: x / RBC: x / WBC x   Sq Epi: x / Non Sq Epi: x / Bacteria: x          Radiology: all available radiological tests reviewed    < from: CT Chest No Cont (07.25.23 @ 00:55) >    ACC: [id number] EXAM:  CT CHEST   ORDERED BY: VERENICE CHAUHAN     PROCEDURE DATE:  07/25/2023          INTERPRETATION:  CLINICAL INFORMATION: Sepsis. Rule out aspiration   pneumonia.    COMPARISON: CT of the abdomen and pelvis from 3/15/2023.    CONTRAST/COMPLICATIONS:  IV Contrast: NONE  Oral Contrast: NONE  Complications: None reported at time of study completion    PROCEDURE:  CT of the Chest was performed.  Sagittal and coronal reformats were performed.    FINDINGS:    LUNGS AND AIRWAYS: Patent central airways. 6 mm nodule in the lingula   (series 3, image 325). Patchy airspace opacities in the left lower lobe   and to a lesser extent in the lingula. Bibasilar subsegmental   atelectasis. Scattered calcified granulomas.  PLEURA: No pleural effusion.  MEDIASTINUM AND LAURIE: No lymphadenopathy.  VESSELS: Ectatic ascending aorta measuring up to 4.0 cm.  HEART: Heart size is normal. No pericardial effusion.  CHEST WALL AND LOWER NECK: Within normal limits.  VISUALIZED UPPER ABDOMEN: Colonic diverticulosis without diverticulitis.  BONES: Redemonstration of T9, T11 and T12 vertebral body compression   fracture deformities.    IMPRESSION:    1. Patchy airspace opacities in the left lower lobe and to a lesser   extent in the lingula likely infectious in etiology.  2. Lingular nodule measuring 6 mm. According to the Fleischner Society   criteria for management of small pulmonary nodules, a follow-up CT of the   chest is recommended at a 6-12 month interval.      < end of copied text >  Advanced directives addressed: full resuscitation

## 2023-07-25 NOTE — SWALLOW BEDSIDE ASSESSMENT ADULT - SLP GENERAL OBSERVATIONS
On encounter, the pt was alert. She was communicatively passive and internally distractible at times. The pt followed a limited number of 1 step verbal commands and periodically verbalized during communicative probes. At these times, pt's speech output was intelligible. However, her utterances were brief, typically rote in nature(i.e "yeah", "okay") and variably contextually inappropriate indicative of chronic Cognitive Dysfunction associated with Dementia. Pt likely at usual communicative state.

## 2023-07-25 NOTE — SWALLOW BEDSIDE ASSESSMENT ADULT - PHARYNGEAL PHASE
Swallow trigger was timely to mildly latent. Laryngeal lift on palpation during swallowing trials was mildly to moderately reduced but felt to be grossly functional with the above modified food consistencies. NO behavioral aspiration signs exhibited with moderately thick liquids and puree food.

## 2023-07-25 NOTE — SWALLOW BEDSIDE ASSESSMENT ADULT - SWALLOW EVAL: SECRETION MANAGEMENT
Limited probes revealed that no drooling was noted on exam. Testing otherwise limited by pt's altered mentation.

## 2023-07-25 NOTE — SWALLOW BEDSIDE ASSESSMENT ADULT - NS SPL SWALLOW CLINIC TRIAL FT
Pt exhibited chronic Oropharyngeal Dysphagia which subjectively appeared to be a grossly functional condition with a restricted inventory of modified food consistencies. Pt is at high risk to aspirate all liquids that are less viscous than moderately thickened/honey thickened liquids. Pt also as increased risk for saliva aspiration. Dysphagia is a chronic pre-existing condition associated with Dementia. Note that pt appears to overtly enjoy being fed the modified food textures within her food inventory. Oropharyngeal swallow integrity at suspected usual state. Solids and liquids less viscous than moderately thick fluids were not offered given the severity of pt's Dysphagia nor were they in her food inventory at [facility].

## 2023-07-25 NOTE — PATIENT PROFILE ADULT - IS THERE A SUSPICION OF ABUSE/NEGLIGENCE?
Physician Progress Note      PATIENT:               Cornell Schulz  CSN #:                  [id number]  :                       1944  ADMIT DATE:       3/17/2022 12:12 AM  [address] [place] DATE:        3/17/2022 3:35 PM  RESPONDING  PROVIDER #:        Fanta FONTANA          QUERY TEXT:    Pt admitted with acute on chronic CHF. Pt noted to also have HTN and history   of non-ischemic cardiomyopathy. If possible, please document in progress notes   and discharge summary the etiology of CHF, if able to be determined. The medical record reflects the following:  Risk Factors: Hx CHF HTN CAD non-ischemic CMP, LifeVest  Clinical Indicators: admitted for CHF exacerbation in the setting of above   risk factors; echo-->Estimated Left ventricular ejection fraction 35-40%. Inferior and inferolateral wall hypokinesis, Bio-prosthetic aortic valve   (TAVR) that is normal in appearance and function; BNP 7092  Treatment: IV Lasix x2, Echo, cardio consult, monitoring, BNP level, lifevest   d/c'd  Options provided:  -- CHF due to Hypertensive Heart Disease  -- CHF due to Hypertensive Heart Disease and CAD  -- CHF not due to Hypertension but due to CAD  -- CHF due to Hypertensive Heart Disease and non-ischemic CMP  -- CHF not due to Hypertension but due to non-ischemic CMP  -- CHF due to HTN, CAD, and non-ischemic CMP  -- Other - I will add my own diagnosis  -- Disagree - Not applicable / Not valid  -- Disagree - Clinically unable to determine / Unknown  -- Refer to Clinical Documentation Reviewer    PROVIDER RESPONSE TEXT:    This patient has CHF due to HTN, CAD, and non-ischemic CMP.     Query created by: Monty Edmonds on 3/22/2022 7:37 AM      Electronically signed by:  Gaby Matias 3/22/2022 9:14 AM no

## 2023-07-25 NOTE — H&P ADULT - ASSESSMENT
87-year-old female with history of advanced dementia, hypercholesterolemia, recent admission to [facility] for aspiration pneumonitis and covid 19(completed remdesivir/ decadron),  presents for evaluation of cough and "gurgling sounds in her throat" that was noted by her daughter when she visited her at the assisted living facility.  Patient is unable to provide a full HPI or review of systems due to her baseline mental status.  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  This is status post previous hip fracture.  Patient states that she has "pain everywhere". 87-year-old female from [facility]  with history of advanced dementia, hypercholesterolemia, recent admission to [facility] for aspiration pneumonitis presents for evaluation of cough and "gurgling sounds in her throat". Patient is unable to provide a full HPI or review of systems due to her baseline mental status.  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 Patient resides at [facility] with 24/7 private aide there. Daughter and family very involved. patient unable to provide any ROS due to lethargy and dementia.         1-possible left sided PNA  -start abx  -mucinex  -possibly gram negative or aspiration pna or both  -speech and swallow evaluation  -ID consult appreciated      2-Old covid 19, asymptomatic from covid standpoint  -no need for iso    3-UTI  -fu urine cultures  -c/w abx    4-debility/ dehydration/ severe protein and overal  malnutrition with cachexia  -patient frail and cachectic  -no peg tube as per family wishes  -start gentle fluids    5- Goals of care/ advance care planning:  Disussed with daughter Juan J at bedside that patient declining clinically, since her last vsit here to ED.   She will continuously be neded to be admitted to hospital due to ongoing risk for aspiration pna.   Patient is currently skin and bones, with poor oral intake. as per daughter meals intake fluctuates and sometimes she eats full meals and other times she eats only 20% of her meals.     patient is also demented. bed bound with no quality of life. oral intake likely will further decline due to worsening dementia.     Family is willing to speak with palliative care to forecast goals of care for near future as she is declining clinically.       6-dvt prphy- sc lovenox

## 2023-07-25 NOTE — H&P ADULT - NSHPLABSRESULTS_GEN_ALL_CORE
Urinalysis Basic - ( 2023 22:34 )    Color: Yellow / Appearance: Slightly Turbid / S.010 / pH: x  Gluc: x / Ketone: Negative  / Bili: Negative / Urobili: Negative   Blood: x / Protein: 15 / Nitrite: Positive   Leuk Esterase: Moderate / RBC: 3-5 /HPF / WBC >50 /HPF   Sq Epi: x / Non Sq Epi: x / Bacteria: TNTC    2023 22:06    139    |  109    |  9      ----------------------------<  93     4.6     |  27     |  0.82     Ca    9.3        2023 22:06    TPro  6.3    /  Alb  2.5    /  TBili  0.4    /  DBili  x      /  AST  32     /  ALT  13     /  AlkPhos  95     2023 22:06  LIVER FUNCTIONS - ( 2023 22:06 )  Alb: 2.5 g/dL / Pro: 6.3 gm/dL / ALK PHOS: 95 U/L / ALT: 13 U/L / AST: 32 U/L / GGT: x         PT/INR - ( 2023 22:06 )   PT: 11.8 sec;   INR: 1.02 ratio         PTT - ( 2023 22:06 )  PTT:28.5 secCBC Full  -  ( 2023 22:06 )  WBC Count : 10.03 K/uL  Hemoglobin : 12.5 g/dL  Hematocrit : 36.1 %  Platelet Count - Automated : 252 K/uL  Mean Cell Volume : 100.3 fl  Mean Cell Hemoglobin : 34.7 pg  Mean Cell Hemoglobin Concentration : 34.6 gm/dL  Auto Neutrophil # : 6.56 K/uL  Auto Lymphocyte # : 2.50 K/uL  Auto Monocyte # : 0.77 K/uL  Auto Eosinophil # : 0.10 K/uL  Auto Basophil # : 0.07 K/uL  Auto Neutrophil % : 65.4 %  Auto Lymphocyte % : 24.9 %  Auto Monocyte % : 7.7 %  Auto Eosinophil % : 1.0 %  Auto Basophil % : 0.7 %

## 2023-07-25 NOTE — PHARMACOTHERAPY INTERVENTION NOTE - COMMENTS
Med history complete, reviewed medications and allergies with patient med list from Amanda and confirmed medication list with doctor first med profile, all medication related questions answered

## 2023-07-25 NOTE — SWALLOW BEDSIDE ASSESSMENT ADULT - COMMENTS
The pt was admitted to  with a cough. Pt's son also reported that she sounded "gurgly" when lying flat on her bed. Hospital course is notable for PNA and hypoalbuminemia. She is followed by Palliative Care. The patient has an underlying history of recent COVID, OA, glaucoma, HLD, advanced Dementia(bedbound) and chronic Oropharyngeal Dysphagia. Note that despite pt's h/o Dysphagia, she reportedly enjoys oral feeding and MOLST states NO FEEDING TUBES. Other selected prior medical history is remarkable for past appy and prior tonsillectomy.

## 2023-07-25 NOTE — PATIENT PROFILE ADULT - FALL HARM RISK - HARM RISK INTERVENTIONS
Assistance OOB with selected safe patient handling equipment/Communicate Risk of Fall with Harm to all staff/Discuss with provider need for PT consult/Monitor for mental status changes/Monitor gait and stability/Move patient closer to nurses' station/Provide patient with walking aids - walker, cane, crutches/Reinforce activity limits and safety measures with patient and family/Reorient to person, place and time as needed/Tailored Fall Risk Interventions/Toileting schedule using arm’s reach rule for commode and bathroom/Use of alarms - bed, chair and/or voice tab/Visual Cue: Yellow wristband and red socks/Bed in lowest position, wheels locked, appropriate side rails in place/Call bell, personal items and telephone in reach/Instruct patient to call for assistance before getting out of bed or chair/Non-slip footwear when patient is out of bed/[place] to call system/Physically safe environment - no spills, clutter or unnecessary equipment/Purposeful Proactive Rounding/Room/bathroom lighting operational, light cord in reach

## 2023-07-25 NOTE — SWALLOW BEDSIDE ASSESSMENT ADULT - SWALLOW EVAL: RECOMMENDED DIET
SUGGEST A PUREE TEXTURE DIET WITH MODERATELY THICK LIQUIDS AS THIS IS THE LEAST RESTRICTIVE TOLERABLE DIET CONSISTENCIES FROM AN OROPHARYNGEAL SWALLOWING PERSPECTIVE ON CLINICAL EXAM.

## 2023-07-25 NOTE — SWALLOW BEDSIDE ASSESSMENT ADULT - SWALLOW EVAL: PROGNOSIS
2) On encounter, the pt was alert. She was communicatively passive and internally distractible at times. The pt followed a limited number of 1 step verbal commands and periodically verbalized during communicative probes. At these times, pt's speech output was intelligible. However, her utterances were brief, typically rote in nature(i.e "yeah", "okay") and variably contextually inappropriate indicative of chronic Cognitive Dysfunction associated with Dementia. Pt likely at usual communicative state.

## 2023-07-25 NOTE — SWALLOW BEDSIDE ASSESSMENT ADULT - ADDITIONAL RECOMMENDATIONS
1) HOSPITALIST, PALLIATIVE CARE AND NUTRITION F/U. PT EXHIBITS CHRONIC OROPHARYNGEAL DYSPHAGIA WHICH IS FUNCTIONAL CONDITION WITH A RESTRICTED INVENTORY OF MODIFIED FOOD CONSISTENCIES. SHE APPEARS TO ENJOY ORAL FEEDING WITH THE LIMITED RESTRICTED FOOD TEXTURES WITHIN HER FOOD INVENTORY. PT'S MOLST AS WELL AS HER SON HAVE EXPRESSED A DESIRE FOR NO FEEDING TUBES. A FEEDING TUBE WOULD LIKELY NOT ENHANCE HER LIFE QUALITY NONETHELESS NOR WOULD IT ELIMINATE SALIVA ASPIRATION.    2) KEEP HEAD OF BED AT SOMEWHAT OF AN UPRIGHT ANGLE WHEN PT IS LYING DOWN TO GUARD AGAINST SALIVA ASPIRATION.

## 2023-07-25 NOTE — SWALLOW BEDSIDE ASSESSMENT ADULT - DIET PRIOR TO ADMI
The pt has a longstanding history of Oropharyngeal Dysphagia and has been on an oral diet of modified consistency for years. Pt was on a puree diet with moderately thick(honey) PTH. MOLST states NO FEEDING TUBES(also confirmed by son).

## 2023-07-25 NOTE — SWALLOW BEDSIDE ASSESSMENT ADULT - SWALLOW EVAL: CRITERIA FOR SKILLED INTERVENTION MET
DO NOT FEEL THAT ACUTE SPEECH PATHOLOGY FOLLOW UP WOULD CHANGE CLINICAL MANAGEMENT/OUTCOME IN HOSPITAL SETTING. PT'S FUNCTIONAL DYSPHAGIA FOR MODIFIED FOOD TEXTURES AND COGNITIVE DYSFUNCTION ARE FELT TO CHRONIC/PRE-EXISTING/IRREVERSIBLE. OROPHARYNGEAL SWALLOWING/COMMUNICATIVE INTEGRITY ARE FELT TO BE AT USUAL STATE/MAXIMIZED. PT WOULD NOT BE A VIABLE THERAPY CANDIDATE AT THIS TIME NONETHELESS GIVEN THE SEVERITY OF HER BASELINE COGNITIVE DEFICITS. GIVEN ABOVE, THIS SERVICE WILL NOT ACTIVELY FOLLOW. RECONSULT PRN SHOULD STATUS CHANGE AND CONDITION WARRANT.

## 2023-07-25 NOTE — H&P ADULT - CONVERSATION DETAILS
Disussed with daughter Juan J at bedside that patient declining clinically, since her last vsit here to ED.   She will continuously be neded to be admitted to hospital due to ongoing risk for aspiration pna.   Patient is currently skin and bones, with poor oral intake. as per daughter meals intake fluctuates and sometimes she eats full meals and other times she eats only 20% of her meals.     patient is also demented. bed bound with no quality of life. oral intake likely will further decline due to worsening dementia.     Family is willing to speak with palliative care to forecast goals of care for near future as she is declining clinically.

## 2023-07-25 NOTE — CONSULT NOTE ADULT - ASSESSMENT
Pt is a 87y old Female with hx of advanced dementia, hypercholesterolemia, recent admission to [facility] for aspiration pneumonitis presents for evaluation of cough and "gurgling sounds in her throat". Patient is unable to provide a full HPI or review of systems due to her baseline mental status.  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 Patient resides at [facility] with 24/7 private aide there. Daughter and family very involved. patient unable to provide any ROS due to lethargy and dementia. Pallaitive medicine consulted to help establish GOC and advance care planning     1) Possible aspiration PNA   - c/w Mucinex,   - speech and swallow consult appreciated   - ID consult appreciated   - supportive care  - aspiration precautions     2) Dementia   - severe protein calorie malnutrition   - unsure of baseline FAST   - PPSV 20%  - MOLST reflecting no PEG tube     Process of Care  --Reviewed dx/treatment problems and alignment with Goals of Care    Physical Aspects of Care  --Pain  patient denies at this time  c/w current managment    --Bowel Regimen  risk for constipation d/t immobility  daily dulcolax    --Dyspnea  No SOB at this time  comfortable and in NAD    --Nausea Vomiting  denies    --Weakness  PT as tolerated     Psychological and Psychiatric Aspects of Care:   --Greif/Bereavment: emotional support provided  -Pastoral Care Available PRN     Social Aspects of Care  -SW involved     Cultural Aspects  -Primary Language: English    Ethical and Legal Aspects:   NA    Capacity- pt does not have capacity    HCP/Surrogate: Cait Alfonso (cheryl) on most recent HCP on one content from 2016     Code Status- DNR/I   MOLST- on chart   Dispo Plan- ongoing discussions     Discussed With: Dr. Wilburn coordinated with attending and SW and RN     Time Spent: 90 minutes including the care, coordination and counseling of this patient, 50% of which was spent coordinating and counseling.

## 2023-07-25 NOTE — CONSULT NOTE ADULT - SUBJECTIVE AND OBJECTIVE BOX
HPI: Pt is a 87y old Female with hx of advanced dementia, hypercholesterolemia, recent admission to [facility] for aspiration pneumonitis presents for evaluation of cough and "gurgling sounds in her throat". Patient is unable to provide a full HPI or review of systems due to her baseline mental status.  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 Patient resides at [facility] with 24/7 private aide there.  family very involved. patient unable to provide any ROS due to lethargy and dementia. Palliative medicine consulted to help establish GOC and advance care planning     7/25/2023 pt seen and examined with son at bedside, patient appears comfortable at [facility] stime patient pt son states that he feels at time she has periods of feeling "achy" patient took Tylenol with good relief.  As per son as patient has not been able to sleep in over 48 hours.  Family has been taking turns being with patient. GOC meeting to be scheduled.       PAIN: ( )Yes   (x )No  at this time patient appears comfortable non verbal indicators not present     DYSPNEA: ( ) Yes  (x ) No  Level: appears comfortable     PAST MEDICAL & SURGICAL HISTORY:  Osteoarthritis of right hip  Glaucoma  Alzheimer disease  Hypercholesteremia  Dementia  S/P appendectomy childhood  S/P tonsillectomy childhood  S/P D&C (status post dilation and curettage) age 32  S/P colonoscopy every 2 or 3 years    SOCIAL HX: from [facility]     Hx opiate tolerance ( )YES  (x )NO    Baseline ADLs  (Prior to Admission)  ( ) Independent   (x )Dependent    FAMILY HISTORY:  Family history of colon cancer (Father)    Review of Systems:    Unable to obtain/Limited due to: Dementia       PHYSICAL EXAM:    Vital Signs Last 24 Hrs  T(C): 36.6 (25 Jul 2023 07:47), Max: 36.7 (24 Jul 2023 21:28)  T(F): 97.8 (25 Jul 2023 07:47), Max: 98.1 (24 Jul 2023 21:28)  HR: 68 (25 Jul 2023 07:47) (68 - 78)  BP: 103/53 (25 Jul 2023 07:47) (87/62 - 103/53)  RR: 18 (25 Jul 2023 07:47) (18 - 22)  SpO2: 95% (25 Jul 2023 07:47) (95% - 99%)    Parameters below as of 25 Jul 2023 07:47  Patient On (Oxygen Delivery Method): room air      Daily Height in cm: 154.94 (24 Jul 2023 21:28)      PPSV2: 20  %  FAST: unsure of baseline      General: elderly frail female in bed, NAD   Mental Status: awake but does not answer any questions or follow any commands   HEENT: dry oral mucosa, missing teeth   Lungs: diminished breath sounds b/l   Cardiac: S1S2 +   GI: soft, nontender, +BS  : no suprapubic tenderness   Ext: no edema   Neuro: dementia     LABS:                        12.5   10.03 )-----------( 252      ( 24 Jul 2023 22:06 )             36.1     07-24    139  |  109<H>  |  9   ----------------------------<  93  4.6   |  27  |  0.82    Ca    9.3      24 Jul 2023 22:06    TPro  6.3  /  Alb  2.5<L>  /  TBili  0.4  /  DBili  x   /  AST  32  /  ALT  13  /  AlkPhos  95  07-24    PT/INR - ( 24 Jul 2023 22:06 )   PT: 11.8 sec;   INR: 1.02 ratio         PTT - ( 24 Jul 2023 22:06 )  PTT:28.5 sec  Albumin: Albumin: 2.5 g/dL (07-24 @ 22:06)      Allergies    No Known Allergies    Intolerances      MEDICATIONS  (STANDING):  cefepime   IVPB 2000 milliGRAM(s) IV Intermittent two times a day  citalopram 20 milliGRAM(s) Oral daily  donepezil 10 milliGRAM(s) Oral at bedtime  enoxaparin Injectable 30 milliGRAM(s) SubCutaneous every 24 hours  guaiFENesin  milliGRAM(s) Oral every 12 hours  lactobacillus acidophilus 1 Tablet(s) Oral two times a day with meals  latanoprost 0.005% Ophthalmic Solution 1 Drop(s) Both EYES at bedtime  lidocaine 4% Topical Solution 1 Application(s) Topical daily  memantine 10 milliGRAM(s) Oral two times a day  nystatin/triamcinolone Cream 1 Application(s) Topical two times a day  pantoprazole    Tablet 40 milliGRAM(s) Oral before breakfast  senna 2 Tablet(s) Oral at bedtime  sodium chloride 0.9%. 1000 milliLiter(s) (70 mL/Hr) IV Continuous <Continuous>  timolol 0.5% Solution 1 Drop(s) Both EYES daily    MEDICATIONS  (PRN):  acetaminophen     Tablet .. 650 milliGRAM(s) Oral every 6 hours PRN Temp greater or equal to 38C (100.4F), Mild Pain (1 - 3)  aluminum hydroxide/magnesium hydroxide/simethicone Suspension 30 milliLiter(s) Oral every 4 hours PRN Dyspepsia  ketorolac   Injectable 15 milliGRAM(s) IV Push every 6 hours PRN Moderate Pain (4 - 6)  melatonin 3 milliGRAM(s) Oral at bedtime PRN Insomnia  midodrine. 2.5 milliGRAM(s) Oral three times a day PRN SBP <100  morphine  - Injectable 2 milliGRAM(s) IV Push every 4 hours PRN breakthrough pain not controlled with other PRNS  ondansetron Injectable 4 milliGRAM(s) IV Push every 8 hours PRN Nausea and/or Vomiting  traMADol 25 milliGRAM(s) Oral every 4 hours PRN Severe Pain (7 - 10)      RADIOLOGY/ADDITIONAL STUDIES:    ACC: [id number] EXAM:  CT CHEST   ORDERED BY: VERENICE CHAUHAN   PROCEDURE DATE:  07/25/2023    INTERPRETATION:  CLINICAL INFORMATION: Sepsis. Rule out aspiration pneumonia.  COMPARISON: CT of the abdomen and pelvis from 3/15/2023.  CONTRAST/COMPLICATIONS:  IV Contrast: NONE  Oral Contrast: NONE  Complications: None reported at time of study completion  PROCEDURE:  CT of the Chest was performed.  Sagittal and coronal reformats were performed.    FINDINGS:  LUNGS AND AIRWAYS: Patent central airways. 6 mm nodule in the lingula   (series 3, image 325). Patchy airspace opacities in the left lower lobe   and to a lesser extent in the lingula. Bibasilar subsegmental   atelectasis. Scattered calcified granulomas.  PLEURA: No pleural effusion.  MEDIASTINUM AND LAURIE: No lymphadenopathy.  VESSELS: Ectatic ascending aorta measuring up to 4.0 cm.  HEART: Heart size is normal. No pericardial effusion.  CHEST WALL AND LOWER NECK: Within normal limits.  VISUALIZED UPPER ABDOMEN: Colonic diverticulosis without diverticulitis.  BONES: Redemonstration of T9, T11 and T12 vertebral body compression   fracture deformities.    IMPRESSION:  1. Patchy airspace opacities in the left lower lobe and to a lesser   extent in the lingula likely infectious in etiology.  2. Lingular nodule measuring 6 mm. According to the Fleischner Society   criteria for management of small pulmonary nodules, a follow-up CT of the   chest is recommended at a 6-12 month interval.      ACC: [id number] EXAM:  XR CHEST PORTABLE IMMED 1V   ORDERED BY: VERENICE CHAUHAN   PROCEDURE DATE:  07/24/2023    INTERPRETATION:  INDICATION: Sepsis protocol  Portable chest 10:15 PM  COMPARISON: 5/28/2023  Overlying EKG leads and wires.    FINDINGS:  Heart/Vascular: The heart size, mediastinum, hilum and aorta are within   normal limits for projection.  Pulmonary: Midline trachea. There is no congestion or effusion.  Bones: There is no fracture.  Lines and catheter: None    Impression:  Wedge-shaped density medial left base may represent focal infiltrate or   atelectasis.  Small nodule in the lingula.  Refer to follow-up chest CT

## 2023-07-25 NOTE — ED ADULT NURSE REASSESSMENT NOTE - NS ED NURSE REASSESS COMMENT FT1
Pt incontinent of urine. Skin care given. Foam dssgs applied to coccyx and thoracic spine for pressure ulcer protection. Pt very cachectic. Monitored closely. Call bell in reach.

## 2023-07-25 NOTE — ED ADULT NURSE REASSESSMENT NOTE - NS ED NURSE REASSESS COMMENT FT1
Pt resting comfortably with daughter at bedside. Pt being fed dinner by daughter. Aspiration px in place. Pt tolerating puree with mod thick liquids. Monitored closely. Call bell in reach.

## 2023-07-25 NOTE — CONSULT NOTE ADULT - ASSESSMENT
87-year-old female from [facility]  with history of advanced dementia, hypercholesterolemia, recent admission to [facility] for aspiration pneumonitis presents for evaluation of cough and "gurgling sounds in her throat". Patient is unable to provide a full HPI or review of systems due to her baseline mental status.  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 Patient resides at [facility] with 24/7 private aide there. Daughter and family very involved. patient unable to provide any ROS due to lethargy and dementia.  Ua positive, CT chest with LLL pneumonia. Covid positive had covid prior in June. No fevers, not hypoxic.     1. LLL aspiration pneumonia. Pyuria. UTI. Metabolic encephalopathy  - covid not new  - dc isolation precautions  - agree with IV cefepime [id number]  - f/u urine cx blood cx  - aspiration precautions  - monitor temps  - tolerating abx well so far; no side effects noted  - reason for abx use and side effects reviewed with patient  - supportive care  - fu cbc    2. other issues - care per medicine

## 2023-07-25 NOTE — H&P ADULT - NSHPPHYSICALEXAM_GEN_ALL_CORE
Vital Signs Last 24 Hrs  T(C): 36.6 (25 Jul 2023 07:47), Max: 36.7 (24 Jul 2023 21:28)  T(F): 97.8 (25 Jul 2023 07:47), Max: 98.1 (24 Jul 2023 21:28)  HR: 68 (25 Jul 2023 07:47) (68 - 78)  BP: 103/53 (25 Jul 2023 07:47) (87/62 - 103/53)  BP(mean): --  RR: 18 (25 Jul 2023 07:47) (18 - 22)  SpO2: 95% (25 Jul 2023 07:47) (95% - 99%)    Parameters below as of 25 Jul 2023 07:47  Patient On (Oxygen Delivery Method): room air        HEENT:   pupils equal and reactive, EOMI, no oropharyngeal lesions, erythema, exudates, oral thrush    NECK:   supple, no carotid bruits, no palpable lymph nodes, no thyromegaly    CV:  +S1, +S2, regular, no murmurs or rubs    RESP:   lungs clear to auscultation bilaterally, no wheezing, rales, rhonchi, good air entry bilaterally    BREAST:  not examined    GI:  abdomen soft, non-tender, non-distended, normal BS, no bruits, no abdominal masses, no palpable masses    RECTAL:  not examined    :  not examined    MSK:   normal muscle tone, no atrophy, no rigidity, no contractions    EXT:   no clubbing, no cyanosis, no edema, no calf pain, swelling or erythema    VASCULAR:  pulses equal and symmetric in the upper and lower extremities    NEURO:  AAOX3, no focal neurological deficits, follows all commands, able to move extremities spontaneously    SKIN:  no ulcers, lesions or rashes

## 2023-07-26 LAB
A1C WITH ESTIMATED AVERAGE GLUCOSE RESULT: 5.3 % — SIGNIFICANT CHANGE UP (ref 4–5.6)
ALBUMIN SERPL ELPH-MCNC: 2.3 G/DL — LOW (ref 3.3–5)
ALP SERPL-CCNC: 83 U/L — SIGNIFICANT CHANGE UP (ref 40–120)
ALT FLD-CCNC: 11 U/L — LOW (ref 12–78)
ANION GAP SERPL CALC-SCNC: 3 MMOL/L — LOW (ref 5–17)
AST SERPL-CCNC: 12 U/L — LOW (ref 15–37)
BASOPHILS # BLD AUTO: 0.08 K/UL — SIGNIFICANT CHANGE UP (ref 0–0.2)
BASOPHILS NFR BLD AUTO: 1.1 % — SIGNIFICANT CHANGE UP (ref 0–2)
BILIRUB SERPL-MCNC: 0.3 MG/DL — SIGNIFICANT CHANGE UP (ref 0.2–1.2)
BUN SERPL-MCNC: 7 MG/DL — SIGNIFICANT CHANGE UP (ref 7–23)
CALCIUM SERPL-MCNC: 9 MG/DL — SIGNIFICANT CHANGE UP (ref 8.5–10.1)
CHLORIDE SERPL-SCNC: 118 MMOL/L — HIGH (ref 96–108)
CO2 SERPL-SCNC: 23 MMOL/L — SIGNIFICANT CHANGE UP (ref 22–31)
CREAT SERPL-MCNC: 0.74 MG/DL — SIGNIFICANT CHANGE UP (ref 0.5–1.3)
EGFR: 79 ML/MIN/1.73M2 — SIGNIFICANT CHANGE UP
EOSINOPHIL # BLD AUTO: 0.25 K/UL — SIGNIFICANT CHANGE UP (ref 0–0.5)
EOSINOPHIL NFR BLD AUTO: 3.5 % — SIGNIFICANT CHANGE UP (ref 0–6)
ESTIMATED AVERAGE GLUCOSE: 105 MG/DL — SIGNIFICANT CHANGE UP (ref 68–114)
GLUCOSE SERPL-MCNC: 88 MG/DL — SIGNIFICANT CHANGE UP (ref 70–99)
HCT VFR BLD CALC: 34 % — LOW (ref 34.5–45)
HGB BLD-MCNC: 11.4 G/DL — LOW (ref 11.5–15.5)
IMM GRANULOCYTES NFR BLD AUTO: 0.3 % — SIGNIFICANT CHANGE UP (ref 0–0.9)
LYMPHOCYTES # BLD AUTO: 2.23 K/UL — SIGNIFICANT CHANGE UP (ref 1–3.3)
LYMPHOCYTES # BLD AUTO: 31.3 % — SIGNIFICANT CHANGE UP (ref 13–44)
MCHC RBC-ENTMCNC: 33.5 GM/DL — SIGNIFICANT CHANGE UP (ref 32–36)
MCHC RBC-ENTMCNC: 35.1 PG — HIGH (ref 27–34)
MCV RBC AUTO: 104.6 FL — HIGH (ref 80–100)
MONOCYTES # BLD AUTO: 0.76 K/UL — SIGNIFICANT CHANGE UP (ref 0–0.9)
MONOCYTES NFR BLD AUTO: 10.7 % — SIGNIFICANT CHANGE UP (ref 2–14)
NEUTROPHILS # BLD AUTO: 3.79 K/UL — SIGNIFICANT CHANGE UP (ref 1.8–7.4)
NEUTROPHILS NFR BLD AUTO: 53.1 % — SIGNIFICANT CHANGE UP (ref 43–77)
PLATELET # BLD AUTO: 245 K/UL — SIGNIFICANT CHANGE UP (ref 150–400)
POTASSIUM SERPL-MCNC: 3.9 MMOL/L — SIGNIFICANT CHANGE UP (ref 3.5–5.3)
POTASSIUM SERPL-SCNC: 3.9 MMOL/L — SIGNIFICANT CHANGE UP (ref 3.5–5.3)
PROT SERPL-MCNC: 5.6 GM/DL — LOW (ref 6–8.3)
RBC # BLD: 3.25 M/UL — LOW (ref 3.8–5.2)
RBC # FLD: 13.6 % — SIGNIFICANT CHANGE UP (ref 10.3–14.5)
SODIUM SERPL-SCNC: 144 MMOL/L — SIGNIFICANT CHANGE UP (ref 135–145)
WBC # BLD: 7.13 K/UL — SIGNIFICANT CHANGE UP (ref 3.8–10.5)
WBC # FLD AUTO: 7.13 K/UL — SIGNIFICANT CHANGE UP (ref 3.8–10.5)

## 2023-07-26 PROCEDURE — 99232 SBSQ HOSP IP/OBS MODERATE 35: CPT

## 2023-07-26 RX ORDER — LIDOCAINE 4 G/100G
1 CREAM TOPICAL DAILY
Refills: 0 | Status: DISCONTINUED | OUTPATIENT
Start: 2023-07-26 | End: 2023-08-02

## 2023-07-26 RX ORDER — SODIUM CHLORIDE 9 MG/ML
1000 INJECTION INTRAMUSCULAR; INTRAVENOUS; SUBCUTANEOUS
Refills: 0 | Status: DISCONTINUED | OUTPATIENT
Start: 2023-07-26 | End: 2023-07-29

## 2023-07-26 RX ADMIN — Medication 1 APPLICATION(S): at 22:59

## 2023-07-26 RX ADMIN — LATANOPROST 1 DROP(S): 0.05 SOLUTION/ DROPS OPHTHALMIC; TOPICAL at 22:57

## 2023-07-26 RX ADMIN — CITALOPRAM 20 MILLIGRAM(S): 10 TABLET, FILM COATED ORAL at 08:41

## 2023-07-26 RX ADMIN — CEFEPIME 100 MILLIGRAM(S): 1 INJECTION, POWDER, FOR SOLUTION INTRAMUSCULAR; INTRAVENOUS at 08:43

## 2023-07-26 RX ADMIN — MEMANTINE HYDROCHLORIDE 10 MILLIGRAM(S): 10 TABLET ORAL at 22:58

## 2023-07-26 RX ADMIN — Medication 1 TABLET(S): at 08:40

## 2023-07-26 RX ADMIN — Medication 600 MILLIGRAM(S): at 08:40

## 2023-07-26 RX ADMIN — Medication 1 DROP(S): at 08:41

## 2023-07-26 RX ADMIN — SODIUM CHLORIDE 50 MILLILITER(S): 9 INJECTION INTRAMUSCULAR; INTRAVENOUS; SUBCUTANEOUS at 20:17

## 2023-07-26 RX ADMIN — Medication 3 MILLIGRAM(S): at 23:05

## 2023-07-26 RX ADMIN — ENOXAPARIN SODIUM 30 MILLIGRAM(S): 100 INJECTION SUBCUTANEOUS at 22:59

## 2023-07-26 RX ADMIN — CEFEPIME 100 MILLIGRAM(S): 1 INJECTION, POWDER, FOR SOLUTION INTRAMUSCULAR; INTRAVENOUS at 22:57

## 2023-07-26 RX ADMIN — SENNA PLUS 2 TABLET(S): 8.6 TABLET ORAL at 22:58

## 2023-07-26 RX ADMIN — MEMANTINE HYDROCHLORIDE 10 MILLIGRAM(S): 10 TABLET ORAL at 08:42

## 2023-07-26 RX ADMIN — DONEPEZIL HYDROCHLORIDE 10 MILLIGRAM(S): 10 TABLET, FILM COATED ORAL at 22:58

## 2023-07-26 NOTE — DIETITIAN NUTRITION RISK NOTIFICATION - TREATMENT: THE FOLLOWING DIET HAS BEEN RECOMMENDED
Diet, Regular:   Pureed (PUREED)  Moderately Thick Liquids (MODTHICKLIQS) (07-25-23 @ 17:11) [Active]

## 2023-07-26 NOTE — DIETITIAN INITIAL EVALUATION ADULT - PERTINENT LABORATORY DATA
07-26    144  |  118<H>  |  7   ----------------------------<  88  3.9   |  23  |  0.74    Ca    9.0      26 Jul 2023 08:38    TPro  5.6<L>  /  Alb  2.3<L>  /  TBili  0.3  /  DBili  x   /  AST  12<L>  /  ALT  11<L>  /  AlkPhos  83  07-26  A1C with Estimated Average Glucose Result: 5.4 % (05-29-23 @ 07:54)

## 2023-07-26 NOTE — PROGRESS NOTE ADULT - ASSESSMENT
87-year-old female from [facility]  with history of advanced dementia, hypercholesterolemia, recent admission to [facility] for aspiration pneumonitis presents for evaluation of cough and "gurgling sounds in her throat". Patient is unable to provide a full HPI or review of systems due to her baseline mental status.  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 Patient resides at [facility] with 24/7 private aide there. Daughter and family very involved. patient unable to provide any ROS due to lethargy and dementia.  Ua positive, CT chest with LLL pneumonia. Covid positive had covid prior in June. No fevers, not hypoxic.     1. LLL aspiration pneumonia. Pyuria. UTI. Metabolic encephalopathy  - on IV cefepime [id number] #2  - continue with abx coverage   - f/u urine cx blood cx no growth  - aspiration precautions  - monitor temps  - tolerating abx well so far; no side effects noted  - reason for abx use and side effects reviewed with patient  - covid - not new dc isolation precautions, imaging reviewed   - supportive care  - fu cbc    2. other issues - care per medicine

## 2023-07-26 NOTE — PROGRESS NOTE ADULT - ASSESSMENT
{\rtf1\rqeqhb53727\ansi\bixsvqs8623\ftnbj\uc1\deff0  {\fonttbl{\f0 \fnil Segoe UI;}{\f1 \fnil \fcharset0 Segoe UI;}{\f2 \fnil Times New Chucky;}}  {\colortbl ;\vtz630\gaovc342\chjy188 ;\red0\green0\blue0 ;\red0\green0\cooi594 ;\red0\green0\blue0 ;}  {\stylesheet{\f0\fs20 Normal;}{\cs1 Default Paragraph Font;}{\cs2\f0\fs16 Line Number;}{\cs3\f2\fs24\ul\cf3 Hyperlink;}}  {\*\revtbl{Unknown;}}  \jxiyzh47358\hszqey26857\vpksu8807\zdpxw8420\fcxbp1603\ddoud3991\qxosrwk431\qxdxiyl734\nogrowautofit\dlpacq302\formshade\nofeaturethrottle1\dntblnsbdb\fet4\aendnotes\aftnnrlc\pgbrdrhead\pgbrdrfoot  \sectd\qqhrnh40059\zzqopx22190\guttersxn0\brxfrlfo4821\lepsgzzh4597\tkpdoxsn0203\cyabmeyl4130\uxzexrm598\slgtykg704\sbkpage\pgncont\pgndec  \plain\plain\f0\fs24\ql\plain\f0\fs24\plain\f1\fs16\lcnx7185\hich\f1\dbch\f1\loch\f1\cf2\fs16 87-year-old female from [facility]  with history of advanced dementia, hypercholesterolemia, recent admission to [facility] for aspiration pneumonitis presents   for evaluation of cough and "gurgling sounds in her throat". Patient is unable to provide a full HPI or review of systems due to her baseline mental status.  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 Patient   resides at [facility] with 24/7 private aide there. Daughter and family very involved. patient unable to provide any ROS due to lethargy and dementia. \par  \par  \par  \par  1-possible left sided PNA\par  -start abx\par  -mucinex\par  -possibly gram negative or aspiration pna or both\par  -speech and swallow evaluation\par  -ID consult appreciated\par  \par  \par  2-Old covid 19, asymptomatic from covid standpoint\par  -no need for iso\par  \par  3-UTI\par  -fu urine cultures\par  -c/w abx\par  \par  4-debility/ dehydration/ severe protein and overal  malnutrition with cachexia\par  -patient frail and cachectic\par  -no peg tube as per family wishes\par  -start gentle fluids\par  \par  5- Goals of care/ advance care planning:\par  Disussed with daughter Juan J at bedside that patient declining clinically, since her last vsit here to ED. \par  She will continuously be neded to be admitted to hospital due to ongoing risk for aspiration pna. \par  Patient is currently skin and bones, with poor oral intake. as per daughter meals intake fluctuates and sometimes she eats full meals and other times she eats only 20% of her meals. \par  \par  patient is also demented. bed bound with no quality of life. oral intake likely will further decline due to worsening dementia. \par  \par  Family is willing to speak with palliative care to forecast goals of care for near future as she is declining clinically. \par  \par  \par  6-dvt prphy- sc lovenox\par  \par  \plain\f1\fs16\dkpf9651\hich\f1\dbch\f1\loch\f1\cf2\fs16\strike\plain\f1\fs16\cwtk2268\hich\f1\dbch\f1\loch\f1\cf2\fs16\plain\f0\fs20\hhad0728\hich\f0\dbch\f0\loch\f0\fs20\par  }   87-year-old female from [facility]  with history of advanced dementia, hypercholesterolemia, recent admission to [facility] for aspiration pneumonitis presents for evaluation of cough and "gurgling sounds in her throat". Patient is unable to provide a full HPI or review of systems due to her baseline mental status.  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 Patient resides at [facility] with 24/7 private aide there. Daughter and family very involved. patient unable to provide any ROS due to lethargy and dementia.         1-possible left sided PNA  -start abx  -mucinex  -possibly gram negative or aspiration pna or both  -speech and swallow evaluation  -ID consult appreciated      2-Old covid 19, asymptomatic from covid standpoint  -no need for iso    3-UTI  -fu urine cultures  -c/w abx    4-debility/ dehydration/ severe protein and overal  malnutrition with cachexia  -patient frail and cachectic  -no peg tube as per family wishes  -start gentle fluids  add enzure, thiamine    5- Goals of care/ advance care planning:  Disussed with daughter Juan J at bedside that patient declining clinically, since her last vsit here to ED.   She will continuously be neded to be admitted to hospital due to ongoing risk for aspiration pna.   Patient is currently skin and bones, with poor oral intake. as per daughter meals intake fluctuates and sometimes she eats full meals and other times she eats only 20% of her meals.     patient is also demented. bed bound with no quality of life. oral intake likely will further decline due to worsening dementia.     Family is willing to speak with palliative care to forecast goals of care for near future as she is declining clinically.       6-dvt prphy- sc lovenox

## 2023-07-26 NOTE — DIETITIAN INITIAL EVALUATION ADULT - PERTINENT MEDS FT
MEDICATIONS  (STANDING):  cefepime   IVPB 2000 milliGRAM(s) IV Intermittent two times a day  citalopram 20 milliGRAM(s) Oral daily  donepezil 10 milliGRAM(s) Oral at bedtime  enoxaparin Injectable 30 milliGRAM(s) SubCutaneous every 24 hours  guaiFENesin  milliGRAM(s) Oral every 12 hours  lactobacillus acidophilus 1 Tablet(s) Oral two times a day with meals  latanoprost 0.005% Ophthalmic Solution 1 Drop(s) Both EYES at bedtime  lidocaine   4% Patch 1 Patch Transdermal daily  memantine 10 milliGRAM(s) Oral two times a day  nystatin/triamcinolone Cream 1 Application(s) Topical two times a day  pantoprazole    Tablet 40 milliGRAM(s) Oral before breakfast  senna 2 Tablet(s) Oral at bedtime  sodium chloride 0.9%. 1000 milliLiter(s) (70 mL/Hr) IV Continuous <Continuous>  timolol 0.5% Solution 1 Drop(s) Both EYES daily    MEDICATIONS  (PRN):  acetaminophen     Tablet .. 650 milliGRAM(s) Oral every 6 hours PRN Temp greater or equal to 38C (100.4F), Mild Pain (1 - 3)  aluminum hydroxide/magnesium hydroxide/simethicone Suspension 30 milliLiter(s) Oral every 4 hours PRN Dyspepsia  ketorolac   Injectable 15 milliGRAM(s) IV Push every 6 hours PRN Moderate Pain (4 - 6)  melatonin 3 milliGRAM(s) Oral at bedtime PRN Insomnia  midodrine. 2.5 milliGRAM(s) Oral three times a day PRN SBP <100  morphine  - Injectable 2 milliGRAM(s) IV Push every 4 hours PRN breakthrough pain not controlled with other PRNS  ondansetron Injectable 4 milliGRAM(s) IV Push every 8 hours PRN Nausea and/or Vomiting  traMADol 25 milliGRAM(s) Oral every 4 hours PRN Severe Pain (7 - 10)

## 2023-07-26 NOTE — DIETITIAN INITIAL EVALUATION ADULT - ORAL INTAKE PTA/DIET HISTORY
Pt lives at [facility]. Unable to obtain information 2/2 w/ advanced dementia, AMS. On a pureed, moderately thickened liquids at NH on 3/16/23.

## 2023-07-26 NOTE — DIETITIAN INITIAL EVALUATION ADULT - OTHER INFO
87-year-old female from [facility]  with history of advanced dementia, hypercholesterolemia, recent admission to [facility] for aspiration pneumonitis presents for evaluation of cough and "gurgling sounds in her throat". Patient is unable to provide a full HPI or review of systems due to her baseline mental status.  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 Patient resides at [facility] with 24/7 private aide there. Daughter and family very involved; patient unable to provide any ROS due to lethargy and dementia.   Admit for possible left sided PNA   87-year-old female from [facility] with history of advanced dementia, hypercholesterolemia, recent admission to [facility] for aspiration pneumonitis presents for evaluation of cough and "gurgling sounds in her throat". Patient is unable to provide a full HPI or review of systems due to her baseline mental status.  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 Patient resides at [facility] with 24/7 private aide there. Daughter and family very involved; patient unable to provide any ROS due to lethargy and dementia.   Admit for possible asp PNA, debility/ dehydration, UTI     Known to nutr services and dx'd w/ mal on multiple admissions; still meets criteria. As per EMR, pt w/ declining mentation & declining clinically; persistent readmissions due to ongoing risk of asp PNA. GOC confirmed; is DNR/DNI w/ NFT. Nutrition support is not recommended due to overall declining medical status which evidenced based studies indicate EN is not effective in prolonging survival and improving quality of life. It can also increase risk of aspiration pneumonia as well as other related issues. Seen by SLP on 7/25 - rec'd pureed diet w/ mod thick liquids. Maintain aspiration precautions, back of bed >35 degrees. Unable to obtain diet/wt hx 2/2 w/ advanced dementia, AMS. Unable to obtain bed scale wt 2/2 pt sitting in chair. Admit wt of 85# taken on 7/24/23. Wt hx as per EMR: 99# (bed scale wt taken by RD on 3/16/23),  99.8# (taken by RD on 9/29/22). Unintentional wt loss of 14# / 14.1% wt loss x 4 mo ; severe & clinically significant. NFPE reveals severe muscle/ fat wasting - pt appears thin, frail, yaa, and debilitated. C/w regular, pureed diet w/ mod thick liquids (as per SLP). Add ensure + HP shake BID (350kcal, 20g protein) - mod thick as per SLP. Total assistance/supervision during meal times; suggest meal encouragement to optimize po intake 2/2 poor po intake.  See below for other recommendations.

## 2023-07-26 NOTE — DIETITIAN INITIAL EVALUATION ADULT - ADD RECOMMEND
1) Continue w/ regular diet; pureed diet w/ mod thick liquids as per SLP recommendations  2) Add ensure + HP shake BID (350kcal, 20g protein) mod thick as per SLP  3) Total assistance/supervision during meal times; suggest meal encouragement to optimize po intake 2/2 malnutrition   4) Monitor bowel movements, if no BM for >3 days, consider implementing bowel regimen.  5) Encourage protein-rich foods, maximize food preferences  6) Monitor lytes/ min and replete prn - at HIGH RISK for refeeding   7) MVI w/ minerals daily to ensure 100% RDA met  8) Consider adding thiamine 100 mg daily 2/2 poor PO intake/ malnutrition  9) Consider adding appetite stimulant such as Remeron or Marinol 2/2 chronically poor appetite/ PO intake  10) GOC confirmed; is DNR/DNI with NFT. Nutrition support is not recommended due to overall declining medical status which evidenced based studies indicate EN is not effective in prolonging survival and improving quality of life. It can also increase risk of aspiration pneumonia as well as other related issues.  RD will continue to monitor PO intake, labs, hydration, and wt prn.

## 2023-07-27 LAB
ANION GAP SERPL CALC-SCNC: 5 MMOL/L — SIGNIFICANT CHANGE UP (ref 5–17)
BASOPHILS # BLD AUTO: 0.09 K/UL — SIGNIFICANT CHANGE UP (ref 0–0.2)
BASOPHILS NFR BLD AUTO: 1.6 % — SIGNIFICANT CHANGE UP (ref 0–2)
BUN SERPL-MCNC: 6 MG/DL — LOW (ref 7–23)
CALCIUM SERPL-MCNC: 9 MG/DL — SIGNIFICANT CHANGE UP (ref 8.5–10.1)
CHLORIDE SERPL-SCNC: 117 MMOL/L — HIGH (ref 96–108)
CO2 SERPL-SCNC: 21 MMOL/L — LOW (ref 22–31)
CREAT SERPL-MCNC: 0.59 MG/DL — SIGNIFICANT CHANGE UP (ref 0.5–1.3)
EGFR: 88 ML/MIN/1.73M2 — SIGNIFICANT CHANGE UP
EOSINOPHIL # BLD AUTO: 0.27 K/UL — SIGNIFICANT CHANGE UP (ref 0–0.5)
EOSINOPHIL NFR BLD AUTO: 4.9 % — SIGNIFICANT CHANGE UP (ref 0–6)
GLUCOSE SERPL-MCNC: 81 MG/DL — SIGNIFICANT CHANGE UP (ref 70–99)
HCT VFR BLD CALC: 32.7 % — LOW (ref 34.5–45)
HGB BLD-MCNC: 11 G/DL — LOW (ref 11.5–15.5)
IMM GRANULOCYTES NFR BLD AUTO: 0.4 % — SIGNIFICANT CHANGE UP (ref 0–0.9)
LYMPHOCYTES # BLD AUTO: 2.2 K/UL — SIGNIFICANT CHANGE UP (ref 1–3.3)
LYMPHOCYTES # BLD AUTO: 39.6 % — SIGNIFICANT CHANGE UP (ref 13–44)
MCHC RBC-ENTMCNC: 33.6 GM/DL — SIGNIFICANT CHANGE UP (ref 32–36)
MCHC RBC-ENTMCNC: 34.2 PG — HIGH (ref 27–34)
MCV RBC AUTO: 101.6 FL — HIGH (ref 80–100)
MONOCYTES # BLD AUTO: 0.6 K/UL — SIGNIFICANT CHANGE UP (ref 0–0.9)
MONOCYTES NFR BLD AUTO: 10.8 % — SIGNIFICANT CHANGE UP (ref 2–14)
NEUTROPHILS # BLD AUTO: 2.37 K/UL — SIGNIFICANT CHANGE UP (ref 1.8–7.4)
NEUTROPHILS NFR BLD AUTO: 42.7 % — LOW (ref 43–77)
PLATELET # BLD AUTO: 256 K/UL — SIGNIFICANT CHANGE UP (ref 150–400)
POTASSIUM SERPL-MCNC: 3.5 MMOL/L — SIGNIFICANT CHANGE UP (ref 3.5–5.3)
POTASSIUM SERPL-SCNC: 3.5 MMOL/L — SIGNIFICANT CHANGE UP (ref 3.5–5.3)
RBC # BLD: 3.22 M/UL — LOW (ref 3.8–5.2)
RBC # FLD: 13.3 % — SIGNIFICANT CHANGE UP (ref 10.3–14.5)
SODIUM SERPL-SCNC: 143 MMOL/L — SIGNIFICANT CHANGE UP (ref 135–145)
WBC # BLD: 5.55 K/UL — SIGNIFICANT CHANGE UP (ref 3.8–10.5)
WBC # FLD AUTO: 5.55 K/UL — SIGNIFICANT CHANGE UP (ref 3.8–10.5)

## 2023-07-27 PROCEDURE — 99232 SBSQ HOSP IP/OBS MODERATE 35: CPT

## 2023-07-27 RX ORDER — DEXTROSE MONOHYDRATE, SODIUM CHLORIDE, AND POTASSIUM CHLORIDE 50; .745; 4.5 G/1000ML; G/1000ML; G/1000ML
1000 INJECTION, SOLUTION INTRAVENOUS
Refills: 0 | Status: DISCONTINUED | OUTPATIENT
Start: 2023-07-27 | End: 2023-07-29

## 2023-07-27 RX ORDER — THIAMINE MONONITRATE (VIT B1) 100 MG
100 TABLET ORAL DAILY
Refills: 0 | Status: DISCONTINUED | OUTPATIENT
Start: 2023-07-27 | End: 2023-08-02

## 2023-07-27 RX ADMIN — ENOXAPARIN SODIUM 30 MILLIGRAM(S): 100 INJECTION SUBCUTANEOUS at 22:24

## 2023-07-27 RX ADMIN — DONEPEZIL HYDROCHLORIDE 10 MILLIGRAM(S): 10 TABLET, FILM COATED ORAL at 22:24

## 2023-07-27 RX ADMIN — Medication 1 APPLICATION(S): at 22:24

## 2023-07-27 RX ADMIN — Medication 1 TABLET(S): at 11:05

## 2023-07-27 RX ADMIN — CITALOPRAM 20 MILLIGRAM(S): 10 TABLET, FILM COATED ORAL at 11:05

## 2023-07-27 RX ADMIN — Medication 1 DROP(S): at 11:05

## 2023-07-27 RX ADMIN — CEFEPIME 100 MILLIGRAM(S): 1 INJECTION, POWDER, FOR SOLUTION INTRAMUSCULAR; INTRAVENOUS at 11:05

## 2023-07-27 RX ADMIN — MEMANTINE HYDROCHLORIDE 10 MILLIGRAM(S): 10 TABLET ORAL at 22:24

## 2023-07-27 RX ADMIN — CEFEPIME 100 MILLIGRAM(S): 1 INJECTION, POWDER, FOR SOLUTION INTRAMUSCULAR; INTRAVENOUS at 22:24

## 2023-07-27 RX ADMIN — MEMANTINE HYDROCHLORIDE 10 MILLIGRAM(S): 10 TABLET ORAL at 11:06

## 2023-07-27 RX ADMIN — LATANOPROST 1 DROP(S): 0.05 SOLUTION/ DROPS OPHTHALMIC; TOPICAL at 23:50

## 2023-07-27 RX ADMIN — DEXTROSE MONOHYDRATE, SODIUM CHLORIDE, AND POTASSIUM CHLORIDE 50 MILLILITER(S): 50; .745; 4.5 INJECTION, SOLUTION INTRAVENOUS at 17:48

## 2023-07-27 RX ADMIN — Medication 3 MILLIGRAM(S): at 22:24

## 2023-07-27 RX ADMIN — SENNA PLUS 2 TABLET(S): 8.6 TABLET ORAL at 22:25

## 2023-07-27 NOTE — PROGRESS NOTE ADULT - ASSESSMENT
87-year-old female from [facility]  with history of advanced dementia, hypercholesterolemia, recent admission to [facility] for aspiration pneumonitis presents for evaluation of cough and "gurgling sounds in her throat". Patient is unable to provide a full HPI or review of systems due to her baseline mental status.  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 Patient resides at [facility] with 24/7 private aide there. Daughter and family very involved. patient unable to provide any ROS due to lethargy and dementia.  Ua positive, CT chest with LLL pneumonia. Covid positive had covid prior in June. No fevers, not hypoxic.     1. LLL aspiration pneumonia. Pyuria. UTI. Metabolic encephalopathy  - on IV cefepime [id number] #3  - continue with abx coverage   - f/u urine cx - growing GNRs - blood cx no growth  - aspiration precautions  - monitor temps  - tolerating abx well so far; no side effects noted  - reason for abx use and side effects reviewed with patient  - covid - not new dc isolation precautions, imaging reviewed   - supportive care  - fu cbc    2. other issues - care per medicine

## 2023-07-27 NOTE — PROGRESS NOTE ADULT - ASSESSMENT
87-year-old female from [facility]  with history of advanced dementia, hypercholesterolemia, recent admission to [facility] for aspiration pneumonitis presents for evaluation of cough and "gurgling sounds in her throat". Patient is unable to provide a full HPI or review of systems due to her baseline mental status.  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 Patient resides at [facility] with 24/7 private aide there. Daughter and family very involved. patient unable to provide any ROS due to lethargy and dementia.         1-possible left sided PNA  on IV cefepime  -mucinex  -possibly gram negative or aspiration pna or both  -speech and swallow evaluation  -ID consult appreciated      2-Old covid 19, asymptomatic from covid standpoint  -no need for iso    3-UTI  -fu urine cultures  -c/w abx    4-debility/ dehydration/ severe protein and overal  malnutrition with cachexia  -patient frail and cachectic  -no peg tube as per family wishes  -start gentle fluids    5- Goals of care/ advance care planning:  Disussed with daughter Juan J at bedside that patient declining clinically, since her last vsit here to ED.   She will continuously be neded to be admitted to hospital due to ongoing risk for aspiration pna.   Patient is currently skin and bones, with poor oral intake. as per daughter meals intake fluctuates and sometimes she eats full meals and other times she eats only 20% of her meals.     patient is also demented. bed bound with no quality of life. oral intake likely will further decline due to worsening dementia.     Family is willing to speak with palliative care to forecast goals of care for near future as she is declining clinically.       6-dvt prphy- sc lovenox

## 2023-07-28 DIAGNOSIS — Z98.890 OTHER SPECIFIED POSTPROCEDURAL STATES: Chronic | ICD-10-CM

## 2023-07-28 LAB
-  AMIKACIN: SIGNIFICANT CHANGE UP
-  AMOXICILLIN/CLAVULANIC ACID: SIGNIFICANT CHANGE UP
-  AMPICILLIN/SULBACTAM: SIGNIFICANT CHANGE UP
-  AMPICILLIN: SIGNIFICANT CHANGE UP
-  AZTREONAM: SIGNIFICANT CHANGE UP
-  CEFAZOLIN: SIGNIFICANT CHANGE UP
-  CEFEPIME: SIGNIFICANT CHANGE UP
-  CEFOXITIN: SIGNIFICANT CHANGE UP
-  CEFTRIAXONE: SIGNIFICANT CHANGE UP
-  CEFUROXIME: SIGNIFICANT CHANGE UP
-  CIPROFLOXACIN: SIGNIFICANT CHANGE UP
-  ERTAPENEM: SIGNIFICANT CHANGE UP
-  GENTAMICIN: SIGNIFICANT CHANGE UP
-  IMIPENEM: SIGNIFICANT CHANGE UP
-  LEVOFLOXACIN: SIGNIFICANT CHANGE UP
-  MEROPENEM: SIGNIFICANT CHANGE UP
-  NITROFURANTOIN: SIGNIFICANT CHANGE UP
-  PIPERACILLIN/TAZOBACTAM: SIGNIFICANT CHANGE UP
-  TOBRAMYCIN: SIGNIFICANT CHANGE UP
-  TRIMETHOPRIM/SULFAMETHOXAZOLE: SIGNIFICANT CHANGE UP
ANION GAP SERPL CALC-SCNC: 6 MMOL/L — SIGNIFICANT CHANGE UP (ref 5–17)
BASOPHILS # BLD AUTO: 0.1 K/UL — SIGNIFICANT CHANGE UP (ref 0–0.2)
BASOPHILS NFR BLD AUTO: 1.4 % — SIGNIFICANT CHANGE UP (ref 0–2)
BUN SERPL-MCNC: 7 MG/DL — SIGNIFICANT CHANGE UP (ref 7–23)
CALCIUM SERPL-MCNC: 8.8 MG/DL — SIGNIFICANT CHANGE UP (ref 8.5–10.1)
CHLORIDE SERPL-SCNC: 114 MMOL/L — HIGH (ref 96–108)
CO2 SERPL-SCNC: 21 MMOL/L — LOW (ref 22–31)
CREAT SERPL-MCNC: 0.56 MG/DL — SIGNIFICANT CHANGE UP (ref 0.5–1.3)
CULTURE RESULTS: SIGNIFICANT CHANGE UP
EGFR: 89 ML/MIN/1.73M2 — SIGNIFICANT CHANGE UP
EOSINOPHIL # BLD AUTO: 0.24 K/UL — SIGNIFICANT CHANGE UP (ref 0–0.5)
EOSINOPHIL NFR BLD AUTO: 3.5 % — SIGNIFICANT CHANGE UP (ref 0–6)
GLUCOSE SERPL-MCNC: 80 MG/DL — SIGNIFICANT CHANGE UP (ref 70–99)
HCT VFR BLD CALC: 33.8 % — LOW (ref 34.5–45)
HGB BLD-MCNC: 11.5 G/DL — SIGNIFICANT CHANGE UP (ref 11.5–15.5)
IMM GRANULOCYTES NFR BLD AUTO: 0.3 % — SIGNIFICANT CHANGE UP (ref 0–0.9)
LYMPHOCYTES # BLD AUTO: 2.49 K/UL — SIGNIFICANT CHANGE UP (ref 1–3.3)
LYMPHOCYTES # BLD AUTO: 36 % — SIGNIFICANT CHANGE UP (ref 13–44)
MCHC RBC-ENTMCNC: 34 GM/DL — SIGNIFICANT CHANGE UP (ref 32–36)
MCHC RBC-ENTMCNC: 34.3 PG — HIGH (ref 27–34)
MCV RBC AUTO: 100.9 FL — HIGH (ref 80–100)
METHOD TYPE: SIGNIFICANT CHANGE UP
MONOCYTES # BLD AUTO: 0.6 K/UL — SIGNIFICANT CHANGE UP (ref 0–0.9)
MONOCYTES NFR BLD AUTO: 8.7 % — SIGNIFICANT CHANGE UP (ref 2–14)
NEUTROPHILS # BLD AUTO: 3.46 K/UL — SIGNIFICANT CHANGE UP (ref 1.8–7.4)
NEUTROPHILS NFR BLD AUTO: 50.1 % — SIGNIFICANT CHANGE UP (ref 43–77)
ORGANISM # SPEC MICROSCOPIC CNT: SIGNIFICANT CHANGE UP
ORGANISM # SPEC MICROSCOPIC CNT: SIGNIFICANT CHANGE UP
PLATELET # BLD AUTO: 283 K/UL — SIGNIFICANT CHANGE UP (ref 150–400)
POTASSIUM SERPL-MCNC: 3.5 MMOL/L — SIGNIFICANT CHANGE UP (ref 3.5–5.3)
POTASSIUM SERPL-SCNC: 3.5 MMOL/L — SIGNIFICANT CHANGE UP (ref 3.5–5.3)
RBC # BLD: 3.35 M/UL — LOW (ref 3.8–5.2)
RBC # FLD: 13.2 % — SIGNIFICANT CHANGE UP (ref 10.3–14.5)
SODIUM SERPL-SCNC: 141 MMOL/L — SIGNIFICANT CHANGE UP (ref 135–145)
SPECIMEN SOURCE: SIGNIFICANT CHANGE UP
WBC # BLD: 6.91 K/UL — SIGNIFICANT CHANGE UP (ref 3.8–10.5)
WBC # FLD AUTO: 6.91 K/UL — SIGNIFICANT CHANGE UP (ref 3.8–10.5)

## 2023-07-28 PROCEDURE — 99232 SBSQ HOSP IP/OBS MODERATE 35: CPT

## 2023-07-28 RX ORDER — FAMOTIDINE 10 MG/ML
20 INJECTION INTRAVENOUS DAILY
Refills: 0 | Status: DISCONTINUED | OUTPATIENT
Start: 2023-07-28 | End: 2023-08-02

## 2023-07-28 RX ADMIN — CEFEPIME 100 MILLIGRAM(S): 1 INJECTION, POWDER, FOR SOLUTION INTRAMUSCULAR; INTRAVENOUS at 08:10

## 2023-07-28 RX ADMIN — Medication 100 MILLIGRAM(S): at 08:06

## 2023-07-28 RX ADMIN — MEMANTINE HYDROCHLORIDE 10 MILLIGRAM(S): 10 TABLET ORAL at 08:09

## 2023-07-28 RX ADMIN — Medication 1 APPLICATION(S): at 08:08

## 2023-07-28 RX ADMIN — Medication 1 DROP(S): at 08:09

## 2023-07-28 RX ADMIN — CITALOPRAM 20 MILLIGRAM(S): 10 TABLET, FILM COATED ORAL at 08:07

## 2023-07-28 RX ADMIN — ENOXAPARIN SODIUM 30 MILLIGRAM(S): 100 INJECTION SUBCUTANEOUS at 22:45

## 2023-07-28 RX ADMIN — FAMOTIDINE 20 MILLIGRAM(S): 10 INJECTION INTRAVENOUS at 22:45

## 2023-07-28 RX ADMIN — MEMANTINE HYDROCHLORIDE 10 MILLIGRAM(S): 10 TABLET ORAL at 22:49

## 2023-07-28 RX ADMIN — Medication 100 MILLIGRAM(S): at 08:10

## 2023-07-28 RX ADMIN — Medication 1 TABLET(S): at 17:05

## 2023-07-28 RX ADMIN — SENNA PLUS 2 TABLET(S): 8.6 TABLET ORAL at 22:35

## 2023-07-28 RX ADMIN — DONEPEZIL HYDROCHLORIDE 10 MILLIGRAM(S): 10 TABLET, FILM COATED ORAL at 22:50

## 2023-07-28 RX ADMIN — Medication 1 TABLET(S): at 08:06

## 2023-07-28 RX ADMIN — Medication 100 MILLIGRAM(S): at 17:05

## 2023-07-28 RX ADMIN — LATANOPROST 1 DROP(S): 0.05 SOLUTION/ DROPS OPHTHALMIC; TOPICAL at 22:46

## 2023-07-28 RX ADMIN — Medication 1 APPLICATION(S): at 22:50

## 2023-07-28 NOTE — PROGRESS NOTE ADULT - ASSESSMENT
87-year-old female from [facility]  with history of advanced dementia, hypercholesterolemia, recent admission to [facility] for aspiration pneumonitis presents for evaluation of cough and "gurgling sounds in her throat". Patient is unable to provide a full HPI or review of systems due to her baseline mental status.  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 Patient resides at [facility] with 24/7 private aide there. Daughter and family very involved. patient unable to provide any ROS due to lethargy and dementia.         1-possible left sided PNA, UTI  on IV cefepime  -mucinex  -possibly gram negative or aspiration pna or both  -speech and swallow evaluation  -ID consult appreciated      2-Old covid 19, asymptomatic from covid standpoint  -no need for iso3-UTI  -fu urine cultures  -c/w abx    4-debility/ dehydration/ severe protein and overal  malnutrition with cachexia  -patient frail and cachectic  -no peg tube as per family wishes  -start gentle fluids    5- Goals of care/ advance care planning:  Disussed with daughter Juan J at bedside that patient declining clinically, since her last vsit here to ED.   She will continuously be neded to be admitted to hospital due to ongoing risk for aspiration pna.   Patient is currently skin and bones, with poor oral intake. as per daughter meals intake fluctuates and sometimes she eats full meals and other times she eats only 20% of her meals.     patient is also demented. bed bound with no quality of life. oral intake likely will further decline due to worsening dementia.     Family is willing to speak with palliative care to forecast goals of care for near future as she is declining clinically.       6-dvt prphy- sc lovenox

## 2023-07-28 NOTE — PROGRESS NOTE ADULT - ASSESSMENT
Pt is a 87y old Female with hx of advanced dementia, hypercholesterolemia, recent admission to [facility] for aspiration pneumonitis presents for evaluation of cough and "gurgling sounds in her throat". Patient is unable to provide a full HPI or review of systems due to her baseline mental status.  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 Patient resides at [facility] with 24/7 private aide there. Daughter and family very involved. patient unable to provide any ROS due to lethargy and dementia. Pallaitive medicine consulted to help establish GOC and advance care planning     1) Possible aspiration PNA   - c/w Mucinex,   - speech and swallow consult appreciated   - ID consult appreciated   - supportive care  - aspiration precautions     2) Dementia   - severe protein calorie malnutrition   - unsure of baseline FAST   - PPSV 20%  - MOLST reflecting no PEG tube     Process of Care  --Reviewed dx/treatment problems and alignment with Goals of Care    Physical Aspects of Care  --Pain  patient denies at this time  c/w current managment    --Bowel Regimen  risk for constipation d/t immobility  daily dulcolax    --Dyspnea  No SOB at this time  comfortable and in NAD    --Nausea Vomiting  denies    --Weakness  PT as tolerated     Psychological and Psychiatric Aspects of Care:   --Greif/Bereavment: emotional support provided  -Pastoral Care Available PRN     Social Aspects of Care  -SW involved     Cultural Aspects  -Primary Language: English    Ethical and Legal Aspects:   NA    Capacity- pt does not have capacity    HCP/Surrogate: Cait Alfonso (shira) on most recent HCP on one content from 2016   Called Shira and asked to schedule GOC conversation/Awaiting callback  Code Status- DNR/I   MOLST- on chart   Dispo Plan- ongoing discussions       Case coordinated with attending and SW and RN     Time Spent: 90 minutes including the care, coordination and counseling of this patient, 50% of which was spent coordinating and counseling.

## 2023-07-28 NOTE — PROGRESS NOTE ADULT - ASSESSMENT
87-year-old female from [facility]  with history of advanced dementia, hypercholesterolemia, recent admission to [facility] for aspiration pneumonitis presents for evaluation of cough and "gurgling sounds in her throat". Patient is unable to provide a full HPI or review of systems due to her baseline mental status.  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 Patient resides at [facility] with 24/7 private aide there. Daughter and family very involved. patient unable to provide any ROS due to lethargy and dementia.  Ua positive, CT chest with LLL pneumonia. Covid positive had covid prior in June. No fevers, not hypoxic.     1. LLL aspiration pneumonia. Pyuria. UTI. Metabolic encephalopathy  - on IV cefepime [id number] #4  - continue with abx coverage   - f/u urine cx - growing KLPN - blood cx no growth  - aspiration precautions  - monitor temps  - tolerating abx well so far; no side effects noted  - reason for abx use and side effects reviewed with patient  - covid - not new dc isolation precautions, imaging reviewed   - on dc po ceftin complete 7 day course otherwise complete IV abx course   - supportive care  - fu cbc    2. other issues - care per medicine

## 2023-07-29 LAB
ANION GAP SERPL CALC-SCNC: 6 MMOL/L — SIGNIFICANT CHANGE UP (ref 5–17)
BUN SERPL-MCNC: 6 MG/DL — LOW (ref 7–23)
CALCIUM SERPL-MCNC: 9.3 MG/DL — SIGNIFICANT CHANGE UP (ref 8.5–10.1)
CHLORIDE SERPL-SCNC: 113 MMOL/L — HIGH (ref 96–108)
CO2 SERPL-SCNC: 21 MMOL/L — LOW (ref 22–31)
CREAT SERPL-MCNC: 0.6 MG/DL — SIGNIFICANT CHANGE UP (ref 0.5–1.3)
EGFR: 88 ML/MIN/1.73M2 — SIGNIFICANT CHANGE UP
GLUCOSE SERPL-MCNC: 88 MG/DL — SIGNIFICANT CHANGE UP (ref 70–99)
POTASSIUM SERPL-MCNC: 3.3 MMOL/L — LOW (ref 3.5–5.3)
POTASSIUM SERPL-SCNC: 3.3 MMOL/L — LOW (ref 3.5–5.3)
SODIUM SERPL-SCNC: 140 MMOL/L — SIGNIFICANT CHANGE UP (ref 135–145)

## 2023-07-29 PROCEDURE — 99232 SBSQ HOSP IP/OBS MODERATE 35: CPT

## 2023-07-29 RX ORDER — DEXTROSE MONOHYDRATE, SODIUM CHLORIDE, AND POTASSIUM CHLORIDE 50; .745; 4.5 G/1000ML; G/1000ML; G/1000ML
1000 INJECTION, SOLUTION INTRAVENOUS
Refills: 0 | Status: DISCONTINUED | OUTPATIENT
Start: 2023-07-29 | End: 2023-07-30

## 2023-07-29 RX ORDER — POTASSIUM CHLORIDE 20 MEQ
40 PACKET (EA) ORAL ONCE
Refills: 0 | Status: COMPLETED | OUTPATIENT
Start: 2023-07-29 | End: 2023-07-29

## 2023-07-29 RX ADMIN — SENNA PLUS 2 TABLET(S): 8.6 TABLET ORAL at 21:51

## 2023-07-29 RX ADMIN — Medication 1 APPLICATION(S): at 21:54

## 2023-07-29 RX ADMIN — Medication 3 MILLIGRAM(S): at 21:50

## 2023-07-29 RX ADMIN — LATANOPROST 1 DROP(S): 0.05 SOLUTION/ DROPS OPHTHALMIC; TOPICAL at 21:54

## 2023-07-29 RX ADMIN — ENOXAPARIN SODIUM 30 MILLIGRAM(S): 100 INJECTION SUBCUTANEOUS at 21:51

## 2023-07-29 RX ADMIN — Medication 1 APPLICATION(S): at 09:27

## 2023-07-29 RX ADMIN — Medication 1 TABLET(S): at 05:40

## 2023-07-29 RX ADMIN — LIDOCAINE 1 PATCH: 4 CREAM TOPICAL at 09:24

## 2023-07-29 RX ADMIN — CEFEPIME 100 MILLIGRAM(S): 1 INJECTION, POWDER, FOR SOLUTION INTRAMUSCULAR; INTRAVENOUS at 00:22

## 2023-07-29 RX ADMIN — MEMANTINE HYDROCHLORIDE 10 MILLIGRAM(S): 10 TABLET ORAL at 21:53

## 2023-07-29 RX ADMIN — DONEPEZIL HYDROCHLORIDE 10 MILLIGRAM(S): 10 TABLET, FILM COATED ORAL at 21:53

## 2023-07-29 RX ADMIN — CEFEPIME 100 MILLIGRAM(S): 1 INJECTION, POWDER, FOR SOLUTION INTRAMUSCULAR; INTRAVENOUS at 21:49

## 2023-07-29 RX ADMIN — Medication 40 MILLIEQUIVALENT(S): at 18:33

## 2023-07-29 RX ADMIN — MEMANTINE HYDROCHLORIDE 10 MILLIGRAM(S): 10 TABLET ORAL at 09:22

## 2023-07-29 RX ADMIN — Medication 100 MILLIGRAM(S): at 13:20

## 2023-07-29 RX ADMIN — CEFEPIME 100 MILLIGRAM(S): 1 INJECTION, POWDER, FOR SOLUTION INTRAMUSCULAR; INTRAVENOUS at 09:30

## 2023-07-29 RX ADMIN — CITALOPRAM 20 MILLIGRAM(S): 10 TABLET, FILM COATED ORAL at 09:23

## 2023-07-29 RX ADMIN — Medication 1 TABLET(S): at 16:15

## 2023-07-29 RX ADMIN — DEXTROSE MONOHYDRATE, SODIUM CHLORIDE, AND POTASSIUM CHLORIDE 50 MILLILITER(S): 50; .745; 4.5 INJECTION, SOLUTION INTRAVENOUS at 18:51

## 2023-07-29 RX ADMIN — Medication 1 DROP(S): at 09:22

## 2023-07-29 RX ADMIN — FAMOTIDINE 20 MILLIGRAM(S): 10 INJECTION INTRAVENOUS at 09:27

## 2023-07-29 NOTE — PROGRESS NOTE ADULT - ASSESSMENT
87-year-old female from [facility]  with history of advanced dementia, hypercholesterolemia, recent admission to [facility] for aspiration pneumonitis presents for evaluation of cough and "gurgling sounds in her throat". Patient is unable to provide a full HPI or review of systems due to her baseline mental status.  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 Patient resides at [facility] with 24/7 private aide there. Daughter and family very involved. patient unable to provide any ROS due to lethargy and dementia.         1-possible left sided PNA, UTI  on IV cefepime#5  -mucinex  -possibly gram negative or aspiration pna or both  -speech and swallow evaluation  -ID consult appreciated      2-Old covid 19, asymptomatic from covid standpoint  -no need for iso3-UTI  -fu urine cultures  -c/w abx    4-debility/ dehydration/ severe protein and overal  malnutrition with cachexia  -patient frail and cachectic  -no peg tube as per family wishes  -start gentle fluids    5- Goals of care/ advance care planning:  Disussed with daughter Juan J at bedside that patient declining clinically, since her last vsit here to ED.   She will continuously be neded to be admitted to hospital due to ongoing risk for aspiration pna.   Patient is currently skin and bones, with poor oral intake. as per daughter meals intake fluctuates and sometimes she eats full meals and other times she eats only 20% of her meals.     patient is also demented. bed bound with no quality of life. oral intake likely will further decline due to worsening dementia.     Family is willing to speak with palliative care to forecast goals of care for near future as she is declining clinically.       6-dvt prphy- sc lovenox

## 2023-07-30 LAB
ANION GAP SERPL CALC-SCNC: 5 MMOL/L — SIGNIFICANT CHANGE UP (ref 5–17)
BUN SERPL-MCNC: 5 MG/DL — LOW (ref 7–23)
CALCIUM SERPL-MCNC: 9.2 MG/DL — SIGNIFICANT CHANGE UP (ref 8.5–10.1)
CHLORIDE SERPL-SCNC: 115 MMOL/L — HIGH (ref 96–108)
CO2 SERPL-SCNC: 23 MMOL/L — SIGNIFICANT CHANGE UP (ref 22–31)
CREAT SERPL-MCNC: 0.65 MG/DL — SIGNIFICANT CHANGE UP (ref 0.5–1.3)
CULTURE RESULTS: SIGNIFICANT CHANGE UP
CULTURE RESULTS: SIGNIFICANT CHANGE UP
EGFR: 86 ML/MIN/1.73M2 — SIGNIFICANT CHANGE UP
GLUCOSE SERPL-MCNC: 86 MG/DL — SIGNIFICANT CHANGE UP (ref 70–99)
POTASSIUM SERPL-MCNC: 4.2 MMOL/L — SIGNIFICANT CHANGE UP (ref 3.5–5.3)
POTASSIUM SERPL-SCNC: 4.2 MMOL/L — SIGNIFICANT CHANGE UP (ref 3.5–5.3)
SODIUM SERPL-SCNC: 143 MMOL/L — SIGNIFICANT CHANGE UP (ref 135–145)
SPECIMEN SOURCE: SIGNIFICANT CHANGE UP
SPECIMEN SOURCE: SIGNIFICANT CHANGE UP

## 2023-07-30 PROCEDURE — 99232 SBSQ HOSP IP/OBS MODERATE 35: CPT

## 2023-07-30 RX ORDER — CEFTRIAXONE 500 MG/1
1000 INJECTION, POWDER, FOR SOLUTION INTRAMUSCULAR; INTRAVENOUS EVERY 24 HOURS
Refills: 0 | Status: DISCONTINUED | OUTPATIENT
Start: 2023-07-30 | End: 2023-07-31

## 2023-07-30 RX ORDER — SODIUM CHLORIDE 9 MG/ML
1000 INJECTION INTRAMUSCULAR; INTRAVENOUS; SUBCUTANEOUS
Refills: 0 | Status: DISCONTINUED | OUTPATIENT
Start: 2023-07-30 | End: 2023-08-02

## 2023-07-30 RX ORDER — CEFUROXIME AXETIL 250 MG
500 TABLET ORAL EVERY 12 HOURS
Refills: 0 | Status: DISCONTINUED | OUTPATIENT
Start: 2023-07-30 | End: 2023-07-30

## 2023-07-30 RX ADMIN — SENNA PLUS 2 TABLET(S): 8.6 TABLET ORAL at 20:16

## 2023-07-30 RX ADMIN — Medication 100 MILLIGRAM(S): at 12:39

## 2023-07-30 RX ADMIN — DONEPEZIL HYDROCHLORIDE 10 MILLIGRAM(S): 10 TABLET, FILM COATED ORAL at 20:17

## 2023-07-30 RX ADMIN — CEFEPIME 100 MILLIGRAM(S): 1 INJECTION, POWDER, FOR SOLUTION INTRAMUSCULAR; INTRAVENOUS at 10:03

## 2023-07-30 RX ADMIN — Medication 3 MILLIGRAM(S): at 20:15

## 2023-07-30 RX ADMIN — LATANOPROST 1 DROP(S): 0.05 SOLUTION/ DROPS OPHTHALMIC; TOPICAL at 20:19

## 2023-07-30 RX ADMIN — Medication 1 TABLET(S): at 16:26

## 2023-07-30 RX ADMIN — Medication 1 APPLICATION(S): at 09:59

## 2023-07-30 RX ADMIN — Medication 1 APPLICATION(S): at 20:19

## 2023-07-30 RX ADMIN — CEFTRIAXONE 1000 MILLIGRAM(S): 500 INJECTION, POWDER, FOR SOLUTION INTRAMUSCULAR; INTRAVENOUS at 20:15

## 2023-07-30 RX ADMIN — CITALOPRAM 20 MILLIGRAM(S): 10 TABLET, FILM COATED ORAL at 09:59

## 2023-07-30 RX ADMIN — MEMANTINE HYDROCHLORIDE 10 MILLIGRAM(S): 10 TABLET ORAL at 20:18

## 2023-07-30 RX ADMIN — FAMOTIDINE 20 MILLIGRAM(S): 10 INJECTION INTRAVENOUS at 10:02

## 2023-07-30 RX ADMIN — MEMANTINE HYDROCHLORIDE 10 MILLIGRAM(S): 10 TABLET ORAL at 09:59

## 2023-07-30 RX ADMIN — ENOXAPARIN SODIUM 30 MILLIGRAM(S): 100 INJECTION SUBCUTANEOUS at 20:18

## 2023-07-30 RX ADMIN — Medication 1 TABLET(S): at 04:59

## 2023-07-30 RX ADMIN — SODIUM CHLORIDE 50 MILLILITER(S): 9 INJECTION INTRAMUSCULAR; INTRAVENOUS; SUBCUTANEOUS at 20:13

## 2023-07-30 RX ADMIN — Medication 1 DROP(S): at 09:59

## 2023-07-30 NOTE — PROGRESS NOTE ADULT - ASSESSMENT
87-year-old female from [facility]  with history of advanced dementia, hypercholesterolemia, recent admission to [facility] for aspiration pneumonitis presents for evaluation of cough and "gurgling sounds in her throat". Patient is unable to provide a full HPI or review of systems due to her baseline mental status.  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 Patient resides at [facility] with 24/7 private aide there. Daughter and family very involved. patient unable to provide any ROS due to lethargy and dementia.         1-possible left sided PNA, UTI  on IV cefepime#6, switch to ceftin for 1 more days  -mucinex  -possibly gram negative or aspiration pna or both  -speech and swallow evaluation  -ID consult appreciated      2-Old covid 19, asymptomatic from covid standpoint  -no need for iso3-UTI  -fu urine cultures  -c/w abx    4-debility/ dehydration/ severe protein and overal  malnutrition with cachexia  -patient frail and cachectic  -no peg tube as per family wishes  -start gentle fluids    5- Goals of care/ advance care planning:  Disussed with daughter Juan J at bedside that patient declining clinically, since her last vsit here to ED.   She will continuously be neded to be admitted to hospital due to ongoing risk for aspiration pna.   Patient is currently skin and bones, with poor oral intake. as per daughter meals intake fluctuates and sometimes she eats full meals and other times she eats only 20% of her meals.     patient is also demented. bed bound with no quality of life. oral intake likely will further decline due to worsening dementia.     Family is willing to speak with palliative care to forecast goals of care for near future as she is declining clinically.       6-dvt prphy- sc lovenox  dispo- pending GOC by palliative ,pt with gradual  failure to thrive   daughter would wants pt to go back to [facility]

## 2023-07-31 LAB
ANION GAP SERPL CALC-SCNC: 4 MMOL/L — LOW (ref 5–17)
BUN SERPL-MCNC: 5 MG/DL — LOW (ref 7–23)
CALCIUM SERPL-MCNC: 9.4 MG/DL — SIGNIFICANT CHANGE UP (ref 8.5–10.1)
CHLORIDE SERPL-SCNC: 115 MMOL/L — HIGH (ref 96–108)
CO2 SERPL-SCNC: 25 MMOL/L — SIGNIFICANT CHANGE UP (ref 22–31)
CREAT SERPL-MCNC: 0.66 MG/DL — SIGNIFICANT CHANGE UP (ref 0.5–1.3)
EGFR: 86 ML/MIN/1.73M2 — SIGNIFICANT CHANGE UP
GLUCOSE SERPL-MCNC: 86 MG/DL — SIGNIFICANT CHANGE UP (ref 70–99)
POTASSIUM SERPL-MCNC: 3.7 MMOL/L — SIGNIFICANT CHANGE UP (ref 3.5–5.3)
POTASSIUM SERPL-SCNC: 3.7 MMOL/L — SIGNIFICANT CHANGE UP (ref 3.5–5.3)
SODIUM SERPL-SCNC: 144 MMOL/L — SIGNIFICANT CHANGE UP (ref 135–145)

## 2023-07-31 PROCEDURE — 99232 SBSQ HOSP IP/OBS MODERATE 35: CPT

## 2023-07-31 RX ORDER — DEXTROSE MONOHYDRATE, SODIUM CHLORIDE, AND POTASSIUM CHLORIDE 50; .745; 4.5 G/1000ML; G/1000ML; G/1000ML
1000 INJECTION, SOLUTION INTRAVENOUS
Refills: 0 | Status: DISCONTINUED | OUTPATIENT
Start: 2023-07-31 | End: 2023-08-02

## 2023-07-31 RX ORDER — CEFTRIAXONE 500 MG/1
1000 INJECTION, POWDER, FOR SOLUTION INTRAMUSCULAR; INTRAVENOUS ONCE
Refills: 0 | Status: COMPLETED | OUTPATIENT
Start: 2023-07-31 | End: 2023-07-31

## 2023-07-31 RX ADMIN — DONEPEZIL HYDROCHLORIDE 10 MILLIGRAM(S): 10 TABLET, FILM COATED ORAL at 22:18

## 2023-07-31 RX ADMIN — DEXTROSE MONOHYDRATE, SODIUM CHLORIDE, AND POTASSIUM CHLORIDE 50 MILLILITER(S): 50; .745; 4.5 INJECTION, SOLUTION INTRAVENOUS at 22:23

## 2023-07-31 RX ADMIN — Medication 1 APPLICATION(S): at 09:36

## 2023-07-31 RX ADMIN — SENNA PLUS 2 TABLET(S): 8.6 TABLET ORAL at 22:21

## 2023-07-31 RX ADMIN — LATANOPROST 1 DROP(S): 0.05 SOLUTION/ DROPS OPHTHALMIC; TOPICAL at 22:27

## 2023-07-31 RX ADMIN — MEMANTINE HYDROCHLORIDE 10 MILLIGRAM(S): 10 TABLET ORAL at 22:21

## 2023-07-31 RX ADMIN — Medication 1 TABLET(S): at 17:02

## 2023-07-31 RX ADMIN — Medication 3 MILLIGRAM(S): at 22:19

## 2023-07-31 RX ADMIN — CEFTRIAXONE 1000 MILLIGRAM(S): 500 INJECTION, POWDER, FOR SOLUTION INTRAMUSCULAR; INTRAVENOUS at 22:19

## 2023-07-31 RX ADMIN — ENOXAPARIN SODIUM 30 MILLIGRAM(S): 100 INJECTION SUBCUTANEOUS at 22:19

## 2023-07-31 RX ADMIN — Medication 1 TABLET(S): at 06:03

## 2023-07-31 RX ADMIN — Medication 1 DROP(S): at 09:36

## 2023-07-31 RX ADMIN — Medication 1 APPLICATION(S): at 22:27

## 2023-07-31 NOTE — PROGRESS NOTE ADULT - ASSESSMENT
87-year-old female from [facility]  with history of advanced dementia, hypercholesterolemia, recent admission to [facility] for aspiration pneumonitis presents for evaluation of cough and "gurgling sounds in her throat". Patient is unable to provide a full HPI or review of systems due to her baseline mental status.  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 Patient resides at [facility] with 24/7 private aide there. Daughter and family very involved. patient unable to provide any ROS due to lethargy and dementia.         1-possible left sided PNA, UTI- afebrile   on IV cefepime#6, switch to ceftin for 1 more days completed   -mucinex  -possibly gram negative or aspiration pna or both  -speech and swallow evaluation  -ID consult appreciated    #Failure to thrive - very poor po intake   #debility/ dehydration/ severe protein and overal  malnutrition with cachexia  -patient frail and cachectic  -no peg tube as per family wishes  -start gentle fluid  would be candidate for hospice but family declining hospice for now, would like her back to [facility]     #-Old covid 19, asymptomatic from covid standpoint  -no need for iso    s    5- Goals of care/ advance care planning:  Disussed with daughter Juan J at bedside that patient declining clinically, since her last vsit here to ED.   She will continuously need  to be admitted to hospital due to ongoing risk for aspiration pna.   Patient is currently skin and bones, with poor oral intake. as per daughter meals intake fluctuates and sometimes she eats full meals and other times she eats only 20% of her meals.     patient is also demented. bed bound with no quality of life. oral intake likely will further decline due to worsening dementia.      Dispo- Family i- decling hospice , today pt no po intake at all , more lethargic , opens eyes and is more confused -I thinks its her failure to thrive - further neuro w/u would be futile  as her mentation waxes and wanes even at baseline - would need to dc back to [facility] if accepts po meds  dw with daughter      #dvt prphy- sc lovenox  dispo- back to [facility]      87-year-old female from [facility]  with history of advanced dementia, hypercholesterolemia, recent admission to [facility] for aspiration pneumonitis presents for evaluation of cough and "gurgling sounds in her throat". Patient is unable to provide a full HPI or review of systems due to her baseline mental status.  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 Patient resides at [facility] with 24/7 private aide there. Daughter and family very involved. patient unable to provide any ROS due to lethargy and dementia.         1-possible left sided PNA, UTI- afebrile   on IV cefepime#6, switch toceftriaxone for 1 more day today and then dc   cefepime stopped to avoid worsening encephalopathy  -mucinex  -possibly gram negative or aspiration pna or both  -speech and swallow evaluation  -ID consult appreciated    #Failure to thrive - very poor po intake   #debility/ dehydration/ severe protein and overal  malnutrition with cachexia  -patient frail and cachectic  -no peg tube as per family wishes  -start gentle fluid  would be candidate for hospice but family declining hospice for now, would like her back to [facility]     #-Old covid 19, asymptomatic from covid standpoint  -no need for iso    s    5- Goals of care/ advance care planning:  Disussed with daughter Juan J at bedside that patient declining clinically, since her last vsit here to ED.   She will continuously need  to be admitted to hospital due to ongoing risk for aspiration pna.   Patient is currently skin and bones, with poor oral intake. as per daughter meals intake fluctuates and sometimes she eats full meals and other times she eats only 20% of her meals.     patient is also demented. bed bound with no quality of life. oral intake likely will further decline due to worsening dementia.      Dispo- Family i- decling hospice , today pt no po intake at all , more lethargic , opens eyes and is more confused -I thinks its her failure to thrive - further neuro w/u would be futile  as her mentation waxes and wanes even at baseline - would need to dc back to [facility] if accepts po meds  dw with daughter      #dvt prphy- sc lovenox  dispo- back to [facility]

## 2023-07-31 NOTE — GOALS OF CARE CONVERSATION - ADVANCED CARE PLANNING - CONVERSATION DETAILS
Team received call back from pts daughter Shira. This SW spoke with her and discussed goals of care. Pts daughter shared that at this time they still would want pt. to come back to the hospital as needed. Pts daughter discussed how they are nto sure what things will look like in a few weeks or months from now and understand a change in plan may be needed. Pt. is from [facility] and pts daughter expressed some concerns of them being able to managing pt. when she is closer to the end of her life. We reviewed hospice services and philosophy of care and discussed both hospice at SNF and inpatient Hospice. This SW explained criteria form inpatient hospice and that pt. must have a symptom that required IV medication. Pts daughter was appreciative of the information for the future as she expressed wanting to prepare and know future options. Plan at this time is for pt. to return back to [facility]. Emotional support provided. Our team will continue to follow. Team received call back from pts daughter Shira. This SW spoke with her and discussed goals of care. Pts daughter shared that at this time they still would want pt. to come back to the hospital as needed. Pts daughter discussed how they are nto sure what things will look like in a few weeks or months from now and understand a change in plan may be needed. Pt. is from [facility] and pts daughter expressed some concerns of them being able to managing pt. when she is closer to the end of her life. We reviewed hospice services and philosophy of care and discussed both hospice at SNF and inpatient Hospice. This SW explained criteria form inpatient hospice and that pt. must have a symptom that required IV medication. Pts daughter was appreciative of the information for the future as she expressed wanting to prepare and know future options. Plan at this time is for pt. to return back to [facility]. Emotional support provided. Our team will sign off as goals of care are clear.

## 2023-08-01 LAB
ANION GAP SERPL CALC-SCNC: 6 MMOL/L — SIGNIFICANT CHANGE UP (ref 5–17)
BUN SERPL-MCNC: 5 MG/DL — LOW (ref 7–23)
CALCIUM SERPL-MCNC: 9.1 MG/DL — SIGNIFICANT CHANGE UP (ref 8.5–10.1)
CHLORIDE SERPL-SCNC: 112 MMOL/L — HIGH (ref 96–108)
CO2 SERPL-SCNC: 24 MMOL/L — SIGNIFICANT CHANGE UP (ref 22–31)
CREAT SERPL-MCNC: 0.56 MG/DL — SIGNIFICANT CHANGE UP (ref 0.5–1.3)
EGFR: 89 ML/MIN/1.73M2 — SIGNIFICANT CHANGE UP
GLUCOSE SERPL-MCNC: 104 MG/DL — HIGH (ref 70–99)
POTASSIUM SERPL-MCNC: 3.6 MMOL/L — SIGNIFICANT CHANGE UP (ref 3.5–5.3)
POTASSIUM SERPL-SCNC: 3.6 MMOL/L — SIGNIFICANT CHANGE UP (ref 3.5–5.3)
SODIUM SERPL-SCNC: 142 MMOL/L — SIGNIFICANT CHANGE UP (ref 135–145)

## 2023-08-01 PROCEDURE — 99232 SBSQ HOSP IP/OBS MODERATE 35: CPT

## 2023-08-01 RX ADMIN — Medication 650 MILLIGRAM(S): at 17:50

## 2023-08-01 RX ADMIN — Medication 1 DROP(S): at 10:54

## 2023-08-01 RX ADMIN — Medication 1 APPLICATION(S): at 10:55

## 2023-08-01 RX ADMIN — Medication 1 APPLICATION(S): at 22:12

## 2023-08-01 RX ADMIN — Medication 1 TABLET(S): at 10:52

## 2023-08-01 RX ADMIN — CITALOPRAM 20 MILLIGRAM(S): 10 TABLET, FILM COATED ORAL at 10:53

## 2023-08-01 RX ADMIN — SENNA PLUS 2 TABLET(S): 8.6 TABLET ORAL at 22:12

## 2023-08-01 RX ADMIN — Medication 650 MILLIGRAM(S): at 18:47

## 2023-08-01 RX ADMIN — DONEPEZIL HYDROCHLORIDE 10 MILLIGRAM(S): 10 TABLET, FILM COATED ORAL at 22:11

## 2023-08-01 RX ADMIN — Medication 3 MILLIGRAM(S): at 22:18

## 2023-08-01 RX ADMIN — MEMANTINE HYDROCHLORIDE 10 MILLIGRAM(S): 10 TABLET ORAL at 22:12

## 2023-08-01 RX ADMIN — FAMOTIDINE 20 MILLIGRAM(S): 10 INJECTION INTRAVENOUS at 10:53

## 2023-08-01 RX ADMIN — Medication 100 MILLIGRAM(S): at 10:59

## 2023-08-01 RX ADMIN — LATANOPROST 1 DROP(S): 0.05 SOLUTION/ DROPS OPHTHALMIC; TOPICAL at 22:11

## 2023-08-01 RX ADMIN — MEMANTINE HYDROCHLORIDE 10 MILLIGRAM(S): 10 TABLET ORAL at 10:53

## 2023-08-01 RX ADMIN — ENOXAPARIN SODIUM 30 MILLIGRAM(S): 100 INJECTION SUBCUTANEOUS at 22:11

## 2023-08-01 RX ADMIN — Medication 1 TABLET(S): at 17:50

## 2023-08-01 NOTE — PROGRESS NOTE ADULT - ASSESSMENT
#possible left sided PNA, UTI  -completed 7 days of ABX   -mucinex  -possibly gram negative or aspiration pna or both  -speech and swallow evaluation  -ID consult appreciated  -urine cx growing KLPN - blood cx no growth    #Old covid 19, asymptomatic from covid standpoint  -no need for iso3    #debility/ dehydration/ severe protein and overall  malnutrition with cachexia  -patient frail and cachectic  -no peg tube as per family wishes  -start gentle fluids    #Dementia  patient is also demented. bed bound with no quality of life. oral intake likely will further decline due to worsening dementia.     #Goals of care/ advance care planning:  Disussed with daughter Juan J at bedside that patient declining clinically, since her last vsit here to ED.   She will continuously be neded to be admitted to hospital due to ongoing risk for aspiration pna.   Patient is currently skin and bones, with poor oral intake. as per daughter meals intake fluctuates and sometimes she eats full meals and other times she eats only 20% of her meals.     #dvt prphy- sc lovenox  dispo- pending GOC by palliative ,pt with gradual  failure to thrive   daughter would wants pt to go back to [facility]

## 2023-08-01 NOTE — CHART NOTE - NSCHARTNOTEFT_GEN_A_CORE
This SW and Palliative NP, Kanika Payton called daughter Shira to introduce our team and offer support.  Our team provided brother our contact information on 7/25 to set up GO and family had not reached out therefore we contacted daughter, Shira, health care agent to offer support.  Shira shared she was on her way to the hospital to discuss Pts current medical condition with the medical team and would reach out to our team after she receives an update.  Daughter educated of palliative team availability and contact information.  Awaiting call back.  Emotional support provided.  Our team to continue to follow.
HPI:  87-year-old female from [facility]  with history of advanced dementia, hypercholesterolemia, recent admission to [facility] for aspiration pneumonitis presents for evaluation of cough and "gurgling sounds in her throat". Patient is unable to provide a full HPI or review of systems due to her baseline mental status.  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 Patient resides at [facility] with 24/7 private aide there. Daughter and family very involved. patient unable to provide any ROS due to lethargy and dementia.  (2023 08:16)      PERTINENT PMH REVIEWED:  [ X ] YES [ ] NO           Primary Contact:    Naty (Shira), daughter, health care agent, phone #[phone]    HCP [ X ] Surrogate [   ] Guardian [   ]    Mental Status: Pt lacks capacity  Concerns of Depression [  ] -Not identified.  Anxiety [   ] -Not identified.  Baseline ADLs (prior to admission):  Independent [ ] moderately [ ] fully   Dependent   [ ] moderately [ X ]fully    Family Meeting attendees: GOC to be discussed.    Anticipated Grief: Patient[  ] Family [ X ]    Caregiver Dora Assessed: Yes [ X ] No [  ]    Alevism: Anglican.    Spiritual Concerns: Not identified,  available for support.    Goals of Care: To be discussed    Previous Services: [facility]    ADVANCE DIRECTIVES:  -Pt. lacks capacity  -Most recent HCP from  which names nayana Alfonso as primary agent.  Spouse, Moustapha whom is  is listed as alternate agent.  -MOLST DNR/DNI/Limited interventions/No feeding tube/use dialysis.    Anticipated D/C Plan: To be discussed.                     Summary:  Palliative NP, Alyssa Crockett and this SW met with Pt. and son bedside to introduce our team and offer support.  Palliative role explained.  Pt. known to us from a previous admission in 2022.  Pt. lacks capacity, dx dementia.  Prior to this hospitalization, Pt. resided long term at [facility].  Family shared that she is total assist with ADLS and primarily bedbound.  Family very involved.  Sons feelings explored.  Support provided.    GOC to be scheduled.  Son will have his sister, Shira reach out to our team to set up GOC.  HCP on [employer] from  names nayana Alfonso (Shira) as primary health care agent.  MOLST on file which reflects DNR/DNI/limited interventions/No Feeding tube/use dialysis.    Awaiting call from daughter to set up GOC.  Emotional support provided.  Our team to continue to follow.
This SW met with pts daughter Shira and pts two sons were also on the phone. Pts daughter was meeting with HOWARD Walsh from Caregiver program and family had questions about Palliative Care. This SW spoke with pts daughter yesterday regarding options including comfort focus at [facility] and discussed hospice as well. Inpatient hospice criteria was reviewed with pts family and this SW explained that currently pt. would not be a inpatient hospice candidate. Pts family expressed that they do not want pt. to return to [facility] with hospice or Comfort and would want her to come back to the hospital. Pts family would like pt. evaluated here and if at that point comfort focus and inpatient hospice are needed they may consider to transition from the hospital. For now, the plan is for pt. to return to [facility] upon d/c, they are not ready for a comfort focus there at this time. Emotional support provided. Our team will continue to follow.

## 2023-08-02 ENCOUNTER — TRANSCRIPTION ENCOUNTER (OUTPATIENT)
Age: 88
End: 2023-08-02

## 2023-08-02 VITALS
TEMPERATURE: 97 F | RESPIRATION RATE: 18 BRPM | OXYGEN SATURATION: 98 % | DIASTOLIC BLOOD PRESSURE: 44 MMHG | SYSTOLIC BLOOD PRESSURE: 95 MMHG | HEART RATE: 78 BPM

## 2023-08-02 LAB
ANION GAP SERPL CALC-SCNC: 4 MMOL/L — LOW (ref 5–17)
BUN SERPL-MCNC: 5 MG/DL — LOW (ref 7–23)
CALCIUM SERPL-MCNC: 9.2 MG/DL — SIGNIFICANT CHANGE UP (ref 8.5–10.1)
CHLORIDE SERPL-SCNC: 114 MMOL/L — HIGH (ref 96–108)
CO2 SERPL-SCNC: 25 MMOL/L — SIGNIFICANT CHANGE UP (ref 22–31)
CREAT SERPL-MCNC: 0.6 MG/DL — SIGNIFICANT CHANGE UP (ref 0.5–1.3)
EGFR: 88 ML/MIN/1.73M2 — SIGNIFICANT CHANGE UP
GLUCOSE SERPL-MCNC: 90 MG/DL — SIGNIFICANT CHANGE UP (ref 70–99)
HCT VFR BLD CALC: 35.9 % — SIGNIFICANT CHANGE UP (ref 34.5–45)
HGB BLD-MCNC: 12 G/DL — SIGNIFICANT CHANGE UP (ref 11.5–15.5)
MAGNESIUM SERPL-MCNC: 2 MG/DL — SIGNIFICANT CHANGE UP (ref 1.6–2.6)
MCHC RBC-ENTMCNC: 33.4 GM/DL — SIGNIFICANT CHANGE UP (ref 32–36)
MCHC RBC-ENTMCNC: 34.4 PG — HIGH (ref 27–34)
MCV RBC AUTO: 102.9 FL — HIGH (ref 80–100)
PHOSPHATE SERPL-MCNC: 2.5 MG/DL — SIGNIFICANT CHANGE UP (ref 2.5–4.5)
PLATELET # BLD AUTO: 287 K/UL — SIGNIFICANT CHANGE UP (ref 150–400)
POTASSIUM SERPL-MCNC: 3.8 MMOL/L — SIGNIFICANT CHANGE UP (ref 3.5–5.3)
POTASSIUM SERPL-SCNC: 3.8 MMOL/L — SIGNIFICANT CHANGE UP (ref 3.5–5.3)
RBC # BLD: 3.49 M/UL — LOW (ref 3.8–5.2)
RBC # FLD: 13.6 % — SIGNIFICANT CHANGE UP (ref 10.3–14.5)
SODIUM SERPL-SCNC: 143 MMOL/L — SIGNIFICANT CHANGE UP (ref 135–145)
WBC # BLD: 4.88 K/UL — SIGNIFICANT CHANGE UP (ref 3.8–10.5)
WBC # FLD AUTO: 4.88 K/UL — SIGNIFICANT CHANGE UP (ref 3.8–10.5)

## 2023-08-02 PROCEDURE — 99239 HOSP IP/OBS DSCHRG MGMT >30: CPT

## 2023-08-02 RX ORDER — THIAMINE MONONITRATE (VIT B1) 100 MG
1 TABLET ORAL
Qty: 0 | Refills: 0 | DISCHARGE
Start: 2023-08-02

## 2023-08-02 RX ADMIN — Medication 1 APPLICATION(S): at 09:42

## 2023-08-02 RX ADMIN — Medication 1 DROP(S): at 09:43

## 2023-08-02 NOTE — PROGRESS NOTE ADULT - SUBJECTIVE AND OBJECTIVE BOX
87-year-old female from [facility]  with history of advanced dementia, hypercholesterolemia, recent admission to [facility] for aspiration pneumonitis presents for evaluation of cough and "gurgling sounds in her throat". Patient is unable to provide a full HPI or review of systems due to her baseline mental status.  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 Patient resides at [facility] with 24/7 private aide there. Daughter and family very involved. patient unable to provide any ROS due to lethargy and dementia.   7/29 daughter at bedside, patient more awake ,mentations stable  , today verbalising a few words with daughter,  appears weak and frail, taking few spoons of food orally   All other review of systems negative, except as noted in HPI      physical exam  HEENT:   pupils equal and reactive, EOMI, no oropharyngeal lesions, erythema, exudates, oral thrush    NECK:   supple, no carotid bruits, no palpable lymph nodes, no thyromegaly    CV:  +S1, +S2, regular, no murmurs or rubs    RESP:   lungs clear to auscultation bilaterally, no wheezing, rales, rhonchi, good air entry bilaterally    BREAST:  not examined    GI:  abdomen soft, non-tender, non-distended, normal BS, no bruits, no abdominal masses, no palpable masses    RECTAL:  not examined    :  not examined    MSK:   normal muscle tone, no atrophy, no rigidity, no contractions    EXT:   no clubbing, no cyanosis, no edema, no calf pain, swelling or erythema    VASCULAR:  pulses equal and symmetric in the upper and lower extremities    NEURO:  AAOX3, no focal neurological deficits, follows all commands, able to move extremities spontaneously    SKIN:  no ulcers, lesions or rashes    labs reviewed
87-year-old female from [facility] [address]  with history of advanced dementia, hypercholesterolemia, recent admission to [facility] for aspiration pneumonitis presents for evaluation of cough and "gurgling sounds in her throat". Patient is unable to provide a full HPI or review of systems due to her baseline mental status.  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 Patient resides at [facility] [address] with 24/7 private aide there. Daughter and family very involved. patient unable to provide any ROS due to lethargy and dementia.   7/27 daughter at bedside, patient opens eyes ,inadequate po intake  appears weak and frail  All other review of systems negative, except as noted in HPI      physical exam  HEENT:   pupils equal and reactive, EOMI, no oropharyngeal lesions, erythema, exudates, oral thrush    NECK:   supple, no carotid bruits, no palpable lymph nodes, no thyromegaly    CV:  +S1, +S2, regular, no murmurs or rubs    RESP:   lungs clear to auscultation bilaterally, no wheezing, rales, rhonchi, good air entry bilaterally    BREAST:  not examined    GI:  abdomen soft, non-tender, non-distended, normal BS, no bruits, no abdominal masses, no palpable masses    RECTAL:  not examined    :  not examined    MSK:   normal muscle tone, no atrophy, no rigidity, no contractions    EXT:   no clubbing, no cyanosis, no edema, no calf pain, swelling or erythema    VASCULAR:  pulses equal and symmetric in the upper and lower extremities    NEURO:  AAOX3, no focal neurological deficits, follows all commands, able to move extremities spontaneously    SKIN:  no ulcers, lesions or rashes    labs reviewed    PHYSICAL EXAM:    Daily     Daily     ICU Vital Signs Last 24 Hrs  T(C): 37.3 (27 Jul 2023 15:54), Max: 37.3 (27 Jul 2023 15:54)  T(F): 99.2 (27 Jul 2023 15:54), Max: 99.2 (27 Jul 2023 15:54)  HR: 68 (27 Jul 2023 15:54) (66 - 69)  BP: 117/61 (27 Jul 2023 15:54) (117/61 - 135/78)  BP(mean): 78 (27 Jul 2023 15:54) (78 - 93)  ABP: --  ABP(mean): --  RR: 18 (27 Jul 2023 15:54) (16 - 18)  SpO2: 95% (27 Jul 2023 15:54) (95% - 95%)    O2 Parameters below as of 27 Jul 2023 15:54  Patient On (Oxygen Delivery Method): room air                                  11.0   5.55  )-----------( 256      ( 27 Jul 2023 07:51 )             32.7       CBC Full  -  ( 27 Jul 2023 07:51 )  WBC Count : 5.55 K/uL  RBC Count : 3.22 M/uL  Hemoglobin : 11.0 g/dL  Hematocrit : 32.7 %  Platelet Count - Automated : 256 K/uL  Mean Cell Volume : 101.6 fl  Mean Cell Hemoglobin : 34.2 pg  Mean Cell Hemoglobin Concentration : 33.6 gm/dL  Auto Neutrophil # : 2.37 K/uL  Auto Lymphocyte # : 2.20 K/uL  Auto Monocyte # : 0.60 K/uL  Auto Eosinophil # : 0.27 K/uL  Auto Basophil # : 0.09 K/uL  Auto Neutrophil % : 42.7 %  Auto Lymphocyte % : 39.6 %  Auto Monocyte % : 10.8 %  Auto Eosinophil % : 4.9 %  Auto Basophil % : 1.6 %      07-27    143  |  117<H>  |  6<L>  ----------------------------<  81  3.5   |  21<L>  |  0.59    Ca    9.0      27 Jul 2023 07:51    TPro  5.6<L>  /  Alb  2.3<L>  /  TBili  0.3  /  DBili  x   /  AST  12<L>  /  ALT  11<L>  /  AlkPhos  83  07-26      LIVER FUNCTIONS - ( 26 Jul 2023 08:38 )  Alb: 2.3 g/dL / Pro: 5.6 gm/dL / ALK PHOS: 83 U/L / ALT: 11 U/L / AST: 12 U/L / GGT: x                       Urinalysis Basic - ( 27 Jul 2023 07:51 )    Color: x / Appearance: x / SG: x / pH: x  Gluc: 81 mg/dL / Ketone: x  / Bili: x / Urobili: x   Blood: x / Protein: x / Nitrite: x   Leuk Esterase: x / RBC: x / WBC x   Sq Epi: x / Non Sq Epi: x / Bacteria: x            MEDICATIONS  (STANDING):  cefepime   IVPB 2000 milliGRAM(s) IV Intermittent two times a day  citalopram 20 milliGRAM(s) Oral daily  donepezil 10 milliGRAM(s) Oral at bedtime  enoxaparin Injectable 30 milliGRAM(s) SubCutaneous every 24 hours  guaiFENesin  milliGRAM(s) Oral every 12 hours  lactobacillus acidophilus 1 Tablet(s) Oral two times a day with meals  latanoprost 0.005% Ophthalmic Solution 1 Drop(s) Both EYES at bedtime  lidocaine   4% Patch 1 Patch Transdermal daily  memantine 10 milliGRAM(s) Oral two times a day  nystatin/triamcinolone Cream 1 Application(s) Topical two times a day  pantoprazole    Tablet 40 milliGRAM(s) Oral before breakfast  senna 2 Tablet(s) Oral at bedtime  sodium chloride 0.45% with potassium chloride 20 mEq/L 1000 milliLiter(s) (50 mL/Hr) IV Continuous <Continuous>  sodium chloride 0.9%. 1000 milliLiter(s) (50 mL/Hr) IV Continuous <Continuous>  timolol 0.5% Solution 1 Drop(s) Both EYES daily      
HPI: Pt is a 87y old Female with hx of advanced dementia, hypercholesterolemia, recent admission to [facility] for aspiration pneumonitis presents for evaluation of cough and "gurgling sounds in her throat". Patient is unable to provide a full HPI or review of systems due to her baseline mental status.  The patient  is nonambulatory with contractures of bilateral lower extremities at baseline. Patient resides at [facility] with 24/7 private aide there. Family  very involved. patient unable to provide any ROS due to lethargy and dementia. Palliative medicine consulted to help establish GOC and advance care planning     7/25/2023 pt seen and examined with son at bedside, patient appears comfortable at thi stime patient pt son states that he feels at time she has periods of feeling "achy" patient took Tylenol with good relief.  As per son as patient has not been able to sleep in over 48 hours.  Family has been taking turns being with patient. GOC meeting to be scheduled.   7/28/23 Seen and examined at bedside with no family. Pt awake and speaking minimally.    PAIN: ( )Yes   (x )No  at this time patient appears comfortable non verbal indicators not present     DYSPNEA: ( ) Yes  (x ) No  Level: appears comfortable     PAST MEDICAL & SURGICAL HISTORY:  Osteoarthritis of right hip  Glaucoma  Alzheimer disease  Hypercholesteremia  Dementia  S/P appendectomy childhood  S/P tonsillectomy childhood  S/P D&C (status post dilation and curettage) age 32  S/P colonoscopy every 2 or 3 years    SOCIAL HX: from [facility]     Hx opiate tolerance ( )YES  (x )NO    Baseline ADLs  (Prior to Admission)  ( ) Independent   (x )Dependent    FAMILY HISTORY:  Family history of colon cancer (Father)    Review of Systems:    Unable to obtain/Limited due to: Dementia       PHYSICAL EXAM:  ICU Vital Signs Last 24 Hrs  T(C): 36.2 (28 Jul 2023 07:30), Max: 37.3 (27 Jul 2023 15:54)  T(F): 97.1 (28 Jul 2023 07:30), Max: 99.2 (27 Jul 2023 15:54)  HR: 63 (28 Jul 2023 07:30) (63 - 72)  BP: 122/57 (28 Jul 2023 07:30) (115/60 - 122/57)  BP(mean): 93 (28 Jul 2023 07:30) (78 - 93)  RR: 17 (28 Jul 2023 07:30) (17 - 18)  SpO2: 98% (28 Jul 2023 07:30) (95% - 98%)    O2 Parameters below as of 28 Jul 2023 07:30  Patient On (Oxygen Delivery Method): room air    PPSV2: 20  %  FAST: unsure of baseline      General: elderly frail female in bed, NAD   Mental Status: awake and minimally verbal   HEENT: dry oral mucosa, missing teeth   Lungs: diminished breath sounds b/l   Cardiac: S1S2 +   GI: soft, nontender, +BS  : no suprapubic tenderness   Ext: no edema   Neuro: dementia     LABS:                             11.5   6.91  )-----------( 283      ( 28 Jul 2023 05:32 )             33.8      07-28    141  |  114<H>  |  7   ----------------------------<  80  3.5   |  21<L>  |  0.56    Ca    8.8      28 Jul 2023 05:32  Albumin: Albumin: 2.5 g/dL (07-24 @ 22:06)      Allergies    No Known Allergies    Intolerances    MEDICATIONS  (STANDING):  cefepime   IVPB 2000 milliGRAM(s) IV Intermittent two times a day  citalopram 20 milliGRAM(s) Oral daily  donepezil 10 milliGRAM(s) Oral at bedtime  enoxaparin Injectable 30 milliGRAM(s) SubCutaneous every 24 hours  famotidine    Tablet 20 milliGRAM(s) Oral daily  lactobacillus acidophilus 1 Tablet(s) Oral two times a day with meals  latanoprost 0.005% Ophthalmic Solution 1 Drop(s) Both EYES at bedtime  lidocaine   4% Patch 1 Patch Transdermal daily  memantine 10 milliGRAM(s) Oral two times a day  nystatin/triamcinolone Cream 1 Application(s) Topical two times a day  senna 2 Tablet(s) Oral at bedtime  sodium chloride 0.45% with potassium chloride 20 mEq/L 1000 milliLiter(s) (50 mL/Hr) IV Continuous <Continuous>  sodium chloride 0.9%. 1000 milliLiter(s) (50 mL/Hr) IV Continuous <Continuous>  thiamine 100 milliGRAM(s) Oral daily  timolol 0.5% Solution 1 Drop(s) Both EYES daily    MEDICATIONS  (PRN):  acetaminophen     Tablet .. 650 milliGRAM(s) Oral every 6 hours PRN Temp greater or equal to 38C (100.4F), Mild Pain (1 - 3)  aluminum hydroxide/magnesium hydroxide/simethicone Suspension 30 milliLiter(s) Oral every 4 hours PRN Dyspepsia  guaiFENesin Oral Liquid (Sugar-Free) 100 milliGRAM(s) Oral every 6 hours PRN Cough  ketorolac   Injectable 15 milliGRAM(s) IV Push every 6 hours PRN Moderate Pain (4 - 6)  melatonin 3 milliGRAM(s) Oral at bedtime PRN Insomnia  midodrine. 2.5 milliGRAM(s) Oral three times a day PRN SBP <100  morphine  - Injectable 2 milliGRAM(s) IV Push every 4 hours PRN breakthrough pain not controlled with other PRNS  ondansetron Injectable 4 milliGRAM(s) IV Push every 8 hours PRN Nausea and/or Vomiting  traMADol 25 milliGRAM(s) Oral every 4 hours PRN Severe Pain (7 - 10)      RADIOLOGY/ADDITIONAL STUDIES:      
  Date of service: 07-27-23 @ 15:36    pt seen and examined  afebrile  frail, weak appearing     ROS: unable to obtain d/t medical condition    MEDICATIONS  (STANDING):  cefepime   IVPB 2000 milliGRAM(s) IV Intermittent two times a day  citalopram 20 milliGRAM(s) Oral daily  donepezil 10 milliGRAM(s) Oral at bedtime  enoxaparin Injectable 30 milliGRAM(s) SubCutaneous every 24 hours  guaiFENesin  milliGRAM(s) Oral every 12 hours  lactobacillus acidophilus 1 Tablet(s) Oral two times a day with meals  latanoprost 0.005% Ophthalmic Solution 1 Drop(s) Both EYES at bedtime  lidocaine   4% Patch 1 Patch Transdermal daily  memantine 10 milliGRAM(s) Oral two times a day  nystatin/triamcinolone Cream 1 Application(s) Topical two times a day  pantoprazole    Tablet 40 milliGRAM(s) Oral before breakfast  senna 2 Tablet(s) Oral at bedtime  sodium chloride 0.9%. 1000 milliLiter(s) (50 mL/Hr) IV Continuous <Continuous>  timolol 0.5% Solution 1 Drop(s) Both EYES daily    Vital Signs Last 24 Hrs  T(C): 36.5 (26 Jul 2023 19:45), Max: 36.5 (26 Jul 2023 19:45)  T(F): 97.7 (26 Jul 2023 19:45), Max: 97.7 (26 Jul 2023 19:45)  HR: 66 (27 Jul 2023 07:15) (66 - 69)  BP: 135/78 (27 Jul 2023 07:15) (119/56 - 135/78)  BP(mean): 93 (26 Jul 2023 19:45) (93 - 93)  RR: 16 (27 Jul 2023 07:15) (16 - 18)  SpO2: 95% (27 Jul 2023 07:15) (95% - 95%)    Parameters below as of 27 Jul 2023 07:15  Patient On (Oxygen Delivery Method): room air      PE:  Constitutional: frail looking  HEENT: NC/AT, EOMI, PERRLA, conjunctivae clear; ears and nose atraumatic; pharynx benign  Neck: supple; thyroid not palpable  Back: no tenderness  Respiratory: decreased breath sounds   Cardiovascular: S1S2 regular, no murmurs  Abdomen: soft, not tender, not distended, positive BS; liver and spleen WNL  Genitourinary: no suprapubic tenderness  Lymphatic: no LN palpable  Musculoskeletal: no muscle tenderness, no joint swelling or tenderness  Extremities: no pedal edema  Neurological/ Psychiatric: no focal deficits   Skin: no rashes; no palpable lesions    Labs: all available labs reviewed                                              11.0   5.55  )-----------( 256      ( 27 Jul 2023 07:51 )             32.7     07-27    143  |  117<H>  |  6<L>  ----------------------------<  81  3.5   |  21<L>  |  0.59    Ca    9.0      27 Jul 2023 07:51    TPro  5.6<L>  /  Alb  2.3<L>  /  TBili  0.3  /  DBili  x   /  AST  12<L>  /  ALT  11<L>  /  AlkPhos  83  07-26        LIVER FUNCTIONS - ( 29 May 2023 17:39 )  Alb: 2.9 g/dL / Pro: 6.7 gm/dL / ALK PHOS: 115 U/L / ALT: 26 U/L / AST: 20 U/L / GGT: x           Culture - Blood (05.28.23 @ 14:16)   Specimen Source: .Blood Blood-Peripheral  Culture Results:   No growth to date.  Culture - Blood (05.28.23 @ 14:16)   Specimen Source: .Blood Blood-Peripheral  Culture Results:   No growth to date.  Culture - Urine (07.24.23 @ 22:34)   Specimen Source: Clean Catch None  Culture Results:   >100,000 CFU/ml Gram Negative Rods  Radiology: all available radiological tests reviewed    ACC: [id number] EXAM:  XR CHEST PORTABLE URGENT 1V   ORDERED BY: MIGNON DIXON     PROCEDURE DATE:  05/28/2023          INTERPRETATION:  CLINICAL STATEMENT: Evaluate for pneumonia    TECHNIQUE: AP view of the chest.      COMPARISON: 3/15/2023    Issues rotated.    The cardiomediastinal silhouette is not enlarged. There is no focal lung   consolidation or sizable pleural effusion. No significant osseous   abnormality.    IMPRESSION:    Unremarkable frontal chest x ray        < end of copied text >    Advanced directives addressed: full resuscitation
87-year-old female from [facility]  with history of advanced dementia, hypercholesterolemia, recent admission to [facility] for aspiration pneumonitis presents for evaluation of cough and "gurgling sounds in her throat". Patient is unable to provide a full HPI or review of systems due to her baseline mental status.  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 Patient resides at [facility] with 24/7 private aide there. Daughter and family very involved. patient unable to provide any ROS due to lethargy and dementia.   7/29 daughter at bedside, patient more awake ,mentations stable  , today verbalising a few words with daughter,  appears weak and frail, taking few spoons of food orally   7/31 not tolerating po at all today, mentation waxes and wanes at baseline, hemodynamcially stable  All other review of systems negative, except as noted in HPI    PHYSICAL EXAM:    Daily     Daily     ICU Vital Signs Last 24 Hrs  T(C): 36.4 (31 Jul 2023 16:42), Max: 36.8 (31 Jul 2023 05:51)  T(F): 97.6 (31 Jul 2023 16:42), Max: 98.3 (31 Jul 2023 05:51)  HR: 73 (31 Jul 2023 16:42) (66 - 73)  BP: 117/47 (31 Jul 2023 16:42) (107/51 - 125/52)  BP(mean): --  ABP: --  ABP(mean): --  RR: 18 (31 Jul 2023 16:42) (18 - 18)  SpO2: 99% (31 Jul 2023 16:42) (96% - 99%)    O2 Parameters below as of 31 Jul 2023 16:42  Patient On (Oxygen Delivery Method): room air    physical exam  HEENT:   pupils equal and reactive    NECK:   supple, y    CV:  +S1, +S2, regula    RESP:   lungs clear to auscultation bilaterally, no wheezing, rales, rhonchi, good air entry bilaterally    BREAST:  not examined    GI:  abdomen soft, non-tender, non-distended, normal BS, no bruits    RECTAL:  not examined    :  not examined    MSK:   normal muscle tone, no atrophy, no rigidity, no contractions    EXT:   no clubbing, no cyanosis, no edema, no calf pain, swelling or erythema    VASCULAR:  pulses equal and symmetric in the upper and lower extremities    NEURO:  opens eyes , more confused , refusing po intake         labs reviewed                    07-31    144  |  115<H>  |  5<L>  ----------------------------<  86  3.7   |  25  |  0.66    Ca    9.4      31 Jul 2023 07:48                      Urinalysis Basic - ( 31 Jul 2023 07:48 )    Color: x / Appearance: x / SG: x / pH: x  Gluc: 86 mg/dL / Ketone: x  / Bili: x / Urobili: x   Blood: x / Protein: x / Nitrite: x   Leuk Esterase: x / RBC: x / WBC x   Sq Epi: x / Non Sq Epi: x / Bacteria: x            MEDICATIONS  (STANDING):  cefTRIAXone Injectable. 1000 milliGRAM(s) IV Push every 24 hours  citalopram 20 milliGRAM(s) Oral daily  donepezil 10 milliGRAM(s) Oral at bedtime  enoxaparin Injectable 30 milliGRAM(s) SubCutaneous every 24 hours  famotidine    Tablet 20 milliGRAM(s) Oral daily  lactobacillus acidophilus 1 Tablet(s) Oral two times a day with meals  latanoprost 0.005% Ophthalmic Solution 1 Drop(s) Both EYES at bedtime  lidocaine   4% Patch 1 Patch Transdermal daily  memantine 10 milliGRAM(s) Oral two times a day  nystatin/triamcinolone Cream 1 Application(s) Topical two times a day  senna 2 Tablet(s) Oral at bedtime  sodium chloride 0.9%. 1000 milliLiter(s) (50 mL/Hr) IV Continuous <Continuous>  thiamine 100 milliGRAM(s) Oral daily  timolol 0.5% Solution 1 Drop(s) Both EYES daily            
87-year-old female from [facility]  with history of advanced dementia, hypercholesterolemia, recent admission to [facility] for aspiration pneumonitis presents for evaluation of cough and "gurgling sounds in her throat". Patient is unable to provide a full HPI or review of systems due to her baseline mental status.  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 Patient resides at [facility] with 24/7 private aide there. Daughter and family very involved. patient unable to provide any ROS due to lethargy and dementia.   7/26 daughter at bedside, patient opens eyes , incomprehensible speech , appears weak and frail  All other review of systems negative, except as noted in HPI      physical exam  HEENT:   pupils equal and reactive, EOMI, no oropharyngeal lesions, erythema, exudates, oral thrush    NECK:   supple, no carotid bruits, no palpable lymph nodes, no thyromegaly    CV:  +S1, +S2, regular, no murmurs or rubs    RESP:   lungs clear to auscultation bilaterally, no wheezing, rales, rhonchi, good air entry bilaterally    BREAST:  not examined    GI:  abdomen soft, non-tender, non-distended, normal BS, no bruits, no abdominal masses, no palpable masses    RECTAL:  not examined    :  not examined    MSK:   normal muscle tone, no atrophy, no rigidity, no contractions    EXT:   no clubbing, no cyanosis, no edema, no calf pain, swelling or erythema    VASCULAR:  pulses equal and symmetric in the upper and lower extremities    NEURO:  AAOX3, no focal neurological deficits, follows all commands, able to move extremities spontaneously    SKIN:  no ulcers, lesions or rashes    labs reviewed
Date of service: 07-26-23 @ 14:20    pt seen and examined  afebrile  no resp distress  frail, weak appearing     ROS: unable to obtain d/t medical condition    MEDICATIONS  (STANDING):  cefepime   IVPB 2000 milliGRAM(s) IV Intermittent two times a day  citalopram 20 milliGRAM(s) Oral daily  donepezil 10 milliGRAM(s) Oral at bedtime  enoxaparin Injectable 30 milliGRAM(s) SubCutaneous every 24 hours  guaiFENesin  milliGRAM(s) Oral every 12 hours  lactobacillus acidophilus 1 Tablet(s) Oral two times a day with meals  latanoprost 0.005% Ophthalmic Solution 1 Drop(s) Both EYES at bedtime  lidocaine   4% Patch 1 Patch Transdermal daily  memantine 10 milliGRAM(s) Oral two times a day  nystatin/triamcinolone Cream 1 Application(s) Topical two times a day  pantoprazole    Tablet 40 milliGRAM(s) Oral before breakfast  senna 2 Tablet(s) Oral at bedtime  sodium chloride 0.9%. 1000 milliLiter(s) (70 mL/Hr) IV Continuous <Continuous>  timolol 0.5% Solution 1 Drop(s) Both EYES daily    Vital Signs Last 24 Hrs  T(C): 36.6 (26 Jul 2023 15:00), Max: 36.6 (26 Jul 2023 00:08)  T(F): 97.9 (26 Jul 2023 15:00), Max: 97.9 (26 Jul 2023 15:00)  HR: 78 (26 Jul 2023 15:00) (71 - 84)  BP: 111/51 (26 Jul 2023 15:00) (105/55 - 134/52)  BP(mean): --  RR: 18 (26 Jul 2023 15:00) (18 - 18)  SpO2: 98% (26 Jul 2023 15:00) (95% - 98%)    Parameters below as of 26 Jul 2023 15:00  Patient On (Oxygen Delivery Method): room air      PE:  Constitutional: frail looking  HEENT: NC/AT, EOMI, PERRLA, conjunctivae clear; ears and nose atraumatic; pharynx benign  Neck: supple; thyroid not palpable  Back: no tenderness  Respiratory: decreased breath sounds   Cardiovascular: S1S2 regular, no murmurs  Abdomen: soft, not tender, not distended, positive BS; liver and spleen WNL  Genitourinary: no suprapubic tenderness  Lymphatic: no LN palpable  Musculoskeletal: no muscle tenderness, no joint swelling or tenderness  Extremities: no pedal edema  Neurological/ Psychiatric: no focal deficits   Skin: no rashes; no palpable lesions    Labs: all available labs reviewed                                   11.1   5.26  )-----------( 210      ( 01 Jun 2023 08:39 )             33.8     06-02    x   |  x   |  x   ----------------------------<  x   x    |  x   |  0.71    Ca    9.0      01 Jun 2023 06:39  Phos  2.6     06-01  Mg     2.0     06-01    TPro  5.7<L>  /  Alb  2.5<L>  /  TBili  0.3  /  DBili  0.1  /  AST  22  /  ALT  24  /  AlkPhos  77  06-02          LIVER FUNCTIONS - ( 29 May 2023 17:39 )  Alb: 2.9 g/dL / Pro: 6.7 gm/dL / ALK PHOS: 115 U/L / ALT: 26 U/L / AST: 20 U/L / GGT: x           Culture - Blood (05.28.23 @ 14:16)   Specimen Source: .Blood Blood-Peripheral  Culture Results:   No growth to date.  Culture - Blood (05.28.23 @ 14:16)   Specimen Source: .Blood Blood-Peripheral  Culture Results:   No growth to date.    Radiology: all available radiological tests reviewed    ACC: [id number] EXAM:  XR CHEST PORTABLE URGENT 1V   ORDERED BY: MIGNON DIXON     PROCEDURE DATE:  05/28/2023          INTERPRETATION:  CLINICAL STATEMENT: Evaluate for pneumonia    TECHNIQUE: AP view of the chest.      COMPARISON: 3/15/2023    Issues rotated.    The cardiomediastinal silhouette is not enlarged. There is no focal lung   consolidation or sizable pleural effusion. No significant osseous   abnormality.    IMPRESSION:    Unremarkable frontal chest x ray        < end of copied text >    Advanced directives addressed: full resuscitation
Date of service: 07-28-23 @ 17:00    pt seen and examined  afebrile  frail, weak appearing   no resp distress    ROS: unable to obtain d/t medical condition    MEDICATIONS  (STANDING):  cefepime   IVPB 2000 milliGRAM(s) IV Intermittent two times a day  citalopram 20 milliGRAM(s) Oral daily  donepezil 10 milliGRAM(s) Oral at bedtime  enoxaparin Injectable 30 milliGRAM(s) SubCutaneous every 24 hours  famotidine    Tablet 20 milliGRAM(s) Oral daily  lactobacillus acidophilus 1 Tablet(s) Oral two times a day with meals  latanoprost 0.005% Ophthalmic Solution 1 Drop(s) Both EYES at bedtime  lidocaine   4% Patch 1 Patch Transdermal daily  memantine 10 milliGRAM(s) Oral two times a day  nystatin/triamcinolone Cream 1 Application(s) Topical two times a day  senna 2 Tablet(s) Oral at bedtime  sodium chloride 0.45% with potassium chloride 20 mEq/L 1000 milliLiter(s) (50 mL/Hr) IV Continuous <Continuous>  sodium chloride 0.9%. 1000 milliLiter(s) (50 mL/Hr) IV Continuous <Continuous>  thiamine 100 milliGRAM(s) Oral daily  timolol 0.5% Solution 1 Drop(s) Both EYES daily    Vital Signs Last 24 Hrs  T(C): 36.8 (28 Jul 2023 16:30), Max: 36.8 (28 Jul 2023 16:30)  T(F): 98.3 (28 Jul 2023 16:30), Max: 98.3 (28 Jul 2023 16:30)  HR: 72 (28 Jul 2023 16:30) (63 - 72)  BP: 130/67 (28 Jul 2023 16:30) (115/60 - 130/67)  BP(mean): 93 (28 Jul 2023 07:30) (93 - 93)  RR: 18 (28 Jul 2023 16:30) (17 - 18)  SpO2: 97% (28 Jul 2023 16:30) (97% - 98%)    Parameters below as of 28 Jul 2023 16:30  Patient On (Oxygen Delivery Method): room air    PE:  Constitutional: frail looking  HEENT: NC/AT, EOMI, PERRLA, conjunctivae clear; ears and nose atraumatic; pharynx benign  Neck: supple; thyroid not palpable  Back: no tenderness  Respiratory: decreased breath sounds   Cardiovascular: S1S2 regular, no murmurs  Abdomen: soft, not tender, not distended, positive BS; liver and spleen WNL  Genitourinary: no suprapubic tenderness  Lymphatic: no LN palpable  Musculoskeletal: no muscle tenderness, no joint swelling or tenderness  Extremities: no pedal edema  Neurological/ Psychiatric: no focal deficits   Skin: no rashes; no palpable lesions    Labs: all available labs reviewed                                                    11.5   6.91  )-----------( 283      ( 28 Jul 2023 05:32 )             33.8     07-28    141  |  114<H>  |  7   ----------------------------<  80  3.5   |  21<L>  |  0.56    Ca    8.8      28 Jul 2023 05:32          LIVER FUNCTIONS - ( 29 May 2023 17:39 )  Alb: 2.9 g/dL / Pro: 6.7 gm/dL / ALK PHOS: 115 U/L / ALT: 26 U/L / AST: 20 U/L / GGT: x           Culture - Blood (05.28.23 @ 14:16)   Specimen Source: .Blood Blood-Peripheral  Culture Results:   No growth to date.  Culture - Blood (05.28.23 @ 14:16)   Specimen Source: .Blood Blood-Peripheral  Culture Results:   No growth to date.  Culture - Urine (07.24.23 @ 22:34)   - Meropenem: S <=1  - Nitrofurantoin: S <=32 Should not be used to treat pyelonephritis  - Piperacillin/Tazobactam: S <=8  - Tobramycin: S <=2  - Trimethoprim/Sulfamethoxazole: R >2/38  - Amikacin: S <=16  - Amoxicillin/Clavulanic Acid: S <=8/4  - Ampicillin: R >16 These ampicillin results predict results for amoxicillin  - Aztreonam: S <=4  - Cefazolin: S <=2 For uncomplicated UTI with K. pneumoniae, E. coli, or P. mirablis: PATRICIA <=16 is sensitive and PATRICIA >=32 is resistant. This also predicts results for oral agents cefaclor, cefdinir, cefpodoxime, cefprozil, cefuroxime axetil, cephalexin and locarbef for uncomplicated UTI. Note that some isolates may be susceptible to these agents while testing resistant to cefazolin.  - Cefepime: S <=2  - Cefoxitin: S <=8  - Ertapenem: S <=0.5  - Gentamicin: S <=2  - Imipenem: S <=1  - Levofloxacin: S <=0.5  - Ceftriaxone: S <=1 Enterobacter, Klebsiella aerogenes, Citrobacter, and Serratia may develop resistance during prolonged therapy  - Cefuroxime: S <=4  - Ciprofloxacin: I 0.5  - Ampicillin/Sulbactam: S <=4/2 Enterobacter, Klebsiella aerogenes, Citrobacter, and Serratia may develop resistance during prolonged therapy (3-4 days)  Specimen Source: Clean Catch None  Culture Results:   >100,000 CFU/ml Klebsiella pneumoniae  Organism Identification: Klebsiella pneumoniae  Radiology: all available radiological tests reviewed    ACC: [id number] EXAM:  XR CHEST PORTABLE URGENT 1V   ORDERED BY: MIGNON DIXON     PROCEDURE DATE:  05/28/2023          INTERPRETATION:  CLINICAL STATEMENT: Evaluate for pneumonia    TECHNIQUE: AP view of the chest.      COMPARISON: 3/15/2023    Issues rotated.    The cardiomediastinal silhouette is not enlarged. There is no focal lung   consolidation or sizable pleural effusion. No significant osseous   abnormality.    IMPRESSION:    Unremarkable frontal chest x ray        < end of copied text >    Advanced directives addressed: full resuscitation
HOSPITALIST PROGRESS NOTE:  SUBJECTIVE:  PCP:  Chief Complaint: Patient is a 87y old  Female who presents with a chief complaint of UTI/ Covid 19/ possible PNA (31 Jul 2023 20:19)      HPI:  87-year-old female from [facility]  with history of advanced dementia, hypercholesterolemia, recent admission to [facility] for aspiration pneumonitis presents for evaluation of cough and "gurgling sounds in her throat". Patient is unable to provide a full HPI or review of systems due to her baseline mental status.  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 Patient resides at [facility] with 24/7 private aide there. Daughter and family very involved. patient unable to provide any ROS due to lethargy and dementia.  (25 Jul 2023 08:16)    8/1: daughter at bedside who is adamant that i repeat Urine Cultures after 7 days of IV abx and i start IVF as she is not eating much;  I exaplained the risks of cdiff and CHF in someone like her mother; daughter is not ready for hospice       Allergies:  No Known Allergies    REVIEW OF SYSTEMS:  See HPI. All other review of systems is negative unless indicated above.     OBJECTIVE  Physical Exam:  Vital Signs:    Vital Signs Last 24 Hrs  T(C): 36.3 (01 Aug 2023 15:45), Max: 36.4 (01 Aug 2023 07:26)  T(F): 97.3 (01 Aug 2023 15:45), Max: 97.6 (01 Aug 2023 07:26)  HR: 68 (01 Aug 2023 15:45) (60 - 68)  BP: 100/42 (01 Aug 2023 15:45) (100/42 - 122/42)  BP(mean): --  RR: 18 (01 Aug 2023 15:45) (16 - 18)  SpO2: 98% (01 Aug 2023 15:45) (96% - 98%)    Parameters below as of 01 Aug 2023 07:26  Patient On (Oxygen Delivery Method): room air      I&O's Summary    31 Jul 2023 07:01  -  01 Aug 2023 07:00  --------------------------------------------------------  IN: 0 mL / OUT: 1 mL / NET: -1 mL        Constitutional: NAD, awake and alert +cachexia  Neurological:, no focal deficits  HEENT: PERRLA, EOMI, MMM  Neck: Soft and supple, No LAD, No JVD  Respiratory: Breath sounds are clear bilaterally, No wheezing, rales or rhonchi  Cardiovascular: S1 and S2, regular rate and rhythm; no Murmurs, gallops or rubs  Gastrointestinal: Bowel Sounds present, soft, nontender, nondistended, no guarding, no rebound tenderness  Back: No CVA tenderness   Extremities: No peripheral edema  Vascular: 2+ peripheral pulses  Musculoskeletal: 5/5 strength b/l upper and lower extremities  Skin: No rashes  Breast: Deferred  Rectal: Deferred    MEDICATIONS  (STANDING):  citalopram 20 milliGRAM(s) Oral daily  dextrose 5% + sodium chloride 0.45% with potassium chloride 20 mEq/L 1000 milliLiter(s) (50 mL/Hr) IV Continuous <Continuous>  donepezil 10 milliGRAM(s) Oral at bedtime  enoxaparin Injectable 30 milliGRAM(s) SubCutaneous every 24 hours  famotidine    Tablet 20 milliGRAM(s) Oral daily  lactobacillus acidophilus 1 Tablet(s) Oral two times a day with meals  latanoprost 0.005% Ophthalmic Solution 1 Drop(s) Both EYES at bedtime  lidocaine   4% Patch 1 Patch Transdermal daily  memantine 10 milliGRAM(s) Oral two times a day  nystatin/triamcinolone Cream 1 Application(s) Topical two times a day  senna 2 Tablet(s) Oral at bedtime  sodium chloride 0.9%. 1000 milliLiter(s) (50 mL/Hr) IV Continuous <Continuous>  thiamine 100 milliGRAM(s) Oral daily  timolol 0.5% Solution 1 Drop(s) Both EYES daily      LABS: All Labs Reviewed:    08-01    142  |  112<H>  |  5<L>  ----------------------------<  104<H>  3.6   |  24  |  0.56    Ca    9.1      01 Aug 2023 07:55      Urinalysis Basic - ( 01 Aug 2023 07:55 )    Color: x / Appearance: x / SG: x / pH: x  Gluc: 104 mg/dL / Ketone: x  / Bili: x / Urobili: x   Blood: x / Protein: x / Nitrite: x   Leuk Esterase: x / RBC: x / WBC x   Sq Epi: x / Non Sq Epi: x / Bacteria: x      RADIOLOGY/EKG:    < from: CT Chest No Cont (07.25.23 @ 00:55) >    IMPRESSION:    1. Patchy airspace opacities in the left lower lobe and to a lesser   extent in the lingula likely infectious in etiology.  2. Lingular nodule measuring 6 mm. According to the Fleischner Society   criteria for management of small pulmonary nodules, a follow-up CT of the   chest is recommended at a 6-12 month interval.      < end of copied text >  < from: Xray Chest 1 View-PORTABLE IMMEDIATE (07.24.23 @ 22:39) >    Impression:    Wedge-shaped density medial left base may represent focal infiltrate or   atelectasis.  Small nodule in the lingula.  Refer to follow-up chest CT    < end of copied text >          
87-year-old female from [facility]  with history of advanced dementia, hypercholesterolemia, recent admission to [facility] for aspiration pneumonitis presents for evaluation of cough and "gurgling sounds in her throat". Patient is unable to provide a full HPI or review of systems due to her baseline mental status.  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 Patient resides at [facility] with 24/7 private aide there. Daughter and family very involved. patient unable to provide any ROS due to lethargy and dementia.   7/28 daughter at bedside, patient more awake today , today verbalising a few words with daughter,  appears weak and frail, taking few spoons of food orally   All other review of systems negative, except as noted in HPI      physical exam  HEENT:   pupils equal and reactive, EOMI, no oropharyngeal lesions, erythema, exudates, oral thrush    NECK:   supple, no carotid bruits, no palpable lymph nodes, no thyromegaly    CV:  +S1, +S2, regular, no murmurs or rubs    RESP:   lungs clear to auscultation bilaterally, no wheezing, rales, rhonchi, good air entry bilaterally    BREAST:  not examined    GI:  abdomen soft, non-tender, non-distended, normal BS, no bruits, no abdominal masses, no palpable masses    RECTAL:  not examined    :  not examined    MSK:   normal muscle tone, no atrophy, no rigidity, no contractions    EXT:   no clubbing, no cyanosis, no edema, no calf pain, swelling or erythema    VASCULAR:  pulses equal and symmetric in the upper and lower extremities    NEURO:  AAOX3, no focal neurological deficits, follows all commands, able to move extremities spontaneously    SKIN:  no ulcers, lesions or rashes    labs reviewed  
87-year-old female from [facility]  with history of advanced dementia, hypercholesterolemia, recent admission to [facility] for aspiration pneumonitis presents for evaluation of cough and "gurgling sounds in her throat". Patient is unable to provide a full HPI or review of systems due to her baseline mental status.  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 Patient resides at [facility] with 24/7 private aide there. Daughter and family very involved. patient unable to provide any ROS due to lethargy and dementia.   7/29 daughter at bedside, patient more awake ,mentations stable  , today verbalising a few words with daughter,  appears weak and frail, taking few spoons of food orally   7/30 pt tolerating some po , received ivf yesterday  All other review of systems negative, except as noted in HPI      physical exam  HEENT:   pupils equal and reactive, EOMI, no oropharyngeal lesions, erythema, exudates, oral thrush    NECK:   supple, no carotid bruits, no palpable lymph nodes, no thyromegaly    CV:  +S1, +S2, regular, no murmurs or rubs    RESP:   lungs clear to auscultation bilaterally, no wheezing, rales, rhonchi, good air entry bilaterally    BREAST:  not examined    GI:  abdomen soft, non-tender, non-distended, normal BS, no bruits, no abdominal masses, no palpable masses    RECTAL:  not examined    :  not examined    MSK:   normal muscle tone, no atrophy, no rigidity, no contractions    EXT:   no clubbing, no cyanosis, no edema, no calf pain, swelling or erythema    VASCULAR:  pulses equal and symmetric in the upper and lower extremities    NEURO:  AAOX3, no focal neurological deficits, follows all commands, able to move extremities spontaneously    SKIN:  no ulcers, lesions or rashes    labs reviewed    PHYSICAL EXAM:    Daily     Daily     ICU Vital Signs Last 24 Hrs  T(C): 36.3 (30 Jul 2023 15:25), Max: 36.4 (29 Jul 2023 17:35)  T(F): 97.4 (30 Jul 2023 15:25), Max: 97.5 (29 Jul 2023 17:35)  HR: 66 (30 Jul 2023 15:25) (66 - 76)  BP: 106/55 (30 Jul 2023 15:25) (106/55 - 130/80)  BP(mean): --  ABP: --  ABP(mean): --  RR: 18 (30 Jul 2023 15:25) (18 - 18)  SpO2: 96% (30 Jul 2023 15:25) (96% - 97%)    O2 Parameters below as of 30 Jul 2023 15:25  Patient On (Oxygen Delivery Method): room air                    07-30    143  |  115<H>  |  5<L>  ----------------------------<  86  4.2   |  23  |  0.65    Ca    9.2      30 Jul 2023 08:39                      Urinalysis Basic - ( 30 Jul 2023 08:39 )    Color: x / Appearance: x / SG: x / pH: x  Gluc: 86 mg/dL / Ketone: x  / Bili: x / Urobili: x   Blood: x / Protein: x / Nitrite: x   Leuk Esterase: x / RBC: x / WBC x   Sq Epi: x / Non Sq Epi: x / Bacteria: x            MEDICATIONS  (STANDING):  cefepime   IVPB 2000 milliGRAM(s) IV Intermittent two times a day  citalopram 20 milliGRAM(s) Oral daily  donepezil 10 milliGRAM(s) Oral at bedtime  enoxaparin Injectable 30 milliGRAM(s) SubCutaneous every 24 hours  famotidine    Tablet 20 milliGRAM(s) Oral daily  lactobacillus acidophilus 1 Tablet(s) Oral two times a day with meals  latanoprost 0.005% Ophthalmic Solution 1 Drop(s) Both EYES at bedtime  lidocaine   4% Patch 1 Patch Transdermal daily  memantine 10 milliGRAM(s) Oral two times a day  nystatin/triamcinolone Cream 1 Application(s) Topical two times a day  senna 2 Tablet(s) Oral at bedtime  sodium chloride 0.9% with potassium chloride 20 mEq/L 1000 milliLiter(s) (50 mL/Hr) IV Continuous <Continuous>  thiamine 100 milliGRAM(s) Oral daily  timolol 0.5% Solution 1 Drop(s) Both EYES daily        
HOSPITALIST PROGRESS NOTE:  SUBJECTIVE:  PCP:  Chief Complaint: Patient is a 87y old  Female who presents with a chief complaint of UTI/ Covid 19/ possible PNA (31 Jul 2023 20:19)      HPI:  87-year-old female from [facility]  with history of advanced dementia, hypercholesterolemia, recent admission to [facility] for aspiration pneumonitis presents for evaluation of cough and "gurgling sounds in her throat". Patient is unable to provide a full HPI or review of systems due to her baseline mental status.  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 Patient resides at [facility] with 24/7 private aide there. Daughter and family very involved. patient unable to provide any ROS due to lethargy and dementia.  (25 Jul 2023 08:16)    8/1: daughter at bedside who is adamant that i repeat Urine Cultures after 7 days of IV abx and i start IVF as she is not eating much;  I exaplained the risks of cdiff and CHF in someone like her mother; daughter is not ready for hospice   8/2: no events overnight;spoke with daughter at bedside;     Allergies:  No Known Allergies    REVIEW OF SYSTEMS:  See HPI. All other review of systems is negative unless indicated above.     OBJECTIVE  Physical Exam:  Vital Signs Last 24 Hrs  T(C): 36.6 (02 Aug 2023 07:43), Max: 36.6 (02 Aug 2023 07:43)  T(F): 97.9 (02 Aug 2023 07:43), Max: 97.9 (02 Aug 2023 07:43)  HR: 58 (02 Aug 2023 07:43) (58 - 68)  BP: 127/36 (02 Aug 2023 07:43) (100/42 - 127/36)  BP(mean): --  RR: 18 (02 Aug 2023 07:43) (18 - 18)  SpO2: 97% (02 Aug 2023 07:43) (97% - 98%)    Parameters below as of 02 Aug 2023 07:43  Patient On (Oxygen Delivery Method): room air      Constitutional: NAD, awake and alert +cachexia  Neurological:, no focal deficits  HEENT: PERRLA, EOMI, MMM  Neck: Soft and supple, No LAD, No JVD  Respiratory: Breath sounds are clear bilaterally, No wheezing, rales or rhonchi  Cardiovascular: S1 and S2, regular rate and rhythm; no Murmurs, gallops or rubs  Gastrointestinal: Bowel Sounds present, soft, nontender, nondistended, no guarding, no rebound tenderness  Back: No CVA tenderness   Extremities: No peripheral edema  Vascular: 2+ peripheral pulses  Musculoskeletal: 5/5 strength b/l upper and lower extremities  Skin: No rashes  Breast: Deferred  Rectal: Deferred    MEDICATIONS  (STANDING):  citalopram 20 milliGRAM(s) Oral daily  dextrose 5% + sodium chloride 0.45% with potassium chloride 20 mEq/L 1000 milliLiter(s) (50 mL/Hr) IV Continuous <Continuous>  donepezil 10 milliGRAM(s) Oral at bedtime  enoxaparin Injectable 30 milliGRAM(s) SubCutaneous every 24 hours  famotidine    Tablet 20 milliGRAM(s) Oral daily  lactobacillus acidophilus 1 Tablet(s) Oral two times a day with meals  latanoprost 0.005% Ophthalmic Solution 1 Drop(s) Both EYES at bedtime  lidocaine   4% Patch 1 Patch Transdermal daily  memantine 10 milliGRAM(s) Oral two times a day  nystatin/triamcinolone Cream 1 Application(s) Topical two times a day  senna 2 Tablet(s) Oral at bedtime  sodium chloride 0.9%. 1000 milliLiter(s) (50 mL/Hr) IV Continuous <Continuous>  thiamine 100 milliGRAM(s) Oral daily  timolol 0.5% Solution 1 Drop(s) Both EYES daily    Lab Results:  CBC  CBC Full  -  ( 02 Aug 2023 07:18 )  WBC Count : 4.88 K/uL  RBC Count : 3.49 M/uL  Hemoglobin : 12.0 g/dL  Hematocrit : 35.9 %  Platelet Count - Automated : 287 K/uL  Mean Cell Volume : 102.9 fl  Mean Cell Hemoglobin : 34.4 pg  Mean Cell Hemoglobin Concentration : 33.4 gm/dL  Auto Neutrophil # : x  Auto Lymphocyte # : x  Auto Monocyte # : x  Auto Eosinophil # : x  Auto Basophil # : x  Auto Neutrophil % : x  Auto Lymphocyte % : x  Auto Monocyte % : x  Auto Eosinophil % : x  Auto Basophil % : x    .		Differential:	[] Automated		[] Manual  Chemistry                        12.0   4.88  )-----------( 287      ( 02 Aug 2023 07:18 )             35.9     08-02    143  |  114<H>  |  5<L>  ----------------------------<  90  3.8   |  25  |  0.60    Ca    9.2      02 Aug 2023 07:18  Phos  2.5     08-02  Mg     2.0     08-02          Urinalysis Basic - ( 02 Aug 2023 07:18 )    Color: x / Appearance: x / SG: x / pH: x  Gluc: 90 mg/dL / Ketone: x  / Bili: x / Urobili: x   Blood: x / Protein: x / Nitrite: x   Leuk Esterase: x / RBC: x / WBC x   Sq Epi: x / Non Sq Epi: x / Bacteria: x      RADIOLOGY/EKG:    < from: CT Chest No Cont (07.25.23 @ 00:55) >    IMPRESSION:    1. Patchy airspace opacities in the left lower lobe and to a lesser   extent in the lingula likely infectious in etiology.  2. Lingular nodule measuring 6 mm. According to the Fleischner Society   criteria for management of small pulmonary nodules, a follow-up CT of the   chest is recommended at a 6-12 month interval.      < end of copied text >  < from: Xray Chest 1 View-PORTABLE IMMEDIATE (07.24.23 @ 22:39) >    Impression:    Wedge-shaped density medial left base may represent focal infiltrate or   atelectasis.  Small nodule in the lingula.  Refer to follow-up chest CT    < end of copied text >

## 2023-08-02 NOTE — DISCHARGE NOTE NURSING/CASE MANAGEMENT/SOCIAL WORK - PATIENT PORTAL LINK FT
You can access the FollowMyHealth Patient Portal offered by [facility] by registering at the following website: [email or web address]. By joining [employer]’s FollowMyHealth portal, you will also be able to view your health information using other applications (apps) compatible with our system.

## 2023-08-02 NOTE — DISCHARGE NOTE PROVIDER - NSDCCPCAREPLAN_GEN_ALL_CORE_FT
PRINCIPAL DISCHARGE DIAGNOSIS  Diagnosis: Left lower lobe pneumonia  Assessment and Plan of Treatment: #possible left sided PNA, UTI  -completed 7 days of ABX, no need for further abx  -mucinex  -Aspiration and fall precautions   -possibly gram negative or aspiration pna or both  -urine cx growing KLPN - blood cx no growth  -patient high risk for readmission with hx of dementia, FTT she will have frequent UTI and PNA. Family is aware and understands

## 2023-08-02 NOTE — DISCHARGE NOTE NURSING/CASE MANAGEMENT/SOCIAL WORK - NSDCVIVACCINE_GEN_ALL_CORE_FT
Moderna COVID-19 Vaccine, Bivalent; 07-Oct-2022 13:40; Gudelia Fraire (RN); [employer] [id number] (Exp. Date: 27-May-2023); IntraMuscular; Deltoid Left.; 0.5 milliLiter(s);

## 2023-08-02 NOTE — DISCHARGE NOTE PROVIDER - HOSPITAL COURSE
HOSPITALIST PROGRESS NOTE:  SUBJECTIVE:  PCP:  Chief Complaint: Patient is a 87y old  Female who presents with a chief complaint of UTI/ Covid 19/ possible PNA (31 Jul 2023 20:19)      HPI:  87-year-old female from [facility]  with history of advanced dementia, hypercholesterolemia, recent admission to [facility] for aspiration pneumonitis presents for evaluation of cough and "gurgling sounds in her throat". Patient is unable to provide a full HPI or review of systems due to her baseline mental status.  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 Patient resides at [facility] with 24/7 private aide there. Daughter and family very involved. patient unable to provide any ROS due to lethargy and dementia.  (25 Jul 2023 08:16)    8/1: daughter at bedside who is adamant that i repeat Urine Cultures after 7 days of IV abx and i start IVF as she is not eating much;  I exaplained the risks of cdiff and CHF in someone like her mother; daughter is not ready for hospice   8/2: no events overnight;spoke with daughter at bedside;     #possible left sided PNA, UTI  -completed 7 days of ABX   -mucinex  -possibly gram negative or aspiration pna or both  -speech and swallow evaluation  -ID consult appreciated  -urine cx growing KLPN - blood cx no growth    #Old covid 19, asymptomatic from covid standpoint  -no need for iso3    #debility/ dehydration/ severe protein and overall  malnutrition with cachexia  -patient frail and cachectic  -no peg tube as per family wishes  -start gentle fluids    #Dementia  patient is also demented. bed bound with no quality of life. oral intake likely will further decline due to worsening dementia.     #Goals of care/ advance care planning:  Disussed with daughter Juan J at bedside that patient declining clinically, since her last vsit here to ED.   She will continuously be neded to be admitted to hospital due to ongoing risk for aspiration pna.   Patient is currently skin and bones, with poor oral intake. as per daughter meals intake fluctuates and sometimes she eats full meals and other times she eats only 20% of her meals.     #dvt prphy- sc lovenox  dispo- pending GOC by palliative ,pt with gradual  failure to thrive   daughter would wants pt to go back to [facility]

## 2023-08-02 NOTE — DISCHARGE NOTE PROVIDER - DETAILS OF MALNUTRITION DIAGNOSIS/DIAGNOSES
This patient has been assessed with a concern for Malnutrition and was treated during this hospitalization for the following Nutrition diagnosis/diagnoses:     -  07/26/2023: Severe protein-calorie malnutrition   -  07/26/2023: Underweight (BMI < 19)

## 2023-08-02 NOTE — PROGRESS NOTE ADULT - PROVIDER SPECIALTY LIST ADULT
Hospitalist
Infectious Disease
Hospitalist
Palliative Care

## 2023-08-02 NOTE — DISCHARGE NOTE NURSING/CASE MANAGEMENT/SOCIAL WORK - NSDCPEFALRISK_GEN_ALL_CORE
For information on Fall & Injury Prevention, visit: [email or web address] OR  [email or web address] OR  https://www.cdc.gov/steadi/patient.html

## 2023-08-02 NOTE — PROGRESS NOTE ADULT - NUTRITIONAL ASSESSMENT
This patient has been assessed with a concern for Malnutrition and has been determined to have a diagnosis/diagnoses of Severe protein-calorie malnutrition and Underweight (BMI < 19).    This patient is being managed with:   Diet Pureed-  Moderately Thick Liquids (MODTHICKLIQS)  Supplement Feeding Modality:  Oral  Ensure Plus High Protein Cans or Servings Per Day:  1       Frequency:  Two Times a day  Entered: Jul 27 2023  4:49PM  
This patient has been assessed with a concern for Malnutrition and has been determined to have a diagnosis/diagnoses of Severe protein-calorie malnutrition and Underweight (BMI < 19).    This patient is being managed with:   Diet Regular-  Pureed (PUREED)  Moderately Thick Liquids (MODTHICKLIQS)  Entered: Jul 25 2023  5:11PM  
This patient has been assessed with a concern for Malnutrition and has been determined to have a diagnosis/diagnoses of Severe protein-calorie malnutrition and Underweight (BMI < 19).    This patient is being managed with:   Diet Regular-  Pureed (PUREED)  Moderately Thick Liquids (MODTHICKLIQS)  Entered: Jul 25 2023  5:11PM

## 2023-08-02 NOTE — DISCHARGE NOTE NURSING/CASE MANAGEMENT/SOCIAL WORK - NSTRANSFERBELONGINGSDISPO_GEN_A_NUR
Venipuncture Paragraph: An alcohol pad was applied to the venipuncture site. Venipuncture was performed using a butterfly needle. Pressure and a bandaid was applied to the site. No complications were noted. not applicable Detail Level: None Number Of Tubes Drawn: 2

## 2023-08-02 NOTE — DISCHARGE NOTE PROVIDER - NSDCMRMEDTOKEN_GEN_ALL_CORE_FT
acetaminophen 325 mg oral tablet: 2 tab(s) orally every 6 hours, As needed, Mild Pain (1 - 3)  citalopram 20 mg oral tablet: 1 tab(s) orally once a day  Decara 1250 mcg (50,000 intl units) oral capsule: 1 cap(s) orally every 4 weeks  donepezil 10 mg oral tablet: 1 tab(s) orally once a day (at bedtime)  lactobacillus acidophilus oral capsule: 1 cap(s) orally 2 times a day  latanoprost 0.005% ophthalmic solution: 1 drop(s) in each eye once a day (at bedtime)  lidocaine 4% patch: Apply topically to affected area once a day  memantine 28 mg oral capsule, extended release: 1 cap(s) orally once a day  midodrine 2.5 mg oral tablet: 1 tab(s) orally 3 times a day As needed SBP &lt;100  nystatin-triamcinolone 100,000 units/g-0.1% topical ointment: Apply topically to affected area 2 times a day apply to lower back  pantoprazole 40 mg oral delayed release tablet: 1 tab(s) orally once a day (before a meal)  senna leaf extract oral tablet: 2 tab(s) orally once a day (at bedtime)  thiamine 100 mg oral tablet: 1 tab(s) orally once a day  timolol 0.5% ophthalmic solution: 1 drop(s) in each eye once a day

## 2023-08-02 NOTE — PROGRESS NOTE ADULT - REASON FOR ADMISSION
UTI/ Covid 19/ possible PNA

## 2023-08-09 ENCOUNTER — TRANSCRIPTION ENCOUNTER (OUTPATIENT)
Age: 88
End: 2023-08-09

## 2023-08-09 DIAGNOSIS — Z90.49 ACQUIRED ABSENCE OF OTHER SPECIFIED PARTS OF DIGESTIVE TRACT: ICD-10-CM

## 2023-08-09 DIAGNOSIS — N39.0 URINARY TRACT INFECTION, SITE NOT SPECIFIED: ICD-10-CM

## 2023-08-09 DIAGNOSIS — I95.9 HYPOTENSION, UNSPECIFIED: ICD-10-CM

## 2023-08-09 DIAGNOSIS — R62.7 ADULT FAILURE TO THRIVE: ICD-10-CM

## 2023-08-09 DIAGNOSIS — G93.41 METABOLIC ENCEPHALOPATHY: ICD-10-CM

## 2023-08-09 DIAGNOSIS — M16.11 UNILATERAL PRIMARY OSTEOARTHRITIS, RIGHT HIP: ICD-10-CM

## 2023-08-09 DIAGNOSIS — R53.81 OTHER MALAISE: ICD-10-CM

## 2023-08-09 DIAGNOSIS — E43 UNSPECIFIED SEVERE PROTEIN-CALORIE MALNUTRITION: ICD-10-CM

## 2023-08-09 DIAGNOSIS — J69.0 PNEUMONITIS DUE TO INHALATION OF FOOD AND VOMIT: ICD-10-CM

## 2023-08-09 DIAGNOSIS — F02.80 DEMENTIA IN OTHER DISEASES CLASSIFIED ELSEWHERE, UNSPECIFIED SEVERITY, WITHOUT BEHAVIORAL DISTURBANCE, PSYCHOTIC DISTURBANCE, MOOD DISTURBANCE, AND ANXIETY: ICD-10-CM

## 2023-08-09 DIAGNOSIS — U07.1 COVID-19: ICD-10-CM

## 2023-08-09 DIAGNOSIS — G30.9 ALZHEIMER'S DISEASE, UNSPECIFIED: ICD-10-CM

## 2023-08-09 DIAGNOSIS — Z66 DO NOT RESUSCITATE: ICD-10-CM

## 2023-08-09 DIAGNOSIS — B96.1 KLEBSIELLA PNEUMONIAE [K. PNEUMONIAE] AS THE CAUSE OF DISEASES CLASSIFIED ELSEWHERE: ICD-10-CM

## 2023-08-09 DIAGNOSIS — E78.00 PURE HYPERCHOLESTEROLEMIA, UNSPECIFIED: ICD-10-CM

## 2023-08-09 DIAGNOSIS — H40.9 UNSPECIFIED GLAUCOMA: ICD-10-CM

## 2023-08-09 DIAGNOSIS — J15.6 PNEUMONIA DUE TO OTHER GRAM-NEGATIVE BACTERIA: ICD-10-CM

## 2023-08-09 DIAGNOSIS — Z79.899 OTHER LONG TERM (CURRENT) DRUG THERAPY: ICD-10-CM

## 2023-08-09 DIAGNOSIS — E86.0 DEHYDRATION: ICD-10-CM

## 2023-08-09 DIAGNOSIS — M24.59 CONTRACTURE, OTHER SPECIFIED JOINT: ICD-10-CM

## 2023-08-09 DIAGNOSIS — R64 CACHEXIA: ICD-10-CM

## 2023-08-09 DIAGNOSIS — Z74.01 BED CONFINEMENT STATUS: ICD-10-CM

## 2023-08-21 ENCOUNTER — EMERGENCY (EMERGENCY)
Facility: HOSPITAL | Age: 88
LOS: 0 days | Discharge: HOME | End: 2023-08-21
Attending: EMERGENCY MEDICINE
Payer: MEDICARE

## 2023-08-21 VITALS
DIASTOLIC BLOOD PRESSURE: 56 MMHG | TEMPERATURE: 98 F | OXYGEN SATURATION: 99 % | RESPIRATION RATE: 16 BRPM | SYSTOLIC BLOOD PRESSURE: 111 MMHG | HEART RATE: 69 BPM

## 2023-08-21 VITALS
RESPIRATION RATE: 18 BRPM | TEMPERATURE: 98 F | OXYGEN SATURATION: 99 % | WEIGHT: 119.93 LBS | HEIGHT: 61 IN | HEART RATE: 72 BPM | DIASTOLIC BLOOD PRESSURE: 74 MMHG | SYSTOLIC BLOOD PRESSURE: 108 MMHG

## 2023-08-21 DIAGNOSIS — Z98.890 OTHER SPECIFIED POSTPROCEDURAL STATES: Chronic | ICD-10-CM

## 2023-08-21 DIAGNOSIS — R68.89 OTHER GENERAL SYMPTOMS AND SIGNS: ICD-10-CM

## 2023-08-21 DIAGNOSIS — M19.90 UNSPECIFIED OSTEOARTHRITIS, UNSPECIFIED SITE: ICD-10-CM

## 2023-08-21 DIAGNOSIS — G30.9 ALZHEIMER'S DISEASE, UNSPECIFIED: ICD-10-CM

## 2023-08-21 DIAGNOSIS — Z98.89 OTHER SPECIFIED POSTPROCEDURAL STATES: Chronic | ICD-10-CM

## 2023-08-21 DIAGNOSIS — H40.9 UNSPECIFIED GLAUCOMA: ICD-10-CM

## 2023-08-21 DIAGNOSIS — E78.00 PURE HYPERCHOLESTEROLEMIA, UNSPECIFIED: ICD-10-CM

## 2023-08-21 DIAGNOSIS — F02.80 DEMENTIA IN OTHER DISEASES CLASSIFIED ELSEWHERE, UNSPECIFIED SEVERITY, WITHOUT BEHAVIORAL DISTURBANCE, PSYCHOTIC DISTURBANCE, MOOD DISTURBANCE, AND ANXIETY: ICD-10-CM

## 2023-08-21 DIAGNOSIS — Z66 DO NOT RESUSCITATE: ICD-10-CM

## 2023-08-21 DIAGNOSIS — Z90.89 ACQUIRED ABSENCE OF OTHER ORGANS: ICD-10-CM

## 2023-08-21 DIAGNOSIS — R64 CACHEXIA: ICD-10-CM

## 2023-08-21 DIAGNOSIS — Z90.49 ACQUIRED ABSENCE OF OTHER SPECIFIED PARTS OF DIGESTIVE TRACT: ICD-10-CM

## 2023-08-21 LAB
ALBUMIN SERPL ELPH-MCNC: 3.1 G/DL — LOW (ref 3.3–5)
ALP SERPL-CCNC: 91 U/L — SIGNIFICANT CHANGE UP (ref 40–120)
ALT FLD-CCNC: 15 U/L — SIGNIFICANT CHANGE UP (ref 12–78)
ANION GAP SERPL CALC-SCNC: 4 MMOL/L — LOW (ref 5–17)
AST SERPL-CCNC: 19 U/L — SIGNIFICANT CHANGE UP (ref 15–37)
BASOPHILS # BLD AUTO: 0.06 K/UL — SIGNIFICANT CHANGE UP (ref 0–0.2)
BASOPHILS NFR BLD AUTO: 0.9 % — SIGNIFICANT CHANGE UP (ref 0–2)
BILIRUB SERPL-MCNC: 0.5 MG/DL — SIGNIFICANT CHANGE UP (ref 0.2–1.2)
BUN SERPL-MCNC: 6 MG/DL — LOW (ref 7–23)
CALCIUM SERPL-MCNC: 9.5 MG/DL — SIGNIFICANT CHANGE UP (ref 8.5–10.1)
CHLORIDE SERPL-SCNC: 111 MMOL/L — HIGH (ref 96–108)
CO2 SERPL-SCNC: 27 MMOL/L — SIGNIFICANT CHANGE UP (ref 22–31)
CREAT SERPL-MCNC: 0.83 MG/DL — SIGNIFICANT CHANGE UP (ref 0.5–1.3)
EGFR: 69 ML/MIN/1.73M2 — SIGNIFICANT CHANGE UP
EOSINOPHIL # BLD AUTO: 0.11 K/UL — SIGNIFICANT CHANGE UP (ref 0–0.5)
EOSINOPHIL NFR BLD AUTO: 1.6 % — SIGNIFICANT CHANGE UP (ref 0–6)
GLUCOSE SERPL-MCNC: 92 MG/DL — SIGNIFICANT CHANGE UP (ref 70–99)
HCT VFR BLD CALC: 39 % — SIGNIFICANT CHANGE UP (ref 34.5–45)
HGB BLD-MCNC: 13.1 G/DL — SIGNIFICANT CHANGE UP (ref 11.5–15.5)
IMM GRANULOCYTES NFR BLD AUTO: 0.1 % — SIGNIFICANT CHANGE UP (ref 0–0.9)
LYMPHOCYTES # BLD AUTO: 2.28 K/UL — SIGNIFICANT CHANGE UP (ref 1–3.3)
LYMPHOCYTES # BLD AUTO: 34.1 % — SIGNIFICANT CHANGE UP (ref 13–44)
MCHC RBC-ENTMCNC: 33.6 GM/DL — SIGNIFICANT CHANGE UP (ref 32–36)
MCHC RBC-ENTMCNC: 34.7 PG — HIGH (ref 27–34)
MCV RBC AUTO: 103.2 FL — HIGH (ref 80–100)
MONOCYTES # BLD AUTO: 0.42 K/UL — SIGNIFICANT CHANGE UP (ref 0–0.9)
MONOCYTES NFR BLD AUTO: 6.3 % — SIGNIFICANT CHANGE UP (ref 2–14)
NEUTROPHILS # BLD AUTO: 3.81 K/UL — SIGNIFICANT CHANGE UP (ref 1.8–7.4)
NEUTROPHILS NFR BLD AUTO: 57 % — SIGNIFICANT CHANGE UP (ref 43–77)
PLATELET # BLD AUTO: 241 K/UL — SIGNIFICANT CHANGE UP (ref 150–400)
POTASSIUM SERPL-MCNC: 4.2 MMOL/L — SIGNIFICANT CHANGE UP (ref 3.5–5.3)
POTASSIUM SERPL-SCNC: 4.2 MMOL/L — SIGNIFICANT CHANGE UP (ref 3.5–5.3)
PROT SERPL-MCNC: 6.3 GM/DL — SIGNIFICANT CHANGE UP (ref 6–8.3)
RBC # BLD: 3.78 M/UL — LOW (ref 3.8–5.2)
RBC # FLD: 13.2 % — SIGNIFICANT CHANGE UP (ref 10.3–14.5)
SODIUM SERPL-SCNC: 142 MMOL/L — SIGNIFICANT CHANGE UP (ref 135–145)
WBC # BLD: 6.69 K/UL — SIGNIFICANT CHANGE UP (ref 3.8–10.5)
WBC # FLD AUTO: 6.69 K/UL — SIGNIFICANT CHANGE UP (ref 3.8–10.5)

## 2023-08-21 PROCEDURE — 99284 EMERGENCY DEPT VISIT MOD MDM: CPT

## 2023-08-21 PROCEDURE — 85025 COMPLETE CBC W/AUTO DIFF WBC: CPT

## 2023-08-21 PROCEDURE — 80053 COMPREHEN METABOLIC PANEL: CPT

## 2023-08-21 PROCEDURE — 71045 X-RAY EXAM CHEST 1 VIEW: CPT | Mod: 26

## 2023-08-21 PROCEDURE — 99283 EMERGENCY DEPT VISIT LOW MDM: CPT | Mod: 25

## 2023-08-21 PROCEDURE — 36415 COLL VENOUS BLD VENIPUNCTURE: CPT

## 2023-08-21 PROCEDURE — 71045 X-RAY EXAM CHEST 1 VIEW: CPT

## 2023-08-21 NOTE — ED PROVIDER NOTE - PATIENT PORTAL LINK FT
You can access the FollowMyHealth Patient Portal offered by [facility] by registering at the following website: [email or web address]. By joining "[employer]’s FollowMyHealth portal, you will also be able to view your health information using other applications (apps) compatible with our system.

## 2023-08-21 NOTE — ED PROVIDER NOTE - CLINICAL SUMMARY MEDICAL DECISION MAKING FREE TEXT BOX
XR without new opacity.  Patient afebrile, satting well on RA, no secretions in ED, airway patent, labs WNL.  Discussed with daughter.  D/c back to [facility].

## 2023-08-21 NOTE — ED ADULT NURSE NOTE - NS ED NURSE LEVEL OF CONSCIOUSNESS AFFECT
[de-identified] : XR of R forearm 10/1/2020: fracture and shaft of radius and ulna with plastic  bowing deformity 
Calm

## 2023-08-21 NOTE — ED PROVIDER NOTE - OBJECTIVE STATEMENT
86 y/o female with a PMHx of Alzheimer's, dementia, glaucoma, hypercholesterolemia, osteoarthritis BIBA form [facility] for evaluation. Daughter states pt had gurgling this morning requiring suction and was sent to the ED to r/o aspiration event/PNA. Pt recently hospitalized for UTI and PNA. Daughter reports GOC +DNR/DNI and limited interventions. Requesting chest XR and labs and possible IVF. Pt with advanced dementia. Hx obtained from daughter.

## 2023-08-21 NOTE — ED ADULT TRIAGE NOTE - BEFAST BALANCE
Christina Neal (MD)  Surgery  [address] [place] [address]  Phone: [phone]  Fax: [phone]  Follow Up Time: No

## 2023-08-21 NOTE — ED ADULT NURSE NOTE - OBJECTIVE STATEMENT
Patient presents to the ER from [facility] with complaints of increased secretions. Patient confused at baseline, history of dysphagia on specific honey thick pureed diet. Patient in no acute respiratory distress at this time. Daughter at bedside. IV attempt by RN unsuccessful, MD rothman placed IV via ultrasound. Patient's left arm edematous as daughter states patient had an IV previously that infiltrated.

## 2023-08-21 NOTE — ED ADULT TRIAGE NOTE - CHIEF COMPLAINT QUOTE
pt BIBEMS for possible aspiration pneumonia. as per EMS pt has had increased secretions at [facility]. pt a&ox0 at baseline.

## 2023-08-21 NOTE — ED ADULT NURSE REASSESSMENT NOTE - NS ED NURSE REASSESS COMMENT FT1
Patient repositioned at this time, awaiting transport for discharge. Specific diet ordered for patient.

## 2023-08-21 NOTE — ED ADULT NURSE NOTE - NSFALLHARMRISKINTERV_ED_ALL_ED
Assistance OOB with selected safe patient handling equipment if applicable/Assistance with ambulation/Communicate risk of Fall with Harm to all staff, patient, and family/Monitor gait and stability/Monitor for mental status changes and reorient to person, place, and time, as needed/Provide visual cue: red socks, yellow wristband, yellow gown, etc/Reinforce activity limits and safety measures with patient and family/Toileting schedule using arm’s reach rule for commode and bathroom/Use of alarms - bed, stretcher, chair and/or video monitoring/Bed in lowest position, wheels locked, appropriate side rails in place/Call bell, personal items and telephone in reach/Instruct patient to call for assistance before getting out of bed/chair/stretcher/Non-slip footwear applied when patient is off stretcher/[place] to call system/Physically safe environment - no spills, clutter or unnecessary equipment/Purposeful Proactive Rounding/Room/bathroom lighting operational, light cord in reach

## 2023-08-21 NOTE — ED PROVIDER NOTE - CONSTITUTIONAL, MLM
normal... Emaciated, awake, alert, oriented to person, place, time/situation and in no apparent distress.

## 2023-08-21 NOTE — ED PROVIDER NOTE - NSICDXPASTSURGICALHX_GEN_ALL_CORE_FT
PAST SURGICAL HISTORY:  History of hip surgery Lt side 2022, Rt side approx 8 years ago    S/P appendectomy childhood    S/P colonoscopy every 2 or 3 years    S/P D&C (status post dilation and curettage) age 32    S/P tonsillectomy childhood

## 2023-09-05 ENCOUNTER — INPATIENT (INPATIENT)
Facility: HOSPITAL | Age: 88
LOS: 8 days | Discharge: HOME | DRG: 871 | End: 2023-09-14
Attending: STUDENT IN AN ORGANIZED HEALTH CARE EDUCATION/TRAINING PROGRAM | Admitting: STUDENT IN AN ORGANIZED HEALTH CARE EDUCATION/TRAINING PROGRAM
Payer: MEDICARE

## 2023-09-05 VITALS
DIASTOLIC BLOOD PRESSURE: 64 MMHG | OXYGEN SATURATION: 95 % | SYSTOLIC BLOOD PRESSURE: 101 MMHG | RESPIRATION RATE: 19 BRPM | HEIGHT: 61 IN | HEART RATE: 115 BPM | TEMPERATURE: 99 F

## 2023-09-05 DIAGNOSIS — Z98.89 OTHER SPECIFIED POSTPROCEDURAL STATES: Chronic | ICD-10-CM

## 2023-09-05 DIAGNOSIS — Z98.890 OTHER SPECIFIED POSTPROCEDURAL STATES: Chronic | ICD-10-CM

## 2023-09-05 PROCEDURE — 71045 X-RAY EXAM CHEST 1 VIEW: CPT | Mod: 26

## 2023-09-05 PROCEDURE — 99285 EMERGENCY DEPT VISIT HI MDM: CPT

## 2023-09-05 RX ORDER — SODIUM CHLORIDE 9 MG/ML
1000 INJECTION INTRAMUSCULAR; INTRAVENOUS; SUBCUTANEOUS ONCE
Refills: 0 | Status: COMPLETED | OUTPATIENT
Start: 2023-09-05 | End: 2023-09-05

## 2023-09-05 RX ORDER — PIPERACILLIN AND TAZOBACTAM 4; .5 G/20ML; G/20ML
3.38 INJECTION, POWDER, LYOPHILIZED, FOR SOLUTION INTRAVENOUS ONCE
Refills: 0 | Status: COMPLETED | OUTPATIENT
Start: 2023-09-05 | End: 2023-09-05

## 2023-09-05 RX ORDER — ACETAMINOPHEN 500 MG
1000 TABLET ORAL ONCE
Refills: 0 | Status: COMPLETED | OUTPATIENT
Start: 2023-09-05 | End: 2023-09-06

## 2023-09-05 RX ORDER — VANCOMYCIN HCL 1 G
750 VIAL (EA) INTRAVENOUS ONCE
Refills: 0 | Status: COMPLETED | OUTPATIENT
Start: 2023-09-05 | End: 2023-09-05

## 2023-09-05 NOTE — ED PROVIDER NOTE - NS ED MD DISPO ADMITTING SERVICE
MED Griseofulvin Counseling:  I discussed with the patient the risks of griseofulvin including but not limited to photosensitivity, cytopenia, liver damage, nausea/vomiting and severe allergy.  The patient understands that this medication is best absorbed when taken with a fatty meal (e.g., ice cream or french fries).

## 2023-09-05 NOTE — ED PROVIDER NOTE - OBJECTIVE STATEMENT
88 y/o female with a PMHx of Alzheimer's, dementia, glaucoma, hypercholesterolemia, osteoarthritis BIBA form [facility] for possible aspiration.  Son at bedside reports patient has had history of aspiration pneumonia.  Recently she has had more difficulty swallowing, wet sounding cough, gargling.  Patient is at baseline nonverbal with advanced dementia, documented DNR/DNI.  Son reports she feels warm to touch

## 2023-09-05 NOTE — ED ADULT TRIAGE NOTE - CHIEF COMPLAINT QUOTE
Pt BIBEMS from [facility] for r/o aspiration pneumonia. Pt has hx of dementia and is a/ox1 at baseline. Per daughter, she visited her sunday morning and noted to have a lot of secretions and patient had to be suctioned multiple times. Daughter states pt has been unable to drink water or eat since sunday. Pt in no acute distress at this time.

## 2023-09-05 NOTE — ED ADULT TRIAGE NOTE - NS ED NURSE AMBULANCES
CHW - Case Closure    This Community Health Worker spoke to patient via telephone today.   Pt denied any additional needs at this time and agrees with episode closure at this time.  Provided patient with Community Health Worker's contact information and encouraged him/her to contact this Community Health Worker if additional needs arise.       [facility]

## 2023-09-05 NOTE — ED PROVIDER NOTE - CLINICAL SUMMARY MEDICAL DECISION MAKING FREE TEXT BOX
Pt p/w diff swallowing, possible aspiration with hx of asp pna. Tachycardic, O2 low 90s placed on 2L NC, BP stable. Family agreeable with IVF, IV abx, hospital admission as required. Will plan for septic workup, high suspicion for aspiration pneumonia, will require admission Pt p/w diff swallowing, possible aspiration with hx of asp pna. Tachycardic, O2 low 90s placed on 2L NC, BP stable. Family agreeable with IVF, IV abx, hospital admission as required. Will plan for septic workup, high suspicion for aspiration pneumonia, will require admission  -Labs with +wbc and lactate, CXR shows +RLL consolidation. BP stable, HR improved. Strarted on IVF, IV abx. Will require admission

## 2023-09-05 NOTE — ED PROVIDER NOTE - PHYSICAL EXAMINATION
General: Awake, nonverbal  HEENT: NCAT  Cardiac: Tachycardic, no murmurs, normal peripheral perfusion  Respiratory: No significant distress. +rhonchi R base and mid lung  GI: Soft, nondistended  Neuro: No gross focal deficits  MSK: No peripheral edema, no signs of trauma  Skin: No rash

## 2023-09-06 DIAGNOSIS — J69.0 PNEUMONITIS DUE TO INHALATION OF FOOD AND VOMIT: ICD-10-CM

## 2023-09-06 LAB
ALBUMIN SERPL ELPH-MCNC: 3.5 G/DL — SIGNIFICANT CHANGE UP (ref 3.3–5)
ALP SERPL-CCNC: 110 U/L — SIGNIFICANT CHANGE UP (ref 40–120)
ALT FLD-CCNC: 14 U/L — SIGNIFICANT CHANGE UP (ref 12–78)
ANION GAP SERPL CALC-SCNC: 7 MMOL/L — SIGNIFICANT CHANGE UP (ref 5–17)
APPEARANCE UR: CLEAR — SIGNIFICANT CHANGE UP
APTT BLD: 34.2 SEC — SIGNIFICANT CHANGE UP (ref 24.5–35.6)
AST SERPL-CCNC: 22 U/L — SIGNIFICANT CHANGE UP (ref 15–37)
BACTERIA # UR AUTO: ABNORMAL
BASOPHILS # BLD AUTO: 0.08 K/UL — SIGNIFICANT CHANGE UP (ref 0–0.2)
BASOPHILS NFR BLD AUTO: 0.7 % — SIGNIFICANT CHANGE UP (ref 0–2)
BILIRUB SERPL-MCNC: 1.1 MG/DL — SIGNIFICANT CHANGE UP (ref 0.2–1.2)
BILIRUB UR-MCNC: NEGATIVE — SIGNIFICANT CHANGE UP
BUN SERPL-MCNC: 15 MG/DL — SIGNIFICANT CHANGE UP (ref 7–23)
CALCIUM SERPL-MCNC: 10.5 MG/DL — HIGH (ref 8.5–10.1)
CHLORIDE SERPL-SCNC: 108 MMOL/L — SIGNIFICANT CHANGE UP (ref 96–108)
CO2 SERPL-SCNC: 28 MMOL/L — SIGNIFICANT CHANGE UP (ref 22–31)
COD CRY URNS QL: ABNORMAL
COLOR SPEC: YELLOW — SIGNIFICANT CHANGE UP
CREAT SERPL-MCNC: 1.08 MG/DL — SIGNIFICANT CHANGE UP (ref 0.5–1.3)
DIFF PNL FLD: ABNORMAL
EGFR: 50 ML/MIN/1.73M2 — LOW
EOSINOPHIL # BLD AUTO: 0.06 K/UL — SIGNIFICANT CHANGE UP (ref 0–0.5)
EOSINOPHIL NFR BLD AUTO: 0.6 % — SIGNIFICANT CHANGE UP (ref 0–6)
EPI CELLS # UR: ABNORMAL
GLUCOSE SERPL-MCNC: 108 MG/DL — HIGH (ref 70–99)
GLUCOSE UR QL: NEGATIVE — SIGNIFICANT CHANGE UP
GRAM STN FLD: SIGNIFICANT CHANGE UP
HCT VFR BLD CALC: 44.3 % — SIGNIFICANT CHANGE UP (ref 34.5–45)
HGB BLD-MCNC: 14.9 G/DL — SIGNIFICANT CHANGE UP (ref 11.5–15.5)
IMM GRANULOCYTES NFR BLD AUTO: 0.2 % — SIGNIFICANT CHANGE UP (ref 0–0.9)
INR BLD: 0.96 RATIO — SIGNIFICANT CHANGE UP (ref 0.85–1.18)
KETONES UR-MCNC: ABNORMAL
LACTATE SERPL-SCNC: 2.1 MMOL/L — HIGH (ref 0.7–2)
LACTATE SERPL-SCNC: 4.8 MMOL/L — CRITICAL HIGH (ref 0.7–2)
LEUKOCYTE ESTERASE UR-ACNC: ABNORMAL
LYMPHOCYTES # BLD AUTO: 3.28 K/UL — SIGNIFICANT CHANGE UP (ref 1–3.3)
LYMPHOCYTES # BLD AUTO: 30.3 % — SIGNIFICANT CHANGE UP (ref 13–44)
MCHC RBC-ENTMCNC: 33.6 GM/DL — SIGNIFICANT CHANGE UP (ref 32–36)
MCHC RBC-ENTMCNC: 35.2 PG — HIGH (ref 27–34)
MCV RBC AUTO: 104.7 FL — HIGH (ref 80–100)
MONOCYTES # BLD AUTO: 0.76 K/UL — SIGNIFICANT CHANGE UP (ref 0–0.9)
MONOCYTES NFR BLD AUTO: 7 % — SIGNIFICANT CHANGE UP (ref 2–14)
NEUTROPHILS # BLD AUTO: 6.62 K/UL — SIGNIFICANT CHANGE UP (ref 1.8–7.4)
NEUTROPHILS NFR BLD AUTO: 61.2 % — SIGNIFICANT CHANGE UP (ref 43–77)
NITRITE UR-MCNC: NEGATIVE — SIGNIFICANT CHANGE UP
PH UR: 6 — SIGNIFICANT CHANGE UP (ref 5–8)
PLATELET # BLD AUTO: 309 K/UL — SIGNIFICANT CHANGE UP (ref 150–400)
POTASSIUM SERPL-MCNC: 4.4 MMOL/L — SIGNIFICANT CHANGE UP (ref 3.5–5.3)
POTASSIUM SERPL-SCNC: 4.4 MMOL/L — SIGNIFICANT CHANGE UP (ref 3.5–5.3)
PROT SERPL-MCNC: 7.9 GM/DL — SIGNIFICANT CHANGE UP (ref 6–8.3)
PROT UR-MCNC: NEGATIVE — SIGNIFICANT CHANGE UP
PROTHROM AB SERPL-ACNC: 10.9 SEC — SIGNIFICANT CHANGE UP (ref 9.5–13)
RAPID RVP RESULT: SIGNIFICANT CHANGE UP
RBC # BLD: 4.23 M/UL — SIGNIFICANT CHANGE UP (ref 3.8–5.2)
RBC # FLD: 13.3 % — SIGNIFICANT CHANGE UP (ref 10.3–14.5)
RBC CASTS # UR COMP ASSIST: SIGNIFICANT CHANGE UP /HPF (ref 0–4)
SARS-COV-2 RNA SPEC QL NAA+PROBE: SIGNIFICANT CHANGE UP
SODIUM SERPL-SCNC: 143 MMOL/L — SIGNIFICANT CHANGE UP (ref 135–145)
SP GR SPEC: 1.02 — SIGNIFICANT CHANGE UP (ref 1.01–1.02)
SPECIMEN SOURCE: SIGNIFICANT CHANGE UP
UROBILINOGEN FLD QL: NEGATIVE — SIGNIFICANT CHANGE UP
WBC # BLD: 10.82 K/UL — HIGH (ref 3.8–10.5)
WBC # FLD AUTO: 10.82 K/UL — HIGH (ref 3.8–10.5)
WBC UR QL: ABNORMAL /HPF (ref 0–5)

## 2023-09-06 PROCEDURE — 92610 EVALUATE SWALLOWING FUNCTION: CPT | Mod: GN

## 2023-09-06 PROCEDURE — 80061 LIPID PANEL: CPT

## 2023-09-06 PROCEDURE — 83735 ASSAY OF MAGNESIUM: CPT

## 2023-09-06 PROCEDURE — 81001 URINALYSIS AUTO W/SCOPE: CPT

## 2023-09-06 PROCEDURE — 83605 ASSAY OF LACTIC ACID: CPT

## 2023-09-06 PROCEDURE — 92526 ORAL FUNCTION THERAPY: CPT | Mod: GN

## 2023-09-06 PROCEDURE — 93010 ELECTROCARDIOGRAM REPORT: CPT

## 2023-09-06 PROCEDURE — 85027 COMPLETE CBC AUTOMATED: CPT

## 2023-09-06 PROCEDURE — 36415 COLL VENOUS BLD VENIPUNCTURE: CPT

## 2023-09-06 PROCEDURE — 84100 ASSAY OF PHOSPHORUS: CPT

## 2023-09-06 PROCEDURE — 99223 1ST HOSP IP/OBS HIGH 75: CPT

## 2023-09-06 PROCEDURE — 80048 BASIC METABOLIC PNL TOTAL CA: CPT

## 2023-09-06 PROCEDURE — 71250 CT THORAX DX C-: CPT | Mod: 26

## 2023-09-06 PROCEDURE — 87635 SARS-COV-2 COVID-19 AMP PRB: CPT

## 2023-09-06 PROCEDURE — 93005 ELECTROCARDIOGRAM TRACING: CPT

## 2023-09-06 PROCEDURE — 71250 CT THORAX DX C-: CPT

## 2023-09-06 PROCEDURE — 87086 URINE CULTURE/COLONY COUNT: CPT

## 2023-09-06 PROCEDURE — 85610 PROTHROMBIN TIME: CPT

## 2023-09-06 PROCEDURE — 83036 HEMOGLOBIN GLYCOSYLATED A1C: CPT

## 2023-09-06 RX ORDER — MEMANTINE HYDROCHLORIDE 10 MG/1
1 TABLET ORAL
Qty: 0 | Refills: 0 | DISCHARGE

## 2023-09-06 RX ORDER — PIPERACILLIN AND TAZOBACTAM 4; .5 G/20ML; G/20ML
3.38 INJECTION, POWDER, LYOPHILIZED, FOR SOLUTION INTRAVENOUS ONCE
Refills: 0 | Status: COMPLETED | OUTPATIENT
Start: 2023-09-06 | End: 2023-09-06

## 2023-09-06 RX ORDER — DONEPEZIL HYDROCHLORIDE 10 MG/1
10 TABLET, FILM COATED ORAL AT BEDTIME
Refills: 0 | Status: DISCONTINUED | OUTPATIENT
Start: 2023-09-06 | End: 2023-09-06

## 2023-09-06 RX ORDER — ROBINUL 0.2 MG/ML
0.2 INJECTION INTRAMUSCULAR; INTRAVENOUS EVERY 8 HOURS
Refills: 0 | Status: DISCONTINUED | OUTPATIENT
Start: 2023-09-06 | End: 2023-09-14

## 2023-09-06 RX ORDER — SODIUM CHLORIDE 9 MG/ML
1000 INJECTION INTRAMUSCULAR; INTRAVENOUS; SUBCUTANEOUS ONCE
Refills: 0 | Status: COMPLETED | OUTPATIENT
Start: 2023-09-06 | End: 2023-09-06

## 2023-09-06 RX ORDER — ONDANSETRON 8 MG/1
4 TABLET, FILM COATED ORAL EVERY 6 HOURS
Refills: 0 | Status: DISCONTINUED | OUTPATIENT
Start: 2023-09-06 | End: 2023-09-14

## 2023-09-06 RX ORDER — CITALOPRAM 10 MG/1
20 TABLET, FILM COATED ORAL DAILY
Refills: 0 | Status: DISCONTINUED | OUTPATIENT
Start: 2023-09-06 | End: 2023-09-14

## 2023-09-06 RX ORDER — LACTOBACILLUS ACIDOPHILUS 100MM CELL
1 CAPSULE ORAL
Qty: 0 | Refills: 0 | DISCHARGE

## 2023-09-06 RX ORDER — PIPERACILLIN AND TAZOBACTAM 4; .5 G/20ML; G/20ML
3.38 INJECTION, POWDER, LYOPHILIZED, FOR SOLUTION INTRAVENOUS EVERY 8 HOURS
Refills: 0 | Status: COMPLETED | OUTPATIENT
Start: 2023-09-06 | End: 2023-09-13

## 2023-09-06 RX ORDER — ACETAMINOPHEN 500 MG
650 TABLET ORAL EVERY 6 HOURS
Refills: 0 | Status: DISCONTINUED | OUTPATIENT
Start: 2023-09-06 | End: 2023-09-07

## 2023-09-06 RX ORDER — PANTOPRAZOLE SODIUM 20 MG/1
40 TABLET, DELAYED RELEASE ORAL
Refills: 0 | Status: DISCONTINUED | OUTPATIENT
Start: 2023-09-06 | End: 2023-09-06

## 2023-09-06 RX ORDER — THIAMINE MONONITRATE (VIT B1) 100 MG
100 TABLET ORAL DAILY
Refills: 0 | Status: DISCONTINUED | OUTPATIENT
Start: 2023-09-06 | End: 2023-09-06

## 2023-09-06 RX ORDER — LATANOPROST 0.05 MG/ML
1 SOLUTION/ DROPS OPHTHALMIC; TOPICAL AT BEDTIME
Refills: 0 | Status: DISCONTINUED | OUTPATIENT
Start: 2023-09-06 | End: 2023-09-14

## 2023-09-06 RX ORDER — TIMOLOL 0.5 %
1 DROPS OPHTHALMIC (EYE) DAILY
Refills: 0 | Status: DISCONTINUED | OUTPATIENT
Start: 2023-09-06 | End: 2023-09-14

## 2023-09-06 RX ORDER — MEMANTINE HYDROCHLORIDE 10 MG/1
10 TABLET ORAL EVERY 12 HOURS
Refills: 0 | Status: DISCONTINUED | OUTPATIENT
Start: 2023-09-06 | End: 2023-09-06

## 2023-09-06 RX ORDER — ENOXAPARIN SODIUM 100 MG/ML
40 INJECTION SUBCUTANEOUS EVERY 24 HOURS
Refills: 0 | Status: DISCONTINUED | OUTPATIENT
Start: 2023-09-06 | End: 2023-09-06

## 2023-09-06 RX ORDER — SENNA PLUS 8.6 MG/1
2 TABLET ORAL AT BEDTIME
Refills: 0 | Status: DISCONTINUED | OUTPATIENT
Start: 2023-09-06 | End: 2023-09-06

## 2023-09-06 RX ORDER — ENOXAPARIN SODIUM 100 MG/ML
40 INJECTION SUBCUTANEOUS EVERY 24 HOURS
Refills: 0 | Status: DISCONTINUED | OUTPATIENT
Start: 2023-09-06 | End: 2023-09-14

## 2023-09-06 RX ORDER — LANOLIN ALCOHOL/MO/W.PET/CERES
3 CREAM (GRAM) TOPICAL AT BEDTIME
Refills: 0 | Status: DISCONTINUED | OUTPATIENT
Start: 2023-09-06 | End: 2023-09-14

## 2023-09-06 RX ORDER — LIDOCAINE 4 G/100G
1 CREAM TOPICAL
Qty: 0 | Refills: 0 | DISCHARGE

## 2023-09-06 RX ADMIN — SODIUM CHLORIDE 1000 MILLILITER(S): 9 INJECTION INTRAMUSCULAR; INTRAVENOUS; SUBCUTANEOUS at 00:46

## 2023-09-06 RX ADMIN — Medication 250 MILLIGRAM(S): at 02:20

## 2023-09-06 RX ADMIN — Medication 1000 MILLIGRAM(S): at 02:14

## 2023-09-06 RX ADMIN — LATANOPROST 1 DROP(S): 0.05 SOLUTION/ DROPS OPHTHALMIC; TOPICAL at 22:13

## 2023-09-06 RX ADMIN — PIPERACILLIN AND TAZOBACTAM 200 GRAM(S): 4; .5 INJECTION, POWDER, LYOPHILIZED, FOR SOLUTION INTRAVENOUS at 01:47

## 2023-09-06 RX ADMIN — SODIUM CHLORIDE 1000 MILLILITER(S): 9 INJECTION INTRAMUSCULAR; INTRAVENOUS; SUBCUTANEOUS at 02:00

## 2023-09-06 RX ADMIN — ENOXAPARIN SODIUM 30 MILLIGRAM(S): 100 INJECTION SUBCUTANEOUS at 12:00

## 2023-09-06 RX ADMIN — PIPERACILLIN AND TAZOBACTAM 25 GRAM(S): 4; .5 INJECTION, POWDER, LYOPHILIZED, FOR SOLUTION INTRAVENOUS at 23:31

## 2023-09-06 RX ADMIN — CITALOPRAM 20 MILLIGRAM(S): 10 TABLET, FILM COATED ORAL at 16:46

## 2023-09-06 RX ADMIN — PIPERACILLIN AND TAZOBACTAM 25 GRAM(S): 4; .5 INJECTION, POWDER, LYOPHILIZED, FOR SOLUTION INTRAVENOUS at 16:29

## 2023-09-06 RX ADMIN — Medication 400 MILLIGRAM(S): at 00:46

## 2023-09-06 NOTE — SWALLOW BEDSIDE ASSESSMENT ADULT - NS SPL SWALLOW CLINIC TRIAL FT
Disc with Dtr, then with NSg, then with MD.  RX; puree and moderately thick liquids.  FEED ONLY WHEN PT IS AWAKE AND ALERT ENOUGH TO PARTICIPATE.   OK IF FAMILY WISHES TO FEED PT AS LONG AS THEY ACCEPT ASPIRATION RISK.  Service will follow.

## 2023-09-06 NOTE — SWALLOW BEDSIDE ASSESSMENT ADULT - SLP GENERAL OBSERVATIONS
pt semi reclining in bed, eyes closed and not rousable to voice and to sternal rub.  DTr in attendance. Dtr was immediately very vocal about how pt could not be seen or tested because she is not awake and she cannot swallow.. When asketo corroborate inofrmation that pt was not eating the above diet texture, dtr was emphartic that ot was indeed eating and drinking BTA., that Amanda laid her flat one night for sleeping and then pt developed pneumonia.  Eventually clinician had to invoke her years of experience and care for pts.  Note pt did not wake during this conversation.

## 2023-09-06 NOTE — SWALLOW BEDSIDE ASSESSMENT ADULT - SWALLOW EVAL: RECOMMENDED DIET
puree and moderately thick liquids; disc with md and with NSG.  FEED ONLY WHEN pt is awake and able to participate. Family is VERY attentive, and likely will wish to feed pt themselves, and this is perfectly appropriate, especially as they appear to be easily adversarial.

## 2023-09-06 NOTE — SWALLOW BEDSIDE ASSESSMENT ADULT - COMMENTS
88 y/o female with a PMHx of Alzheimer's, advanced dementia and non verbal, glaucoma, hypercholesterolemia, osteoarthritis [facility]  Son at bedside reports patient has had history of aspiration pneumonia.  Recently she has had more difficulty swallowing, wet coughing requiring suction at [facility]. Patient with increased weakness , lethargy past few days and very poor oral intake since the weekend.     ROS is not attainable secondary to dementia.   NOTE: PT IS KNOWN TO SERVICE FROM 2 PREVIOUS ADMISSIONS AT WHICH TIME SHE WAS CLEARED FOR MODIFIED DIET TEXTURE:

## 2023-09-06 NOTE — SWALLOW BEDSIDE ASSESSMENT ADULT - ASR SWALLOW DENTITION
some teeth were visible during passive exam of her mouth since pt at first tolerated it and then began to resist.

## 2023-09-06 NOTE — ED ADULT NURSE REASSESSMENT NOTE - NS ED NURSE REASSESS COMMENT FT1
received report from CELSO Hernandez.  patient laying in stretcher, VSS at this time, on 2L NC.  patients sheeted changed, fouzia care provided, brief changed.  wound noted to patients sacrum - as per daughter, wound is chronic.  daughter aware and agreeable with plan of care.  pending med surg bed placement.

## 2023-09-06 NOTE — CHART NOTE - NSCHARTNOTEFT_GEN_A_CORE
Called by RN to report positive blood culture. Growth in aerobic bottle, gram positive cocci in clusters.   Pt admitted with pneumonia, on Zosyn  Will continue with Zosyn and obtain ID consult in am

## 2023-09-06 NOTE — SWALLOW BEDSIDE ASSESSMENT ADULT - SWALLOW EVAL: CURRENT DIET
pending SLP evaluation. NOTE: PRIOR DISC WITH NSG. AS PER NSG, PT'S SONE REFUSED TO LET NSG GIVE PO TO PT, AT LEVEL OF PUREE AND MODERATELY THICK LIQUIDS, STATING THAT SHE COULD NOT SWALLOW.  AS PER DTR TOP THIS CLINICIAN, PT IS ABLE TO EAT pUREE AND MODERATELY THICK LIQUIDS, BUT SEE BELOW.

## 2023-09-06 NOTE — H&P ADULT - NSHPPHYSICALEXAM_GEN_ALL_CORE
Vital Signs Last 24 Hrs  T(C): 37.1 (06 Sep 2023 07:02), Max: 37.3 (05 Sep 2023 22:36)  T(F): 98.8 (06 Sep 2023 07:02), Max: 99.1 (05 Sep 2023 22:36)  HR: 80 (06 Sep 2023 07:02) (80 - 115)  BP: 110/47 (06 Sep 2023 07:02) (101/64 - 110/47)  BP(mean): 65 (06 Sep 2023 07:02) (65 - 65)  RR: 19 (06 Sep 2023 07:02) (19 - 19)  SpO2: 100% (06 Sep 2023 07:02) (95% - 100%)    Parameters below as of 06 Sep 2023 07:02  Patient On (Oxygen Delivery Method): nasal cannula  O2 Flow (L/min): 2      HEENT:   pupils equal and reactive, EOMI, no oropharyngeal lesions, erythema, exudates, oral thrush    NECK:   supple, no carotid bruits, no palpable lymph nodes, no thyromegaly    CV:  +S1, +S2, regular, no murmurs or rubs    RESP:   lungs clear to auscultation bilaterally, no wheezing, rales, rhonchi, good air entry bilaterally    BREAST:  not examined    GI:  abdomen soft, non-tender, non-distended, normal BS, no bruits, no abdominal masses, no palpable masses    RECTAL:  not examined    :  not examined    MSK:   normal muscle tone, no atrophy, no rigidity, no contractions    EXT:   no clubbing, no cyanosis, no edema, no calf pain, swelling or erythema    VASCULAR:  pulses equal and symmetric in the upper and lower extremities    NEURO:  AAOX3, no focal neurological deficits, follows all commands, able to move extremities spontaneously    SKIN:  no ulcers, lesions or rashes

## 2023-09-06 NOTE — PATIENT PROFILE ADULT - FALL HARM RISK - HARM RISK INTERVENTIONS
Assistance OOB with selected safe patient handling equipment/Communicate Risk of Fall with Harm to all staff/Discuss with provider need for PT consult/Monitor for mental status changes/Monitor gait and stability/Move patient closer to nurses' station/Provide patient with walking aids - walker, cane, crutches/Reinforce activity limits and safety measures with patient and family/Reorient to person, place and time as needed/Tailored Fall Risk Interventions/Toileting schedule using arm’s reach rule for commode and bathroom/Use of alarms - bed, chair and/or voice tab/Visual Cue: Yellow wristband and red socks/Bed in lowest position, wheels locked, appropriate side rails in place/Call bell, personal items and telephone in reach/Instruct patient to call for assistance before getting out of bed or chair/Non-slip footwear when patient is out of bed/[place] to call system/Physically safe environment - no spills, clutter or unnecessary equipment/Purposeful Proactive Rounding/Room/bathroom lighting operational, light cord in reach

## 2023-09-06 NOTE — SWALLOW BEDSIDE ASSESSMENT ADULT - ORAL PREPARATORY PHASE
pt demonstrtaed, even in her half awake and lethargic state,  oral laerting to theprezxsence of the spoon at her lip and in her mouth, Clinican was able to induce lip strclosure and spoons tripping actively on part of the pt. .

## 2023-09-06 NOTE — H&P ADULT - NSHPLABSRESULTS_GEN_ALL_CORE
Urinalysis Basic - ( 06 Sep 2023 00:35 )    Color: x / Appearance: x / SG: x / pH: x  Gluc: 108 mg/dL / Ketone: x  / Bili: x / Urobili: x   Blood: x / Protein: x / Nitrite: x   Leuk Esterase: x / RBC: x / WBC x   Sq Epi: x / Non Sq Epi: x / Bacteria: x    06 Sep 2023 00:35    143    |  108    |  15     ----------------------------<  108    4.4     |  28     |  1.08     Ca    10.5       06 Sep 2023 00:35    TPro  7.9    /  Alb  3.5    /  TBili  1.1    /  DBili  x      /  AST  22     /  ALT  14     /  AlkPhos  110    06 Sep 2023 00:35  LIVER FUNCTIONS - ( 06 Sep 2023 00:35 )  Alb: 3.5 g/dL / Pro: 7.9 gm/dL / ALK PHOS: 110 U/L / ALT: 14 U/L / AST: 22 U/L / GGT: x         PT/INR - ( 06 Sep 2023 00:35 )   PT: 10.9 sec;   INR: 0.96 ratio         PTT - ( 06 Sep 2023 00:35 )  PTT:34.2 secCBC Full  -  ( 06 Sep 2023 00:35 )  WBC Count : 10.82 K/uL  Hemoglobin : 14.9 g/dL  Hematocrit : 44.3 %  Platelet Count - Automated : 309 K/uL  Mean Cell Volume : 104.7 fl  Mean Cell Hemoglobin : 35.2 pg  Mean Cell Hemoglobin Concentration : 33.6 gm/dL  Auto Neutrophil # : 6.62 K/uL  Auto Lymphocyte # : 3.28 K/uL  Auto Monocyte # : 0.76 K/uL  Auto Eosinophil # : 0.06 K/uL  Auto Basophil # : 0.08 K/uL  Auto Neutrophil % : 61.2 %  Auto Lymphocyte % : 30.3 %  Auto Monocyte % : 7.0 %  Auto Eosinophil % : 0.6 %  Auto Basophil % : 0.7 %

## 2023-09-06 NOTE — PHARMACOTHERAPY INTERVENTION NOTE - COMMENTS
Med history complete, reviewed medications and allergies [facility] to [facility] records patient stopped taking namenda, donepezil and pantoprazole, confirmed medication list with doctor first med profile, all medication related questions answered

## 2023-09-06 NOTE — ED ADULT NURSE NOTE - CHIEF COMPLAINT QUOTE
Pt BIBEMS from [facility] for r/o aspiration pneumonia. Pt has hx of dementia and is a/ox1 at baseline. Per daughter, she visited her sunday morning and noted to have a lot of secretions and patient had to be suctioned multiple times. Daughter states pt has been unable to drink water or eat since sunday. Pt in no acute distress at this time.

## 2023-09-06 NOTE — H&P ADULT - HISTORY OF PRESENT ILLNESS
88 y/o female with a PMHx of Alzheimer's, advanced dementia and non verbal, glaucoma, hypercholesterolemia, osteoarthritis [facility]  Son at bedside reports patient has had history of aspiration pneumonia.  Recently she has had more difficulty swallowing, wet coughing requring suction at [facility]. Patient with increased weakness , lethargy past few days and very poor oral intake since the weekend.     ROS is not attainable secondary to dementia.  88 y/o female with a PMHx of Alzheimer's, advanced dementia and non verbal, glaucoma, hypercholesterolemia, osteoarthritis [facility]  Son at bedside reports patient has had history of aspiration pneumonia.  Recently she has had more difficulty swallowing, wet coughing requring suction at [facility]. Patient with increased weakness , lethargy past few days and very poor oral intake since the weekend.     ROS is not attainable secondary to dementia.

## 2023-09-06 NOTE — SWALLOW BEDSIDE ASSESSMENT ADULT - SWALLOW EVAL: DIAGNOSIS
At this time, pt demonstretes reduced awareness for po intake, but shows that she likely has sufficient underlying capacity (WHEN SHE IS FULLY AWAKE) for po intake at the level of puree and moderately thick liquids.

## 2023-09-06 NOTE — H&P ADULT - ASSESSMENT
86 y/o female with a PMHx of Alzheimer's, advanced dementia and non verbal, glaucoma, hypercholesterolemia, osteoarthritis BIBA form [facility] for possible aspiration.  Son at bedside reports patient has had history of aspiration pneumonia.  Recently she has had more difficulty swallowing, [facility] Patient with increased weakness , lethargy past few days and very poor oral intake since the weekend.     ROS is not attainable secondary to dementia.       1-SIRS , unclear etiology, likely sepsis  -Follow up blood cultures  -Urine cultures or UA not sent in ED. Abx given.   -spoke with nurse on 5S she will send now  -will order abx after urine studies collected  -chest xray seems unremarkable. RVP negative.   -LA 4.8 > trending down 2.1      2-c/w home meds      3-DVT prophy- sc lovenox           88 y/o female with a PMHx of Alzheimer's, advanced dementia and non verbal, glaucoma, hypercholesterolemia, osteoarthritis BIBA form [facility] for possible aspiration.  Son at bedside reports patient has had history of aspiration pneumonia.  Recently she has had more difficulty swallowing, wet coughing requring suction at [facility]. Patient with increased weakness , lethargy past few days and very poor oral intake since the weekend.     ROS is not attainable secondary to dementia.       1-SIRS , unclear etiology, possible sepsis  -Follow up blood cultures  -Urine cultures or UA not sent in ED. Abx given.   -spoke with nurse on 5S she will send now  -will order abx after urine studies collected  -chest xray negative; Radiologist documenting possibly might  be something there but not clear. Will get CT chest.   RVP negative.   -LA 4.8 > trending down 2.1  -excess mucous production  likely chronic in nauture; daughter convinced patient aspirated. Will see what ct chest shows.   -glycopyrrlate ordered.   -will start empiric zosyn for now; if CT chest negative, consider de-escalating abx    2-c/w home meds      3-DVT prophy- sc lovenox

## 2023-09-06 NOTE — ED ADULT NURSE NOTE - OBJECTIVE STATEMENT
pt presents to ED from [facility] for r/o pneumonia. pt daughter noticed a lot of secretions in throat x3 days PTA. pt also having a hard time swallowing/clearing secretions. pt hx of alzheimer; a&o x1 at baseline. pt family at bedside. no other complaints at this time.

## 2023-09-06 NOTE — SWALLOW BEDSIDE ASSESSMENT ADULT - PHARYNGEAL PHASE
Ap transfer was followed by the slow onset of the pharyngeal swallow with palpable laryngeal lift.  w

## 2023-09-07 LAB
-  STAPHYLOCOCCUS EPIDERMIDIS, METHICILLIN RESISTANT: SIGNIFICANT CHANGE UP
A1C WITH ESTIMATED AVERAGE GLUCOSE RESULT: 5.2 % — SIGNIFICANT CHANGE UP (ref 4–5.6)
ANION GAP SERPL CALC-SCNC: 7 MMOL/L — SIGNIFICANT CHANGE UP (ref 5–17)
BUN SERPL-MCNC: 10 MG/DL — SIGNIFICANT CHANGE UP (ref 7–23)
CALCIUM SERPL-MCNC: 9.4 MG/DL — SIGNIFICANT CHANGE UP (ref 8.5–10.1)
CHLORIDE SERPL-SCNC: 114 MMOL/L — HIGH (ref 96–108)
CHOLEST SERPL-MCNC: 151 MG/DL — SIGNIFICANT CHANGE UP
CO2 SERPL-SCNC: 24 MMOL/L — SIGNIFICANT CHANGE UP (ref 22–31)
CREAT SERPL-MCNC: 0.71 MG/DL — SIGNIFICANT CHANGE UP (ref 0.5–1.3)
CULTURE RESULTS: NO GROWTH — SIGNIFICANT CHANGE UP
CULTURE RESULTS: SIGNIFICANT CHANGE UP
EGFR: 83 ML/MIN/1.73M2 — SIGNIFICANT CHANGE UP
ESTIMATED AVERAGE GLUCOSE: 103 MG/DL — SIGNIFICANT CHANGE UP (ref 68–114)
GLUCOSE SERPL-MCNC: 83 MG/DL — SIGNIFICANT CHANGE UP (ref 70–99)
GRAM STN FLD: SIGNIFICANT CHANGE UP
HCT VFR BLD CALC: 32.3 % — LOW (ref 34.5–45)
HDLC SERPL-MCNC: 31 MG/DL — LOW
HGB BLD-MCNC: 10.8 G/DL — LOW (ref 11.5–15.5)
INR BLD: 0.99 RATIO — SIGNIFICANT CHANGE UP (ref 0.85–1.18)
LIPID PNL WITH DIRECT LDL SERPL: 101 MG/DL — HIGH
MCHC RBC-ENTMCNC: 33.4 GM/DL — SIGNIFICANT CHANGE UP (ref 32–36)
MCHC RBC-ENTMCNC: 35.1 PG — HIGH (ref 27–34)
MCV RBC AUTO: 104.9 FL — HIGH (ref 80–100)
METHOD TYPE: SIGNIFICANT CHANGE UP
NON HDL CHOLESTEROL: 121 MG/DL — SIGNIFICANT CHANGE UP
ORGANISM # SPEC MICROSCOPIC CNT: SIGNIFICANT CHANGE UP
ORGANISM # SPEC MICROSCOPIC CNT: SIGNIFICANT CHANGE UP
PLATELET # BLD AUTO: 254 K/UL — SIGNIFICANT CHANGE UP (ref 150–400)
POTASSIUM SERPL-MCNC: 3.4 MMOL/L — LOW (ref 3.5–5.3)
POTASSIUM SERPL-SCNC: 3.4 MMOL/L — LOW (ref 3.5–5.3)
PROTHROM AB SERPL-ACNC: 11.2 SEC — SIGNIFICANT CHANGE UP (ref 9.5–13)
RBC # BLD: 3.08 M/UL — LOW (ref 3.8–5.2)
RBC # FLD: 13.3 % — SIGNIFICANT CHANGE UP (ref 10.3–14.5)
SODIUM SERPL-SCNC: 145 MMOL/L — SIGNIFICANT CHANGE UP (ref 135–145)
SPECIMEN SOURCE: SIGNIFICANT CHANGE UP
SPECIMEN SOURCE: SIGNIFICANT CHANGE UP
TRIGL SERPL-MCNC: 107 MG/DL — SIGNIFICANT CHANGE UP
WBC # BLD: 6.64 K/UL — SIGNIFICANT CHANGE UP (ref 3.8–10.5)
WBC # FLD AUTO: 6.64 K/UL — SIGNIFICANT CHANGE UP (ref 3.8–10.5)

## 2023-09-07 PROCEDURE — 99232 SBSQ HOSP IP/OBS MODERATE 35: CPT

## 2023-09-07 PROCEDURE — 99223 1ST HOSP IP/OBS HIGH 75: CPT

## 2023-09-07 RX ORDER — POTASSIUM CHLORIDE 20 MEQ
10 PACKET (EA) ORAL
Refills: 0 | Status: COMPLETED | OUTPATIENT
Start: 2023-09-07 | End: 2023-09-07

## 2023-09-07 RX ORDER — ACETAMINOPHEN 500 MG
650 TABLET ORAL EVERY 6 HOURS
Refills: 0 | Status: DISCONTINUED | OUTPATIENT
Start: 2023-09-07 | End: 2023-09-14

## 2023-09-07 RX ORDER — SODIUM CHLORIDE 9 MG/ML
1000 INJECTION INTRAMUSCULAR; INTRAVENOUS; SUBCUTANEOUS
Refills: 0 | Status: DISCONTINUED | OUTPATIENT
Start: 2023-09-07 | End: 2023-09-08

## 2023-09-07 RX ADMIN — Medication 100 MILLIEQUIVALENT(S): at 13:23

## 2023-09-07 RX ADMIN — ENOXAPARIN SODIUM 30 MILLIGRAM(S): 100 INJECTION SUBCUTANEOUS at 12:11

## 2023-09-07 RX ADMIN — ROBINUL 0.2 MILLIGRAM(S): 0.2 INJECTION INTRAMUSCULAR; INTRAVENOUS at 05:20

## 2023-09-07 RX ADMIN — LATANOPROST 1 DROP(S): 0.05 SOLUTION/ DROPS OPHTHALMIC; TOPICAL at 21:24

## 2023-09-07 RX ADMIN — PIPERACILLIN AND TAZOBACTAM 25 GRAM(S): 4; .5 INJECTION, POWDER, LYOPHILIZED, FOR SOLUTION INTRAVENOUS at 14:52

## 2023-09-07 RX ADMIN — Medication 1 DROP(S): at 12:11

## 2023-09-07 RX ADMIN — PIPERACILLIN AND TAZOBACTAM 25 GRAM(S): 4; .5 INJECTION, POWDER, LYOPHILIZED, FOR SOLUTION INTRAVENOUS at 06:13

## 2023-09-07 RX ADMIN — Medication 650 MILLIGRAM(S): at 14:15

## 2023-09-07 RX ADMIN — Medication 100 MILLIEQUIVALENT(S): at 12:12

## 2023-09-07 RX ADMIN — PIPERACILLIN AND TAZOBACTAM 25 GRAM(S): 4; .5 INJECTION, POWDER, LYOPHILIZED, FOR SOLUTION INTRAVENOUS at 21:23

## 2023-09-07 RX ADMIN — Medication 650 MILLIGRAM(S): at 13:47

## 2023-09-07 RX ADMIN — SODIUM CHLORIDE 75 MILLILITER(S): 9 INJECTION INTRAMUSCULAR; INTRAVENOUS; SUBCUTANEOUS at 13:23

## 2023-09-07 RX ADMIN — Medication 100 MILLIEQUIVALENT(S): at 11:12

## 2023-09-07 NOTE — DIETITIAN INITIAL EVALUATION ADULT - PERTINENT LABORATORY DATA
09-07    145  |  114<H>  |  10  ----------------------------<  83  3.4<L>   |  24  |  0.71    Ca    9.4      07 Sep 2023 06:49    TPro  7.9  /  Alb  3.5  /  TBili  1.1  /  DBili  x   /  AST  22  /  ALT  14  /  AlkPhos  110  09-06  A1C with Estimated Average Glucose Result: 5.3 % (07-26-23 @ 08:38)  A1C with Estimated Average Glucose Result: 5.4 % (05-29-23 @ 07:54)   09-07    145  |  114<H>  |  10  ----------------------------<  83  3.4<L>   |  24  |  0.71    Ca    9.4      07 Sep 2023 06:49    TPro  7.9  /  Alb  3.5  /  TBili  1.1  /  DBili  x   /  AST  22  /  ALT  14  /  AlkPhos  110  09-06  A1C with Estimated Average Glucose Result: 5.3 % (07-26-23 @ 08:38)  A1C with Estimated Average Glucose Result: 5.4 % (05-29-23 @ 07:54)    Folate, Serum: 9.4 ng/mL (05-29-23 @ 07:54)  Iron Total, Serum: 23 ug/dL (05-29-23 @ 07:54)  Vitamin B12, Serum: 645 pg/mL (02-03-23 @ 14:07)  Folate, Serum: 13.7 ng/mL (02-03-23 @ 14:07)  Vitamin D, 25-Hydroxy: 41.4 ng/mL (09-25-22 @ 05:08)

## 2023-09-07 NOTE — CONSULT NOTE ADULT - SUBJECTIVE AND OBJECTIVE BOX
Patient is a 87y old  Female who presents with a chief complaint of Pneumonitis due to inhalation of food or vomitus     (07 Sep 2023 11:15)    HPI:  88 y/o female with a PMHx of Alzheimer's, advanced dementia and non verbal, glaucoma, hypercholesterolemia, osteoarthritis BIBA form [facility] for possible aspiration.  Son at bedside reports patient has had history of aspiration pneumonia.  Recently she has had more difficulty swallowing, wet coughing requring suction at [facility]. Patient with increased weakness , lethargy past few days and very poor oral intake since the weekend.  ROS is not attainable secondary to dementia.  Here wbc ct 10, LA 4.8, UA pyuria, imaging shows Right lower lobe linear and patchy opacities as described above new since July 25, 2023 suggestive of combination of atelectasis and pneumonia. Focal opacification of portion of the right lower lobe bronchus likely  due to internal secretions. Was given IV zosyn, vancomycin x 1.       PMH: as above  PSH: as above    Meds: per reconciliation sheet, noted below  MEDICATIONS  (STANDING):  citalopram 20 milliGRAM(s) Oral daily  enoxaparin Injectable 30 milliGRAM(s) SubCutaneous every 24 hours  latanoprost 0.005% Ophthalmic Solution 1 Drop(s) Both EYES at bedtime  piperacillin/tazobactam IVPB.. 3.375 Gram(s) IV Intermittent every 8 hours  potassium chloride  10 mEq/100 mL IVPB 10 milliEquivalent(s) IV Intermittent every 1 hour  sodium chloride 0.9%. 1000 milliLiter(s) (75 mL/Hr) IV Continuous <Continuous>  timolol 0.5% Solution 1 Drop(s) Both EYES daily    Allergies    No Known Allergies    Intolerances      Social: no smoking, no alcohol, no illegal drugs; no recent travel, no exposure to TB  FAMILY HISTORY:  Family history of colon cancer (Father)       no history of premature cardiovascular disease in first degree relatives    ROS: unable to obtain d/t medical condition    All other systems reviewed and are negative    Vital Signs Last 24 Hrs  T(C): 36.3 (07 Sep 2023 08:11), Max: 37.3 (06 Sep 2023 20:43)  T(F): 97.3 (07 Sep 2023 08:11), Max: 99.2 (06 Sep 2023 20:43)  HR: 97 (07 Sep 2023 08:11) (70 - 97)  BP: 94/52 (07 Sep 2023 08:11) (94/52 - 112/74)  BP(mean): 86 (06 Sep 2023 20:43) (86 - 86)  RR: 18 (07 Sep 2023 08:11) (18 - 19)  SpO2: 97% (07 Sep 2023 08:11) (94% - 97%)    Parameters below as of 07 Sep 2023 08:11  Patient On (Oxygen Delivery Method): nasal cannula  O2 Flow (L/min): 2    Daily     Daily     PE:  Constitutional: frail looking  HEENT: NC/AT, EOMI, PERRLA, conjunctivae clear; ears and nose atraumatic; pharynx benign  Neck: supple; thyroid not palpable  Back: no tenderness  Respiratory: decreased breath sounds, rhonchi    Cardiovascular: S1S2 regular, no murmurs  Abdomen: soft, not tender, not distended, positive BS; liver and spleen WNL  Genitourinary: no suprapubic tenderness  Lymphatic: no LN palpable  Musculoskeletal: no muscle tenderness, no joint swelling or tenderness  Extremities: no pedal edema  Neurological/ Psychiatric:  moving all extremities  Skin: no rashes; no palpable lesions    Labs: all available labs reviewed                        10.8   6.64  )-----------( 254      ( 07 Sep 2023 06:49 )             32.3     09-07    145  |  114<H>  |  10  ----------------------------<  83  3.4<L>   |  24  |  0.71    Ca    9.4      07 Sep 2023 06:49    TPro  7.9  /  Alb  3.5  /  TBili  1.1  /  DBili  x   /  AST  22  /  ALT  14  /  AlkPhos  110  09-06     LIVER FUNCTIONS - ( 06 Sep 2023 00:35 )  Alb: 3.5 g/dL / Pro: 7.9 gm/dL / ALK PHOS: 110 U/L / ALT: 14 U/L / AST: 22 U/L / GGT: x           Urinalysis Basic - ( 07 Sep 2023 06:49 )    Color: x / Appearance: x / SG: x / pH: x  Gluc: 83 mg/dL / Ketone: x  / Bili: x / Urobili: x   Blood: x / Protein: x / Nitrite: x   Leuk Esterase: x / RBC: x / WBC x   Sq Epi: x / Non Sq Epi: x / Bacteria: x    Culture - Blood (09.06.23 @ 00:35)   Specimen Source: .Blood Blood-Peripheral  Culture Results:   No growth at 24 hours  Culture - Blood (09.06.23 @ 00:35)   - Staphylococcus epidermidis, Methicillin resistant: Detec  Gram Stain:   Growth in aerobic bottle: Gram Positive Cocci in Clusters   Growth in anaerobic bottle: Gram Positive Cocci in Clusters  Specimen Source: .Blood Blood-Peripheral  Organism: Blood Culture PCR  Culture Results:   Growth in aerobic bottle: Gram Positive Cocci in Clusters   Growth in anaerobic bottle: Gram Positive Cocci in Clusters   Direct identification is available within approximately 3-5   hours either by Blood Panel Multiplexed PCR or Direct   MALDI-TOF. Details: [email or web address]  Organism Identification: Blood Culture PCR  Method Type: PCR      Radiology: all available radiological tests reviewed  < from: CT Chest No Cont (09.06.23 @ 14:00) >    ACC: [id number] EXAM:  CT CHEST   ORDERED BY: ANUSHKA TILLMAN     PROCEDURE DATE:  09/06/2023          INTERPRETATION:  Clinical indications: Evaluate for aspiration pneumonia.    Axial CT images of the chest are obtained without intravenous   administration of contrast.    Comparison is made with the chest CT of July 25, 2023.    No enlarged axillary, mediastinal or hilar lymph nodes.    Heart size is normal. No pericardial effusion. Vascular calcifications   with involvement of the aorta and the coronary arteries.    Evaluation of the upper abdomen demonstrate no significant abnormality.    Evaluation of the lungs demonstrate mild bronchiectasis within the   lingula and the right middle lobe as on the prior study may be sequela of   chronic mycobacterial avium complex infection given the distribution.   Superimposed bilateral lower lung foci of impacted distal or dilated   airways. Bilateral mid to lower lung areas of linear or subsegmental   atelectasis. Right lower lobe linear opacities suggestive of atelectasis   with contiguous patchy consolidation within the dependent portion of the   right lower lobe new since July 25, 2023 may represent pneumonia.    Previously described 6 mm lingular nodule is unchanged. 4 mm right middle   lobe pulmonary nodule is unchanged since March 15, 2023. Tiny bilateral   calcified granulomas.    Secretions within the right mainstem bronchus extending into the bronchus   intermedius. Focal opacification of a focal portion of the right lower   lobar bronchus extending into a segmental branch likely due to internal   secretions.    Degenerative changes of the spine. Severe loss of height of the T9 and   T12 vertebral bodies with moderate superior endplate loss of height of   the L1 vertebral body are unchanged.    IMPRESSION: Right lower lobe linear and patchy opacities as described   above new since July 25, 2023 suggestive of combination of atelectasis   and pneumonia.    Focal opacification of portion of the right lower lobe bronchus likely   due to internal secretions.    A 3 month follow-up noncontrast chest CT is recommended to ensure   resolution of the above findings.        Advanced directives addressed: full resuscitation

## 2023-09-07 NOTE — DIETITIAN INITIAL EVALUATION ADULT - OTHER INFO
86 y/o F with a PMHx of Alzheimer's, advanced dementia and non verbal, glaucoma, hypercholesterolemia, osteoarthritis BIBA form [facility] for possible aspiration. Son at bedside reports patient has had history of aspiration pneumonia. Recently she has had more difficulty swallowing, wet coughing requiring suction at [facility]. Patient with increased weakness, lethargy past few days and very poor oral intake since the weekend. Admitted for aspiration PNA. DNR/ DNI/ NFT. SLP Eval (9/6): Pureed, Moderately Thickened Liquids; FEED ONLY WHEN PT IS AWAKE AND ALERT ENOUGH TO PARTICIPATE.    Unable to obtain meaningful information 2/2 pt's mental status. Breakfast tray observed untouched at bedside. RD obtained bedscale wt on 9/7 - 92#. Weight hx reviewed: 99# (taken by RD on 3/16/23; met criteria for severe malnutrition), 99.8# (taken by RD on 9/29/22; met criteria for severe malnutrition); weight loss of 7# (7.1%) x 6 mon - not clinsig. Pt very thin, frail, and yaa appearing. NFPE reveals severe muscle/ fat wasting, pt continues to meet criteria for PCM at this time. C/w Pureed, Moderately Thickened Liquids per SLP eval. Will trial Ensure Plus High Protein TID in effort to optimize PO intake. Please see additional recommendations below.

## 2023-09-07 NOTE — DIETITIAN INITIAL EVALUATION ADULT - ADD RECOMMEND
1) C/w Puree, Moderately Thickened Liquids  - Maintain aspiration precautions, back of bed >45 degrees.   2) Add ensure plus high protein TID to optimize PO intake (provides 350 kcal, 20g protein/ shake)   3) Obtain vitamin D 25OH level to assess nutriture  4) Please obtain weekly weights  5) Consider adding thiamine 100 mg daily 2/2 poor PO intake/ malnutrition  6) MVI w/ minerals daily to ensure 100% RDA met  7) Encourage protein-rich foods, maximize food preferences  8) Monitor bowel movements, if no BM for >3 days, consider implementing bowel regimen.  9) Monitor and replete lytes PRN  RD will continue to monitor PO intake, labs, hydration, and wt prn.

## 2023-09-07 NOTE — CONSULT NOTE ADULT - SUBJECTIVE AND OBJECTIVE BOX
HPI: Pt is a 87y old Female with hx of       PAIN: ( )Yes   (x )No     DYSPNEA: ( ) Yes  (x ) No  Level:    PAST MEDICAL & SURGICAL HISTORY:  Osteoarthritis of right hip      Glaucoma      Alzheimer disease      Hypercholesteremia      Dementia      S/P appendectomy  childhood      S/P tonsillectomy  childhood      S/P D&C (status post dilation and curettage)  age 32      S/P colonoscopy  every 2 or 3 years      History of hip surgery  Lt side 2022, Rt side approx 8 years ago          SOCIAL HX:    Hx opiate tolerance ( )YES  ( )NO    Baseline ADLs  (Prior to Admission)  ( ) Independent   ( )Dependent    FAMILY HISTORY:  Family history of colon cancer (Father)        Review of Systems:    Anxiety-  Depression-  Physical Discomfort- NAD  Dyspnea- none  Constipation-  Diarrhea-  Nausea-  Vomiting-  Anorexia-  Weight Loss-   Cough-  Secretions-  Fatigue- +  Weakness-+  Delirium-    All other systems reviewed and negative  Unable to obtain/Limited due to:      PHYSICAL EXAM:    Vital Signs Last 24 Hrs  T(C): 36.3 (07 Sep 2023 08:11), Max: 37.3 (06 Sep 2023 20:43)  T(F): 97.3 (07 Sep 2023 08:11), Max: 99.2 (06 Sep 2023 20:43)  HR: 97 (07 Sep 2023 08:11) (73 - 97)  BP: 94/52 (07 Sep 2023 08:11) (94/52 - 112/74)  BP(mean): 86 (06 Sep 2023 20:43) (86 - 86)  RR: 18 (07 Sep 2023 08:11) (18 - 18)  SpO2: 97% (07 Sep 2023 08:11) (94% - 97%)    Parameters below as of 07 Sep 2023 08:11  Patient On (Oxygen Delivery Method): nasal cannula  O2 Flow (L/min): 2    Daily     Daily     PPSV2:   %  FAST:    General:  Mental Status:  HEENT:  Lungs:  Cardiac:  GI:  :  Ext:  Neuro:      LABS:                        10.8   6.64  )-----------( 254      ( 07 Sep 2023 06:49 )             32.3     09-07    145  |  114<H>  |  10  ----------------------------<  83  3.4<L>   |  24  |  0.71    Ca    9.4      07 Sep 2023 06:49    TPro  7.9  /  Alb  3.5  /  TBili  1.1  /  DBili  x   /  AST  22  /  ALT  14  /  AlkPhos  110  09-06    PT/INR - ( 07 Sep 2023 06:49 )   PT: 11.2 sec;   INR: 0.99 ratio         PTT - ( 06 Sep 2023 00:35 )  PTT:34.2 sec  Albumin: Albumin: 3.5 g/dL (09-06 @ 00:35)      Allergies    No Known Allergies    Intolerances      MEDICATIONS  (STANDING):  citalopram 20 milliGRAM(s) Oral daily  enoxaparin Injectable 30 milliGRAM(s) SubCutaneous every 24 hours  latanoprost 0.005% Ophthalmic Solution 1 Drop(s) Both EYES at bedtime  piperacillin/tazobactam IVPB.. 3.375 Gram(s) IV Intermittent every 8 hours  sodium chloride 0.9%. 1000 milliLiter(s) (75 mL/Hr) IV Continuous <Continuous>  timolol 0.5% Solution 1 Drop(s) Both EYES daily    MEDICATIONS  (PRN):  acetaminophen  Suppository .. 650 milliGRAM(s) Rectal every 6 hours PRN Temp greater or equal to 38C (100.4F), Mild Pain (1 - 3)  aluminum hydroxide/magnesium hydroxide/simethicone Suspension 30 milliLiter(s) Oral every 4 hours PRN Dyspepsia  glycopyrrolate Injectable 0.2 milliGRAM(s) IV Push every 8 hours PRN secretions  guaiFENesin Oral Liquid (Sugar-Free) 100 milliGRAM(s) Oral every 6 hours PRN Cough  melatonin 3 milliGRAM(s) Oral at bedtime PRN Insomnia  ondansetron Injectable 4 milliGRAM(s) IV Push every 6 hours PRN Nausea and/or Vomiting      RADIOLOGY/ADDITIONAL STUDIES:   HPI:    "86 y/o female with a PMHx of Alzheimer's, advanced dementia and non verbal, glaucoma, hypercholesterolemia, osteoarthritis BIBA form [facility] for possible aspiration.  Son at bedside reports patient has had history of aspiration pneumonia.  Recently she has had more difficulty swallowing, wet coughing requring suction at [facility]. Patient with increased weakness , lethargy past few days and very poor oral intake since the weekend.   ROS is not attainable secondary to dementia.  "    9/7  pt seen and examined  private aide at bedside  appeared comfortable and in NAD  was able to answer minimal questions yes/no.   aide reports she can be a little more verbal at times  case d/w speech and swallow         PAIN: ( )Yes   (x )No     DYSPNEA: ( ) Yes  (x ) No  Level:    PAST MEDICAL & SURGICAL HISTORY:  Osteoarthritis of right hip      Glaucoma  Alzheimer disease  Hypercholesteremia  Dementia  S/P appendectomy  childhood  S/P tonsillectomy  childhood  S/P D&C (status post dilation and curettage)  age 32  S/P colonoscopy  every 2 or 3 years  History of hip surgery  Lt side 2022, Rt side approx 8 years ago        SOCIAL HX:    Hx opiate tolerance ( )YES  (x )NO    Baseline ADLs  (Prior to Admission)  ( ) Independent   (x )Dependent    FAMILY HISTORY:  Family history of colon cancer (Father)        Review of Systems:    Anxiety- calm   Depression-  Physical Discomfort- NAD  Dyspnea- none  Constipation-  Diarrhea- none   Nausea-  Vomiting- none  Anorexia- +   Weight Loss-  +  Cough-  none   Secretions- none  Fatigue- +  Weakness-+  Delirium- baseline confusion     All other systems reviewed and negative        PHYSICAL EXAM:    Vital Signs Last 24 Hrs  T(C): 36.3 (07 Sep 2023 08:11), Max: 37.3 (06 Sep 2023 20:43)  T(F): 97.3 (07 Sep 2023 08:11), Max: 99.2 (06 Sep 2023 20:43)  HR: 97 (07 Sep 2023 08:11) (73 - 97)  BP: 94/52 (07 Sep 2023 08:11) (94/52 - 112/74)  BP(mean): 86 (06 Sep 2023 20:43) (86 - 86)  RR: 18 (07 Sep 2023 08:11) (18 - 18)  SpO2: 97% (07 Sep 2023 08:11) (94% - 97%)    Parameters below as of 07 Sep 2023 08:11  Patient On (Oxygen Delivery Method): nasal cannula  O2 Flow (L/min): 2    Daily     Daily     PPSV2: 30  %  FAST:    General: calm in NAD  Mental Status: awake alert oriented x1  HEENT:  perrl  Lungs: ctabl b/l bs  no rales or wheezing or crackles  Cardiac: s1s2    GI: soft nontender +BS  : voids  Ext: moves all 4 extremities spontaneously  neuro- confused at baseline    LABS:                        10.8   6.64  )-----------( 254      ( 07 Sep 2023 06:49 )             32.3     09-07    145  |  114<H>  |  10  ----------------------------<  83  3.4<L>   |  24  |  0.71    Ca    9.4      07 Sep 2023 06:49    TPro  7.9  /  Alb  3.5  /  TBili  1.1  /  DBili  x   /  AST  22  /  ALT  14  /  AlkPhos  110  09-06    PT/INR - ( 07 Sep 2023 06:49 )   PT: 11.2 sec;   INR: 0.99 ratio         PTT - ( 06 Sep 2023 00:35 )  PTT:34.2 sec  Albumin: Albumin: 3.5 g/dL (09-06 @ 00:35)      Allergies    No Known Allergies    Intolerances      MEDICATIONS  (STANDING):  citalopram 20 milliGRAM(s) Oral daily  enoxaparin Injectable 30 milliGRAM(s) SubCutaneous every 24 hours  latanoprost 0.005% Ophthalmic Solution 1 Drop(s) Both EYES at bedtime  piperacillin/tazobactam IVPB.. 3.375 Gram(s) IV Intermittent every 8 hours  sodium chloride 0.9%. 1000 milliLiter(s) (75 mL/Hr) IV Continuous <Continuous>  timolol 0.5% Solution 1 Drop(s) Both EYES daily    MEDICATIONS  (PRN):  acetaminophen  Suppository .. 650 milliGRAM(s) Rectal every 6 hours PRN Temp greater or equal to 38C (100.4F), Mild Pain (1 - 3)  aluminum hydroxide/magnesium hydroxide/simethicone Suspension 30 milliLiter(s) Oral every 4 hours PRN Dyspepsia  glycopyrrolate Injectable 0.2 milliGRAM(s) IV Push every 8 hours PRN secretions  guaiFENesin Oral Liquid (Sugar-Free) 100 milliGRAM(s) Oral every 6 hours PRN Cough  melatonin 3 milliGRAM(s) Oral at bedtime PRN Insomnia  ondansetron Injectable 4 milliGRAM(s) IV Push every 6 hours PRN Nausea and/or Vomiting      RADIOLOGY/ADDITIONAL STUDIES:

## 2023-09-07 NOTE — DIETITIAN INITIAL EVALUATION ADULT - PERTINENT MEDS FT
MEDICATIONS  (STANDING):  citalopram 20 milliGRAM(s) Oral daily  enoxaparin Injectable 30 milliGRAM(s) SubCutaneous every 24 hours  latanoprost 0.005% Ophthalmic Solution 1 Drop(s) Both EYES at bedtime  piperacillin/tazobactam IVPB.. 3.375 Gram(s) IV Intermittent every 8 hours  potassium chloride  10 mEq/100 mL IVPB 10 milliEquivalent(s) IV Intermittent every 1 hour  timolol 0.5% Solution 1 Drop(s) Both EYES daily    MEDICATIONS  (PRN):  acetaminophen     Tablet .. 650 milliGRAM(s) Oral every 6 hours PRN Mild Pain (1 - 3)  aluminum hydroxide/magnesium hydroxide/simethicone Suspension 30 milliLiter(s) Oral every 4 hours PRN Dyspepsia  glycopyrrolate Injectable 0.2 milliGRAM(s) IV Push every 8 hours PRN secretions  guaiFENesin Oral Liquid (Sugar-Free) 100 milliGRAM(s) Oral every 6 hours PRN Cough  melatonin 3 milliGRAM(s) Oral at bedtime PRN Insomnia  ondansetron Injectable 4 milliGRAM(s) IV Push every 6 hours PRN Nausea and/or Vomiting   MEDICATIONS  (STANDING):  citalopram 20 milliGRAM(s) Oral daily  enoxaparin Injectable 30 milliGRAM(s) SubCutaneous every 24 hours  latanoprost 0.005% Ophthalmic Solution 1 Drop(s) Both EYES at bedtime  piperacillin/tazobactam IVPB.. 3.375 Gram(s) IV Intermittent every 8 hours  potassium chloride  10 mEq/100 mL IVPB 10 milliEquivalent(s) IV Intermittent every 1 hour  timolol 0.5% Solution 1 Drop(s) Both EYES daily    MEDICATIONS  (PRN):  acetaminophen     Tablet .. 650 milliGRAM(s) Oral every 6 hours PRN Mild Pain (1 - 3)  aluminum hydroxide/magnesium hydroxide/simethicone Suspension 30 milliLiter(s) Oral every 4 hours PRN Dyspepsia  glycopyrrolate Injectable 0.2 milliGRAM(s) IV Push every 8 hours PRN secretions  guaiFENesin Oral Liquid (Sugar-Free) 100 milliGRAM(s) Oral every 6 hours PRN Cough  melatonin 3 milliGRAM(s) Oral at bedtime PRN Insomnia  ondansetron Injectable 4 milliGRAM(s) IV Push every 6 hours PRN Nausea and/or Vomiting    Home Medications:  acetaminophen 325 mg oral tablet: 2 tab(s) orally every 6 hours, As needed, Mild Pain (1 - 3) (06 Sep 2023 08:45)  citalopram 20 mg oral tablet: 1 tab(s) orally once a day (06 Sep 2023 08:45)  Decara 1250 mcg (50,000 intl units) oral capsule: 1 cap(s) orally every 2 weeks (06 Sep 2023 08:46)  latanoprost 0.005% ophthalmic solution: 1 drop(s) in each eye once a day (at bedtime) (06 Sep 2023 08:45)  timolol 0.5% ophthalmic solution: 1 drop(s) in each eye once a day (06 Sep 2023 08:45)

## 2023-09-07 NOTE — CONSULT NOTE ADULT - ASSESSMENT
86 y/o female with a PMHx of Alzheimer's, advanced dementia and non verbal, glaucoma, hypercholesterolemia, osteoarthritis [facility].  Son at bedside reports patient has had history of aspiration pneumonia.  Recently she has had more difficulty swallowing, wet coughing requring suction at [facility]. Patient with increased weakness , lethargy past few days and very poor oral intake since the weekend.  ROS is not attainable secondary to dementia.  Here wbc ct 10, LA 4.8, UA pyuria, imaging shows Right lower lobe linear and patchy opacities as described above new since July 25, 2023 suggestive of combination of atelectasis and pneumonia. Focal opacification of portion of the right lower lobe bronchus likely  due to internal secretions. Was given IV zosyn, vancomycin x 1.     1. Acute respiratory failure. Sepsis. RLL aspiration pneumonia. Pyuria, Probable UTI. Advanced alzheimers dementia  - imaging reviewed  - agree with IV zosyn [id number]  - hold further vancomycin  - continue with antibiotic coverage  - blood cx - growing cons - contaminant   - fu cbc  - monitor temps  - aspiration precautions  - tolerating abx well so far; no side effects noted  - reason for abx use and side effects reviewed with patient  - supportive care    2. other issues - care per medicine 
Process of Care  --Reviewed dx/treatment problems and alignment with Goals of Care    Physical Aspects of Care  --Pain  patient denies at this time  c/w current managment    --Bowel Regimen  denies constipation  risk for constipation d/t immobility  daily dulcolax    --Dyspnea  No SOB at this time  comfortable and in NAD    --Nausea Vomiting  denies    --Weakness  PT as tolerated     Psychological and Psychiatric Aspects of Care:   --Greif/Bereavment: emotional support provided  --Hx of psychiatric dx: none  -Pastoral Care Available PRN     Social Aspects of Care  -SW involved     Cultural Aspects  -Primary Language: English    Goals of Care:     We discussed Palliative Care team being a supportive team when a patient has ongoing illnesses.  We also discussed that it is not an end of life care service, but can help navigate symptoms and emotional support throughout their hospital stay here.    Pt known to palliative team  Son discussed MOLST w/ primary team deferred to sister/hcp  MOSLT confirmed by primary NP   pt is DNR DNI     Will reach out to discuss current ongoing aspiration   would NOT recommend feeding tube in progressed dementia  will not prevent aspiration   will reach out to daughter to further discuss        Ethical and Legal Aspects:   NA        Capacity: w.o capacity   HCP/Surrogate: Shira Natynicole Navarro  Code Status: dnr dni  MOLST: dnr dni   Dispo Plan: pending further disscussion     Discussed With: Case coordinated with attending and SW and RN     Time Spent: 70 minutes including the care, coordination and counseling of this patient, 50% of which was spent coordinating and counseling.

## 2023-09-07 NOTE — DIETITIAN INITIAL EVALUATION ADULT - ORAL INTAKE PTA/DIET HISTORY
Unable to obtain intake/ diet hx pta 2/2 pt's mental status. Pt from [facility] - on pureed, moderately thickened liquid diet.

## 2023-09-08 DIAGNOSIS — G30.9 ALZHEIMER'S DISEASE, UNSPECIFIED: ICD-10-CM

## 2023-09-08 LAB
ANION GAP SERPL CALC-SCNC: 6 MMOL/L — SIGNIFICANT CHANGE UP (ref 5–17)
BUN SERPL-MCNC: 8 MG/DL — SIGNIFICANT CHANGE UP (ref 7–23)
CALCIUM SERPL-MCNC: 9.1 MG/DL — SIGNIFICANT CHANGE UP (ref 8.5–10.1)
CHLORIDE SERPL-SCNC: 116 MMOL/L — HIGH (ref 96–108)
CO2 SERPL-SCNC: 23 MMOL/L — SIGNIFICANT CHANGE UP (ref 22–31)
CREAT SERPL-MCNC: 0.7 MG/DL — SIGNIFICANT CHANGE UP (ref 0.5–1.3)
EGFR: 85 ML/MIN/1.73M2 — SIGNIFICANT CHANGE UP
GLUCOSE SERPL-MCNC: 81 MG/DL — SIGNIFICANT CHANGE UP (ref 70–99)
HCT VFR BLD CALC: 30.5 % — LOW (ref 34.5–45)
HGB BLD-MCNC: 10.1 G/DL — LOW (ref 11.5–15.5)
MCHC RBC-ENTMCNC: 33.1 GM/DL — SIGNIFICANT CHANGE UP (ref 32–36)
MCHC RBC-ENTMCNC: 34.9 PG — HIGH (ref 27–34)
MCV RBC AUTO: 105.5 FL — HIGH (ref 80–100)
PLATELET # BLD AUTO: 260 K/UL — SIGNIFICANT CHANGE UP (ref 150–400)
POTASSIUM SERPL-MCNC: 4.1 MMOL/L — SIGNIFICANT CHANGE UP (ref 3.5–5.3)
POTASSIUM SERPL-SCNC: 4.1 MMOL/L — SIGNIFICANT CHANGE UP (ref 3.5–5.3)
RBC # BLD: 2.89 M/UL — LOW (ref 3.8–5.2)
RBC # FLD: 13.2 % — SIGNIFICANT CHANGE UP (ref 10.3–14.5)
SODIUM SERPL-SCNC: 145 MMOL/L — SIGNIFICANT CHANGE UP (ref 135–145)
WBC # BLD: 5.81 K/UL — SIGNIFICANT CHANGE UP (ref 3.8–10.5)
WBC # FLD AUTO: 5.81 K/UL — SIGNIFICANT CHANGE UP (ref 3.8–10.5)

## 2023-09-08 PROCEDURE — 99497 ADVNCD CARE PLAN 30 MIN: CPT

## 2023-09-08 PROCEDURE — 99233 SBSQ HOSP IP/OBS HIGH 50: CPT

## 2023-09-08 RX ORDER — SODIUM CHLORIDE 9 MG/ML
1000 INJECTION, SOLUTION INTRAVENOUS
Refills: 0 | Status: DISCONTINUED | OUTPATIENT
Start: 2023-09-08 | End: 2023-09-11

## 2023-09-08 RX ORDER — SODIUM CHLORIDE 9 MG/ML
500 INJECTION INTRAMUSCULAR; INTRAVENOUS; SUBCUTANEOUS ONCE
Refills: 0 | Status: COMPLETED | OUTPATIENT
Start: 2023-09-08 | End: 2023-09-08

## 2023-09-08 RX ADMIN — Medication 650 MILLIGRAM(S): at 15:13

## 2023-09-08 RX ADMIN — SODIUM CHLORIDE 500 MILLILITER(S): 9 INJECTION INTRAMUSCULAR; INTRAVENOUS; SUBCUTANEOUS at 08:40

## 2023-09-08 RX ADMIN — SODIUM CHLORIDE 75 MILLILITER(S): 9 INJECTION INTRAMUSCULAR; INTRAVENOUS; SUBCUTANEOUS at 01:46

## 2023-09-08 RX ADMIN — ENOXAPARIN SODIUM 40 MILLIGRAM(S): 100 INJECTION SUBCUTANEOUS at 11:56

## 2023-09-08 RX ADMIN — LATANOPROST 1 DROP(S): 0.05 SOLUTION/ DROPS OPHTHALMIC; TOPICAL at 23:32

## 2023-09-08 RX ADMIN — Medication 650 MILLIGRAM(S): at 15:50

## 2023-09-08 RX ADMIN — SODIUM CHLORIDE 50 MILLILITER(S): 9 INJECTION, SOLUTION INTRAVENOUS at 11:07

## 2023-09-08 RX ADMIN — PIPERACILLIN AND TAZOBACTAM 25 GRAM(S): 4; .5 INJECTION, POWDER, LYOPHILIZED, FOR SOLUTION INTRAVENOUS at 13:54

## 2023-09-08 RX ADMIN — SODIUM CHLORIDE 75 MILLILITER(S): 9 INJECTION INTRAMUSCULAR; INTRAVENOUS; SUBCUTANEOUS at 05:39

## 2023-09-08 RX ADMIN — PIPERACILLIN AND TAZOBACTAM 25 GRAM(S): 4; .5 INJECTION, POWDER, LYOPHILIZED, FOR SOLUTION INTRAVENOUS at 05:38

## 2023-09-08 RX ADMIN — PIPERACILLIN AND TAZOBACTAM 25 GRAM(S): 4; .5 INJECTION, POWDER, LYOPHILIZED, FOR SOLUTION INTRAVENOUS at 23:31

## 2023-09-08 RX ADMIN — Medication 1 DROP(S): at 10:00

## 2023-09-08 NOTE — CHART NOTE - NSCHARTNOTEFT_GEN_A_CORE
HPI:  86 y/o female with a PMHx of Alzheimer's, advanced dementia and non verbal, glaucoma, hypercholesterolemia, osteoarthritis BIBA form [facility] for possible aspiration.   (06 Sep 2023 09:04)      PERTINENT PMH REVIEWED:  [ X ] YES [ ] NO           Primary Contact:   Daughter Shira HCP     HCP [ X ] Surrogate [   ] Guardian [   ]    Mental Status: [ ] Alert  [  ] Oriented [  ] Confused [ X ] Lethargic [  ]  Concerns of Depression [  ]- unable to assess  Anxiety [   ]- unable to assess  Baseline ADLs (prior to admission):  Independent [ ] moderately [ ] fully   Dependent   [ ] moderately [ X ] fully    Anticipated Grief: Patient [  ] Family [ X ]    Caregiver [place] Assessed: Yes [ X  ] No [  ]    Temple: Mu-ism     Spiritual Concerns: Not identified     Goals of Care: Continue with current medical management     Previous Services:    ADVANCE DIRECTIVES:  [facility]- DNR/ DNI Trial of non invasive ventilation, send to hospital, Limited medical, No feeding tube, Use IV fluids, Use Antibiotics, Use Dialysis     Anticipated D/C Plan:                     Summary: HPI:  86 y/o female with a PMHx of Alzheimer's, advanced dementia and non verbal, glaucoma, hypercholesterolemia, osteoarthritis BIBA form [facility] for possible aspiration.   (06 Sep 2023 09:04)      PERTINENT PMH REVIEWED:  [ X ] YES [ ] NO           Primary Contact:   Daughter Shira HCP     HCP [ X ] Surrogate [   ] Guardian [   ]    Mental Status: [ ] Alert  [  ] Oriented [  ] Confused [ X ] Lethargic [  ]  Concerns of Depression [  ]- unable to assess  Anxiety [   ]- unable to assess  Baseline ADLs (prior to admission):  Independent [ ] moderately [ ] fully   Dependent   [ ] moderately [ X ] fully    Anticipated Grief: Patient [  ] Family [ X ]    Caregiver Cade Assessed: Yes [ X  ] No [  ]    Presybeterian: Sabianist     Spiritual Concerns: Not identified     Goals of Care: Continue with current medical management     Previous Services: [facility]    ADVANCE DIRECTIVES:  [facility]- DNR/ DNI Trial of non invasive ventilation, send to hospital, Limited medical, No feeding tube, Use IV fluids, Use Antibiotics, Use Dialysis     Anticipated D/C Plan: to be further determined                     Summary:    Dr. Montalvo and this SW spoke with pts daughter via phone to discuss goals of care, assist with planning and provide supportive counseling. Pt. has been a long term resident at [facility]. Pts daughter shared some of the challenges pt. has faced including difficulty with swallowing. She confirmed that pt. would never want a feeding tube however, is hopeful pt. can still improve. We did discuss comfort focus and hospice if pt. were to continue to decline. Pts daughter was receptive to the information however, is very clear she would like to give pt. some more time before making any further decisions. Plan to be determined. Emotional support provided. Our team will continue to follow.

## 2023-09-09 PROCEDURE — 99232 SBSQ HOSP IP/OBS MODERATE 35: CPT

## 2023-09-09 RX ADMIN — ENOXAPARIN SODIUM 40 MILLIGRAM(S): 100 INJECTION SUBCUTANEOUS at 12:45

## 2023-09-09 RX ADMIN — PIPERACILLIN AND TAZOBACTAM 25 GRAM(S): 4; .5 INJECTION, POWDER, LYOPHILIZED, FOR SOLUTION INTRAVENOUS at 16:11

## 2023-09-09 RX ADMIN — LATANOPROST 1 DROP(S): 0.05 SOLUTION/ DROPS OPHTHALMIC; TOPICAL at 21:39

## 2023-09-09 RX ADMIN — SODIUM CHLORIDE 50 MILLILITER(S): 9 INJECTION, SOLUTION INTRAVENOUS at 12:49

## 2023-09-09 RX ADMIN — Medication 1 DROP(S): at 09:57

## 2023-09-09 RX ADMIN — PIPERACILLIN AND TAZOBACTAM 25 GRAM(S): 4; .5 INJECTION, POWDER, LYOPHILIZED, FOR SOLUTION INTRAVENOUS at 06:34

## 2023-09-10 PROCEDURE — 99232 SBSQ HOSP IP/OBS MODERATE 35: CPT

## 2023-09-10 RX ADMIN — Medication 650 MILLIGRAM(S): at 13:25

## 2023-09-10 RX ADMIN — SODIUM CHLORIDE 50 MILLILITER(S): 9 INJECTION, SOLUTION INTRAVENOUS at 06:21

## 2023-09-10 RX ADMIN — Medication 1 DROP(S): at 09:36

## 2023-09-10 RX ADMIN — PIPERACILLIN AND TAZOBACTAM 25 GRAM(S): 4; .5 INJECTION, POWDER, LYOPHILIZED, FOR SOLUTION INTRAVENOUS at 15:38

## 2023-09-10 RX ADMIN — LATANOPROST 1 DROP(S): 0.05 SOLUTION/ DROPS OPHTHALMIC; TOPICAL at 21:24

## 2023-09-10 RX ADMIN — PIPERACILLIN AND TAZOBACTAM 25 GRAM(S): 4; .5 INJECTION, POWDER, LYOPHILIZED, FOR SOLUTION INTRAVENOUS at 04:25

## 2023-09-10 RX ADMIN — ENOXAPARIN SODIUM 40 MILLIGRAM(S): 100 INJECTION SUBCUTANEOUS at 15:39

## 2023-09-10 RX ADMIN — PIPERACILLIN AND TAZOBACTAM 25 GRAM(S): 4; .5 INJECTION, POWDER, LYOPHILIZED, FOR SOLUTION INTRAVENOUS at 22:05

## 2023-09-10 RX ADMIN — SODIUM CHLORIDE 50 MILLILITER(S): 9 INJECTION, SOLUTION INTRAVENOUS at 22:08

## 2023-09-11 LAB
ADD ON TEST-SPECIMEN IN LAB: SIGNIFICANT CHANGE UP
ANION GAP SERPL CALC-SCNC: 2 MMOL/L — LOW (ref 5–17)
BUN SERPL-MCNC: 1 MG/DL — LOW (ref 7–23)
CALCIUM SERPL-MCNC: 8.4 MG/DL — LOW (ref 8.5–10.1)
CHLORIDE SERPL-SCNC: 116 MMOL/L — HIGH (ref 96–108)
CO2 SERPL-SCNC: 28 MMOL/L — SIGNIFICANT CHANGE UP (ref 22–31)
CREAT SERPL-MCNC: 0.56 MG/DL — SIGNIFICANT CHANGE UP (ref 0.5–1.3)
CULTURE RESULTS: SIGNIFICANT CHANGE UP
EGFR: 89 ML/MIN/1.73M2 — SIGNIFICANT CHANGE UP
GLUCOSE SERPL-MCNC: 107 MG/DL — HIGH (ref 70–99)
MAGNESIUM SERPL-MCNC: 1.6 MG/DL — SIGNIFICANT CHANGE UP (ref 1.6–2.6)
PHOSPHATE SERPL-MCNC: 1.4 MG/DL — LOW (ref 2.5–4.5)
POTASSIUM SERPL-MCNC: 2.8 MMOL/L — CRITICAL LOW (ref 3.5–5.3)
POTASSIUM SERPL-SCNC: 2.8 MMOL/L — CRITICAL LOW (ref 3.5–5.3)
SODIUM SERPL-SCNC: 146 MMOL/L — HIGH (ref 135–145)
SPECIMEN SOURCE: SIGNIFICANT CHANGE UP

## 2023-09-11 PROCEDURE — 99232 SBSQ HOSP IP/OBS MODERATE 35: CPT

## 2023-09-11 RX ORDER — SODIUM CHLORIDE 9 MG/ML
1000 INJECTION, SOLUTION INTRAVENOUS
Refills: 0 | Status: DISCONTINUED | OUTPATIENT
Start: 2023-09-11 | End: 2023-09-13

## 2023-09-11 RX ORDER — ACETAMINOPHEN 500 MG
550 TABLET ORAL ONCE
Refills: 0 | Status: COMPLETED | OUTPATIENT
Start: 2023-09-11 | End: 2023-09-11

## 2023-09-11 RX ORDER — POTASSIUM CHLORIDE 20 MEQ
10 PACKET (EA) ORAL
Refills: 0 | Status: COMPLETED | OUTPATIENT
Start: 2023-09-11 | End: 2023-09-11

## 2023-09-11 RX ADMIN — Medication 100 MILLIEQUIVALENT(S): at 09:58

## 2023-09-11 RX ADMIN — Medication 1 DROP(S): at 13:09

## 2023-09-11 RX ADMIN — PIPERACILLIN AND TAZOBACTAM 25 GRAM(S): 4; .5 INJECTION, POWDER, LYOPHILIZED, FOR SOLUTION INTRAVENOUS at 06:15

## 2023-09-11 RX ADMIN — LATANOPROST 1 DROP(S): 0.05 SOLUTION/ DROPS OPHTHALMIC; TOPICAL at 21:46

## 2023-09-11 RX ADMIN — PIPERACILLIN AND TAZOBACTAM 25 GRAM(S): 4; .5 INJECTION, POWDER, LYOPHILIZED, FOR SOLUTION INTRAVENOUS at 22:30

## 2023-09-11 RX ADMIN — PIPERACILLIN AND TAZOBACTAM 25 GRAM(S): 4; .5 INJECTION, POWDER, LYOPHILIZED, FOR SOLUTION INTRAVENOUS at 14:50

## 2023-09-11 RX ADMIN — Medication 100 MILLIEQUIVALENT(S): at 11:53

## 2023-09-11 RX ADMIN — ENOXAPARIN SODIUM 40 MILLIGRAM(S): 100 INJECTION SUBCUTANEOUS at 13:59

## 2023-09-11 RX ADMIN — Medication 220 MILLIGRAM(S): at 13:59

## 2023-09-11 RX ADMIN — Medication 100 MILLIEQUIVALENT(S): at 13:08

## 2023-09-11 RX ADMIN — Medication 550 MILLIGRAM(S): at 14:00

## 2023-09-11 RX ADMIN — SODIUM CHLORIDE 50 MILLILITER(S): 9 INJECTION, SOLUTION INTRAVENOUS at 22:30

## 2023-09-12 LAB
ADD ON TEST-SPECIMEN IN LAB: SIGNIFICANT CHANGE UP
ANION GAP SERPL CALC-SCNC: 6 MMOL/L — SIGNIFICANT CHANGE UP (ref 5–17)
BUN SERPL-MCNC: <1 MG/DL — LOW (ref 7–23)
CALCIUM SERPL-MCNC: 8.3 MG/DL — LOW (ref 8.5–10.1)
CHLORIDE SERPL-SCNC: 111 MMOL/L — HIGH (ref 96–108)
CO2 SERPL-SCNC: 26 MMOL/L — SIGNIFICANT CHANGE UP (ref 22–31)
CREAT SERPL-MCNC: 0.57 MG/DL — SIGNIFICANT CHANGE UP (ref 0.5–1.3)
EGFR: 88 ML/MIN/1.73M2 — SIGNIFICANT CHANGE UP
GLUCOSE SERPL-MCNC: 107 MG/DL — HIGH (ref 70–99)
MAGNESIUM SERPL-MCNC: 1.6 MG/DL — SIGNIFICANT CHANGE UP (ref 1.6–2.6)
PHOSPHATE SERPL-MCNC: 1.3 MG/DL — LOW (ref 2.5–4.5)
POTASSIUM SERPL-MCNC: 2.5 MMOL/L — CRITICAL LOW (ref 3.5–5.3)
POTASSIUM SERPL-SCNC: 2.5 MMOL/L — CRITICAL LOW (ref 3.5–5.3)
SODIUM SERPL-SCNC: 143 MMOL/L — SIGNIFICANT CHANGE UP (ref 135–145)

## 2023-09-12 PROCEDURE — 99232 SBSQ HOSP IP/OBS MODERATE 35: CPT

## 2023-09-12 RX ORDER — POTASSIUM PHOSPHATE, MONOBASIC POTASSIUM PHOSPHATE, DIBASIC 236; 224 MG/ML; MG/ML
15 INJECTION, SOLUTION INTRAVENOUS ONCE
Refills: 0 | Status: COMPLETED | OUTPATIENT
Start: 2023-09-12 | End: 2023-09-13

## 2023-09-12 RX ORDER — POTASSIUM CHLORIDE 20 MEQ
10 PACKET (EA) ORAL
Refills: 0 | Status: COMPLETED | OUTPATIENT
Start: 2023-09-12 | End: 2023-09-12

## 2023-09-12 RX ADMIN — PIPERACILLIN AND TAZOBACTAM 25 GRAM(S): 4; .5 INJECTION, POWDER, LYOPHILIZED, FOR SOLUTION INTRAVENOUS at 22:33

## 2023-09-12 RX ADMIN — Medication 100 MILLIEQUIVALENT(S): at 10:44

## 2023-09-12 RX ADMIN — Medication 650 MILLIGRAM(S): at 13:51

## 2023-09-12 RX ADMIN — CITALOPRAM 20 MILLIGRAM(S): 10 TABLET, FILM COATED ORAL at 18:32

## 2023-09-12 RX ADMIN — LATANOPROST 1 DROP(S): 0.05 SOLUTION/ DROPS OPHTHALMIC; TOPICAL at 21:38

## 2023-09-12 RX ADMIN — Medication 100 MILLIEQUIVALENT(S): at 13:56

## 2023-09-12 RX ADMIN — SODIUM CHLORIDE 50 MILLILITER(S): 9 INJECTION, SOLUTION INTRAVENOUS at 15:57

## 2023-09-12 RX ADMIN — Medication 650 MILLIGRAM(S): at 22:32

## 2023-09-12 RX ADMIN — PIPERACILLIN AND TAZOBACTAM 25 GRAM(S): 4; .5 INJECTION, POWDER, LYOPHILIZED, FOR SOLUTION INTRAVENOUS at 18:31

## 2023-09-12 RX ADMIN — PIPERACILLIN AND TAZOBACTAM 25 GRAM(S): 4; .5 INJECTION, POWDER, LYOPHILIZED, FOR SOLUTION INTRAVENOUS at 06:02

## 2023-09-12 RX ADMIN — Medication 100 MILLIEQUIVALENT(S): at 15:56

## 2023-09-12 RX ADMIN — ENOXAPARIN SODIUM 40 MILLIGRAM(S): 100 INJECTION SUBCUTANEOUS at 13:54

## 2023-09-12 RX ADMIN — Medication 1 DROP(S): at 16:13

## 2023-09-13 LAB
ADD ON TEST-SPECIMEN IN LAB: SIGNIFICANT CHANGE UP
ANION GAP SERPL CALC-SCNC: 4 MMOL/L — LOW (ref 5–17)
ANION GAP SERPL CALC-SCNC: 7 MMOL/L — SIGNIFICANT CHANGE UP (ref 5–17)
BUN SERPL-MCNC: 1 MG/DL — LOW (ref 7–23)
BUN SERPL-MCNC: 2 MG/DL — LOW (ref 7–23)
CALCIUM SERPL-MCNC: 8.4 MG/DL — LOW (ref 8.5–10.1)
CALCIUM SERPL-MCNC: 8.4 MG/DL — LOW (ref 8.5–10.1)
CHLORIDE SERPL-SCNC: 114 MMOL/L — HIGH (ref 96–108)
CHLORIDE SERPL-SCNC: 114 MMOL/L — HIGH (ref 96–108)
CO2 SERPL-SCNC: 26 MMOL/L — SIGNIFICANT CHANGE UP (ref 22–31)
CO2 SERPL-SCNC: 27 MMOL/L — SIGNIFICANT CHANGE UP (ref 22–31)
CREAT SERPL-MCNC: 0.61 MG/DL — SIGNIFICANT CHANGE UP (ref 0.5–1.3)
CREAT SERPL-MCNC: 0.63 MG/DL — SIGNIFICANT CHANGE UP (ref 0.5–1.3)
EGFR: 86 ML/MIN/1.73M2 — SIGNIFICANT CHANGE UP
EGFR: 87 ML/MIN/1.73M2 — SIGNIFICANT CHANGE UP
GLUCOSE SERPL-MCNC: 106 MG/DL — HIGH (ref 70–99)
GLUCOSE SERPL-MCNC: 118 MG/DL — HIGH (ref 70–99)
HCT VFR BLD CALC: 30.9 % — LOW (ref 34.5–45)
HGB BLD-MCNC: 11.2 G/DL — LOW (ref 11.5–15.5)
MAGNESIUM SERPL-MCNC: 1.5 MG/DL — LOW (ref 1.6–2.6)
MCHC RBC-ENTMCNC: 34.9 PG — HIGH (ref 27–34)
MCHC RBC-ENTMCNC: 36.2 GM/DL — HIGH (ref 32–36)
MCV RBC AUTO: 96.3 FL — SIGNIFICANT CHANGE UP (ref 80–100)
PHOSPHATE SERPL-MCNC: 1.7 MG/DL — LOW (ref 2.5–4.5)
PLATELET # BLD AUTO: 301 K/UL — SIGNIFICANT CHANGE UP (ref 150–400)
POTASSIUM SERPL-MCNC: 2.7 MMOL/L — CRITICAL LOW (ref 3.5–5.3)
POTASSIUM SERPL-MCNC: 3.5 MMOL/L — SIGNIFICANT CHANGE UP (ref 3.5–5.3)
POTASSIUM SERPL-SCNC: 2.7 MMOL/L — CRITICAL LOW (ref 3.5–5.3)
POTASSIUM SERPL-SCNC: 3.5 MMOL/L — SIGNIFICANT CHANGE UP (ref 3.5–5.3)
RBC # BLD: 3.21 M/UL — LOW (ref 3.8–5.2)
RBC # FLD: 13.1 % — SIGNIFICANT CHANGE UP (ref 10.3–14.5)
SARS-COV-2 RNA SPEC QL NAA+PROBE: SIGNIFICANT CHANGE UP
SODIUM SERPL-SCNC: 145 MMOL/L — SIGNIFICANT CHANGE UP (ref 135–145)
SODIUM SERPL-SCNC: 147 MMOL/L — HIGH (ref 135–145)
WBC # BLD: 5.52 K/UL — SIGNIFICANT CHANGE UP (ref 3.8–10.5)
WBC # FLD AUTO: 5.52 K/UL — SIGNIFICANT CHANGE UP (ref 3.8–10.5)

## 2023-09-13 PROCEDURE — 99498 ADVNCD CARE PLAN ADDL 30 MIN: CPT

## 2023-09-13 PROCEDURE — 99232 SBSQ HOSP IP/OBS MODERATE 35: CPT

## 2023-09-13 PROCEDURE — 99497 ADVNCD CARE PLAN 30 MIN: CPT

## 2023-09-13 PROCEDURE — 99233 SBSQ HOSP IP/OBS HIGH 50: CPT

## 2023-09-13 RX ORDER — DEXTROSE MONOHYDRATE, SODIUM CHLORIDE, AND POTASSIUM CHLORIDE 50; .745; 4.5 G/1000ML; G/1000ML; G/1000ML
1000 INJECTION, SOLUTION INTRAVENOUS
Refills: 0 | Status: DISCONTINUED | OUTPATIENT
Start: 2023-09-13 | End: 2023-09-14

## 2023-09-13 RX ORDER — POTASSIUM CHLORIDE 20 MEQ
10 PACKET (EA) ORAL
Refills: 0 | Status: COMPLETED | OUTPATIENT
Start: 2023-09-13 | End: 2023-09-13

## 2023-09-13 RX ORDER — SODIUM,POTASSIUM PHOSPHATES 278-250MG
2 POWDER IN PACKET (EA) ORAL ONCE
Refills: 0 | Status: COMPLETED | OUTPATIENT
Start: 2023-09-13 | End: 2023-09-13

## 2023-09-13 RX ORDER — ERYTHROMYCIN BASE 5 MG/GRAM
1 OINTMENT (GRAM) OPHTHALMIC (EYE)
Refills: 0 | Status: DISCONTINUED | OUTPATIENT
Start: 2023-09-13 | End: 2023-09-14

## 2023-09-13 RX ORDER — MAGNESIUM SULFATE 500 MG/ML
2 VIAL (ML) INJECTION ONCE
Refills: 0 | Status: COMPLETED | OUTPATIENT
Start: 2023-09-13 | End: 2023-09-13

## 2023-09-13 RX ADMIN — Medication 650 MILLIGRAM(S): at 21:26

## 2023-09-13 RX ADMIN — Medication 25 GRAM(S): at 10:23

## 2023-09-13 RX ADMIN — Medication 100 MILLIEQUIVALENT(S): at 09:34

## 2023-09-13 RX ADMIN — CITALOPRAM 20 MILLIGRAM(S): 10 TABLET, FILM COATED ORAL at 10:23

## 2023-09-13 RX ADMIN — Medication 650 MILLIGRAM(S): at 14:35

## 2023-09-13 RX ADMIN — Medication 1 DROP(S): at 10:23

## 2023-09-13 RX ADMIN — PIPERACILLIN AND TAZOBACTAM 25 GRAM(S): 4; .5 INJECTION, POWDER, LYOPHILIZED, FOR SOLUTION INTRAVENOUS at 06:51

## 2023-09-13 RX ADMIN — DEXTROSE MONOHYDRATE, SODIUM CHLORIDE, AND POTASSIUM CHLORIDE 60 MILLILITER(S): 50; .745; 4.5 INJECTION, SOLUTION INTRAVENOUS at 11:18

## 2023-09-13 RX ADMIN — POTASSIUM PHOSPHATE, MONOBASIC POTASSIUM PHOSPHATE, DIBASIC 62.5 MILLIMOLE(S): 236; 224 INJECTION, SOLUTION INTRAVENOUS at 11:18

## 2023-09-13 RX ADMIN — ENOXAPARIN SODIUM 40 MILLIGRAM(S): 100 INJECTION SUBCUTANEOUS at 10:30

## 2023-09-13 RX ADMIN — Medication 2 PACKET(S): at 10:24

## 2023-09-13 RX ADMIN — Medication 1 APPLICATION(S): at 23:50

## 2023-09-13 RX ADMIN — PIPERACILLIN AND TAZOBACTAM 25 GRAM(S): 4; .5 INJECTION, POWDER, LYOPHILIZED, FOR SOLUTION INTRAVENOUS at 14:36

## 2023-09-13 RX ADMIN — LATANOPROST 1 DROP(S): 0.05 SOLUTION/ DROPS OPHTHALMIC; TOPICAL at 21:26

## 2023-09-13 RX ADMIN — Medication 650 MILLIGRAM(S): at 14:48

## 2023-09-13 RX ADMIN — Medication 100 MILLIEQUIVALENT(S): at 08:41

## 2023-09-13 RX ADMIN — Medication 100 MILLIEQUIVALENT(S): at 08:44

## 2023-09-13 RX ADMIN — Medication 1 APPLICATION(S): at 18:06

## 2023-09-13 NOTE — PROVIDER CONTACT NOTE (CRITICAL VALUE NOTIFICATION) - ACTION/TREATMENT ORDERED:
NP called Message left.  will follow up
PA made aware.
x3 K riders ordered
MD aware and will replaced
ID Consult placed. PA made aware. C/w IV abx.

## 2023-09-13 NOTE — PROGRESS NOTE ADULT - SUBJECTIVE AND OBJECTIVE BOX
HPI:    "86 y/o female with a PMHx of Alzheimer's, advanced dementia and non verbal, glaucoma, hypercholesterolemia, osteoarthritis BIBA form [facility] for possible aspiration.  Son at bedside reports patient has had history of aspiration pneumonia.  Recently she has had more difficulty swallowing, wet coughing requring suction at [facility]. Patient with increased weakness , lethargy past few days and very poor oral intake since the weekend.   ROS is not attainable secondary to dementia.  "    9/7  pt seen and examined  private aide at bedside  appeared comfortable and in NAD  was able to answer minimal questions yes/no.   aide reports she can be a little more verbal at times  case d/w speech and swallow     9/8  pt seen and examined  not following commands  very somnolent  case d/w ss and primary team  no changes in goals at this time  case d/w Shira HCP       PAIN: ( )Yes   (x )No   Pt unable to characterise d/t clinical status   DYSPNEA: ( ) Yes  (x ) No  Level:   Pt unable to characterise d/t clinical status       Review of Systems:    Anxiety- calm   Depression-  Physical Discomfort- NAD  Dyspnea- none  Constipation-  Diarrhea- none   Nausea-  Vomiting- none  Anorexia-   Weight Loss-    Cough-  none   Secretions- none  Fatigue- +  Weakness-+  Delirium- +     All other systems reviewed and negative        PHYSICAL EXAM:     ICU Vital Signs Last 24 Hrs  T(C): 36.7 (08 Sep 2023 08:39), Max: 37.8 (07 Sep 2023 21:35)  T(F): 98.1 (08 Sep 2023 08:39), Max: 100 (07 Sep 2023 21:35)  HR: 71 (08 Sep 2023 10:16) (65 - 78)  BP: 94/55 (08 Sep 2023 10:16) (90/44 - 105/52)  BP(mean): --  ABP: --  ABP(mean): --  RR: 18 (08 Sep 2023 08:39) (18 - 18)  SpO2: 96% (08 Sep 2023 08:39) (96% - 99%)    O2 Parameters below as of 08 Sep 2023 08:39  Patient On (Oxygen Delivery Method): nasal cannula  O2 Flow (L/min): 2     PpS: 20  FAST:    General: calm in NAD  Mental Status: somnolent  HEENT:  perrl  Lungs: decreased b/s  bilatearlly no wheezing or rales  Cardiac: s1s2    GI: soft nontender +BS  : voids  Ext: no swelling or erythema  neuro- somnolent    LABS:                        10.8   6.64  )-----------( 254      ( 07 Sep 2023 06:49 )             32.3     09-07    145  |  114<H>  |  10  ----------------------------<  83  3.4<L>   |  24  |  0.71    Ca    9.4      07 Sep 2023 06:49    TPro  7.9  /  Alb  3.5  /  TBili  1.1  /  DBili  x   /  AST  22  /  ALT  14  /  AlkPhos  110  09-06    PT/INR - ( 07 Sep 2023 06:49 )   PT: 11.2 sec;   INR: 0.99 ratio         PTT - ( 06 Sep 2023 00:35 )  PTT:34.2 sec  Albumin: Albumin: 3.5 g/dL (09-06 @ 00:35)      Allergies    No Known Allergies    Intolerances      MEDICATIONS  (STANDING):  citalopram 20 milliGRAM(s) Oral daily  enoxaparin Injectable 30 milliGRAM(s) SubCutaneous every 24 hours  latanoprost 0.005% Ophthalmic Solution 1 Drop(s) Both EYES at bedtime  piperacillin/tazobactam IVPB.. 3.375 Gram(s) IV Intermittent every 8 hours  sodium chloride 0.9%. 1000 milliLiter(s) (75 mL/Hr) IV Continuous <Continuous>  timolol 0.5% Solution 1 Drop(s) Both EYES daily    MEDICATIONS  (PRN):  acetaminophen  Suppository .. 650 milliGRAM(s) Rectal every 6 hours PRN Temp greater or equal to 38C (100.4F), Mild Pain (1 - 3)  aluminum hydroxide/magnesium hydroxide/simethicone Suspension 30 milliLiter(s) Oral every 4 hours PRN Dyspepsia  glycopyrrolate Injectable 0.2 milliGRAM(s) IV Push every 8 hours PRN secretions  guaiFENesin Oral Liquid (Sugar-Free) 100 milliGRAM(s) Oral every 6 hours PRN Cough  melatonin 3 milliGRAM(s) Oral at bedtime PRN Insomnia  ondansetron Injectable 4 milliGRAM(s) IV Push every 6 hours PRN Nausea and/or Vomiting      RADIOLOGY/ADDITIONAL STUDIES:
86 y/o female with a PMHx of Alzheimer's, advanced dementia and non verbal, glaucoma, hypercholesterolemia, osteoarthritis [facility]  Son at bedside reports patient has had history of aspiration pneumonia.  Recently she has had more difficulty swallowing, wet coughing requring suction at [facility]. Patient with increased weakness , lethargy past few days and very poor oral intake since the weekend.       ROS is not attainable secondary to dementia.        9/7- patient was seen and examined- non verbal- does not follow commands. VSS  9/8- patient was seen and examined- opens eyes but does not follow commands. Daughter- Shira at the bedside- plan d/w daughter.   9/9 - no overnight events.  daughter at bedside  9/10 - no overnight events    PE:  Constitutional: NAD, laying in bed- cachectic, frail- ill appearing   HEENT: NC/AT  Back: no tenderness  Respiratory: respirations even and non labored, LCTA- diminished at the base   Cardiovascular: S1S2 regular, no murmurs  Abdomen: soft, not tender, not distended, positive BS  Genitourinary: voiding-  incontinent   Musculoskeletal: no muscle tenderness, no joint swelling or tenderness  Extremities: no pedal edema  Neurological: no focal deficits      MEDICATIONS  (STANDING):  citalopram 20 milliGRAM(s) Oral daily  dextrose 5% + sodium chloride 0.9%. 1000 milliLiter(s) (50 mL/Hr) IV Continuous <Continuous>  enoxaparin Injectable 40 milliGRAM(s) SubCutaneous every 24 hours  latanoprost 0.005% Ophthalmic Solution 1 Drop(s) Both EYES at bedtime  piperacillin/tazobactam IVPB.. 3.375 Gram(s) IV Intermittent every 8 hours  timolol 0.5% Solution 1 Drop(s) Both EYES daily    MEDICATIONS  (PRN):  acetaminophen  Suppository .. 650 milliGRAM(s) Rectal every 6 hours PRN Temp greater or equal to 38C (100.4F), Mild Pain (1 - 3)  aluminum hydroxide/magnesium hydroxide/simethicone Suspension 30 milliLiter(s) Oral every 4 hours PRN Dyspepsia  glycopyrrolate Injectable 0.2 milliGRAM(s) IV Push every 8 hours PRN secretions  guaiFENesin Oral Liquid (Sugar-Free) 100 milliGRAM(s) Oral every 6 hours PRN Cough  melatonin 3 milliGRAM(s) Oral at bedtime PRN Insomnia  ondansetron Injectable 4 milliGRAM(s) IV Push every 6 hours PRN Nausea and/or Vomiting      09-08    145  |  116<H>  |  8   ----------------------------<  81  4.1   |  23  |  0.70    Ca    9.1      08 Sep 2023 08:35                            10.1   5.81  )-----------( 260      ( 08 Sep 2023 08:35 )             30.5     < from: CT Chest No Cont (09.06.23 @ 14:00) >  IMPRESSION: Right lower lobe linear and patchy opacities as described   above new since July 25, 2023 suggestive of combination of atelectasis   and pneumonia.    Focal opacification of portion of the right lower lobe bronchus likely   due to internal secretions.    A 3 month follow-up noncontrast chest CT is recommended to ensure   resolution of the above findings.        
88 y/o female with a PMHx of Alzheimer's, advanced dementia and non verbal, glaucoma, hypercholesterolemia, osteoarthritis BIBA form [facility] for possible aspiration.  Son at bedside reports patient has had history of aspiration pneumonia.  Recently she has had more difficulty swallowing, wet coughing requring suction at [facility]. Patient with increased weakness , lethargy past few days and very poor oral intake since the weekend.     ROS is not attainable secondary to dementia.        9/7- patient was seen and examined- non verbal- does not follow commands. VSS  9/8- patient was seen and examined- opens eyes but does not follow commands. Daughter- Shira at the bedside- plan d/w daughter.   9/9 - no overnight events.  daughter at bedside    Vital Signs Last 24 Hrs  T(C): 36.4 (09 Sep 2023 08:49), Max: 36.9 (08 Sep 2023 23:28)  T(F): 97.5 (09 Sep 2023 08:49), Max: 98.4 (08 Sep 2023 23:28)  HR: 68 (09 Sep 2023 08:49) (66 - 70)  BP: 117/48 (09 Sep 2023 08:49) (97/51 - 117/48)  BP(mean): --  RR: 16 (09 Sep 2023 08:49) (16 - 18)  SpO2: 98% (09 Sep 2023 08:49) (98% - 100%)    Parameters below as of 09 Sep 2023 08:49  Patient On (Oxygen Delivery Method): nasal cannula  O2 Flow (L/min): 2        ROS:   All 10 systems reviewed and found to be negative with the exception of what has been described above.     PE:  Constitutional: NAD, laying in bed- cachectic, frail- ill appearing   HEENT: NC/AT  Back: no tenderness  Respiratory: respirations even and non labored, LCTA- diminished at the base   Cardiovascular: S1S2 regular, no murmurs  Abdomen: soft, not tender, not distended, positive BS  Genitourinary: voiding-  incontinent   Musculoskeletal: no muscle tenderness, no joint swelling or tenderness  Extremities: no pedal edema  Neurological: no focal deficits      MEDICATIONS  (STANDING):  citalopram 20 milliGRAM(s) Oral daily  dextrose 5% + sodium chloride 0.9%. 1000 milliLiter(s) (50 mL/Hr) IV Continuous <Continuous>  enoxaparin Injectable 40 milliGRAM(s) SubCutaneous every 24 hours  latanoprost 0.005% Ophthalmic Solution 1 Drop(s) Both EYES at bedtime  piperacillin/tazobactam IVPB.. 3.375 Gram(s) IV Intermittent every 8 hours  timolol 0.5% Solution 1 Drop(s) Both EYES daily    MEDICATIONS  (PRN):  acetaminophen  Suppository .. 650 milliGRAM(s) Rectal every 6 hours PRN Temp greater or equal to 38C (100.4F), Mild Pain (1 - 3)  aluminum hydroxide/magnesium hydroxide/simethicone Suspension 30 milliLiter(s) Oral every 4 hours PRN Dyspepsia  glycopyrrolate Injectable 0.2 milliGRAM(s) IV Push every 8 hours PRN secretions  guaiFENesin Oral Liquid (Sugar-Free) 100 milliGRAM(s) Oral every 6 hours PRN Cough  melatonin 3 milliGRAM(s) Oral at bedtime PRN Insomnia  ondansetron Injectable 4 milliGRAM(s) IV Push every 6 hours PRN Nausea and/or Vomiting      09-08    145  |  116<H>  |  8   ----------------------------<  81  4.1   |  23  |  0.70    Ca    9.1      08 Sep 2023 08:35                            10.1   5.81  )-----------( 260      ( 08 Sep 2023 08:35 )             30.5     < from: CT Chest No Cont (09.06.23 @ 14:00) >  IMPRESSION: Right lower lobe linear and patchy opacities as described   above new since July 25, 2023 suggestive of combination of atelectasis   and pneumonia.    Focal opacification of portion of the right lower lobe bronchus likely   due to internal secretions.    A 3 month follow-up noncontrast chest CT is recommended to ensure   resolution of the above findings.        
88 y/o female with a PMHx of Alzheimer's, advanced dementia and non verbal, glaucoma, hypercholesterolemia, osteoarthritis BIBA form [facility] for possible aspiration.  Son at bedside reports patient has had history of aspiration pneumonia.  Recently she has had more difficulty swallowing, wet coughing requring suction at [facility]. Patient with increased weakness , lethargy past few days and very poor oral intake since the weekend.       ROS is not attainable secondary to dementia.        9/11- more alert and awake this morning. Not following commands but maintains eye contact. VSS.     Vital Signs Last 24 Hrs  T(C): 36.6 (11 Sep 2023 07:29), Max: 36.7 (10 Sep 2023 17:11)  T(F): 97.8 (11 Sep 2023 07:29), Max: 98.1 (10 Sep 2023 17:11)  HR: 78 (11 Sep 2023 07:29) (77 - 78)  BP: 132/66 (11 Sep 2023 07:29) (132/66 - 147/91)  BP(mean): --  RR: 18 (11 Sep 2023 07:29) (18 - 18)  SpO2: 99% (11 Sep 2023 07:29) (99% - 100%)    Parameters below as of 11 Sep 2023 07:29  Patient On (Oxygen Delivery Method): nasal cannula  O2 Flow (L/min): 2      PE:  Constitutional: NAD, laying in bed- cachectic, frail- ill appearing   HEENT: NC/AT  Back: no tenderness  Respiratory: respirations even and non labored, LCTA- diminished at the base   Cardiovascular: S1S2 regular, no murmurs  Abdomen: soft, not tender, not distended, positive BS  Genitourinary: voiding-  incontinent   Musculoskeletal: no muscle tenderness, no joint swelling or tenderness  Extremities: no pedal edema  Neurological: no focal deficits    09-11    146<H>  |  116<H>  |  1<L>  ----------------------------<  107<H>  2.8<LL>   |  28  |  0.56    Ca    8.4<L>      11 Sep 2023 06:29  Phos  1.4     09-11  Mg     1.6     09-11          MEDICATIONS  (STANDING):  citalopram 20 milliGRAM(s) Oral daily  dextrose 5% + sodium chloride 0.9%. 1000 milliLiter(s) (50 mL/Hr) IV Continuous <Continuous>  enoxaparin Injectable 40 milliGRAM(s) SubCutaneous every 24 hours  latanoprost 0.005% Ophthalmic Solution 1 Drop(s) Both EYES at bedtime  piperacillin/tazobactam IVPB.. 3.375 Gram(s) IV Intermittent every 8 hours  timolol 0.5% Solution 1 Drop(s) Both EYES daily    MEDICATIONS  (PRN):  acetaminophen  Suppository .. 650 milliGRAM(s) Rectal every 6 hours PRN Temp greater or equal to 38C (100.4F), Mild Pain (1 - 3)  aluminum hydroxide/magnesium hydroxide/simethicone Suspension 30 milliLiter(s) Oral every 4 hours PRN Dyspepsia  glycopyrrolate Injectable 0.2 milliGRAM(s) IV Push every 8 hours PRN secretions  guaiFENesin Oral Liquid (Sugar-Free) 100 milliGRAM(s) Oral every 6 hours PRN Cough  melatonin 3 milliGRAM(s) Oral at bedtime PRN Insomnia  ondansetron Injectable 4 milliGRAM(s) IV Push every 6 hours PRN Nausea and/or Vomiting      < from: CT Chest No Cont (09.06.23 @ 14:00) >  IMPRESSION: Right lower lobe linear and patchy opacities as described   above new since July 25, 2023 suggestive of combination of atelectasis   and pneumonia.    Focal opacification of portion of the right lower lobe bronchus likely   due to internal secretions.    A 3 month follow-up noncontrast chest CT is recommended to ensure   resolution of the above findings.        
Date of service: 09-11-23 @ 10:54    pt seen and examined   weak appearing  more alert   no resp distress  afebrile    ROS: unable to obtain d/t medical condition      MEDICATIONS  (STANDING):  citalopram 20 milliGRAM(s) Oral daily  dextrose 5% + sodium chloride 0.45%. 1000 milliLiter(s) (50 mL/Hr) IV Continuous <Continuous>  enoxaparin Injectable 40 milliGRAM(s) SubCutaneous every 24 hours  latanoprost 0.005% Ophthalmic Solution 1 Drop(s) Both EYES at bedtime  piperacillin/tazobactam IVPB.. 3.375 Gram(s) IV Intermittent every 8 hours  potassium chloride  10 mEq/100 mL IVPB 10 milliEquivalent(s) IV Intermittent every 1 hour  timolol 0.5% Solution 1 Drop(s) Both EYES daily    Vital Signs Last 24 Hrs  T(C): 36.6 (11 Sep 2023 07:29), Max: 36.7 (10 Sep 2023 17:11)  T(F): 97.8 (11 Sep 2023 07:29), Max: 98.1 (10 Sep 2023 17:11)  HR: 78 (11 Sep 2023 07:29) (77 - 78)  BP: 132/66 (11 Sep 2023 07:29) (132/66 - 147/91)  BP(mean): --  RR: 18 (11 Sep 2023 07:29) (18 - 18)  SpO2: 99% (11 Sep 2023 07:29) (99% - 100%)    Parameters below as of 11 Sep 2023 07:29  Patient On (Oxygen Delivery Method): nasal cannula  O2 Flow (L/min): 2      PE:  Constitutional: frail looking  HEENT: NC/AT, EOMI, PERRLA, conjunctivae clear; ears and nose atraumatic; pharynx benign  Neck: supple; thyroid not palpable  Back: no tenderness  Respiratory: decreased breath sounds, rhonchi    Cardiovascular: S1S2 regular, no murmurs  Abdomen: soft, not tender, not distended, positive BS; liver and spleen WNL  Genitourinary: no suprapubic tenderness  Lymphatic: no LN palpable  Musculoskeletal: no muscle tenderness, no joint swelling or tenderness  Extremities: no pedal edema  Neurological/ Psychiatric:  moving all extremities  Skin: no rashes; no palpable lesions    Labs: all available labs reviewed                        09-11    146<H>  |  116<H>  |  1<L>  ----------------------------<  107<H>  2.8<LL>   |  28  |  0.56    Ca    8.4<L>      11 Sep 2023 06:29  Phos  1.4     09-11  Mg     1.6     09-11      LIVER FUNCTIONS - ( 06 Sep 2023 00:35 )  Alb: 3.5 g/dL / Pro: 7.9 gm/dL / ALK PHOS: 110 U/L / ALT: 14 U/L / AST: 22 U/L / GGT: x           Urinalysis Basic - ( 07 Sep 2023 06:49 )    Color: x / Appearance: x / SG: x / pH: x  Gluc: 83 mg/dL / Ketone: x  / Bili: x / Urobili: x   Blood: x / Protein: x / Nitrite: x   Leuk Esterase: x / RBC: x / WBC x   Sq Epi: x / Non Sq Epi: x / Bacteria: x    Culture - Blood (09.06.23 @ 00:35)   Specimen Source: .Blood Blood-Peripheral  Culture Results:   No growth at 24 hours  Culture - Blood (09.06.23 @ 00:35)   - Staphylococcus epidermidis, Methicillin resistant: Detec  Gram Stain:   Growth in aerobic bottle: Gram Positive Cocci in Clusters   Growth in anaerobic bottle: Gram Positive Cocci in Clusters  Specimen Source: .Blood Blood-Peripheral  Organism: Blood Culture PCR  Culture Results:   Growth in aerobic bottle: Gram Positive Cocci in Clusters   Growth in anaerobic bottle: Gram Positive Cocci in Clusters   Direct identification is available within approximately 3-5   hours either by Blood Panel Multiplexed PCR or Direct   MALDI-TOF. Details: [email or web address]  Organism Identification: Blood Culture PCR  Method Type: PCR      Radiology: all available radiological tests reviewed  < from: CT Chest No Cont (09.06.23 @ 14:00) >    ACC: [id number] EXAM:  CT CHEST   ORDERED BY: ANUSHKA TILLMAN     PROCEDURE DATE:  09/06/2023          INTERPRETATION:  Clinical indications: Evaluate for aspiration pneumonia.    Axial CT images of the chest are obtained without intravenous   administration of contrast.    Comparison is made with the chest CT of July 25, 2023.    No enlarged axillary, mediastinal or hilar lymph nodes.    Heart size is normal. No pericardial effusion. Vascular calcifications   with involvement of the aorta and the coronary arteries.    Evaluation of the upper abdomen demonstrate no significant abnormality.    Evaluation of the lungs demonstrate mild bronchiectasis within the   lingula and the right middle lobe as on the prior study may be sequela of   chronic mycobacterial avium complex infection given the distribution.   Superimposed bilateral lower lung foci of impacted distal or dilated   airways. Bilateral mid to lower lung areas of linear or subsegmental   atelectasis. Right lower lobe linear opacities suggestive of atelectasis   with contiguous patchy consolidation within the dependent portion of the   right lower lobe new since July 25, 2023 may represent pneumonia.    Previously described 6 mm lingular nodule is unchanged. 4 mm right middle   lobe pulmonary nodule is unchanged since March 15, 2023. Tiny bilateral   calcified granulomas.    Secretions within the right mainstem bronchus extending into the bronchus   intermedius. Focal opacification of a focal portion of the right lower   lobar bronchus extending into a segmental branch likely due to internal   secretions.    Degenerative changes of the spine. Severe loss of height of the T9 and   T12 vertebral bodies with moderate superior endplate loss of height of   the L1 vertebral body are unchanged.    IMPRESSION: Right lower lobe linear and patchy opacities as described   above new since July 25, 2023 suggestive of combination of atelectasis   and pneumonia.    Focal opacification of portion of the right lower lobe bronchus likely   due to internal secretions.    A 3 month follow-up noncontrast chest CT is recommended to ensure   resolution of the above findings.        Advanced directives addressed: full resuscitation
Date of service: 09-08-23 @ 10:34    pt seen and examined   weak appearing, frail  tmax 100  no resp distress     ROS: unable to obtain d/t medical condition    MEDICATIONS  (STANDING):  citalopram 20 milliGRAM(s) Oral daily  dextrose 5% + sodium chloride 0.9%. 1000 milliLiter(s) (50 mL/Hr) IV Continuous <Continuous>  enoxaparin Injectable 40 milliGRAM(s) SubCutaneous every 24 hours  latanoprost 0.005% Ophthalmic Solution 1 Drop(s) Both EYES at bedtime  piperacillin/tazobactam IVPB.. 3.375 Gram(s) IV Intermittent every 8 hours  timolol 0.5% Solution 1 Drop(s) Both EYES daily    Vital Signs Last 24 Hrs  T(C): 36.7 (08 Sep 2023 08:39), Max: 37.8 (07 Sep 2023 21:35)  T(F): 98.1 (08 Sep 2023 08:39), Max: 100 (07 Sep 2023 21:35)  HR: 71 (08 Sep 2023 10:16) (65 - 78)  BP: 94/55 (08 Sep 2023 10:16) (90/44 - 105/52)  BP(mean): --  RR: 18 (08 Sep 2023 08:39) (18 - 18)  SpO2: 96% (08 Sep 2023 08:39) (96% - 100%)    Parameters below as of 08 Sep 2023 08:39  Patient On (Oxygen Delivery Method): nasal cannula  O2 Flow (L/min): 2    PE:  Constitutional: frail looking  HEENT: NC/AT, EOMI, PERRLA, conjunctivae clear; ears and nose atraumatic; pharynx benign  Neck: supple; thyroid not palpable  Back: no tenderness  Respiratory: decreased breath sounds, rhonchi    Cardiovascular: S1S2 regular, no murmurs  Abdomen: soft, not tender, not distended, positive BS; liver and spleen WNL  Genitourinary: no suprapubic tenderness  Lymphatic: no LN palpable  Musculoskeletal: no muscle tenderness, no joint swelling or tenderness  Extremities: no pedal edema  Neurological/ Psychiatric:  moving all extremities  Skin: no rashes; no palpable lesions    Labs: all available labs reviewed                                   10.1   5.81  )-----------( 260      ( 08 Sep 2023 08:35 )             30.5     09-08    145  |  116<H>  |  8   ----------------------------<  81  4.1   |  23  |  0.70    Ca    9.1      08 Sep 2023 08:35        LIVER FUNCTIONS - ( 06 Sep 2023 00:35 )  Alb: 3.5 g/dL / Pro: 7.9 gm/dL / ALK PHOS: 110 U/L / ALT: 14 U/L / AST: 22 U/L / GGT: x           Urinalysis Basic - ( 07 Sep 2023 06:49 )    Color: x / Appearance: x / SG: x / pH: x  Gluc: 83 mg/dL / Ketone: x  / Bili: x / Urobili: x   Blood: x / Protein: x / Nitrite: x   Leuk Esterase: x / RBC: x / WBC x   Sq Epi: x / Non Sq Epi: x / Bacteria: x    Culture - Blood (09.06.23 @ 00:35)   Specimen Source: .Blood Blood-Peripheral  Culture Results:   No growth at 24 hours  Culture - Blood (09.06.23 @ 00:35)   - Staphylococcus epidermidis, Methicillin resistant: Detec  Gram Stain:   Growth in aerobic bottle: Gram Positive Cocci in Clusters   Growth in anaerobic bottle: Gram Positive Cocci in Clusters  Specimen Source: .Blood Blood-Peripheral  Organism: Blood Culture PCR  Culture Results:   Growth in aerobic bottle: Gram Positive Cocci in Clusters   Growth in anaerobic bottle: Gram Positive Cocci in Clusters   Direct identification is available within approximately 3-5   hours either by Blood Panel Multiplexed PCR or Direct   MALDI-TOF. Details: [email or web address]  Organism Identification: Blood Culture PCR  Method Type: PCR      Radiology: all available radiological tests reviewed  < from: CT Chest No Cont (09.06.23 @ 14:00) >    ACC: [id number] EXAM:  CT CHEST   ORDERED BY: ANUSHKA TILLMAN     PROCEDURE DATE:  09/06/2023          INTERPRETATION:  Clinical indications: Evaluate for aspiration pneumonia.    Axial CT images of the chest are obtained without intravenous   administration of contrast.    Comparison is made with the chest CT of July 25, 2023.    No enlarged axillary, mediastinal or hilar lymph nodes.    Heart size is normal. No pericardial effusion. Vascular calcifications   with involvement of the aorta and the coronary arteries.    Evaluation of the upper abdomen demonstrate no significant abnormality.    Evaluation of the lungs demonstrate mild bronchiectasis within the   lingula and the right middle lobe as on the prior study may be sequela of   chronic mycobacterial avium complex infection given the distribution.   Superimposed bilateral lower lung foci of impacted distal or dilated   airways. Bilateral mid to lower lung areas of linear or subsegmental   atelectasis. Right lower lobe linear opacities suggestive of atelectasis   with contiguous patchy consolidation within the dependent portion of the   right lower lobe new since July 25, 2023 may represent pneumonia.    Previously described 6 mm lingular nodule is unchanged. 4 mm right middle   lobe pulmonary nodule is unchanged since March 15, 2023. Tiny bilateral   calcified granulomas.    Secretions within the right mainstem bronchus extending into the bronchus   intermedius. Focal opacification of a focal portion of the right lower   lobar bronchus extending into a segmental branch likely due to internal   secretions.    Degenerative changes of the spine. Severe loss of height of the T9 and   T12 vertebral bodies with moderate superior endplate loss of height of   the L1 vertebral body are unchanged.    IMPRESSION: Right lower lobe linear and patchy opacities as described   above new since July 25, 2023 suggestive of combination of atelectasis   and pneumonia.    Focal opacification of portion of the right lower lobe bronchus likely   due to internal secretions.    A 3 month follow-up noncontrast chest CT is recommended to ensure   resolution of the above findings.        Advanced directives addressed: full resuscitation
Date of service: 09-10-23 @ 14:37    pt seen and examined   weak appearing  no o/n events  no resp distress  afebrile    ROS: unable to obtain d/t medical condition      MEDICATIONS  (STANDING):  citalopram 20 milliGRAM(s) Oral daily  dextrose 5% + sodium chloride 0.9%. 1000 milliLiter(s) (50 mL/Hr) IV Continuous <Continuous>  enoxaparin Injectable 40 milliGRAM(s) SubCutaneous every 24 hours  latanoprost 0.005% Ophthalmic Solution 1 Drop(s) Both EYES at bedtime  piperacillin/tazobactam IVPB.. 3.375 Gram(s) IV Intermittent every 8 hours  timolol 0.5% Solution 1 Drop(s) Both EYES daily    Vital Signs Last 24 Hrs  T(C): 36.9 (10 Sep 2023 08:59), Max: 37 (09 Sep 2023 21:10)  T(F): 98.4 (10 Sep 2023 08:59), Max: 98.6 (09 Sep 2023 21:10)  HR: 89 (10 Sep 2023 08:59) (65 - 89)  BP: 103/63 (10 Sep 2023 08:59) (103/63 - 120/50)  BP(mean): --  RR: 18 (10 Sep 2023 08:59) (17 - 18)  SpO2: 94% (10 Sep 2023 08:59) (94% - 100%)    Parameters below as of 10 Sep 2023 08:59  Patient On (Oxygen Delivery Method): nasal cannula  O2 Flow (L/min): 2      PE:  Constitutional: frail looking  HEENT: NC/AT, EOMI, PERRLA, conjunctivae clear; ears and nose atraumatic; pharynx benign  Neck: supple; thyroid not palpable  Back: no tenderness  Respiratory: decreased breath sounds, rhonchi    Cardiovascular: S1S2 regular, no murmurs  Abdomen: soft, not tender, not distended, positive BS; liver and spleen WNL  Genitourinary: no suprapubic tenderness  Lymphatic: no LN palpable  Musculoskeletal: no muscle tenderness, no joint swelling or tenderness  Extremities: no pedal edema  Neurological/ Psychiatric:  moving all extremities  Skin: no rashes; no palpable lesions    Labs: all available labs reviewed                          LIVER FUNCTIONS - ( 06 Sep 2023 00:35 )  Alb: 3.5 g/dL / Pro: 7.9 gm/dL / ALK PHOS: 110 U/L / ALT: 14 U/L / AST: 22 U/L / GGT: x           Urinalysis Basic - ( 07 Sep 2023 06:49 )    Color: x / Appearance: x / SG: x / pH: x  Gluc: 83 mg/dL / Ketone: x  / Bili: x / Urobili: x   Blood: x / Protein: x / Nitrite: x   Leuk Esterase: x / RBC: x / WBC x   Sq Epi: x / Non Sq Epi: x / Bacteria: x    Culture - Blood (09.06.23 @ 00:35)   Specimen Source: .Blood Blood-Peripheral  Culture Results:   No growth at 24 hours  Culture - Blood (09.06.23 @ 00:35)   - Staphylococcus epidermidis, Methicillin resistant: Detec  Gram Stain:   Growth in aerobic bottle: Gram Positive Cocci in Clusters   Growth in anaerobic bottle: Gram Positive Cocci in Clusters  Specimen Source: .Blood Blood-Peripheral  Organism: Blood Culture PCR  Culture Results:   Growth in aerobic bottle: Gram Positive Cocci in Clusters   Growth in anaerobic bottle: Gram Positive Cocci in Clusters   Direct identification is available within approximately 3-5   hours either by Blood Panel Multiplexed PCR or Direct   MALDI-TOF. Details: [email or web address]  Organism Identification: Blood Culture PCR  Method Type: PCR      Radiology: all available radiological tests reviewed  < from: CT Chest No Cont (09.06.23 @ 14:00) >    ACC: [id number] EXAM:  CT CHEST   ORDERED BY: ANUSHKA LYN DATE:  09/06/2023          INTERPRETATION:  Clinical indications: Evaluate for aspiration pneumonia.    Axial CT images of the chest are obtained without intravenous   administration of contrast.    Comparison is made with the chest CT of July 25, 2023.    No enlarged axillary, mediastinal or hilar lymph nodes.    Heart size is normal. No pericardial effusion. Vascular calcifications   with involvement of the aorta and the coronary arteries.    Evaluation of the upper abdomen demonstrate no significant abnormality.    Evaluation of the lungs demonstrate mild bronchiectasis within the   lingula and the right middle lobe as on the prior study may be sequela of   chronic mycobacterial avium complex infection given the distribution.   Superimposed bilateral lower lung foci of impacted distal or dilated   airways. Bilateral mid to lower lung areas of linear or subsegmental   atelectasis. Right lower lobe linear opacities suggestive of atelectasis   with contiguous patchy consolidation within the dependent portion of the   right lower lobe new since July 25, 2023 may represent pneumonia.    Previously described 6 mm lingular nodule is unchanged. 4 mm right middle   lobe pulmonary nodule is unchanged since March 15, 2023. Tiny bilateral   calcified granulomas.    Secretions within the right mainstem bronchus extending into the bronchus   intermedius. Focal opacification of a focal portion of the right lower   lobar bronchus extending into a segmental branch likely due to internal   secretions.    Degenerative changes of the spine. Severe loss of height of the T9 and   T12 vertebral bodies with moderate superior endplate loss of height of   the L1 vertebral body are unchanged.    IMPRESSION: Right lower lobe linear and patchy opacities as described   above new since July 25, 2023 suggestive of combination of atelectasis   and pneumonia.    Focal opacification of portion of the right lower lobe bronchus likely   due to internal secretions.    A 3 month follow-up noncontrast chest CT is recommended to ensure   resolution of the above findings.        Advanced directives addressed: full resuscitation
86 y/o female with a PMHx of Alzheimer's, advanced dementia and non verbal, glaucoma, hypercholesterolemia, osteoarthritis [facility] for possible [facility].  Son at bedside reports patient has had history of aspiration pneumonia.  Recently she has had more difficulty swallowing, wet coughing requring suction at [facility]. Patient with increased weakness , lethargy past few days and very poor oral intake since the weekend.     ROS is not attainable secondary to dementia.        9/7- patient was seen and examined- non verbal- does not follow commands. VSS  9/8- patient was seen and examined- opens eyes but does not follow commands. Daughter- Shira at the bedside- plan d/w daughter.     Vital Signs Last 24 Hrs  T(C): 36.7 (08 Sep 2023 08:39), Max: 37.8 (07 Sep 2023 21:35)  T(F): 98.1 (08 Sep 2023 08:39), Max: 100 (07 Sep 2023 21:35)  HR: 71 (08 Sep 2023 10:16) (65 - 78)  BP: 94/55 (08 Sep 2023 10:16) (90/44 - 105/52)  BP(mean): --  RR: 18 (08 Sep 2023 08:39) (18 - 18)  SpO2: 96% (08 Sep 2023 08:39) (96% - 100%)    Parameters below as of 08 Sep 2023 08:39  Patient On (Oxygen Delivery Method): nasal cannula  O2 Flow (L/min): 2        ROS:   All 10 systems reviewed and found to be negative with the exception of what has been described above.     PE:  Constitutional: NAD, laying in bed- cachectic, frail- ill appearing   HEENT: NC/AT  Back: no tenderness  Respiratory: respirations even and non labored, LCTA- diminished at the base   Cardiovascular: S1S2 regular, no murmurs  Abdomen: soft, not tender, not distended, positive BS  Genitourinary: voiding-  incontinent   Musculoskeletal: no muscle tenderness, no joint swelling or tenderness  Extremities: no pedal edema  Neurological: no focal deficits      MEDICATIONS  (STANDING):  citalopram 20 milliGRAM(s) Oral daily  dextrose 5% + sodium chloride 0.9%. 1000 milliLiter(s) (50 mL/Hr) IV Continuous <Continuous>  enoxaparin Injectable 40 milliGRAM(s) SubCutaneous every 24 hours  latanoprost 0.005% Ophthalmic Solution 1 Drop(s) Both EYES at bedtime  piperacillin/tazobactam IVPB.. 3.375 Gram(s) IV Intermittent every 8 hours  timolol 0.5% Solution 1 Drop(s) Both EYES daily    MEDICATIONS  (PRN):  acetaminophen  Suppository .. 650 milliGRAM(s) Rectal every 6 hours PRN Temp greater or equal to 38C (100.4F), Mild Pain (1 - 3)  aluminum hydroxide/magnesium hydroxide/simethicone Suspension 30 milliLiter(s) Oral every 4 hours PRN Dyspepsia  glycopyrrolate Injectable 0.2 milliGRAM(s) IV Push every 8 hours PRN secretions  guaiFENesin Oral Liquid (Sugar-Free) 100 milliGRAM(s) Oral every 6 hours PRN Cough  melatonin 3 milliGRAM(s) Oral at bedtime PRN Insomnia  ondansetron Injectable 4 milliGRAM(s) IV Push every 6 hours PRN Nausea and/or Vomiting      09-08    145  |  116<H>  |  8   ----------------------------<  81  4.1   |  23  |  0.70    Ca    9.1      08 Sep 2023 08:35                            10.1   5.81  )-----------( 260      ( 08 Sep 2023 08:35 )             30.5     < from: CT Chest No Cont (09.06.23 @ 14:00) >  IMPRESSION: Right lower lobe linear and patchy opacities as described   above new since July 25, 2023 suggestive of combination of atelectasis   and pneumonia.    Focal opacification of portion of the right lower lobe bronchus likely   due to internal secretions.    A 3 month follow-up noncontrast chest CT is recommended to ensure   resolution of the above findings.        
88 y/o female with a PMHx of Alzheimer's, advanced dementia and non verbal, glaucoma, hypercholesterolemia, osteoarthritis [facility]  Son at bedside reports patient has had history of aspiration pneumonia.  Recently she has had more difficulty swallowing, wet coughing requring suction at [facility]. Patient with increased weakness , lethargy past few days and very poor oral intake since the weekend.     ROS is not attainable secondary to dementia.        9/7- patient was seen and examined- non verbal- does not follow commands. VSS    Vital Signs Last 24 Hrs  T(C): 36.3 (07 Sep 2023 08:11), Max: 37.3 (06 Sep 2023 20:43)  T(F): 97.3 (07 Sep 2023 08:11), Max: 99.2 (06 Sep 2023 20:43)  HR: 97 (07 Sep 2023 08:11) (70 - 97)  BP: 94/52 (07 Sep 2023 08:11) (94/52 - 112/74)  BP(mean): 86 (06 Sep 2023 20:43) (86 - 86)  RR: 18 (07 Sep 2023 08:11) (18 - 19)  SpO2: 97% (07 Sep 2023 08:11) (94% - 97%)    Parameters below as of 07 Sep 2023 08:11  Patient On (Oxygen Delivery Method): nasal cannula  O2 Flow (L/min): 2      ROS:   All 10 systems reviewed and found to be negative with the exception of what has been described above.     PE:  Constitutional: NAD, laying in bed- cachectic, frail- ill appearing   HEENT: NC/AT  Back: no tenderness  Respiratory: respirations even and non labored, LCTA- diminished at the base   Cardiovascular: S1S2 regular, no murmurs  Abdomen: soft, not tender, not distended, positive BS  Genitourinary: voiding-  incontinent   Musculoskeletal: no muscle tenderness, no joint swelling or tenderness  Extremities: no pedal edema  Neurological: no focal deficits      MEDICATIONS  (STANDING):  citalopram 20 milliGRAM(s) Oral daily  enoxaparin Injectable 30 milliGRAM(s) SubCutaneous every 24 hours  latanoprost 0.005% Ophthalmic Solution 1 Drop(s) Both EYES at bedtime  piperacillin/tazobactam IVPB.. 3.375 Gram(s) IV Intermittent every 8 hours  sodium chloride 0.9%. 1000 milliLiter(s) (75 mL/Hr) IV Continuous <Continuous>  timolol 0.5% Solution 1 Drop(s) Both EYES daily    MEDICATIONS  (PRN):  acetaminophen  Suppository .. 650 milliGRAM(s) Rectal every 6 hours PRN Temp greater or equal to 38C (100.4F), Mild Pain (1 - 3)  aluminum hydroxide/magnesium hydroxide/simethicone Suspension 30 milliLiter(s) Oral every 4 hours PRN Dyspepsia  glycopyrrolate Injectable 0.2 milliGRAM(s) IV Push every 8 hours PRN secretions  guaiFENesin Oral Liquid (Sugar-Free) 100 milliGRAM(s) Oral every 6 hours PRN Cough  melatonin 3 milliGRAM(s) Oral at bedtime PRN Insomnia  ondansetron Injectable 4 milliGRAM(s) IV Push every 6 hours PRN Nausea and/or Vomiting    09-07    145  |  114<H>  |  10  ----------------------------<  83  3.4<L>   |  24  |  0.71    Ca    9.4      07 Sep 2023 06:49    TPro  7.9  /  Alb  3.5  /  TBili  1.1  /  DBili  x   /  AST  22  /  ALT  14  /  AlkPhos  110  09-06                            10.8   6.64  )-----------( 254      ( 07 Sep 2023 06:49 )             32.3     < from: CT Chest No Cont (09.06.23 @ 14:00) >  IMPRESSION: Right lower lobe linear and patchy opacities as described   above new since July 25, 2023 suggestive of combination of atelectasis   and pneumonia.    Focal opacification of portion of the right lower lobe bronchus likely   due to internal secretions.    A 3 month follow-up noncontrast chest CT is recommended to ensure   resolution of the above findings.        
Date of service: 09-09-23 @ 15:09    pt seen and examined   weak appearing, frail  afebrile    ROS: unable to obtain d/t medical condition      MEDICATIONS  (STANDING):  citalopram 20 milliGRAM(s) Oral daily  dextrose 5% + sodium chloride 0.9%. 1000 milliLiter(s) (50 mL/Hr) IV Continuous <Continuous>  enoxaparin Injectable 40 milliGRAM(s) SubCutaneous every 24 hours  latanoprost 0.005% Ophthalmic Solution 1 Drop(s) Both EYES at bedtime  piperacillin/tazobactam IVPB.. 3.375 Gram(s) IV Intermittent every 8 hours  timolol 0.5% Solution 1 Drop(s) Both EYES daily    Vital Signs Last 24 Hrs  T(C): 36.4 (09 Sep 2023 08:49), Max: 36.9 (08 Sep 2023 23:28)  T(F): 97.5 (09 Sep 2023 08:49), Max: 98.4 (08 Sep 2023 23:28)  HR: 68 (09 Sep 2023 08:49) (66 - 70)  BP: 117/48 (09 Sep 2023 08:49) (97/51 - 117/48)  BP(mean): --  RR: 16 (09 Sep 2023 08:49) (16 - 18)  SpO2: 98% (09 Sep 2023 08:49) (98% - 100%)    Parameters below as of 09 Sep 2023 08:49  Patient On (Oxygen Delivery Method): nasal cannula  O2 Flow (L/min): 2          PE:  Constitutional: frail looking  HEENT: NC/AT, EOMI, PERRLA, conjunctivae clear; ears and nose atraumatic; pharynx benign  Neck: supple; thyroid not palpable  Back: no tenderness  Respiratory: decreased breath sounds, rhonchi    Cardiovascular: S1S2 regular, no murmurs  Abdomen: soft, not tender, not distended, positive BS; liver and spleen WNL  Genitourinary: no suprapubic tenderness  Lymphatic: no LN palpable  Musculoskeletal: no muscle tenderness, no joint swelling or tenderness  Extremities: no pedal edema  Neurological/ Psychiatric:  moving all extremities  Skin: no rashes; no palpable lesions    Labs: all available labs reviewed                                   10.1   5.81  )-----------( 260      ( 08 Sep 2023 08:35 )             30.5     09-08    145  |  116<H>  |  8   ----------------------------<  81  4.1   |  23  |  0.70    Ca    9.1      08 Sep 2023 08:35        LIVER FUNCTIONS - ( 06 Sep 2023 00:35 )  Alb: 3.5 g/dL / Pro: 7.9 gm/dL / ALK PHOS: 110 U/L / ALT: 14 U/L / AST: 22 U/L / GGT: x           Urinalysis Basic - ( 07 Sep 2023 06:49 )    Color: x / Appearance: x / SG: x / pH: x  Gluc: 83 mg/dL / Ketone: x  / Bili: x / Urobili: x   Blood: x / Protein: x / Nitrite: x   Leuk Esterase: x / RBC: x / WBC x   Sq Epi: x / Non Sq Epi: x / Bacteria: x    Culture - Blood (09.06.23 @ 00:35)   Specimen Source: .Blood Blood-Peripheral  Culture Results:   No growth at 24 hours  Culture - Blood (09.06.23 @ 00:35)   - Staphylococcus epidermidis, Methicillin resistant: Detec  Gram Stain:   Growth in aerobic bottle: Gram Positive Cocci in Clusters   Growth in anaerobic bottle: Gram Positive Cocci in Clusters  Specimen Source: .Blood Blood-Peripheral  Organism: Blood Culture PCR  Culture Results:   Growth in aerobic bottle: Gram Positive Cocci in Clusters   Growth in anaerobic bottle: Gram Positive Cocci in Clusters   Direct identification is available within approximately 3-5   hours either by Blood Panel Multiplexed PCR or Direct   MALDI-TOF. Details: [email or web address]  Organism Identification: Blood Culture PCR  Method Type: PCR      Radiology: all available radiological tests reviewed  < from: CT Chest No Cont (09.06.23 @ 14:00) >    ACC: [id number] EXAM:  CT CHEST   ORDERED BY: ANUSHKA TILLMAN     PROCEDURE DATE:  09/06/2023          INTERPRETATION:  Clinical indications: Evaluate for aspiration pneumonia.    Axial CT images of the chest are obtained without intravenous   administration of contrast.    Comparison is made with the chest CT of July 25, 2023.    No enlarged axillary, mediastinal or hilar lymph nodes.    Heart size is normal. No pericardial effusion. Vascular calcifications   with involvement of the aorta and the coronary arteries.    Evaluation of the upper abdomen demonstrate no significant abnormality.    Evaluation of the lungs demonstrate mild bronchiectasis within the   lingula and the right middle lobe as on the prior study may be sequela of   chronic mycobacterial avium complex infection given the distribution.   Superimposed bilateral lower lung foci of impacted distal or dilated   airways. Bilateral mid to lower lung areas of linear or subsegmental   atelectasis. Right lower lobe linear opacities suggestive of atelectasis   with contiguous patchy consolidation within the dependent portion of the   right lower lobe new since July 25, 2023 may represent pneumonia.    Previously described 6 mm lingular nodule is unchanged. 4 mm right middle   lobe pulmonary nodule is unchanged since March 15, 2023. Tiny bilateral   calcified granulomas.    Secretions within the right mainstem bronchus extending into the bronchus   intermedius. Focal opacification of a focal portion of the right lower   lobar bronchus extending into a segmental branch likely due to internal   secretions.    Degenerative changes of the spine. Severe loss of height of the T9 and   T12 vertebral bodies with moderate superior endplate loss of height of   the L1 vertebral body are unchanged.    IMPRESSION: Right lower lobe linear and patchy opacities as described   above new since July 25, 2023 suggestive of combination of atelectasis   and pneumonia.    Focal opacification of portion of the right lower lobe bronchus likely   due to internal secretions.    A 3 month follow-up noncontrast chest CT is recommended to ensure   resolution of the above findings.        Advanced directives addressed: full resuscitation
  HPI:    "88 y/o female with a PMHx of Alzheimer's, advanced dementia and non verbal, glaucoma, hypercholesterolemia, osteoarthritis [facility] form [facility] for possible [facility].  Son at bedside reports patient has had history of aspiration pneumonia.  Recently she has had more difficulty swallowing, wet coughing requring suction at [facility]. Patient with increased weakness , lethargy past few days and very poor oral intake since the weekend.   ROS is not attainable secondary to dementia.  "    9/7  pt seen and examined  private aide at bedside  appeared comfortable and in NAD  was able to answer minimal questions yes/no.   aide reports she can be a little more verbal at times  case d/w speech and swallow       9/12  pt more responsive   in NAD improved since admission   pt  w/o pain nausea vomiting sob        PAIN: ( )Yes   (x )No     DYSPNEA: ( ) Yes  (x ) No  Level:       Review of Systems:    Anxiety- calm   Depression-  Physical Discomfort- NAD  Dyspnea- none  Constipation-  Diarrhea- none   Nausea-    Vomiting- none  Anorexia- +   Weight Loss-  +  Cough-  none   Secretions- none  Fatigue- +  Weakness-+  Delirium- baseline confusion     All other systems reviewed and negative        PHYSICAL EXAM:    Vital Signs Last 24 Hrs  T(C): 36.3 (07 Sep 2023 08:11), Max: 37.3 (06 Sep 2023 20:43)  T(F): 97.3 (07 Sep 2023 08:11), Max: 99.2 (06 Sep 2023 20:43)  HR: 97 (07 Sep 2023 08:11) (73 - 97)  BP: 94/52 (07 Sep 2023 08:11) (94/52 - 112/74)  BP(mean): 86 (06 Sep 2023 20:43) (86 - 86)  RR: 18 (07 Sep 2023 08:11) (18 - 18)  SpO2: 97% (07 Sep 2023 08:11) (94% - 97%)    Parameters below as of 07 Sep 2023 08:11  Patient On (Oxygen Delivery Method): nasal cannula  O2 Flow (L/min): 2    Daily     Daily     PPSV2: 30  %  FAST:    General: calm in NAD  Mental Status: awake alert oriented x1  HEENT:  perrl  Lungs: ctabl b/l bs     Cardiac: s1s2   no mgr  GI: soft nontender +BS  Ext: moves all 4 extremities spontaneously  neuro- improving    LABS:  09-12    143  |  111<H>  |  <1<L>  ----------------------------<  107<H>  2.5<LL>   |  26  |  0.57    Ca    8.3<L>      12 Sep 2023 06:36  Phos  1.3     09-12  Mg     1.6     09-12      Allergies    No Known Allergies    Intolerances         RADIOLOGY/ADDITIONAL STUDIES:
86 y/o female with a PMHx of Alzheimer's, advanced dementia and non verbal, glaucoma, hypercholesterolemia, osteoarthritis B[facility]A form [facility] for possible aspiration.  Son at bedside reports patient has had history of aspiration pneumonia.  Recently she has had more difficulty swallowing, wet coughing requring suction at [facility]. Patient with increased weakness , lethargy past few days and very poor oral intake since the weekend.           9/11- more alert and awake this morning. Not following commands but maintains eye contact. VSS.   9/12- oriented x1- denies headache, fever, or shortness of breath.   9/13- patient was seen and examined. no acute overnight events. VSS.     Vital Signs Last 24 Hrs  T(C): 36.9 (13 Sep 2023 07:57), Max: 36.9 (13 Sep 2023 07:57)  T(F): 98.5 (13 Sep 2023 07:57), Max: 98.5 (13 Sep 2023 07:57)  HR: 86 (13 Sep 2023 07:57) (84 - 87)  BP: 105/59 (13 Sep 2023 07:57) (105/59 - 138/60)  BP(mean): --  RR: 18 (13 Sep 2023 07:57) (18 - 18)  SpO2: 97% (13 Sep 2023 07:57) (97% - 100%)    Parameters below as of 13 Sep 2023 07:57  Patient On (Oxygen Delivery Method): room air        ROS:   All 10 systems reviewed and found to be negative with the exception of what has been described above.   PE:  Constitutional: NAD, laying in bed- cachectic, frail- ill appearing   HEENT: NC/AT  Back: no tenderness  Respiratory: respirations even and non labored, LCTA- diminished at the base   Cardiovascular: S1S2 regular, no murmurs  Abdomen: soft, not tender, not distended, positive BS  Genitourinary: voiding-  incontinent   Musculoskeletal: no muscle tenderness, no joint swelling or tenderness  Extremities: no pedal edema  Neurological: no focal deficits    MEDICATIONS  (STANDING):  citalopram 20 milliGRAM(s) Oral daily  dextrose 5% + sodium chloride 0.45%. 1000 milliLiter(s) (50 mL/Hr) IV Continuous <Continuous>  enoxaparin Injectable 40 milliGRAM(s) SubCutaneous every 24 hours  latanoprost 0.005% Ophthalmic Solution 1 Drop(s) Both EYES at bedtime  piperacillin/tazobactam IVPB.. 3.375 Gram(s) IV Intermittent every 8 hours  potassium chloride  10 mEq/100 mL IVPB 10 milliEquivalent(s) IV Intermittent every 1 hour  potassium phosphate IVPB 15 milliMole(s) IV Intermittent once  timolol 0.5% Solution 1 Drop(s) Both EYES daily    MEDICATIONS  (PRN):  acetaminophen  Suppository .. 650 milliGRAM(s) Rectal every 6 hours PRN Temp greater or equal to 38C (100.4F), Mild Pain (1 - 3)  aluminum hydroxide/magnesium hydroxide/simethicone Suspension 30 milliLiter(s) Oral every 4 hours PRN Dyspepsia  glycopyrrolate Injectable 0.2 milliGRAM(s) IV Push every 8 hours PRN secretions  guaiFENesin Oral Liquid (Sugar-Free) 100 milliGRAM(s) Oral every 6 hours PRN Cough  melatonin 3 milliGRAM(s) Oral at bedtime PRN Insomnia  ondansetron Injectable 4 milliGRAM(s) IV Push every 6 hours PRN Nausea and/or Vomiting      09-13    147<H>  |  114<H>  |  2<L>  ----------------------------<  106<H>  2.7<LL>   |  26  |  0.63    Ca    8.4<L>      13 Sep 2023 06:35  Phos  1.7     09-13  Mg     1.5     09-13                              11.2   5.52  )-----------( 301      ( 13 Sep 2023 06:35 )             30.9           < from: CT Chest No Cont (09.06.23 @ 14:00) >  IMPRESSION: Right lower lobe linear and patchy opacities as described   above new since July 25, 2023 suggestive of combination of atelectasis   and pneumonia.    Focal opacification of portion of the right lower lobe bronchus likely   due to internal secretions.    A 3 month follow-up noncontrast chest CT is recommended to ensure   resolution of the above findings.        
88 y/o female with a PMHx of Alzheimer's, advanced dementia and non verbal, glaucoma, hypercholesterolemia, osteoarthritis [facility] for possible [facility].  Son at bedside reports patient has had history of aspiration pneumonia.  Recently she has had more difficulty swallowing, wet coughing requring suction at [place] Hospital. Patient with increased weakness , lethargy past few days and very poor oral intake since the weekend.           9/11- more alert and awake this morning. Not following commands but maintains eye contact. VSS.   9/12- oriented x1- denies headache, fever, or shortness of breath.     Vital Signs Last 24 Hrs  T(C): 36.6 (12 Sep 2023 07:58), Max: 36.6 (11 Sep 2023 21:43)  T(F): 97.8 (12 Sep 2023 07:58), Max: 97.9 (11 Sep 2023 21:43)  HR: 81 (12 Sep 2023 07:58) (81 - 85)  BP: 118/69 (12 Sep 2023 07:58) (118/69 - 130/68)  BP(mean): 87 (11 Sep 2023 21:43) (87 - 87)  RR: 18 (12 Sep 2023 07:58) (18 - 18)  SpO2: 99% (12 Sep 2023 07:58) (97% - 99%)    Parameters below as of 12 Sep 2023 07:58  Patient On (Oxygen Delivery Method): room air          PE:  Constitutional: NAD, laying in bed- cachectic, frail- ill appearing   HEENT: NC/AT  Back: no tenderness  Respiratory: respirations even and non labored, LCTA- diminished at the base   Cardiovascular: S1S2 regular, no murmurs  Abdomen: soft, not tender, not distended, positive BS  Genitourinary: voiding-  incontinent   Musculoskeletal: no muscle tenderness, no joint swelling or tenderness  Extremities: no pedal edema  Neurological: no focal deficits    09-12    143  |  111<H>  |  <1<L>  ----------------------------<  107<H>  2.5<LL>   |  26  |  0.57    Ca    8.3<L>      12 Sep 2023 06:36  Phos  1.3     09-12  Mg     1.6     09-12 09-11    146<H>  |  116<H>  |  1<L>  ----------------------------<  107<H>  2.8<LL>   |  28  |  0.56    Ca    8.4<L>      11 Sep 2023 06:29  Phos  1.4     09-11  Mg     1.6     09-11          MEDICATIONS  (STANDING):  citalopram 20 milliGRAM(s) Oral daily  dextrose 5% + sodium chloride 0.45%. 1000 milliLiter(s) (50 mL/Hr) IV Continuous <Continuous>  enoxaparin Injectable 40 milliGRAM(s) SubCutaneous every 24 hours  latanoprost 0.005% Ophthalmic Solution 1 Drop(s) Both EYES at bedtime  piperacillin/tazobactam IVPB.. 3.375 Gram(s) IV Intermittent every 8 hours  potassium chloride  10 mEq/100 mL IVPB 10 milliEquivalent(s) IV Intermittent every 1 hour  potassium phosphate IVPB 15 milliMole(s) IV Intermittent once  timolol 0.5% Solution 1 Drop(s) Both EYES daily    MEDICATIONS  (PRN):  acetaminophen  Suppository .. 650 milliGRAM(s) Rectal every 6 hours PRN Temp greater or equal to 38C (100.4F), Mild Pain (1 - 3)  aluminum hydroxide/magnesium hydroxide/simethicone Suspension 30 milliLiter(s) Oral every 4 hours PRN Dyspepsia  glycopyrrolate Injectable 0.2 milliGRAM(s) IV Push every 8 hours PRN secretions  guaiFENesin Oral Liquid (Sugar-Free) 100 milliGRAM(s) Oral every 6 hours PRN Cough  melatonin 3 milliGRAM(s) Oral at bedtime PRN Insomnia  ondansetron Injectable 4 milliGRAM(s) IV Push every 6 hours PRN Nausea and/or Vomiting        < from: CT Chest No Cont (09.06.23 @ 14:00) >  IMPRESSION: Right lower lobe linear and patchy opacities as described   above new since July 25, 2023 suggestive of combination of atelectasis   and pneumonia.    Focal opacification of portion of the right lower lobe bronchus likely   due to internal secretions.    A 3 month follow-up noncontrast chest CT is recommended to ensure   resolution of the above findings.        
  HPI:    "88 y/o female with a PMHx of Alzheimer's, advanced dementia and non verbal, glaucoma, hypercholesterolemia, osteoarthritis BIBA form [facility] for possible aspiration.  Son at bedside reports patient has had history of aspiration pneumonia.  Recently she has had more difficulty swallowing, wet coughing requring suction at [facility]. Patient with increased weakness , lethargy past few days and very poor oral intake since the weekend.   ROS is not attainable secondary to dementia.  "    9/7  pt seen and examined  private aide at bedside  appeared comfortable and in NAD  was able to answer minimal questions yes/no.   aide reports she can be a little more verbal at times  case d/w speech and swallow       9/12  pt more responsive   in NAD improved since admission   pt  w/o pain nausea vomiting sob        PAIN: ( )Yes   (x )No     DYSPNEA: ( ) Yes  (x ) No  Level:       Review of Systems:    Anxiety- calm   Depression-  Physical Discomfort- NAD  Dyspnea- none  Constipation-  Diarrhea- none   Nausea-    Vomiting- none  Anorexia- +   Weight Loss-  +  Cough-  none   Secretions- none  Fatigue- +  Weakness-+  Delirium- baseline confusion     All other systems reviewed and negative        PHYSICAL EXAM:    Vital Signs Last 24 Hrs  T(C): 36.3 (07 Sep 2023 08:11), Max: 37.3 (06 Sep 2023 20:43)  T(F): 97.3 (07 Sep 2023 08:11), Max: 99.2 (06 Sep 2023 20:43)  HR: 97 (07 Sep 2023 08:11) (73 - 97)  BP: 94/52 (07 Sep 2023 08:11) (94/52 - 112/74)  BP(mean): 86 (06 Sep 2023 20:43) (86 - 86)  RR: 18 (07 Sep 2023 08:11) (18 - 18)  SpO2: 97% (07 Sep 2023 08:11) (94% - 97%)    Parameters below as of 07 Sep 2023 08:11  Patient On (Oxygen Delivery Method): nasal cannula  O2 Flow (L/min): 2    Daily     Daily     PPSV2: 30  %  FAST:    General: calm in NAD  Mental Status: awake alert oriented x1  HEENT:  perrl  Lungs: ctabl b/l bs     Cardiac: s1s2   no mgr  GI: soft nontender +BS  Ext: moves all 4 extremities spontaneously  neuro- improving    LABS:  09-12    143  |  111<H>  |  <1<L>  ----------------------------<  107<H>  2.5<LL>   |  26  |  0.57    Ca    8.3<L>      12 Sep 2023 06:36  Phos  1.3     09-12  Mg     1.6     09-12      Allergies    No Known Allergies    Intolerances         RADIOLOGY/ADDITIONAL STUDIES:

## 2023-09-13 NOTE — PROVIDER CONTACT NOTE (CRITICAL VALUE NOTIFICATION) - TEST AND RESULT REPORTED:
Lactate 4.8
Potassium of 2.8
Blood Culture+ for growth in aerobic & anearobic bottle gram+ cocci in clusters
K 2.5
Blood Culture Prelim results growth in aerobic bottle gram + cocci in clusters
k 2.7

## 2023-09-13 NOTE — PROGRESS NOTE ADULT - ASSESSMENT
86 y/o female with a PMHx of Alzheimer's, advanced dementia and non verbal, glaucoma, hypercholesterolemia, osteoarthritis [facility].  Son at bedside reports patient has had history of aspiration pneumonia.  Recently she has had more difficulty swallowing, wet coughing requring suction at [facility]. Patient with increased weakness , lethargy past few days and very poor oral intake since the weekend.  ROS is not attainable secondary to dementia.  Here wbc ct 10, LA 4.8, UA pyuria, imaging shows Right lower lobe linear and patchy opacities as described above new since July 25, 2023 suggestive of combination of atelectasis and pneumonia. Focal opacification of portion of the right lower lobe bronchus likely  due to internal secretions. Was given IV zosyn, vancomycin x 1.     1. Acute respiratory failure. Sepsis. RLL aspiration pneumonia. Pyuria. Advanced alzheimers dementia  - imaging reviewed  - on IV zosyn [id number] #3-4  - continue with antibiotic coverage  - blood cx - growing cons - contaminant urine cx (-)   - fu cbc  - monitor temps  - aspiration precautions  - tolerating abx well so far; no side effects noted  - reason for abx use and side effects reviewed with patient  - on augmentin oral liquid if can tolerate complete 7 days  - supportive care    2. other issues - care per medicine 
PLAN    1-SIRS , unclear etiology, possible sepsis  -Follow up blood cultures- ngtd   -Urine cultures or UA not sent in ED. Abx given.   -chest xray negative   RVP negative.  - CT chest results noted  - on Zosyn- dc on oral antibiotics  - ID consulted   - LA 4.8 > trending down 2.1  Glycopyrrolate ordered.       2-c/w home meds      3-DVT prophy- sc lovenox    Patient with multiple readmission for aspiration pneumonia.  GOC discussion son with Son and Daughter Shira- patient is DNR/DNI.   PAlliative care consulted. overall prognosis is poor             
PLAN    1-SIRS , unclear etiology, possible sepsis  -Follow up blood cultures- ngtd   -Urine cultures or UA not sent in ED. Abx given.   -chest xray negative; Radiologist documenting possibly might  be something there but not clear. Will get CT chest.   RVP negative.  - CT chest results noted  - on Zosyn  - ID consulted   -LA 4.8 > trending down 2.1  -excess mucous production  likely chronic in nauture; daughter convinced patient aspirated. Will see what ct chest shows.   -glycopyrrlate ordered.       2-c/w home meds      3-DVT prophy- sc lovenox    PAitent with multiple readmission for aspiration pneumonia.  GOC discussion son with Son and Daughter Shira- patient is DNR/DNI.   PAlliative care consulted.             
PLAN    1-SIRS , unclear etiology  Acute respiratory failure.   Sepsis. RLL aspiration pneumonia (high risk for aspiration)- concern for gram negative rods and other resistant bacteria   - Follow up blood cultures- ngtd   - chest xray negative   - RVP negative.  - CT chest results noted  - on Zosyn- dc on oral antibiotics  - ID consulted   - LA 4.8 > trending down 2.1  - monitor temps   - high risk for aspiration pneumonia  - on puree diet    *hypokalemia  - replete and monitor      DVT prophylaxis       Patient with multiple readmission for aspiration pneumonia.  GOC discussion son with Son and Daughter Shira- patient is DNR/DNI.   PAlliative care consulted. overall prognosis is poor             
PLAN    1-SIRS , unclear etiology, possible sepsis  -Follow up blood cultures  -Urine cultures or UA not sent in ED. Abx given.   -spoke with nurse on 5S she will send now  -chest xray negative; Radiologist documenting possibly might  be something there but not clear. Will get CT chest.   RVP negative.  - CT chest results noted  - on Zosyn  - ID consulted   -LA 4.8 > trending down 2.1  -excess mucous production  likely chronic in nauture; daughter convinced patient aspirated. Will see what ct chest shows.   -glycopyrrlate ordered.       2-c/w home meds      3-DVT prophy- sc lovenox    Ranulfotent with multiple readmission for aspiration pneumonia.  GOC discussion son with Son and Daughter Shira- patient is DNR/DNI.  PAlliative care consulted.             
88 y/o female with a PMHx of Alzheimer's, advanced dementia and non verbal, glaucoma, hypercholesterolemia, osteoarthritis [facility]  Son at bedside reports patient has had history of aspiration pneumonia.  Recently she has had more difficulty swallowing, wet coughing requring suction at [facility]. Patient with increased weakness , lethargy past few days and very poor oral intake since the weekend.  ROS is not attainable secondary to dementia.  Here wbc ct 10, LA 4.8, UA pyuria, imaging shows Right lower lobe linear and patchy opacities as described above new since July 25, 2023 suggestive of combination of atelectasis and pneumonia. Focal opacification of portion of the right lower lobe bronchus likely  due to internal secretions. Was given IV zosyn, vancomycin x 1.     1. Acute respiratory failure. Sepsis. RLL aspiration pneumonia. Pyuria. Advanced alzheimers dementia  - imaging reviewed  - s/p IV zosyn [id number] #1-2  - change to IV cefepime  - continue with antibiotic coverage  - blood cx - growing cons - contaminant urine cx (-)   - fu cbc  - monitor temps  - aspiration precautions  - tolerating abx well so far; no side effects noted  - reason for abx use and side effects reviewed with patient  - supportive care    2. other issues - care per medicine 
88 y/o female with a PMHx of Alzheimer's, advanced dementia and non verbal, glaucoma, hypercholesterolemia, osteoarthritis [facility]  Son at bedside reports patient has had history of aspiration pneumonia.  Recently she has had more difficulty swallowing, wet coughing requring suction at [facility]. Patient with increased weakness , lethargy past few days and very poor oral intake since the weekend.  ROS is not attainable secondary to dementia.  Here wbc ct 10, LA 4.8, UA pyuria, imaging shows Right lower lobe linear and patchy opacities as described above new since July 25, 2023 suggestive of combination of atelectasis and pneumonia. Focal opacification of portion of the right lower lobe bronchus likely  due to internal secretions. Was given IV zosyn, vancomycin x 1.     1. Acute respiratory failure. Sepsis. RLL aspiration pneumonia. Pyuria. Advanced alzheimers dementia  - imaging reviewed  - s/p IV zosyn 3.511ehf2s #2-3   - continue with antibiotic coverage  - blood cx - growing cons - contaminant urine cx (-)   - fu cbc  - monitor temps  - aspiration precautions  - tolerating abx well so far; no side effects noted  - reason for abx use and side effects reviewed with patient  - supportive care    2. other issues - care per medicine 
Process of Care  --Reviewed dx/treatment problems and alignment with Goals of Care    Physical Aspects of Care  --Pain  patient denies at this time  comofortable  Tylenol PRN for mild - mod pain    --Bowel Regimen  risk for constipation d/t immobility  daily dulcolax     --Dyspnea  No SOB at this time  comfortable and in NAD    --Nausea Vomiting  denies    --Weakness  PT as tolerated     Psychological and Psychiatric Aspects of Care:   --Greif/Bereavment: emotional support provided  --Hx of psychiatric dx: none  -Pastoral Care Available PRN     Social Aspects of Care  -SW involved     Cultural Aspects  -Primary Language: English    Goals of Care:      Pt known to palliative team   family wanted c/w disease oriented treatment  no PEG tubes, encourage safe po intake  and hopeful send home sp improvement  team available prn     Ethical and Legal Aspects:   NA        Capacity: w.o capacity   HCP/Surrogate: Shira Navarro  Code Status: dnr dni  MOLST: dnr dni   Dispo Plan:  as per primary team    Discussed With: Case coordinated with attending and SW and RN     Time Spent: 30 minutes including the care, coordination and counseling of this patient, 50% of which was spent coordinating and counseling. 
86 y/o female with a PMHx of Alzheimer's, advanced dementia and non verbal, glaucoma, hypercholesterolemia, osteoarthritis [facility]  Son at bedside reports patient has had history of aspiration pneumonia.  Recently she has had more difficulty swallowing, wet coughing requring suction at [facility]. Patient with increased weakness , lethargy past few days and very poor oral intake since the weekend.  ROS is not attainable secondary to dementia.  Here wbc ct 10, LA 4.8, UA pyuria, imaging shows Right lower lobe linear and patchy opacities as described above new since July 25, 2023 suggestive of combination of atelectasis and pneumonia. Focal opacification of portion of the right lower lobe bronchus likely  due to internal secretions. Was given IV zosyn, vancomycin x 1.     1. Acute respiratory failure. Sepsis. RLL aspiration pneumonia. Pyuria. Advanced alzheimers dementia  - imaging reviewed  - on IV zosyn [id number] #4-5  - continue with antibiotic coverage  - blood cx - growing cons - contaminant urine cx (-)   - fu cbc  - monitor temps  - aspiration precautions  - tolerating abx well so far; no side effects noted  - reason for abx use and side effects reviewed with patient  - on augmentin oral liquid if can tolerate complete 7 days  - supportive care    2. other issues - care per medicine 
PLAN    1-SIRS , unclear etiology, possible sepsis  -Follow up blood cultures- ngtd   -Urine cultures or UA not sent in ED. Abx given.   -chest xray negative; Radiologist documenting possibly might  be something there but not clear. Will get CT chest.   RVP negative.  - CT chest results noted  - on Zosyn  - ID consulted   - LA 4.8 > trending down 2.1  Glycopyrrolate ordered.       2-c/w home meds      3-DVT prophy- sc lovenox    PAitent with multiple readmission for aspiration pneumonia.  GOC discussion son with Son and Daughter Shira- patient is DNR/DNI.   PAlliative care consulted. overall prognosis is poor             
PLAN    1-SIRS , unclear etiology, possible sepsis  -Follow up blood cultures- ngtd   -Urine cultures or UA not sent in ED. Abx given.   -chest xray negative; Radiologist documenting possibly might  be something there but not clear. Will get CT chest.   RVP negative.  - CT chest results noted  - on Zosyn  - ID consulted   -LA 4.8 > trending down 2.1  -excess mucous production  likely chronic in nature daughter convinced patient aspirated. Will see what ct chest shows.   Glycopyrrolate ordered.       2-c/w home meds      3-DVT prophy- sc lovenox    PAitent with multiple readmission for aspiration pneumonia.  GOC discussion son with Son and Daughter Shira- patient is DNR/DNI.   PAlliative care consulted. overall prognosis is poor             
PLAN    1-SIRS , unclear etiology, possible sepsis  -Follow up blood cultures- ngtd   -Urine cultures or UA not sent in ED. Abx given.   -chest xray negative; Radiologist documenting possibly might  be something there but not clear. Will get CT chest.   RVP negative.  - CT chest results noted  - on Zosyn  - ID consulted   -LA 4.8 > trending down 2.1  -excess mucous production  likely chronic in nature daughter convinced patient aspirated. Will see what ct chest shows.   Glycopyrrolate ordered.       2-c/w home meds      3-DVT prophy- sc lovenox    PAitent with multiple readmission for aspiration pneumonia.  GOC discussion son with Son and Daughter Shira- patient is DNR/DNI.   PAlliative care consulted. overall prognosis is poor             
Process of Care  --Reviewed dx/treatment problems and alignment with Goals of Care    Physical Aspects of Care  --Pain  patient denies at this time  comofortable  Tylenol PRN for mild - mod pain    --Bowel Regimen  risk for constipation d/t immobility  daily dulcolax     --Dyspnea  No SOB at this time  comfortable and in NAD    --Nausea Vomiting  denies    --Weakness  PT as tolerated     Psychological and Psychiatric Aspects of Care:   --Greif/Bereavment: emotional support provided  --Hx of psychiatric dx: none  -Pastoral Care Available PRN     Social Aspects of Care  -SW involved     Cultural Aspects  -Primary Language: English    Goals of Care:      Pt known to palliative team   long discussion regarding GOC /  Advance directives anad hopsice   as noted above  no changes in care made   hospice referrals placed  comfort molst encouraged as pt continues to decline   they agreed this conversation is much like previous admisisons and recognize she will continue to decline    Ethical and Legal Aspects:   NA        Capacity: w.o capacity   HCP/Surrogate: Shira Navarro  Code Status: dnr dni  MOLST: dnr dni   Dispo Plan:  as per primary team    Discussed With: Case coordinated with attending and SW and RN     Time Spent: 55 minutes including the care, coordination and counseling of this patient, 50% of which was spent coordinating and counseling. 
Process of Care  --Reviewed dx/treatment problems and alignment with Goals of Care    Physical Aspects of Care  --Pain  patient denies at this time  c/w current managment    --Bowel Regimen  denies constipation  risk for constipation d/t immobility  daily dulcolax    --Dyspnea  No SOB at this time  comfortable and in NAD    --Nausea Vomiting  denies    --Weakness  PT as tolerated     Psychological and Psychiatric Aspects of Care:   --Greif/Bereavment: emotional support provided  --Hx of psychiatric dx: none  -Pastoral Care Available PRN     Social Aspects of Care  -SW involved     Cultural Aspects  -Primary Language: English    Goals of Care:     We discussed Palliative Care team being a supportive team when a patient has ongoing illnesses.  We also discussed that it is not an end of life care service, but can help navigate symptoms and emotional support throughout their hospital stay here.    Pt known to palliative team  Son discussed MOLST w/ primary team deferred to sister/hcp  MOSLT confirmed by primary NP   pt is DNR DNI     Will reach out to discuss current ongoing aspiration   would NOT recommend feeding tube in progressed dementia  will not prevent aspiration   will reach out to daughter to further discuss        Ethical and Legal Aspects:   NA        Capacity: w.o capacity   HCP/Surrogate: Shira Natynicole Navarro  Code Status: dnr dni  MOLST: dnr dni   Dispo Plan: pending further disscussion     Discussed With: Case coordinated with attending and SW and RN     Time Spent: 70 minutes including the care, coordination and counseling of this patient, 50% of which was spent coordinating and counseling.

## 2023-09-13 NOTE — PROGRESS NOTE ADULT - PROVIDER SPECIALTY LIST ADULT
Hospitalist
Hospitalist
Infectious Disease
Palliative Care
Hospitalist
Infectious Disease
Hospitalist
Palliative Care
Palliative Care

## 2023-09-13 NOTE — PROGRESS NOTE ADULT - REASON FOR ADMISSION
sepsis/ weakness/ lethargy

## 2023-09-13 NOTE — PROVIDER CONTACT NOTE (CRITICAL VALUE NOTIFICATION) - PERSON GIVING RESULT:
[facility] - LAB
[facility]
Doris Aguilera
Lab: [facility] Gia Osorio
[facility] [place]

## 2023-09-13 NOTE — PROGRESS NOTE ADULT - CONVERSATION DETAILS
Long discussion with family at this stage, we are recommending comfort measures as patient continues to decline regardless of maximal support.  We discussed prolonging suffering rather than prolonging life.     We discussed what that would mean,  no longer doing blood tests, dx imaging, abx, and palliative extubation to RA with comfort medications to address any symptoms that may arise. This would mean shortened life span but would focus on comfort and quality at the end of life.       I explained hospice was a benefit she could receive at home/  its main focus is quality of life and dignity with focus on symptom management when cure is no longer possible.        we talked about the difference between  in patient hospice vs. home hospice (@ [facility])     Comfort molst was also discussed but no changes were made  family had many questions regarding qualifications for hospice,   hospicee in patient units and what comfort entails.   All questions answered    Hospice referrals were placed as per family request.
We discussed Palliative Care team being a supportive team when a patient has ongoing illnesses.  We also discussed that it is not an end of life care service, but can help navigate symptoms and emotional support throughout their hospital stay here.       JAYCE reviewed- at this time family wants to continue disease oriented treatment with some limitations.   DNR DNI     Patient has tendency to decompensate and rebound and family would like to give her the opertunity to improve.  They did also make clear that if she ever reached point that a feeding tube vs. comfort wsa required they would NOT want feeding tubes.     That being said they obviously want to rule out any reviersble causes and treatable situations.     Shira hopes to see an improbment and treate w/ fluids/abx for a  few days to give her mom appropriate time for possible recovery.     JAYCE was cononfirmed without any different restrictions at this time

## 2023-09-13 NOTE — PROGRESS NOTE ADULT - NUTRITIONAL ASSESSMENT
This patient has been assessed with a concern for Malnutrition and has been determined to have a diagnosis/diagnoses of Severe protein-calorie malnutrition and Underweight (BMI < 19).    This patient is being managed with:   Diet Pureed-  Moderately Thick Liquids (MODTHICKLIQS)  Entered: Sep  6 2023 11:28AM  

## 2023-09-14 ENCOUNTER — TRANSCRIPTION ENCOUNTER (OUTPATIENT)
Age: 88
End: 2023-09-14

## 2023-09-14 VITALS
SYSTOLIC BLOOD PRESSURE: 138 MMHG | OXYGEN SATURATION: 100 % | TEMPERATURE: 98 F | HEART RATE: 88 BPM | DIASTOLIC BLOOD PRESSURE: 65 MMHG | RESPIRATION RATE: 19 BRPM

## 2023-09-14 LAB
ADD ON TEST-SPECIMEN IN LAB: SIGNIFICANT CHANGE UP
ANION GAP SERPL CALC-SCNC: 3 MMOL/L — LOW (ref 5–17)
BUN SERPL-MCNC: 1 MG/DL — LOW (ref 7–23)
CALCIUM SERPL-MCNC: 8.9 MG/DL — SIGNIFICANT CHANGE UP (ref 8.5–10.1)
CHLORIDE SERPL-SCNC: 113 MMOL/L — HIGH (ref 96–108)
CO2 SERPL-SCNC: 27 MMOL/L — SIGNIFICANT CHANGE UP (ref 22–31)
CREAT SERPL-MCNC: 0.58 MG/DL — SIGNIFICANT CHANGE UP (ref 0.5–1.3)
EGFR: 88 ML/MIN/1.73M2 — SIGNIFICANT CHANGE UP
GLUCOSE SERPL-MCNC: 103 MG/DL — HIGH (ref 70–99)
HCT VFR BLD CALC: 33.3 % — LOW (ref 34.5–45)
HGB BLD-MCNC: 11.5 G/DL — SIGNIFICANT CHANGE UP (ref 11.5–15.5)
MAGNESIUM SERPL-MCNC: 2.3 MG/DL — SIGNIFICANT CHANGE UP (ref 1.6–2.6)
MCHC RBC-ENTMCNC: 34.2 PG — HIGH (ref 27–34)
MCHC RBC-ENTMCNC: 34.5 GM/DL — SIGNIFICANT CHANGE UP (ref 32–36)
MCV RBC AUTO: 99.1 FL — SIGNIFICANT CHANGE UP (ref 80–100)
PHOSPHATE SERPL-MCNC: 2.3 MG/DL — LOW (ref 2.5–4.5)
PLATELET # BLD AUTO: 314 K/UL — SIGNIFICANT CHANGE UP (ref 150–400)
POTASSIUM SERPL-MCNC: 3.8 MMOL/L — SIGNIFICANT CHANGE UP (ref 3.5–5.3)
POTASSIUM SERPL-SCNC: 3.8 MMOL/L — SIGNIFICANT CHANGE UP (ref 3.5–5.3)
RBC # BLD: 3.36 M/UL — LOW (ref 3.8–5.2)
RBC # FLD: 13.7 % — SIGNIFICANT CHANGE UP (ref 10.3–14.5)
SODIUM SERPL-SCNC: 143 MMOL/L — SIGNIFICANT CHANGE UP (ref 135–145)
WBC # BLD: 5.95 K/UL — SIGNIFICANT CHANGE UP (ref 3.8–10.5)
WBC # FLD AUTO: 5.95 K/UL — SIGNIFICANT CHANGE UP (ref 3.8–10.5)

## 2023-09-14 PROCEDURE — 99239 HOSP IP/OBS DSCHRG MGMT >30: CPT

## 2023-09-14 RX ORDER — ERYTHROMYCIN BASE 5 MG/GRAM
1 OINTMENT (GRAM) OPHTHALMIC (EYE)
Qty: 0 | Refills: 0 | DISCHARGE
Start: 2023-09-14

## 2023-09-14 RX ADMIN — CITALOPRAM 20 MILLIGRAM(S): 10 TABLET, FILM COATED ORAL at 10:24

## 2023-09-14 RX ADMIN — Medication 1 APPLICATION(S): at 06:06

## 2023-09-14 RX ADMIN — Medication 1 DROP(S): at 10:25

## 2023-09-14 RX ADMIN — ENOXAPARIN SODIUM 40 MILLIGRAM(S): 100 INJECTION SUBCUTANEOUS at 14:00

## 2023-09-14 RX ADMIN — Medication 1 APPLICATION(S): at 14:00

## 2023-09-14 NOTE — DISCHARGE NOTE PROVIDER - HOSPITAL COURSE
88 y/o female with a PMHx of Alzheimer's, advanced dementia and non verbal, glaucoma, hypercholesterolemia, osteoarthritis BIBA form riwn for possible aspiration.  Son at bedside reports patient has had history of aspiration pneumonia.  Recently she has had more difficulty swallowing, wet coughing requring suction at [facility]. Patient with increased weakness , lethargy and poor po intake. Patient admitted for Pneumonia and was started on IV antibiotics and IVF hydration.     9/14- pt was seen and examined. Oriented x1- answers simple questions- follows simple commands.     Vital Signs Last 24 Hrs  T(C): 36.3 (14 Sep 2023 07:14), Max: 36.9 (13 Sep 2023 22:00)  T(F): 97.3 (14 Sep 2023 07:14), Max: 98.4 (13 Sep 2023 22:00)  HR: 78 (14 Sep 2023 07:14) (78 - 87)  BP: 132/81 (14 Sep 2023 07:14) (120/69 - 132/81)  BP(mean): --  RR: 18 (14 Sep 2023 07:14) (18 - 18)  SpO2: 98% (14 Sep 2023 07:14) (98% - 98%)    Parameters below as of 14 Sep 2023 07:14  Patient On (Oxygen Delivery Method): room air    ROS:   All 10 systems reviewed and found to be negative with the exception of what has been described above.     PE:  Constitutional: NAD, laying in bed- cachectic, frail- ill appearing   HEENT: NC/AT  Back: no tenderness  Respiratory: respirations even and non labored, LCTA- diminished at the base   Cardiovascular: S1S2 regular, no murmurs  Abdomen: soft, not tender, not distended, positive BS  Genitourinary: voiding-  incontinent   Musculoskeletal: no muscle tenderness, no joint swelling or tenderness  Extremities: no pedal edema  Neurological: no focal deficits      PLAN   1-SIRS - related to possible aspiration pneumonia   Acute respiratory failure.   Sepsis. RLL aspiration pneumonia (high risk for aspiration)- concern for gram negative rods and other resistant bacteria   - Follow up blood cultures- ngtd   - chest xray negative   - RVP negative.  - CT chest results noted  - on Zosyn- d/w ID- completed 7 days of Zosyn- o oral antibiotic on dc.  - ID consulted   - monitor temps   - high risk for aspiration pneumonia- ensure patient is awake during meals and HOB elevated to 45degrees during meals and a few ours after.   - on puree diet    *hypokalemia  - replete and monitor    DVT prophylaxis   Case d/w team on IDR.. Plan for dc to inpatient hospice facility.          88 y/o female with a PMHx of Alzheimer's, advanced dementia and non verbal, glaucoma, hypercholesterolemia, osteoarthritis BIBA form [facility]wn for possible aspiration.  Son at bedside reports patient has had history of aspiration pneumonia.  Recently she has had more difficulty swallowing, wet coughing requring suction at [facility]. Patient with increased weakness , lethargy and poor po intake. Patient admitted for Pneumonia and was started on IV antibiotics and IVF hydration.     9/14- pt was seen and examined. Oriented x1- answers simple questions- follows simple commands.     Vital Signs Last 24 Hrs  T(C): 36.3 (14 Sep 2023 07:14), Max: 36.9 (13 Sep 2023 22:00)  T(F): 97.3 (14 Sep 2023 07:14), Max: 98.4 (13 Sep 2023 22:00)  HR: 78 (14 Sep 2023 07:14) (78 - 87)  BP: 132/81 (14 Sep 2023 07:14) (120/69 - 132/81)  RR: 18 (14 Sep 2023 07:14) (18 - 18)  SpO2: 98% (14 Sep 2023 07:14) (98% - 98%)    Parameters below as of 14 Sep 2023 07:14  Patient On (Oxygen Delivery Method): room air    ROS:   All 10 systems reviewed and found to be negative with the exception of what has been described above.     PE:  Constitutional: NAD, laying in bed- cachectic, frail- ill appearing   HEENT: NC/AT  Back: no tenderness  Respiratory: respirations even and non labored, LCTA- diminished at the base   Cardiovascular: S1S2 regular, no murmurs  Abdomen: soft, not tender, not distended, positive BS  Genitourinary: voiding-  incontinent   Musculoskeletal: no muscle tenderness, no joint swelling or tenderness  Extremities: no pedal edema  Neurological: no focal deficits      PLAN   1-SIRS - related to possible aspiration pneumonia   Acute respiratory failure.   Sepsis. RLL aspiration pneumonia (high risk for aspiration)- concern for gram negative rods and other resistant bacteria   - Follow up blood cultures- ngtd   - chest xray negative   - RVP negative.  - CT chest results noted  - on Zosyn- d/w ID- completed 7 days of Zosyn- no oral antibiotic on dc.  - ID consulted   - monitor temps   - high risk for aspiration pneumonia- ensure patient is awake during meals and HOB elevated to 45degrees during meals and a few ours after.   - on puree diet    *hypokalemia  - replete and monitor    DVT prophylaxis   Case d/w team on IDR

## 2023-09-14 NOTE — DISCHARGE NOTE NURSING/CASE MANAGEMENT/SOCIAL WORK - PATIENT PORTAL LINK FT
You can access the FollowMyHealth Patient Portal offered by  by registering at the following website: [email or web address]. By joining [facility]’s FollowMyHealth portal, you will also be able to view your health information using other applications (apps) compatible with our system.

## 2023-09-14 NOTE — DISCHARGE NOTE PROVIDER - ATTENDING DISCHARGE PHYSICAL EXAMINATION:
Patient seen today, dtr and NP Yuly at bedside, pt awake, alert, follows commands, some cough, was suctioned. No longer being d/c to hospice, discussed with daughter that pt may likely continue to aspirate. Daughter stated she will probably be back here soon.  Patient seen today, dtr and NP Yuly at bedside, pt awake, alert, follows commands, some cough, was suctioned. No longer being d/c to hospice, discussed with daughter that pt may likely continue to aspirate. Daughter stated she will probably be back here soon. Dtr aware of risk of continued aspiration and aspiration PNA.     Vital Signs Last 24 Hrs  T(C): 36.9 (14 Sep 2023 12:38), Max: 36.9 (13 Sep 2023 22:00)  T(F): 98.5 (14 Sep 2023 12:38), Max: 98.5 (14 Sep 2023 12:38)  HR: 88 (14 Sep 2023 12:38) (78 - 88)  BP: 138/65 (14 Sep 2023 12:38) (120/69 - 138/65)  RR: 19 (14 Sep 2023 12:38) (18 - 19)  SpO2: 100% (14 Sep 2023 12:38) (98% - 100%)    Parameters below as of 14 Sep 2023 12:38  Patient On (Oxygen Delivery Method): room air

## 2023-09-14 NOTE — DISCHARGE NOTE PROVIDER - NSDCCPCAREPLAN_GEN_ALL_CORE_FT
PRINCIPAL DISCHARGE DIAGNOSIS  Diagnosis: Aspiration pneumonia  Assessment and Plan of Treatment: completed IV antibiotic while inpatient- no further antibitioc as per ID recommendations.   Ensure that head of bed is elevated during meals and an hour or two after meals.   -follow medications as per your discharge medication paperwork.

## 2023-09-14 NOTE — DISCHARGE NOTE PROVIDER - NSDCMRMEDTOKEN_GEN_ALL_CORE_FT
acetaminophen 325 mg oral tablet: 2 tab(s) orally every 6 hours, As needed, Mild Pain (1 - 3)  citalopram 20 mg oral tablet: 1 tab(s) orally once a day  Decara 1250 mcg (50,000 intl units) oral capsule: 1 cap(s) orally every 2 weeks  erythromycin 0.5% ophthalmic ointment: 1 application to each affected eye 4 times a day  guaiFENesin 100 mg/5 mL oral liquid: 5 milliliter(s) orally every 6 hours As needed Cough  latanoprost 0.005% ophthalmic solution: 1 drop(s) in each eye once a day (at bedtime)  timolol 0.5% ophthalmic solution: 1 drop(s) in each eye once a day

## 2023-09-14 NOTE — DISCHARGE NOTE PROVIDER - NSDCFUADDINST_GEN_ALL_CORE_FT
ensure patient is awake during meals and HOB elevated to 45degrees during meals and a few ours after.   - on puree diet, moderately thick liquids

## 2023-09-14 NOTE — DISCHARGE NOTE NURSING/CASE MANAGEMENT/SOCIAL WORK - NSDCVIVACCINE_GEN_ALL_CORE_FT
Moderna COVID-19 Vaccine, Bivalent; 07-Oct-2022 13:40; Gudelia Fraire (RN); [employer] [id number] (Exp. Date: 27-May-2023); IntraMuscular; Deltoid Left.; 0.5 milliLiter(s);

## 2023-09-14 NOTE — DISCHARGE NOTE PROVIDER - DETAILS OF MALNUTRITION DIAGNOSIS/DIAGNOSES
This patient has been assessed with a concern for Malnutrition and was treated during this hospitalization for the following Nutrition diagnosis/diagnoses:     -  09/07/2023: Severe protein-calorie malnutrition   -  09/07/2023: Underweight (BMI < 19)

## 2023-09-14 NOTE — DISCHARGE NOTE PROVIDER - CARE PROVIDER_API CALL
Fior Hughes.  Internal Medicine  [address] [place] [address]  Phone: [phone]  Fax: [phone]  Follow Up Time:

## 2023-09-17 ENCOUNTER — INPATIENT (INPATIENT)
Facility: HOSPITAL | Age: 88
LOS: 2 days | Discharge: HOSPICE | DRG: 177 | End: 2023-09-20
Attending: STUDENT IN AN ORGANIZED HEALTH CARE EDUCATION/TRAINING PROGRAM | Admitting: INTERNAL MEDICINE
Payer: MEDICARE

## 2023-09-17 VITALS
SYSTOLIC BLOOD PRESSURE: 111 MMHG | DIASTOLIC BLOOD PRESSURE: 58 MMHG | OXYGEN SATURATION: 100 % | HEIGHT: 61 IN | RESPIRATION RATE: 18 BRPM | HEART RATE: 86 BPM

## 2023-09-17 DIAGNOSIS — Z98.89 OTHER SPECIFIED POSTPROCEDURAL STATES: Chronic | ICD-10-CM

## 2023-09-17 DIAGNOSIS — J18.9 PNEUMONIA, UNSPECIFIED ORGANISM: ICD-10-CM

## 2023-09-17 DIAGNOSIS — Z98.890 OTHER SPECIFIED POSTPROCEDURAL STATES: Chronic | ICD-10-CM

## 2023-09-17 LAB
ADD ON TEST-SPECIMEN IN LAB: SIGNIFICANT CHANGE UP
ALBUMIN SERPL ELPH-MCNC: 2.5 G/DL — LOW (ref 3.3–5)
ALP SERPL-CCNC: 86 U/L — SIGNIFICANT CHANGE UP (ref 40–120)
ALT FLD-CCNC: 10 U/L — LOW (ref 12–78)
ANION GAP SERPL CALC-SCNC: 3 MMOL/L — LOW (ref 5–17)
APTT BLD: 33.6 SEC — SIGNIFICANT CHANGE UP (ref 24.5–35.6)
AST SERPL-CCNC: 16 U/L — SIGNIFICANT CHANGE UP (ref 15–37)
BASOPHILS # BLD AUTO: 0.09 K/UL — SIGNIFICANT CHANGE UP (ref 0–0.2)
BASOPHILS NFR BLD AUTO: 0.9 % — SIGNIFICANT CHANGE UP (ref 0–2)
BILIRUB SERPL-MCNC: 0.5 MG/DL — SIGNIFICANT CHANGE UP (ref 0.2–1.2)
BUN SERPL-MCNC: 8 MG/DL — SIGNIFICANT CHANGE UP (ref 7–23)
CALCIUM SERPL-MCNC: 9.4 MG/DL — SIGNIFICANT CHANGE UP (ref 8.5–10.1)
CHLORIDE SERPL-SCNC: 111 MMOL/L — HIGH (ref 96–108)
CO2 SERPL-SCNC: 27 MMOL/L — SIGNIFICANT CHANGE UP (ref 22–31)
CREAT SERPL-MCNC: 0.62 MG/DL — SIGNIFICANT CHANGE UP (ref 0.5–1.3)
EGFR: 87 ML/MIN/1.73M2 — SIGNIFICANT CHANGE UP
EOSINOPHIL # BLD AUTO: 0.04 K/UL — SIGNIFICANT CHANGE UP (ref 0–0.5)
EOSINOPHIL NFR BLD AUTO: 0.4 % — SIGNIFICANT CHANGE UP (ref 0–6)
FLUAV AG NPH QL: SIGNIFICANT CHANGE UP
FLUBV AG NPH QL: SIGNIFICANT CHANGE UP
GLUCOSE SERPL-MCNC: 93 MG/DL — SIGNIFICANT CHANGE UP (ref 70–99)
HCT VFR BLD CALC: 33.6 % — LOW (ref 34.5–45)
HGB BLD-MCNC: 11.7 G/DL — SIGNIFICANT CHANGE UP (ref 11.5–15.5)
IMM GRANULOCYTES NFR BLD AUTO: 0.2 % — SIGNIFICANT CHANGE UP (ref 0–0.9)
INR BLD: 1 RATIO — SIGNIFICANT CHANGE UP (ref 0.85–1.18)
LACTATE SERPL-SCNC: 0.8 MMOL/L — SIGNIFICANT CHANGE UP (ref 0.7–2)
LYMPHOCYTES # BLD AUTO: 2.37 K/UL — SIGNIFICANT CHANGE UP (ref 1–3.3)
LYMPHOCYTES # BLD AUTO: 23.2 % — SIGNIFICANT CHANGE UP (ref 13–44)
MCHC RBC-ENTMCNC: 34.8 GM/DL — SIGNIFICANT CHANGE UP (ref 32–36)
MCHC RBC-ENTMCNC: 34.8 PG — HIGH (ref 27–34)
MCV RBC AUTO: 100 FL — SIGNIFICANT CHANGE UP (ref 80–100)
MONOCYTES # BLD AUTO: 0.6 K/UL — SIGNIFICANT CHANGE UP (ref 0–0.9)
MONOCYTES NFR BLD AUTO: 5.9 % — SIGNIFICANT CHANGE UP (ref 2–14)
NEUTROPHILS # BLD AUTO: 7.08 K/UL — SIGNIFICANT CHANGE UP (ref 1.8–7.4)
NEUTROPHILS NFR BLD AUTO: 69.4 % — SIGNIFICANT CHANGE UP (ref 43–77)
PLATELET # BLD AUTO: 315 K/UL — SIGNIFICANT CHANGE UP (ref 150–400)
POTASSIUM SERPL-MCNC: 3.5 MMOL/L — SIGNIFICANT CHANGE UP (ref 3.5–5.3)
POTASSIUM SERPL-SCNC: 3.5 MMOL/L — SIGNIFICANT CHANGE UP (ref 3.5–5.3)
PROT SERPL-MCNC: 5.6 GM/DL — LOW (ref 6–8.3)
PROTHROM AB SERPL-ACNC: 11.3 SEC — SIGNIFICANT CHANGE UP (ref 9.5–13)
RBC # BLD: 3.36 M/UL — LOW (ref 3.8–5.2)
RBC # FLD: 13.8 % — SIGNIFICANT CHANGE UP (ref 10.3–14.5)
RSV RNA NPH QL NAA+NON-PROBE: SIGNIFICANT CHANGE UP
SARS-COV-2 RNA SPEC QL NAA+PROBE: SIGNIFICANT CHANGE UP
SODIUM SERPL-SCNC: 141 MMOL/L — SIGNIFICANT CHANGE UP (ref 135–145)
TROPONIN I, HIGH SENSITIVITY RESULT: 8.67 NG/L — SIGNIFICANT CHANGE UP
WBC # BLD: 10.2 K/UL — SIGNIFICANT CHANGE UP (ref 3.8–10.5)
WBC # FLD AUTO: 10.2 K/UL — SIGNIFICANT CHANGE UP (ref 3.8–10.5)

## 2023-09-17 PROCEDURE — 36415 COLL VENOUS BLD VENIPUNCTURE: CPT

## 2023-09-17 PROCEDURE — 85025 COMPLETE CBC W/AUTO DIFF WBC: CPT

## 2023-09-17 PROCEDURE — 85610 PROTHROMBIN TIME: CPT

## 2023-09-17 PROCEDURE — 99285 EMERGENCY DEPT VISIT HI MDM: CPT | Mod: FS

## 2023-09-17 PROCEDURE — 92610 EVALUATE SWALLOWING FUNCTION: CPT | Mod: GN

## 2023-09-17 PROCEDURE — 93010 ELECTROCARDIOGRAM REPORT: CPT

## 2023-09-17 PROCEDURE — 85027 COMPLETE CBC AUTOMATED: CPT

## 2023-09-17 PROCEDURE — 80048 BASIC METABOLIC PNL TOTAL CA: CPT

## 2023-09-17 PROCEDURE — 71045 X-RAY EXAM CHEST 1 VIEW: CPT | Mod: 26

## 2023-09-17 PROCEDURE — 71250 CT THORAX DX C-: CPT | Mod: 26,MA

## 2023-09-17 PROCEDURE — 80053 COMPREHEN METABOLIC PANEL: CPT

## 2023-09-17 PROCEDURE — 94640 AIRWAY INHALATION TREATMENT: CPT

## 2023-09-17 PROCEDURE — 85730 THROMBOPLASTIN TIME PARTIAL: CPT

## 2023-09-17 RX ORDER — VANCOMYCIN HCL 1 G
750 VIAL (EA) INTRAVENOUS ONCE
Refills: 0 | Status: COMPLETED | OUTPATIENT
Start: 2023-09-17 | End: 2023-09-17

## 2023-09-17 RX ORDER — CEFEPIME 1 G/1
1000 INJECTION, POWDER, FOR SOLUTION INTRAMUSCULAR; INTRAVENOUS ONCE
Refills: 0 | Status: DISCONTINUED | OUTPATIENT
Start: 2023-09-17 | End: 2023-09-17

## 2023-09-17 RX ORDER — TIMOLOL 0.5 %
1 DROPS OPHTHALMIC (EYE) DAILY
Refills: 0 | Status: DISCONTINUED | OUTPATIENT
Start: 2023-09-17 | End: 2023-09-20

## 2023-09-17 RX ORDER — MIDODRINE HYDROCHLORIDE 2.5 MG/1
2.5 TABLET ORAL
Refills: 0 | Status: DISCONTINUED | OUTPATIENT
Start: 2023-09-17 | End: 2023-09-20

## 2023-09-17 RX ORDER — ACETAMINOPHEN 500 MG
650 TABLET ORAL EVERY 6 HOURS
Refills: 0 | Status: DISCONTINUED | OUTPATIENT
Start: 2023-09-17 | End: 2023-09-20

## 2023-09-17 RX ORDER — IPRATROPIUM/ALBUTEROL SULFATE 18-103MCG
3 AEROSOL WITH ADAPTER (GRAM) INHALATION ONCE
Refills: 0 | Status: COMPLETED | OUTPATIENT
Start: 2023-09-17 | End: 2023-09-17

## 2023-09-17 RX ORDER — LATANOPROST 0.05 MG/ML
1 SOLUTION/ DROPS OPHTHALMIC; TOPICAL AT BEDTIME
Refills: 0 | Status: DISCONTINUED | OUTPATIENT
Start: 2023-09-17 | End: 2023-09-20

## 2023-09-17 RX ORDER — SODIUM CHLORIDE 9 MG/ML
1000 INJECTION INTRAMUSCULAR; INTRAVENOUS; SUBCUTANEOUS ONCE
Refills: 0 | Status: COMPLETED | OUTPATIENT
Start: 2023-09-17 | End: 2023-09-17

## 2023-09-17 RX ORDER — CEFEPIME 1 G/1
1000 INJECTION, POWDER, FOR SOLUTION INTRAMUSCULAR; INTRAVENOUS ONCE
Refills: 0 | Status: COMPLETED | OUTPATIENT
Start: 2023-09-17 | End: 2023-09-18

## 2023-09-17 RX ORDER — ACETAMINOPHEN 500 MG
550 TABLET ORAL ONCE
Refills: 0 | Status: COMPLETED | OUTPATIENT
Start: 2023-09-17 | End: 2023-09-17

## 2023-09-17 RX ORDER — ACETAMINOPHEN 500 MG
1000 TABLET ORAL ONCE
Refills: 0 | Status: DISCONTINUED | OUTPATIENT
Start: 2023-09-17 | End: 2023-09-17

## 2023-09-17 RX ORDER — MIDODRINE HYDROCHLORIDE 2.5 MG/1
1 TABLET ORAL
Refills: 0 | DISCHARGE

## 2023-09-17 RX ORDER — CEFTRIAXONE 500 MG/1
1000 INJECTION, POWDER, FOR SOLUTION INTRAMUSCULAR; INTRAVENOUS ONCE
Refills: 0 | Status: DISCONTINUED | OUTPATIENT
Start: 2023-09-17 | End: 2023-09-17

## 2023-09-17 RX ORDER — CITALOPRAM 10 MG/1
20 TABLET, FILM COATED ORAL DAILY
Refills: 0 | Status: DISCONTINUED | OUTPATIENT
Start: 2023-09-17 | End: 2023-09-20

## 2023-09-17 RX ORDER — VANCOMYCIN HCL 1 G
1000 VIAL (EA) INTRAVENOUS ONCE
Refills: 0 | Status: DISCONTINUED | OUTPATIENT
Start: 2023-09-17 | End: 2023-09-17

## 2023-09-17 RX ORDER — CHOLECALCIFEROL (VITAMIN D3) 125 MCG
1 CAPSULE ORAL
Refills: 0 | DISCHARGE

## 2023-09-17 RX ORDER — LANOLIN ALCOHOL/MO/W.PET/CERES
3 CREAM (GRAM) TOPICAL AT BEDTIME
Refills: 0 | Status: DISCONTINUED | OUTPATIENT
Start: 2023-09-17 | End: 2023-09-20

## 2023-09-17 RX ORDER — AZITHROMYCIN 500 MG/1
500 TABLET, FILM COATED ORAL ONCE
Refills: 0 | Status: DISCONTINUED | OUTPATIENT
Start: 2023-09-17 | End: 2023-09-17

## 2023-09-17 RX ORDER — LANOLIN ALCOHOL/MO/W.PET/CERES
1 CREAM (GRAM) TOPICAL
Refills: 0 | DISCHARGE

## 2023-09-17 RX ADMIN — Medication 3 MILLILITER(S): at 22:01

## 2023-09-17 RX ADMIN — SODIUM CHLORIDE 1000 MILLILITER(S): 9 INJECTION INTRAMUSCULAR; INTRAVENOUS; SUBCUTANEOUS at 22:01

## 2023-09-17 NOTE — H&P ADULT - HISTORY OF PRESENT ILLNESS
87 y/o F w/ PMH of dementia, dyslipidemia, alzheimer's disease, glaucoma, OA, h/o aspiration pneumonia, p/w cough. Patient is non-verbal, and daughter at bedside provides history. States that patient has been having a cough since yesterday, and seem to have difficulty clearing her mucus. Patient has also been more lethargic compared to baseline as well.     PSH: Hip surgery, appendectomy, D&C, tonsillectomy     Social Hx: Denies tobacco /etoh / drugs     Family Hx: Father - colon cancer

## 2023-09-17 NOTE — ED PROVIDER NOTE - PHYSICAL EXAMINATION
PA NOTE: GEN: AOX3, NAD. HEENT: Throat clear. Airway is patent. EYES: PERRLA. EOMI. Head: NC/AT. NECK: Supple, No JVD. FROM. C-spine non-tender. CV:S1S2, RRR, LUNGS: Slight diffuse rhonchi. CHEST: Equal chest expansion and rise. No deformity. ABD: Soft, NT/ND, no rebound, no guarding. No CVAT. EXT: No e/c/c. 2+ distal pulses. SKIN: No rashes. NEURO: No focal deficits. CN II-XII intact. FROM. 5/5 motor and sensory. ~Remington Christianson PA-C

## 2023-09-17 NOTE — ED PROVIDER NOTE - CLINICAL SUMMARY MEDICAL DECISION MAKING FREE TEXT BOX
88 year old female with a history of pneumonia and dementia; presents to the ED with cough, no fever. Daughter is concerned that she does not develop another pneumonia.   Plan CT chest, nebulizer treatment, IV fluids, labs, chest x-ray. e 88 year old female with a history of pneumonia and dementia; presents to the ED with cough, no fever. Daughter is concerned that she does not develop another pneumonia.   Plan CT chest, nebulizer treatment, IV fluids, labs, chest x-ray. e      spoke with hospitalist dr. Theodore, will admit patient. ~Remington Christianson PA-C

## 2023-09-17 NOTE — H&P ADULT - ASSESSMENT
89 y/o F w/ PMH of dementia, dyslipidemia, alzheimer's disease, glaucoma, OA, h/o aspiration pneumonia, p/w cough    *Metabolic encephalopathy & cough 2/2 chronic inflammation / bronchiectasis - questionable early aspiration PNA  -Albuterol / Spiriva / Steroids   -Given h/o aspiration PNA, will cover w/ Zosyn   -Lactate = 0.8  -F/u blood cx  -COVID negative   -ID consult  -Pulm consult   -Pulse ox monitoring   -CT chest: +Trace pleural effusion, diffuse bronchial wall thickening in LLL 2/2 chronic inflammation / underlying bronchiectasis.    *Pulmonary nodule  -F/u Pulm     *Incidentally noted nodular thickening of adrenal gland  -F/u outpatient endo for further evaluation    *Degenerative changes / severe compression deformity of T9 / T12  -F/u outpatient ortho     *DVT ppx   -Heparin SubQ    89 y/o F w/ PMH of dementia, dyslipidemia, alzheimer's disease, glaucoma, OA, h/o aspiration pneumonia, p/w cough    *Metabolic encephalopathy & cough 2/2 chronic inflammation / bronchiectasis - questionable early aspiration PNA  -Albuterol / Spiriva / Steroids   -Given h/o aspiration PNA, will cover w/ cefepime  -Lactate = 0.8  -F/u blood cx  -COVID negative   -ID consult  -Pulm consult   -Pulse ox monitoring   -CT chest: +Trace pleural effusion, diffuse bronchial wall thickening in LLL 2/2 chronic inflammation / underlying bronchiectasis.    *Pulmonary nodule  -F/u Pulm     *Incidentally noted nodular thickening of adrenal gland  -F/u outpatient endo for further evaluation    *Degenerative changes / severe compression deformity of T9 / T12  -F/u outpatient ortho     *DVT ppx   -Heparin SubQ

## 2023-09-17 NOTE — ED ADULT NURSE NOTE - NSFALLHARMRISKINTERV_ED_ALL_ED

## 2023-09-17 NOTE — ED PROVIDER NOTE - CARE PLAN
1 Principal Discharge DX:	Pneumonia   Principal Discharge DX:	HCAP (healthcare-associated pneumonia)

## 2023-09-17 NOTE — ED PROVIDER NOTE - PROGRESS NOTE DETAILS
+LLL infiltrate. Waiting on CT chest. Ordered ABX. ~Remington Christianson PA-C PA: Patient is a 89 y/o female with PMHx of dementia, hypercholesteremia, dysphagia, Alzheimer's disease, glaucoma, OA of right hip, hx of hip surgery who presents to [facility] from [facility] c/o productive cough since last night. Patient recently finished treatment for pneumonia and was discharged from  on 9/14. +Poor PO intake, lethargy. ~Remington Christianson PA-C spoke with hospitalist dr. Theodore, will admit patient. ~Remington Christianson PA-C

## 2023-09-17 NOTE — ED ADULT TRIAGE NOTE - CHIEF COMPLAINT QUOTE
Patient presents to the ER from [facility] for complaints of productive cough. Patient recently finished treatment for Pneumonia and has developed a productive cough and wheezing. Patient with history of dementia.

## 2023-09-17 NOTE — ED CLERICAL - NS ED CLERK NOTE PRE-ARRIVAL INFORMATION; ADDITIONAL PRE-ARRIVAL INFORMATION
This patient is enrolled in the readmission program and has active care navigation. This patient can be followed up by the care navigation team within 24 hours. To arrange close follow-up or to obtain additional clinical information about this patient, please call the contact number above.   Please call the hospitalist as needed to collaborate on further medical management ([phone])

## 2023-09-17 NOTE — ED PROVIDER NOTE - OBJECTIVE STATEMENT
89 y/o female with a PMHx of dementia, hypercholesteremia, dysphagia, Alzheimer's disease, glaucoma, OA of right hip, hx of hip surgery; presenting to the ED from [facility] c/o productive cough onset last night as per family member at bedside. Patient recently finished treatment for pneumonia and was discharged from  on 9/14. Family member states she was going to wait for the cough to clear up on its own but patient is unable to clear her own phlegm. Endorses decreased PO intake, lethargy. Denies fever, use of nebulizer.

## 2023-09-17 NOTE — PHARMACOTHERAPY INTERVENTION NOTE - COMMENTS
Medication history complete. Medications and allergies reviewed with list provided by [facility] and confirmed with .

## 2023-09-17 NOTE — ED PROVIDER NOTE - NS ED ATTENDING STATEMENT MOD
Attending Only This was a shared visit with the LYSSA. I reviewed and verified the documentation and independently performed the documented:

## 2023-09-17 NOTE — ED ADULT NURSE NOTE - CHIEF COMPLAINT QUOTE
Patient presents to the ER from [facility] for complaints of productive cough. Patient recently finished treatment for Pneumonia and has developed a productive cough and wheezing. Patient with history of dementia.

## 2023-09-18 DIAGNOSIS — R13.10 DYSPHAGIA, UNSPECIFIED: ICD-10-CM

## 2023-09-18 DIAGNOSIS — J47.9 BRONCHIECTASIS, UNCOMPLICATED: ICD-10-CM

## 2023-09-18 DIAGNOSIS — E43 UNSPECIFIED SEVERE PROTEIN-CALORIE MALNUTRITION: ICD-10-CM

## 2023-09-18 DIAGNOSIS — J18.9 PNEUMONIA, UNSPECIFIED ORGANISM: ICD-10-CM

## 2023-09-18 DIAGNOSIS — F03.90 UNSPECIFIED DEMENTIA, UNSPECIFIED SEVERITY, WITHOUT BEHAVIORAL DISTURBANCE, PSYCHOTIC DISTURBANCE, MOOD DISTURBANCE, AND ANXIETY: ICD-10-CM

## 2023-09-18 LAB
ALBUMIN SERPL ELPH-MCNC: 2.3 G/DL — LOW (ref 3.3–5)
ALP SERPL-CCNC: 78 U/L — SIGNIFICANT CHANGE UP (ref 40–120)
ALT FLD-CCNC: 10 U/L — LOW (ref 12–78)
ANION GAP SERPL CALC-SCNC: 4 MMOL/L — LOW (ref 5–17)
APTT BLD: 34.6 SEC — SIGNIFICANT CHANGE UP (ref 24.5–35.6)
AST SERPL-CCNC: 13 U/L — LOW (ref 15–37)
BASOPHILS # BLD AUTO: 0.06 K/UL — SIGNIFICANT CHANGE UP (ref 0–0.2)
BASOPHILS NFR BLD AUTO: 0.9 % — SIGNIFICANT CHANGE UP (ref 0–2)
BILIRUB SERPL-MCNC: 0.5 MG/DL — SIGNIFICANT CHANGE UP (ref 0.2–1.2)
BUN SERPL-MCNC: 8 MG/DL — SIGNIFICANT CHANGE UP (ref 7–23)
CALCIUM SERPL-MCNC: 9.1 MG/DL — SIGNIFICANT CHANGE UP (ref 8.5–10.1)
CHLORIDE SERPL-SCNC: 112 MMOL/L — HIGH (ref 96–108)
CO2 SERPL-SCNC: 25 MMOL/L — SIGNIFICANT CHANGE UP (ref 22–31)
CREAT SERPL-MCNC: 0.58 MG/DL — SIGNIFICANT CHANGE UP (ref 0.5–1.3)
EGFR: 88 ML/MIN/1.73M2 — SIGNIFICANT CHANGE UP
EOSINOPHIL # BLD AUTO: 0.07 K/UL — SIGNIFICANT CHANGE UP (ref 0–0.5)
EOSINOPHIL NFR BLD AUTO: 1 % — SIGNIFICANT CHANGE UP (ref 0–6)
GLUCOSE SERPL-MCNC: 88 MG/DL — SIGNIFICANT CHANGE UP (ref 70–99)
HCT VFR BLD CALC: 30.2 % — LOW (ref 34.5–45)
HGB BLD-MCNC: 10.3 G/DL — LOW (ref 11.5–15.5)
IMM GRANULOCYTES NFR BLD AUTO: 0.1 % — SIGNIFICANT CHANGE UP (ref 0–0.9)
INR BLD: 1 RATIO — SIGNIFICANT CHANGE UP (ref 0.85–1.18)
LYMPHOCYTES # BLD AUTO: 2.8 K/UL — SIGNIFICANT CHANGE UP (ref 1–3.3)
LYMPHOCYTES # BLD AUTO: 40.2 % — SIGNIFICANT CHANGE UP (ref 13–44)
MCHC RBC-ENTMCNC: 34.1 GM/DL — SIGNIFICANT CHANGE UP (ref 32–36)
MCHC RBC-ENTMCNC: 34.6 PG — HIGH (ref 27–34)
MCV RBC AUTO: 101.3 FL — HIGH (ref 80–100)
MONOCYTES # BLD AUTO: 0.66 K/UL — SIGNIFICANT CHANGE UP (ref 0–0.9)
MONOCYTES NFR BLD AUTO: 9.5 % — SIGNIFICANT CHANGE UP (ref 2–14)
NEUTROPHILS # BLD AUTO: 3.37 K/UL — SIGNIFICANT CHANGE UP (ref 1.8–7.4)
NEUTROPHILS NFR BLD AUTO: 48.3 % — SIGNIFICANT CHANGE UP (ref 43–77)
PLATELET # BLD AUTO: 276 K/UL — SIGNIFICANT CHANGE UP (ref 150–400)
POTASSIUM SERPL-MCNC: 3.4 MMOL/L — LOW (ref 3.5–5.3)
POTASSIUM SERPL-SCNC: 3.4 MMOL/L — LOW (ref 3.5–5.3)
PROT SERPL-MCNC: 5.2 GM/DL — LOW (ref 6–8.3)
PROTHROM AB SERPL-ACNC: 11.3 SEC — SIGNIFICANT CHANGE UP (ref 9.5–13)
RAPID RVP RESULT: SIGNIFICANT CHANGE UP
RBC # BLD: 2.98 M/UL — LOW (ref 3.8–5.2)
RBC # FLD: 13.7 % — SIGNIFICANT CHANGE UP (ref 10.3–14.5)
SARS-COV-2 RNA SPEC QL NAA+PROBE: SIGNIFICANT CHANGE UP
SODIUM SERPL-SCNC: 141 MMOL/L — SIGNIFICANT CHANGE UP (ref 135–145)
WBC # BLD: 6.97 K/UL — SIGNIFICANT CHANGE UP (ref 3.8–10.5)
WBC # FLD AUTO: 6.97 K/UL — SIGNIFICANT CHANGE UP (ref 3.8–10.5)

## 2023-09-18 PROCEDURE — 99223 1ST HOSP IP/OBS HIGH 75: CPT

## 2023-09-18 RX ORDER — ACETAMINOPHEN 500 MG
550 TABLET ORAL ONCE
Refills: 0 | Status: COMPLETED | OUTPATIENT
Start: 2023-09-18 | End: 2023-09-18

## 2023-09-18 RX ORDER — SODIUM CHLORIDE 9 MG/ML
1000 INJECTION, SOLUTION INTRAVENOUS
Refills: 0 | Status: DISCONTINUED | OUTPATIENT
Start: 2023-09-18 | End: 2023-09-20

## 2023-09-18 RX ORDER — ALBUTEROL 90 UG/1
2 AEROSOL, METERED ORAL EVERY 6 HOURS
Refills: 0 | Status: DISCONTINUED | OUTPATIENT
Start: 2023-09-18 | End: 2023-09-20

## 2023-09-18 RX ORDER — POTASSIUM CHLORIDE 20 MEQ
40 PACKET (EA) ORAL ONCE
Refills: 0 | Status: COMPLETED | OUTPATIENT
Start: 2023-09-18 | End: 2023-09-18

## 2023-09-18 RX ORDER — ROBINUL 0.2 MG/ML
0.2 INJECTION INTRAMUSCULAR; INTRAVENOUS EVERY 8 HOURS
Refills: 0 | Status: DISCONTINUED | OUTPATIENT
Start: 2023-09-18 | End: 2023-09-20

## 2023-09-18 RX ORDER — HEPARIN SODIUM 5000 [USP'U]/ML
5000 INJECTION INTRAVENOUS; SUBCUTANEOUS EVERY 12 HOURS
Refills: 0 | Status: DISCONTINUED | OUTPATIENT
Start: 2023-09-18 | End: 2023-09-20

## 2023-09-18 RX ORDER — CEFEPIME 1 G/1
1000 INJECTION, POWDER, FOR SOLUTION INTRAMUSCULAR; INTRAVENOUS EVERY 24 HOURS
Refills: 0 | Status: DISCONTINUED | OUTPATIENT
Start: 2023-09-19 | End: 2023-09-20

## 2023-09-18 RX ORDER — CEFEPIME 1 G/1
1000 INJECTION, POWDER, FOR SOLUTION INTRAMUSCULAR; INTRAVENOUS DAILY
Refills: 0 | Status: DISCONTINUED | OUTPATIENT
Start: 2023-09-18 | End: 2023-09-18

## 2023-09-18 RX ORDER — TIOTROPIUM BROMIDE 18 UG/1
2 CAPSULE ORAL; RESPIRATORY (INHALATION) DAILY
Refills: 0 | Status: DISCONTINUED | OUTPATIENT
Start: 2023-09-18 | End: 2023-09-20

## 2023-09-18 RX ADMIN — Medication 1 DROP(S): at 13:16

## 2023-09-18 RX ADMIN — CEFEPIME 1000 MILLIGRAM(S): 1 INJECTION, POWDER, FOR SOLUTION INTRAMUSCULAR; INTRAVENOUS at 03:52

## 2023-09-18 RX ADMIN — ALBUTEROL 2 PUFF(S): 90 AEROSOL, METERED ORAL at 20:17

## 2023-09-18 RX ADMIN — HEPARIN SODIUM 5000 UNIT(S): 5000 INJECTION INTRAVENOUS; SUBCUTANEOUS at 11:32

## 2023-09-18 RX ADMIN — Medication 250 MILLIGRAM(S): at 01:23

## 2023-09-18 RX ADMIN — TIOTROPIUM BROMIDE 2 PUFF(S): 18 CAPSULE ORAL; RESPIRATORY (INHALATION) at 10:08

## 2023-09-18 RX ADMIN — Medication 220 MILLIGRAM(S): at 19:01

## 2023-09-18 RX ADMIN — LATANOPROST 1 DROP(S): 0.05 SOLUTION/ DROPS OPHTHALMIC; TOPICAL at 21:15

## 2023-09-18 RX ADMIN — Medication 550 MILLIGRAM(S): at 19:31

## 2023-09-18 RX ADMIN — SODIUM CHLORIDE 80 MILLILITER(S): 9 INJECTION, SOLUTION INTRAVENOUS at 17:57

## 2023-09-18 RX ADMIN — HEPARIN SODIUM 5000 UNIT(S): 5000 INJECTION INTRAVENOUS; SUBCUTANEOUS at 21:13

## 2023-09-18 RX ADMIN — ALBUTEROL 2 PUFF(S): 90 AEROSOL, METERED ORAL at 15:27

## 2023-09-18 NOTE — CONSULT NOTE ADULT - SUBJECTIVE AND OBJECTIVE BOX
HPI: Pt is a 88y old Female with hx of       PAIN: ( )Yes   ( )No  Level:  Location:  Intensity:    /10  Quality:  Aggravating Factors:  Alleviating Factors:  Radiation:  Duration/Timing:  Impact on ADLs:    DYSPNEA: ( ) Yes  ( ) No  Level:    PAST MEDICAL & SURGICAL HISTORY:  Osteoarthritis of right hip      Glaucoma      Alzheimer disease      Hypercholesteremia      Dementia      S/P appendectomy  childhood      S/P tonsillectomy  childhood      S/P D&C (status post dilation and curettage)  age 32      S/P colonoscopy  every 2 or 3 years      History of hip surgery  Lt side 2022, Rt side approx 8 years ago          SOCIAL HX:    Hx opiate tolerance ( )YES  ( )NO    Baseline ADLs  (Prior to Admission)  ( ) Independent   ( )Dependent    FAMILY HISTORY:  Family history of colon cancer (Father)        Review of Systems:    Anxiety-  Depression-  Physical Discomfort-  Dyspnea-  Constipation-  Diarrhea-  Nausea-  Vomiting-  Anorexia-  Weight Loss-   Cough-  Secretions-  Fatigue-  Weakness-  Delirium-    All other systems reviewed and negative  Unable to obtain/Limited due to:      PHYSICAL EXAM:    Vital Signs Last 24 Hrs  T(C): 36.8 (18 Sep 2023 08:20), Max: 36.9 (18 Sep 2023 03:29)  T(F): 98.3 (18 Sep 2023 08:20), Max: 98.5 (18 Sep 2023 03:29)  HR: 87 (18 Sep 2023 11:13) (80 - 87)  BP: 109/53 (18 Sep 2023 11:13) (101/52 - 131/82)  BP(mean): --  RR: 16 (18 Sep 2023 08:20) (16 - 18)  SpO2: 93% (18 Sep 2023 08:20) (93% - 100%)    Parameters below as of 18 Sep 2023 08:20  Patient On (Oxygen Delivery Method): room air      Daily Height in cm: 154.94 (17 Sep 2023 16:35)    Daily     PPSV2:   %  FAST:    General:  Mental Status:  HEENT:  Lungs:  Cardiac:  GI:  :  Ext:  Neuro:      LABS:                        10.3   6.97  )-----------( 276      ( 18 Sep 2023 07:21 )             30.2     09-18    141  |  112<H>  |  8   ----------------------------<  88  3.4<L>   |  25  |  0.58    Ca    9.1      18 Sep 2023 07:21    TPro  5.2<L>  /  Alb  2.3<L>  /  TBili  0.5  /  DBili  x   /  AST  13<L>  /  ALT  10<L>  /  AlkPhos  78  09-18    PT/INR - ( 18 Sep 2023 07:21 )   PT: 11.3 sec;   INR: 1.00 ratio         PTT - ( 18 Sep 2023 07:21 )  PTT:34.6 sec  Albumin: Albumin: 2.3 g/dL (09-18 @ 07:21)      Allergies    No Known Allergies    Intolerances      MEDICATIONS  (STANDING):  albuterol    90 MICROgram(s) HFA Inhaler 2 Puff(s) Inhalation every 6 hours  citalopram 20 milliGRAM(s) Oral daily  heparin   Injectable 5000 Unit(s) SubCutaneous every 12 hours  latanoprost 0.005% Ophthalmic Solution 1 Drop(s) Both EYES at bedtime  midodrine. 2.5 milliGRAM(s) Oral <User Schedule>  predniSONE   Tablet 20 milliGRAM(s) Oral daily  timolol 0.5% Solution 1 Drop(s) Both EYES daily  tiotropium 2.5 MICROgram(s) Inhaler 2 Puff(s) Inhalation daily    MEDICATIONS  (PRN):  acetaminophen     Tablet .. 650 milliGRAM(s) Oral every 6 hours PRN Mild Pain (1 - 3)  guaiFENesin Oral Liquid (Sugar-Free) 100 milliGRAM(s) Oral every 6 hours PRN Cough  melatonin 3 milliGRAM(s) Oral at bedtime PRN Insomnia      RADIOLOGY/ADDITIONAL STUDIES:   HPI:    "88 y/o female with a PMHx of Alzheimer's, advanced dementia and non verbal, glaucoma, hypercholesterolemia, osteoarthritis BIBA form [facility] for possible aspiration.  Son at bedside reports patient has had history of aspiration pneumonia.  Recently she has had more difficulty swallowing, wet coughing requring suction at [facility]. Patient with increased weakness , lethargy past few days and very poor oral intake since the weekend.   ROS is not attainable secondary to dementia.  now presented to ED again d/t aspiration is now NPO d/t dysphagia again pt is endstage dementia.         9/18  pt seen and examined  lethargic and now NPO   family was not at bedside   aide was present  will reach out to family         PAIN: ( )Yes   (x )No   Pt unable to characterise d/t clinical status   DYSPNEA: ( ) Yes  (x ) No  Level:    PAST MEDICAL & SURGICAL HISTORY:  Osteoarthritis of right hip      Glaucoma  Alzheimer disease  Hypercholesteremia  Dementia  S/P appendectomy  childhood  S/P tonsillectomy  childhood  S/P D&C (status post dilation and curettage)  age 32  S/P colonoscopy  every 2 or 3 years  History of hip surgery  Lt side 2022, Rt side approx 8 years ago        SOCIAL HX:    Hx opiate tolerance ( )YES  (x )NO    Baseline ADLs  (Prior to Admission)  ( ) Independent   (x )Dependent    FAMILY HISTORY:  Family history of colon cancer (Father)        Review of Systems:    Anxiety- calm   Depression-  Physical Discomfort- NAD  Dyspnea- none  Constipation-  Diarrhea- none   Nausea-  Vomiting- none  Anorexia- +   Weight Loss-  +  Cough-  none   Secretions- none  Fatigue- +  Weakness-+  Delirium- lethargic  All other systems reviewed and negative          PHYSICAL EXAM:    Vital Signs Last 24 Hrs  T(C): 36.8 (18 Sep 2023 08:20), Max: 36.9 (18 Sep 2023 03:29)  T(F): 98.3 (18 Sep 2023 08:20), Max: 98.5 (18 Sep 2023 03:29)  HR: 87 (18 Sep 2023 11:13) (80 - 87)  BP: 109/53 (18 Sep 2023 11:13) (101/52 - 131/82)  BP(mean): --  RR: 16 (18 Sep 2023 08:20) (16 - 18)  SpO2: 93% (18 Sep 2023 08:20) (93% - 100%)    Parameters below as of 18 Sep 2023 08:20  Patient On (Oxygen Delivery Method): room air      Daily Height in cm: 154.94 (17 Sep 2023 16:35)    Daily     PPSV2:  30 %  FAST:    General: calm in NAD  Mental Status: lethargic  HEENT:  perrl  Lungs: ctabl b/l bs -   Cardiac: s1s2 no mgr  GI: soft nontender +BS  : voids  Ext: moves all 4 extremities spontaneously  Neuro: no gross findings       LABS:                        10.3   6.97  )-----------( 276      ( 18 Sep 2023 07:21 )             30.2     09-18    141  |  112<H>  |  8   ----------------------------<  88  3.4<L>   |  25  |  0.58    Ca    9.1      18 Sep 2023 07:21    TPro  5.2<L>  /  Alb  2.3<L>  /  TBili  0.5  /  DBili  x   /  AST  13<L>  /  ALT  10<L>  /  AlkPhos  78  09-18    PT/INR - ( 18 Sep 2023 07:21 )   PT: 11.3 sec;   INR: 1.00 ratio         PTT - ( 18 Sep 2023 07:21 )  PTT:34.6 sec  Albumin: Albumin: 2.3 g/dL (09-18 @ 07:21)      Allergies    No Known Allergies    Intolerances      MEDICATIONS  (STANDING):  albuterol    90 MICROgram(s) HFA Inhaler 2 Puff(s) Inhalation every 6 hours  citalopram 20 milliGRAM(s) Oral daily  heparin   Injectable 5000 Unit(s) SubCutaneous every 12 hours  latanoprost 0.005% Ophthalmic Solution 1 Drop(s) Both EYES at bedtime  midodrine. 2.5 milliGRAM(s) Oral <User Schedule>  predniSONE   Tablet 20 milliGRAM(s) Oral daily  timolol 0.5% Solution 1 Drop(s) Both EYES daily  tiotropium 2.5 MICROgram(s) Inhaler 2 Puff(s) Inhalation daily    MEDICATIONS  (PRN):  acetaminophen     Tablet .. 650 milliGRAM(s) Oral every 6 hours PRN Mild Pain (1 - 3)  guaiFENesin Oral Liquid (Sugar-Free) 100 milliGRAM(s) Oral every 6 hours PRN Cough  melatonin 3 milliGRAM(s) Oral at bedtime PRN Insomnia      RADIOLOGY/ADDITIONAL STUDIES:      RADIOLOGY/ADDITIONAL STUDIES:

## 2023-09-18 NOTE — CONSULT NOTE ADULT - SUBJECTIVE AND OBJECTIVE BOX
HPI:  89 y/o F w/ PMH of dementia, dyslipidemia, alzheimer's disease, glaucoma, OA, h/o aspiration pneumonia, p/w cough. Patient is non-verbal, and daughter at bedside provides history. States that patient has been having a cough since yesterday, and seem to have difficulty clearing her mucus. Patient has also been more lethargic compared to baseline as well.   *** I concur with this HPI, the daughter provided me that same history***    The patient has dementia that started nearly 12 years ago. More recently she has been bed bound and had progressive worsening of dementia. The patient is also having difficulty with aspirations. She is underweight. Recently she was admitted with pneumonia    PSH: Hip surgery, appendectomy, D&C, tonsillectomy     Social Hx: Denies tobacco /etoh / drugs     Family Hx: Father - colon cancer    (17 Sep 2023 23:47)      REVIEW OF SYSTEMS: The patient is a poor history, ROS from daughter.   Constitutional: No fevers or chills. No weight loss. No fatigue or generalised malaise.  Eyes: No itching or discharge from the eyes  ENT: No ear pain. No ear discharge. No nasal congestion. No post nasal drip. No epistaxis. No throat pain.    CV: No chest pain. No palpitations. No lightheadedness or dizziness.   Resp: + dry cough, + aspiration, + chest pain  GI: No nausea. No vomiting. No diarrhea.  MSK: old age related joint pains  Integumentary: no acute skin lesions  Neurological: weak. mostly bed bound  Rest of review of symptoms were unremarkable        PHYSICAL EXAM:  General: Awake, opens eyes, weak, did not answer my questions.   HEENT: Atraumatic, normocephalic.                  No tonsillar or pharyngeal exudates.  Lymph Nodes: No palpable lymphadenopathy  Neck: No JVD. No carotid bruit.   Respiratory: Normal chest expansion                         Normal percussion                         Diminished breath sounds, rales at bases.   Cardiovascular: S1 S2 normal. No murmurs appreciated.   Abdomen: Soft, non-tender, non-distended. No organomegaly.  Extremities: Warm to touch.  No pedal edema.       PAST MEDICAL & SURGICAL HISTORY:  Osteoarthritis of right hip  Glaucoma  Alzheimer disease  Hypercholesteremia  Dementia  S/P appendectomy  childhood  S/P tonsillectomy  childhood  S/P D&C (status post dilation and curettage)  age 32  S/P colonoscopy  every 2 or 3 years      History of hip surgery  Lt side 2022, Rt side approx 8 years ago      FAMILY HISTORY:  Family history of colon cancer (Father)        Allergies    No Known Allergies    Intolerances            OBJECTIVE:    T(C): 36.8 (18 Sep 2023 08:20), Max: 36.9 (18 Sep 2023 03:29)  T(F): 98.3 (18 Sep 2023 08:20), Max: 98.5 (18 Sep 2023 03:29)  HR: 81 (18 Sep 2023 08:20) (80 - 86)  BP: 124/48 (18 Sep 2023 08:20) (101/52 - 131/82)  RR: 16 (18 Sep 2023 08:20) (16 - 18)  SpO2: 93% (18 Sep 2023 08:20) (93% - 100%)    O2 Parameters below as of 18 Sep 2023 08:20  Patient On (Oxygen Delivery Method): room air          HOSPITAL MEDICATIONS:  MEDICATIONS  (STANDING):  albuterol    90 MICROgram(s) HFA Inhaler 2 Puff(s) Inhalation every 6 hours  citalopram 20 milliGRAM(s) Oral daily  heparin   Injectable 5000 Unit(s) SubCutaneous every 12 hours  latanoprost 0.005% Ophthalmic Solution 1 Drop(s) Both EYES at bedtime  midodrine. 2.5 milliGRAM(s) Oral <User Schedule>  potassium chloride   Powder 40 milliEquivalent(s) Oral once  predniSONE   Tablet 20 milliGRAM(s) Oral daily  timolol 0.5% Solution 1 Drop(s) Both EYES daily  tiotropium 2.5 MICROgram(s) Inhaler 2 Puff(s) Inhalation daily    MEDICATIONS  (PRN):  acetaminophen     Tablet .. 650 milliGRAM(s) Oral every 6 hours PRN Mild Pain (1 - 3)  guaiFENesin Oral Liquid (Sugar-Free) 100 milliGRAM(s) Oral every 6 hours PRN Cough  melatonin 3 milliGRAM(s) Oral at bedtime PRN Insomnia      LABS:                        10.3   6.97  )-----------( 276      ( 18 Sep 2023 07:21 )             30.2     09-18    141  |  112<H>  |  8   ----------------------------<  88  3.4<L>   |  25  |  0.58    Ca    9.1      18 Sep 2023 07:21    TPro  5.2<L>  /  Alb  2.3<L>  /  TBili  0.5  /  DBili  x   /  AST  13<L>  /  ALT  10<L>  /  AlkPhos  78  09-18    PT/INR - ( 18 Sep 2023 07:21 )   PT: 11.3 sec;   INR: 1.00 ratio         PTT - ( 18 Sep 2023 07:21 )  PTT:34.6 sec        MICROBIOLOGY:     RADIOLOGY:   Reviewed and interpreted myself  < from: CT Chest No Cont (09.17.23 @ 21:32) >    ACC: [id number] EXAM:  CT CHEST   ORDERED BY: WON ROSAS     PROCEDURE DATE:  09/17/2023          INTERPRETATION:  CLINICAL INFORMATION: Left lower lobe infiltration?    COMPARISON: Same day radiograph, 9/6/2023 (CT of the chest)    CONTRAST/COMPLICATIONS:  IV Contrast: None  Oral Contrast: None  Complications: N/A    PROCEDURE:  CT of the Chest was performed.  Sagittal and coronal reformats were performed.    FINDINGS:    LUNGS AND AIRWAYS: Patent central airways.  Diffuse bronchial wall   thickening especially in the left lower lobe. 3 mm sized calcified nodule   in the right middle lobe (3-333).  PLEURA: Small left and trace right pleural effusion. No pneumothorax.  MEDIASTINUM AND LAURIE: No lymphadenopathy.  VESSELS: Mild atherosclerotic calcification.  HEART: Heart size is normal. No pericardial effusion.  CHEST WALL AND LOWER NECK: Within normal limits.  VISUALIZED UPPER ABDOMEN: Nodular thickening of left adrenal gland.   Scattered colonic diverticuli.  BONES: Severe compression deformity of T9 and T12 with acute kyphotic   curvature at thoracolumbar junction. Diffuse osteopenia.    IMPRESSION:  Trace right small left pleural effusion.  Diffuse bronchial wall thickening especially in the left lower lobe   likely a sequela of chronic inflammation and underlying bronchiectasis.   No evidence of pneumonia.        --- End of Report ---            RICARDO JAMESON MD; Attending Radiologist  This document has been electronically signed. Sep 17 2023 10:15PM    < end of copied text >  < from: CT Chest No Cont (09.06.23 @ 14:00) >    ACC: [id number] EXAM:  CT CHEST   ORDERED BY: ANUSHKA TILLMAN     PROCEDURE DATE:  09/06/2023          INTERPRETATION:  Clinical indications: Evaluate for aspiration pneumonia.    Axial CT images of the chest are obtained without intravenous   administration of contrast.    Comparison is made with the chest CT of July 25, 2023.    No enlarged axillary, mediastinal or hilar lymph nodes.    Heart size is normal. No pericardial effusion. Vascular calcifications   with involvement of the aorta and the coronary arteries.    Evaluation of the upper abdomen demonstrate no significant abnormality.    Evaluation of the lungs demonstrate mild bronchiectasis within the   lingula and the right middle lobe as on the prior study may be sequela of   chronic mycobacterial avium complex infection given the distribution.   Superimposed bilateral lower lung foci of impacted distal or dilated   airways. Bilateral mid to lower lung areas of linear or subsegmental   atelectasis. Right lower lobe linear opacities suggestive of atelectasis   with contiguous patchy consolidation within the dependent portion of the   right lower lobe new since July 25, 2023 may represent pneumonia.    Previously described 6 mm lingular nodule is unchanged. 4 mm right middle   lobe pulmonary nodule is unchanged since March 15, 2023. Tiny bilateral   calcified granulomas.    Secretions within the right mainstem bronchus extending into the bronchus   intermedius. Focal opacification of a focal portion of the right lower   lobar bronchus extending into a segmental branch likely due to internal   secretions.    Degenerative changes of the spine. Severe loss of height of the T9 and   T12 vertebral bodies with moderate superior endplate loss of height of   the L1 vertebral body are unchanged.    IMPRESSION: Right lower lobe linear and patchy opacities as described   above new since July 25, 2023 suggestive of combination of atelectasis   and pneumonia.    Focal opacification of portion of the right lower lobe bronchus likely   due to internal secretions.    A 3 month follow-up noncontrast chest CT is recommended to ensure   resolution of the above findings.    --- End of Report ---            KLAUS HARLEY MD; Attending Radiologist  This document has been electronically signed. Sep  6 2023  2:25PM    < end of copied text >    Oral Musculature Evaluation:  · Oral Musculature	unable to assess due to poor participation/comprehension  · Structural Abnormalities	none present  · Dentition	some teeth were visible during passive exam of her mouth since pt at first tolerated it and then began to resist.  · Labial Strength, Mobility and Agility	wilmer  · Lingual Strength, Mobility and Agility	wilmer    Bedside Swallow: Trial 1  · Consistencies administered	moderately thick; in fact, spoon was barely wt with the liquid.  · Mode of Presentation	spoon  · Positioning	upright 75 degrees  · Oral Preparatory Phase	pt demonstrtaed, even in her half awake and lethargic state,  oral laerting to theprezxsence of the spoon at her lip and in her mouth, Clinican was able to induce lip strclosure and spoons tripping actively on part of the pt. .  · Oral Phase	there was slow and weak AP transfer of the minimal bolus  · Pharyngeal Phase	Ap transfer was followed by the slow onset of the pharyngeal swallow with palpable laryngeal lift.  w  · Comments	Disc with Dtr, then with NSg, then with MD.  RX; puree and moderately thick liquids.  FEED ONLY WHEN PT IS AWAKE AND ALERT ENOUGH TO PARTICIPATE.   OK IF FAMILY WISHES TO FEED PT AS LONG AS THEY ACCEPT ASPIRATION RISK.  Service will follow.

## 2023-09-18 NOTE — PATIENT PROFILE ADULT - FALL HARM RISK - HARM RISK INTERVENTIONS
Assistance OOB with selected safe patient handling equipment/Communicate Risk of Fall with Harm to all staff/Discuss with provider need for PT consult/Monitor for mental status changes/Monitor gait and stability/Move patient closer to nurses' station/Provide patient with walking aids - walker, cane, crutches/Reinforce activity limits and safety measures with patient and family/Reorient to person, place and time as needed/Tailored Fall Risk Interventions/Toileting schedule using arm’s reach rule for commode and bathroom/Use of alarms - bed, chair and/or voice tab/Visual Cue: Yellow wristband and red socks/Bed in lowest position, wheels locked, appropriate side rails in place/Call bell, personal items and telephone in reach/Instruct patient to call for assistance before getting out of bed or chair/Non-slip footwear when patient is out of bed/[place] to call system/Physically safe environment - no spills, clutter or unnecessary equipment/Purposeful Proactive Rounding/Room/bathroom lighting operational, light cord in reach

## 2023-09-18 NOTE — DIETITIAN INITIAL EVALUATION ADULT - PERTINENT MEDS FT
MEDICATIONS  (STANDING):  albuterol    90 MICROgram(s) HFA Inhaler 2 Puff(s) Inhalation every 6 hours  citalopram 20 milliGRAM(s) Oral daily  heparin   Injectable 5000 Unit(s) SubCutaneous every 12 hours  latanoprost 0.005% Ophthalmic Solution 1 Drop(s) Both EYES at bedtime  midodrine. 2.5 milliGRAM(s) Oral <User Schedule>  potassium chloride   Powder 40 milliEquivalent(s) Oral once  predniSONE   Tablet 20 milliGRAM(s) Oral daily  timolol 0.5% Solution 1 Drop(s) Both EYES daily  tiotropium 2.5 MICROgram(s) Inhaler 2 Puff(s) Inhalation daily    MEDICATIONS  (PRN):  acetaminophen     Tablet .. 650 milliGRAM(s) Oral every 6 hours PRN Mild Pain (1 - 3)  guaiFENesin Oral Liquid (Sugar-Free) 100 milliGRAM(s) Oral every 6 hours PRN Cough  melatonin 3 milliGRAM(s) Oral at bedtime PRN Insomnia    Home Medications:  acetaminophen 325 mg oral tablet: 2 tab(s) orally every 6 hours, As needed, Mild Pain (1 - 3) (17 Sep 2023 23:38)  citalopram 20 mg oral tablet: 1 tab(s) orally once a day (17 Sep 2023 23:38)  Decara 1250 mcg (50,000 intl units) oral capsule: 1 cap(s) orally every 2 weeks (17 Sep 2023 23:38)  guaiFENesin 100 mg/5 mL oral liquid: 5 milliliter(s) orally every 6 hours As needed Cough (17 Sep 2023 23:38)  latanoprost 0.005% ophthalmic solution: 1 drop(s) in each eye once a day (at bedtime) (17 Sep 2023 23:38)  Melatonin 3 mg oral tablet: 1 tab(s) orally once a day (at bedtime) as needed for (17 Sep 2023 23:39)  midodrine 2.5 mg oral tablet: 1 tab(s) orally once a day (17 Sep 2023 23:39)  timolol 0.5% ophthalmic solution: 1 drop(s) in each eye once a day (17 Sep 2023 23:38)

## 2023-09-18 NOTE — CONSULT NOTE ADULT - ASSESSMENT
The patient is an 89 yo woman with dementia, dysphagia and recurrent pneumonias who is admitted with cough, difficulty clearing secretions and possible pneumonia on CT scan. She also has evidence of diffuse bronchial wall thickening especially in the left lower lobe which is a sequela of chronic inflammation. She has hs focal atelectasis. Furthermore the patient is malnourished.     The clinical picture suggests that the patient has a viscous cycle of recurrent pneumonia/pneumonitis due to dementia/ weak swallow and recurrent aspiration. Due to recurrent pneumonia the patient has chronic lung changes that are leading to scrarring and brochiectasis.       ## Recurrent pneumonia;   Discussed with the daughter at length. Suggested deciding between PEG tube vs hospice that is in light with the patient's wishes. I also told her that the will likely remain at the risk of aspiration even with PEG tube though the aspirations will be lower. She will discuss with the family. For now monitor off antibiotics. WBC okay, no fever.    Cont current inhalers    ## Dysphagia: Discussed with speech/swallow team. For now aspiration precautions, slow supervised feeding.    ## left LL chronic bronchial thickening bronchiectasis    Likely related to aspiration    ## Goals of care: Discussed with the patient's daughter at length.      
89 y/o F w/ PMH of dementia, dyslipidemia, alzheimer's disease, glaucoma, OA, h/o aspiration pneumonia, p/w cough. Patient is non-verbal, and daughter at bedside provides history. States that patient has been having a cough  and seem to have difficulty clearing her mucus. Patient has also been more lethargic compared to baseline as well. Here afebrile, wbc ct nl, imaging remarkable for trace right small left pleural effusion. Diffuse bronchial wall thickening especially in the left lower lobe likely a sequela of chronic inflammation and underlying bronchiectasis.  No evidence of pneumonia. Was given IV cefepime.     1. LLL bronchiectasis, aspiration pneumonitis. Alzheimers dementia. Recurrent aspiration  - imaging reviewed  - on cefepime [id number] #2   - f/u cultures  - aspiration precautions  - fu cbc  - tolerating abx well so far; no side effects noted  - reason for abx use and side effects reviewed with patient  - supportive care  - fu cbc    2. other issues - care per medicine 
Process of Care  --Reviewed dx/treatment problems and alignment with Goals of Care      Endstage dementia  progressive dysphagia  NPO   pt qualifies for hospice care- unclear if she really meets IPU   yet would benefit from more comfort approach at this stage of her disease process.      Physical Aspects of Care  --Pain  comfortable  no pain    --Bowel Regimen  risk for constipation d/t immobility  daily dulcolax    --Dyspnea  No SOB at this time  comfortable and in NAD    --Nausea Vomiting  denies    --Weakness  PT as tolerated     Psychological and Psychiatric Aspects of Care:   --Greif/Bereavment: emotional support provided  --Hx of psychiatric dx: none  -Pastoral Care Available PRN     Social Aspects of Care  -SW involved     Cultural Aspects  -Primary Language: English    Goals of Care:     We discussed Palliative Care team being a supportive team when a patient has ongoing illnesses.  We also discussed that it is not an end of life care service, but can help navigate symptoms and emotional support througout their hospital stay here.    Hospice was explained as well  as an end of life care philosophy.  When a disease cannot be cured, or family/patient decide the treatment burdens out weigh the risk and one choses to change focus of treatment from cure to quality/comfort.       Prognosis: Death can occur at anytime, but if disease continues to progress naturally patient likely has days to weeks.    Ethical and Legal Aspects:   NA        Capacity: pt w/o capacity  HCP/Surrogate: cheryl  Code Status: dnr dni   MOLST: molst on chart  Dispo Plan:    Discussed With: Case coordinated with attending and SW and RN     Time Spent: 75 minutes including the care, coordination and counseling of this patient, 50% of which was spent coordinating and counseling.

## 2023-09-18 NOTE — CONSULT NOTE ADULT - SUBJECTIVE AND OBJECTIVE BOX
Patient is a 88y old  Female who presents with a chief complaint of Pneumonia due to infectious organism     (18 Sep 2023 09:33)    HPI:  87 y/o F w/ PMH of dementia, dyslipidemia, alzheimer's disease, glaucoma, OA, h/o aspiration pneumonia, p/w cough. Patient is non-verbal, and daughter at bedside provides history. States that patient has been having a cough  and seem to have difficulty clearing her mucus. Patient has also been more lethargic compared to baseline as well. Here afebrile, wbc ct nl, imaging remarkable for trace right small left pleural effusion. Diffuse bronchial wall thickening especially in the left lower lobe likely a sequela of chronic inflammation and underlying bronchiectasis.  No evidence of pneumonia. Was given IV cefepime.           PSH: Hip surgery, appendectomy, D&C, tonsillectomy   PMH: as above    Meds: per reconciliation sheet, noted below  MEDICATIONS  (STANDING):  albuterol    90 MICROgram(s) HFA Inhaler 2 Puff(s) Inhalation every 6 hours  citalopram 20 milliGRAM(s) Oral daily  heparin   Injectable 5000 Unit(s) SubCutaneous every 12 hours  latanoprost 0.005% Ophthalmic Solution 1 Drop(s) Both EYES at bedtime  midodrine. 2.5 milliGRAM(s) Oral <User Schedule>  potassium chloride   Powder 40 milliEquivalent(s) Oral once  predniSONE   Tablet 20 milliGRAM(s) Oral daily  timolol 0.5% Solution 1 Drop(s) Both EYES daily  tiotropium 2.5 MICROgram(s) Inhaler 2 Puff(s) Inhalation daily    Allergies    No Known Allergies    Intolerances      Social: no smoking, no alcohol, no illegal drugs; no recent travel, no exposure to TB  FAMILY HISTORY:  Family history of colon cancer (Father)       no history of premature cardiovascular disease in first degree relatives    ROS: unable to obtain d/t medical condition     All other systems reviewed and are negative    Vital Signs Last 24 Hrs  T(C): 36.8 (18 Sep 2023 08:20), Max: 36.9 (18 Sep 2023 03:29)  T(F): 98.3 (18 Sep 2023 08:20), Max: 98.5 (18 Sep 2023 03:29)  HR: 81 (18 Sep 2023 08:20) (80 - 86)  BP: 124/48 (18 Sep 2023 08:20) (101/52 - 131/82)  BP(mean): --  RR: 16 (18 Sep 2023 08:20) (16 - 18)  SpO2: 93% (18 Sep 2023 08:20) (93% - 100%)    Parameters below as of 18 Sep 2023 08:20  Patient On (Oxygen Delivery Method): room air      Daily Height in cm: 154.94 (17 Sep 2023 16:35)    Daily     PE:    Constitutional: frail looking  HEENT: NC/AT, EOMI, PERRLA, conjunctivae clear; ears and nose atraumatic; pharynx benign  Neck: supple; thyroid not palpable  Back: no tenderness  Respiratory: decreased breath sounds   Cardiovascular: S1S2 regular, no murmurs  Abdomen: soft, not tender, not distended, positive BS; liver and spleen WNL  Genitourinary: no suprapubic tenderness  Lymphatic: no LN palpable  Musculoskeletal: no muscle tenderness, no joint swelling or tenderness  Extremities: no pedal edema  Neurological/ Psychiatric:  moving all extremities  Skin: no rashes; no palpable lesions    Labs: all available labs reviewed                        10.3   6.97  )-----------( 276      ( 18 Sep 2023 07:21 )             30.2     09-18    141  |  112<H>  |  8   ----------------------------<  88  3.4<L>   |  25  |  0.58    Ca    9.1      18 Sep 2023 07:21    TPro  5.2<L>  /  Alb  2.3<L>  /  TBili  0.5  /  DBili  x   /  AST  13<L>  /  ALT  10<L>  /  AlkPhos  78  09-18     LIVER FUNCTIONS - ( 18 Sep 2023 07:21 )  Alb: 2.3 g/dL / Pro: 5.2 gm/dL / ALK PHOS: 78 U/L / ALT: 10 U/L / AST: 13 U/L / GGT: x           Urinalysis Basic - ( 18 Sep 2023 07:21 )    Color: x / Appearance: x / SG: x / pH: x  Gluc: 88 mg/dL / Ketone: x  / Bili: x / Urobili: x   Blood: x / Protein: x / Nitrite: x   Leuk Esterase: x / RBC: x / WBC x   Sq Epi: x / Non Sq Epi: x / Bacteria: x          Radiology: all available radiological tests reviewed  < from: CT Chest No Cont (09.17.23 @ 21:32) >    ACC: [id number] EXAM:  CT CHEST   ORDERED BY: WON ROSAS     PROCEDURE DATE:  09/17/2023          INTERPRETATION:  CLINICAL INFORMATION: Left lower lobe infiltration?    COMPARISON: Same day radiograph, 9/6/2023 (CT of the chest)    CONTRAST/COMPLICATIONS:  IV Contrast: None  Oral Contrast: None  Complications: N/A    PROCEDURE:  CT of the Chest was performed.  Sagittal and coronal reformats were performed.    FINDINGS:    LUNGS AND AIRWAYS: Patent central airways.  Diffuse bronchial wall   thickening especially in the left lower lobe. 3 mm sized calcified nodule   in the right middle lobe (3-333).  PLEURA: Small left and trace right pleural effusion. No pneumothorax.  MEDIASTINUM AND LAURIE: No lymphadenopathy.  VESSELS: Mild atherosclerotic calcification.  HEART: Heart size is normal. No pericardial effusion.  CHEST WALL AND LOWER NECK: Within normal limits.  VISUALIZED UPPER ABDOMEN: Nodular thickening of left adrenal gland.   Scattered colonic diverticuli.  BONES: Severe compression deformity of T9 and T12 with acute kyphotic   curvature at thoracolumbar junction. Diffuse osteopenia.    IMPRESSION:  Trace right small left pleural effusion.  Diffuse bronchial wall thickening especially in the left lower lobe   likely a sequela of chronic inflammation and underlying bronchiectasis.   No evidence of pneumonia.          Advanced directives addressed: full resuscitation

## 2023-09-18 NOTE — PATIENT PROFILE ADULT - NSPROMEDSADMININFO_GEN_A_NUR
No pertinent past medical history in pudding; small spoonfuls at a time/administer in food/crush pills for administration

## 2023-09-18 NOTE — DIETITIAN NUTRITION RISK NOTIFICATION - ADDITIONAL COMMENTS/DIETITIAN RECOMMENDATIONS
1) C/w Puree diet, Moderately Thickened Liquids  - Maintain aspiration precautions, back of bed >45 degrees.  2) Add ensure plus high protein TID to optimize PO intake (provides 350 kcal, 20g protein/ shake)  3) Obtain vitamin D 25OH level to assess nutriture  4) Please obtain weekly weights  5) Consider adding thiamine 100 mg daily 2/2 poor PO intake/ malnutrition  6) Recommend to add MVI w/minerals, Vit C 500 mg BID, add Zinc Sulfate 220 mg x 10 days to promote wound healing.  7) Encourage protein-rich foods, maximize food preferences  8) Monitor bowel movements, if no BM for >3 days, consider implementing bowel regimen.  9) Consider adding appetite stimulant such as Remeron or Marinol 2/2 chronically poor appetite/ PO intake  RD will continue to monitor PO intake, labs, hydration, and wt prn.

## 2023-09-18 NOTE — SWALLOW BEDSIDE ASSESSMENT ADULT - SLP GENERAL OBSERVATIONS
pt semi reclining in bed; stuporous; eye open to slits, open mouthed breathing, visible tongue was dry and leathery,. Breath and phlegm could be heard roiling in her throat. Head is in extension, which makes it harder to swallow.

## 2023-09-18 NOTE — SWALLOW BEDSIDE ASSESSMENT ADULT - SWALLOW EVAL: CRITERIA FOR SKILLED INTERVENTION MET
but will follow for support and guidance as appropriate./no significant expected improvement in functional status

## 2023-09-18 NOTE — SWALLOW BEDSIDE ASSESSMENT ADULT - PHARYNGEAL PHASE
No mouth closure for onset of startle swallow.  Difficult lift because of head position, but even so, with good head postion, pt still does not have the mental status or skill to achieve full laryngeal lift.

## 2023-09-18 NOTE — DIETITIAN INITIAL EVALUATION ADULT - OTHER INFO
89 y/o F with a PMHx of dementia, dyslipidemia, alzheimer's disease, glaucoma, OA, h/o aspiration pneumonia, presented to ED with cough. Patient is non-verbal, and daughter at bedside provides history. States that patient has been having a cough since yesterday, and seem to have difficulty clearing her mucus. Patient has also been more lethargic compared to baseline as well. Admitted for metabolic encephalopathy & cough 2/2 chronic inflammation / bronchiectasis - questionable early aspiration PNA. DNR/ DNI/ NFT.    Unable to obtain meaningful information 2/2 pt's mental status. RD unable to obtain bedscale wt 2/2 bedscale not working at time of visit. Admit weight of 80# - ?accuracy. Weight hx reviewed: 92# (taken by RD on 9/7/23; met criteria for severe malnutrition), 99# (taken by RD on 7/26/23; met criteria for severe malnutrition), 99.8# (taken by RD on 9/29/22; met criteria for severe malnutrition); weight loss of 7# (7.1%) x 3 mon - clinsig and severe. Pt thin, frail, and very yaa appearing. NFPE reveals severe muscle/ fat wasting, pt continues to meet criteria for PCM at this time. C/w Puree diet with Honey Thickened Liquids. Will trial Ensure Plus High Protein TID in effort to optimize PO intake. Consider adding appetite stimulant such as Remeron or Marinol 2/2 chronically poor appetite/ PO intake. Please see additional recommendations below.

## 2023-09-18 NOTE — DIETITIAN NUTRITION RISK NOTIFICATION - TREATMENT: THE FOLLOWING DIET HAS BEEN RECOMMENDED
Diet, Regular:   Pureed (PUREED)  Moderately Thick Liquids (MODTHICKLIQS) (09-18-23 @ 00:19) [Active]

## 2023-09-18 NOTE — DIETITIAN INITIAL EVALUATION ADULT - ORAL INTAKE PTA/DIET HISTORY
Unable to obtain intake/ diet hx pta 2/2 pt's mental status. Pt from [facility] - [facility] diet, Honey Thickened Liquids with Ensure BID

## 2023-09-18 NOTE — SWALLOW BEDSIDE ASSESSMENT ADULT - SWALLOW EVAL: PROGNOSIS
Pt has end stage dementia and end-stage dysphagia.  Even if mental status resolves sufficiently for her to take some po, and that is doubtful, she will not able to take sufficient for her needs.

## 2023-09-18 NOTE — SWALLOW BEDSIDE ASSESSMENT ADULT - NS SPL SWALLOW CLINIC TRIAL FT
Pt may have some element of preserved pharyngeal swallow, but cannot take po because she lacks mental status and the oral awareness to participate in eating routines.  feeding pt Po will inevitably keith to further pneumonia.  Family should consider hospice since pt has directives against PG tube.

## 2023-09-18 NOTE — DIETITIAN INITIAL EVALUATION ADULT - PERTINENT LABORATORY DATA
09-18    141  |  112<H>  |  8   ----------------------------<  88  3.4<L>   |  25  |  0.58    Ca    9.1      18 Sep 2023 07:21    TPro  5.2<L>  /  Alb  2.3<L>  /  TBili  0.5  /  DBili  x   /  AST  13<L>  /  ALT  10<L>  /  AlkPhos  78  09-18  A1C with Estimated Average Glucose Result: 5.2 % (09-07-23 @ 06:49)  A1C with Estimated Average Glucose Result: 5.3 % (07-26-23 @ 08:38)  A1C with Estimated Average Glucose Result: 5.4 % (05-29-23 @ 07:54)    Folate, Serum: 9.4 ng/mL (05-29-23 @ 07:54)  Iron Total, Serum: 23 ug/dL (05-29-23 @ 07:54)  Vitamin B12, Serum: 645 pg/mL (02-03-23 @ 14:07)  Folate, Serum: 13.7 ng/mL (02-03-23 @ 14:07)  Vitamin D, 25-Hydroxy: 41.4 ng/mL (09-25-22 @ 05:08)

## 2023-09-18 NOTE — SWALLOW BEDSIDE ASSESSMENT ADULT - COMMENTS
History of Present Illness:   89 y/o F w/ PMH of dementia, dyslipidemia, alzheimer's disease, glaucoma, OA, h/o aspiration pneumonia, p/w cough. Patient is non-verbal, and daughter at bedside provides history. States that patient has been having a cough since yesterday, and seem to have difficulty clearing her mucus. Patient has also been more lethargic compared to baseline as well.   Pt was just recently discharged form hospital back to [facility]:  Service was consulted.  She was eventually  cleared for careful feeding with puree and mod thick liquids after a considerable time being unable to take po 2/2 AMS and severe dysphagia.  Pt has had a number of admissions for pneumonia.

## 2023-09-19 LAB
ANION GAP SERPL CALC-SCNC: 1 MMOL/L — LOW (ref 5–17)
BASOPHILS # BLD AUTO: 0.06 K/UL — SIGNIFICANT CHANGE UP (ref 0–0.2)
BASOPHILS NFR BLD AUTO: 0.9 % — SIGNIFICANT CHANGE UP (ref 0–2)
BUN SERPL-MCNC: 7 MG/DL — SIGNIFICANT CHANGE UP (ref 7–23)
CALCIUM SERPL-MCNC: 8.9 MG/DL — SIGNIFICANT CHANGE UP (ref 8.5–10.1)
CHLORIDE SERPL-SCNC: 116 MMOL/L — HIGH (ref 96–108)
CO2 SERPL-SCNC: 25 MMOL/L — SIGNIFICANT CHANGE UP (ref 22–31)
CREAT SERPL-MCNC: 0.66 MG/DL — SIGNIFICANT CHANGE UP (ref 0.5–1.3)
EGFR: 85 ML/MIN/1.73M2 — SIGNIFICANT CHANGE UP
EOSINOPHIL # BLD AUTO: 0.04 K/UL — SIGNIFICANT CHANGE UP (ref 0–0.5)
EOSINOPHIL NFR BLD AUTO: 0.6 % — SIGNIFICANT CHANGE UP (ref 0–6)
GLUCOSE SERPL-MCNC: 125 MG/DL — HIGH (ref 70–99)
HCT VFR BLD CALC: 27.8 % — LOW (ref 34.5–45)
HGB BLD-MCNC: 9.6 G/DL — LOW (ref 11.5–15.5)
IMM GRANULOCYTES NFR BLD AUTO: 0.6 % — SIGNIFICANT CHANGE UP (ref 0–0.9)
LYMPHOCYTES # BLD AUTO: 2.77 K/UL — SIGNIFICANT CHANGE UP (ref 1–3.3)
LYMPHOCYTES # BLD AUTO: 40.4 % — SIGNIFICANT CHANGE UP (ref 13–44)
MCHC RBC-ENTMCNC: 34.5 GM/DL — SIGNIFICANT CHANGE UP (ref 32–36)
MCHC RBC-ENTMCNC: 34.7 PG — HIGH (ref 27–34)
MCV RBC AUTO: 100.4 FL — HIGH (ref 80–100)
MONOCYTES # BLD AUTO: 0.69 K/UL — SIGNIFICANT CHANGE UP (ref 0–0.9)
MONOCYTES NFR BLD AUTO: 10.1 % — SIGNIFICANT CHANGE UP (ref 2–14)
NEUTROPHILS # BLD AUTO: 3.26 K/UL — SIGNIFICANT CHANGE UP (ref 1.8–7.4)
NEUTROPHILS NFR BLD AUTO: 47.4 % — SIGNIFICANT CHANGE UP (ref 43–77)
PLATELET # BLD AUTO: 248 K/UL — SIGNIFICANT CHANGE UP (ref 150–400)
POTASSIUM SERPL-MCNC: 4 MMOL/L — SIGNIFICANT CHANGE UP (ref 3.5–5.3)
POTASSIUM SERPL-SCNC: 4 MMOL/L — SIGNIFICANT CHANGE UP (ref 3.5–5.3)
RBC # BLD: 2.77 M/UL — LOW (ref 3.8–5.2)
RBC # FLD: 13.9 % — SIGNIFICANT CHANGE UP (ref 10.3–14.5)
SODIUM SERPL-SCNC: 142 MMOL/L — SIGNIFICANT CHANGE UP (ref 135–145)
WBC # BLD: 6.86 K/UL — SIGNIFICANT CHANGE UP (ref 3.8–10.5)
WBC # FLD AUTO: 6.86 K/UL — SIGNIFICANT CHANGE UP (ref 3.8–10.5)

## 2023-09-19 PROCEDURE — 99233 SBSQ HOSP IP/OBS HIGH 50: CPT

## 2023-09-19 RX ORDER — ACETAMINOPHEN 500 MG
550 TABLET ORAL ONCE
Refills: 0 | Status: COMPLETED | OUTPATIENT
Start: 2023-09-19 | End: 2023-09-19

## 2023-09-19 RX ADMIN — HEPARIN SODIUM 5000 UNIT(S): 5000 INJECTION INTRAVENOUS; SUBCUTANEOUS at 14:19

## 2023-09-19 RX ADMIN — Medication 220 MILLIGRAM(S): at 09:47

## 2023-09-19 RX ADMIN — Medication 1 DROP(S): at 12:21

## 2023-09-19 RX ADMIN — ALBUTEROL 2 PUFF(S): 90 AEROSOL, METERED ORAL at 21:35

## 2023-09-19 RX ADMIN — ALBUTEROL 2 PUFF(S): 90 AEROSOL, METERED ORAL at 13:26

## 2023-09-19 RX ADMIN — HEPARIN SODIUM 5000 UNIT(S): 5000 INJECTION INTRAVENOUS; SUBCUTANEOUS at 22:00

## 2023-09-19 RX ADMIN — TIOTROPIUM BROMIDE 2 PUFF(S): 18 CAPSULE ORAL; RESPIRATORY (INHALATION) at 08:46

## 2023-09-19 RX ADMIN — CEFEPIME 1000 MILLIGRAM(S): 1 INJECTION, POWDER, FOR SOLUTION INTRAMUSCULAR; INTRAVENOUS at 05:12

## 2023-09-19 RX ADMIN — SODIUM CHLORIDE 80 MILLILITER(S): 9 INJECTION, SOLUTION INTRAVENOUS at 06:49

## 2023-09-19 RX ADMIN — ALBUTEROL 2 PUFF(S): 90 AEROSOL, METERED ORAL at 08:38

## 2023-09-19 RX ADMIN — Medication 220 MILLIGRAM(S): at 22:00

## 2023-09-19 RX ADMIN — Medication 550 MILLIGRAM(S): at 10:47

## 2023-09-19 RX ADMIN — ALBUTEROL 2 PUFF(S): 90 AEROSOL, METERED ORAL at 02:43

## 2023-09-19 RX ADMIN — LATANOPROST 1 DROP(S): 0.05 SOLUTION/ DROPS OPHTHALMIC; TOPICAL at 22:00

## 2023-09-19 RX ADMIN — Medication 550 MILLIGRAM(S): at 22:30

## 2023-09-19 NOTE — PROGRESS NOTE ADULT - NUTRITIONAL ASSESSMENT
This patient has been assessed with a concern for Malnutrition and has been determined to have a diagnosis/diagnoses of Severe protein-calorie malnutrition and Underweight (BMI < 19).    This patient is being managed with:   Diet NPO-  Entered: Sep 18 2023  6:17PM

## 2023-09-19 NOTE — CHART NOTE - NSCHARTNOTEFT_GEN_A_CORE
HPI:  89 y/o F w/ PMH of dementia, dyslipidemia, alzheimer's disease, glaucoma, OA, h/o aspiration pneumonia, p/w cough. Patient is non-verbal, and daughter at bedside provides history. States that patient has been having a cough since yesterday, and seem to have difficulty clearing her mucus. Patient has also been more lethargic compared to baseline as well.    (17 Sep 2023 23:47)      PERTINENT PMH REVIEWED:  [ X ] YES [ ] NO           Primary Contact:   Daughter Shira HCP     HCP [ X ] Surrogate [   ] Guardian [   ]    Mental Status: [ ] Alert  [  ] Oriented [  ] Confused [ X ] Lethargic [  ]  Concerns of Depression [  ]- unable to assess  Anxiety [   ]- unable to assess  Baseline ADLs (prior to admission):  Independent [ ] moderately [ ] fully   Dependent   [ ] moderately [ X ] fully    Anticipated Grief: Patient [  ] Family [ X ]    Caregiver [place] Assessed: Yes [ X  ] No [  ]    Mandaen: Hindu     Spiritual Concerns: Not identified     Goals of Care: to be further determined, GOC meeting 10AM via phone tomorrow     Previous Services: [facility]    ADVANCE DIRECTIVES:  DNR/ DNI     Anticipated D/C Plan: to be further determined                     Summary:    This SW spoke with pts daughter via phone to follow up and provide supportive counseling. Pt. and family known to our team from previous admissions. Pt. has been a long term resident at [facility]. Pt. is currently NPO and the family previously has never wanted to pursue a feeding tube. Pts daughter requested to have a conference call to further discuss options moving forward with our team and her brothers. Conference call scheduled for tomorrow at 10AM. Plan to be determined. Emotional support provided. Our team will continue to follow.

## 2023-09-20 ENCOUNTER — TRANSCRIPTION ENCOUNTER (OUTPATIENT)
Age: 88
End: 2023-09-20

## 2023-09-20 VITALS
TEMPERATURE: 98 F | RESPIRATION RATE: 18 BRPM | DIASTOLIC BLOOD PRESSURE: 48 MMHG | SYSTOLIC BLOOD PRESSURE: 104 MMHG | HEART RATE: 88 BPM | OXYGEN SATURATION: 96 %

## 2023-09-20 DIAGNOSIS — J69.0 PNEUMONITIS DUE TO INHALATION OF FOOD AND VOMIT: ICD-10-CM

## 2023-09-20 DIAGNOSIS — F02.80 DEMENTIA IN OTHER DISEASES CLASSIFIED ELSEWHERE, UNSPECIFIED SEVERITY, WITHOUT BEHAVIORAL DISTURBANCE, PSYCHOTIC DISTURBANCE, MOOD DISTURBANCE, AND ANXIETY: ICD-10-CM

## 2023-09-20 DIAGNOSIS — H40.9 UNSPECIFIED GLAUCOMA: ICD-10-CM

## 2023-09-20 DIAGNOSIS — G30.9 ALZHEIMER'S DISEASE, UNSPECIFIED: ICD-10-CM

## 2023-09-20 DIAGNOSIS — E87.6 HYPOKALEMIA: ICD-10-CM

## 2023-09-20 DIAGNOSIS — Z66 DO NOT RESUSCITATE: ICD-10-CM

## 2023-09-20 DIAGNOSIS — M19.91 PRIMARY OSTEOARTHRITIS, UNSPECIFIED SITE: ICD-10-CM

## 2023-09-20 DIAGNOSIS — E43 UNSPECIFIED SEVERE PROTEIN-CALORIE MALNUTRITION: ICD-10-CM

## 2023-09-20 DIAGNOSIS — A41.9 SEPSIS, UNSPECIFIED ORGANISM: ICD-10-CM

## 2023-09-20 DIAGNOSIS — E78.00 PURE HYPERCHOLESTEROLEMIA, UNSPECIFIED: ICD-10-CM

## 2023-09-20 DIAGNOSIS — J96.00 ACUTE RESPIRATORY FAILURE, UNSPECIFIED WHETHER WITH HYPOXIA OR HYPERCAPNIA: ICD-10-CM

## 2023-09-20 LAB
ANION GAP SERPL CALC-SCNC: 5 MMOL/L — SIGNIFICANT CHANGE UP (ref 5–17)
BUN SERPL-MCNC: 4 MG/DL — LOW (ref 7–23)
CALCIUM SERPL-MCNC: 9.1 MG/DL — SIGNIFICANT CHANGE UP (ref 8.5–10.1)
CHLORIDE SERPL-SCNC: 114 MMOL/L — HIGH (ref 96–108)
CO2 SERPL-SCNC: 25 MMOL/L — SIGNIFICANT CHANGE UP (ref 22–31)
CREAT SERPL-MCNC: 0.49 MG/DL — LOW (ref 0.5–1.3)
EGFR: 92 ML/MIN/1.73M2 — SIGNIFICANT CHANGE UP
GLUCOSE SERPL-MCNC: 89 MG/DL — SIGNIFICANT CHANGE UP (ref 70–99)
HCT VFR BLD CALC: 29.3 % — LOW (ref 34.5–45)
HGB BLD-MCNC: 10.1 G/DL — LOW (ref 11.5–15.5)
MCHC RBC-ENTMCNC: 34.5 GM/DL — SIGNIFICANT CHANGE UP (ref 32–36)
MCHC RBC-ENTMCNC: 34.8 PG — HIGH (ref 27–34)
MCV RBC AUTO: 101 FL — HIGH (ref 80–100)
PLATELET # BLD AUTO: 269 K/UL — SIGNIFICANT CHANGE UP (ref 150–400)
POTASSIUM SERPL-MCNC: 3.2 MMOL/L — LOW (ref 3.5–5.3)
POTASSIUM SERPL-SCNC: 3.2 MMOL/L — LOW (ref 3.5–5.3)
RBC # BLD: 2.9 M/UL — LOW (ref 3.8–5.2)
RBC # FLD: 14.2 % — SIGNIFICANT CHANGE UP (ref 10.3–14.5)
SODIUM SERPL-SCNC: 144 MMOL/L — SIGNIFICANT CHANGE UP (ref 135–145)
WBC # BLD: 5.71 K/UL — SIGNIFICANT CHANGE UP (ref 3.8–10.5)
WBC # FLD AUTO: 5.71 K/UL — SIGNIFICANT CHANGE UP (ref 3.8–10.5)

## 2023-09-20 PROCEDURE — 99232 SBSQ HOSP IP/OBS MODERATE 35: CPT

## 2023-09-20 PROCEDURE — 99497 ADVNCD CARE PLAN 30 MIN: CPT

## 2023-09-20 PROCEDURE — 99233 SBSQ HOSP IP/OBS HIGH 50: CPT

## 2023-09-20 PROCEDURE — 99239 HOSP IP/OBS DSCHRG MGMT >30: CPT

## 2023-09-20 RX ORDER — MORPHINE SULFATE 50 MG/1
1 CAPSULE, EXTENDED RELEASE ORAL
Qty: 0 | Refills: 0 | DISCHARGE
Start: 2023-09-20

## 2023-09-20 RX ORDER — ROBINUL 0.2 MG/ML
0 INJECTION INTRAMUSCULAR; INTRAVENOUS
Qty: 0 | Refills: 0 | DISCHARGE
Start: 2023-09-20

## 2023-09-20 RX ORDER — ALBUTEROL 90 UG/1
2 AEROSOL, METERED ORAL
Qty: 0 | Refills: 0 | DISCHARGE
Start: 2023-09-20

## 2023-09-20 RX ORDER — CEFEPIME 1 G/1
1 INJECTION, POWDER, FOR SOLUTION INTRAMUSCULAR; INTRAVENOUS
Qty: 0 | Refills: 0 | DISCHARGE
Start: 2023-09-20

## 2023-09-20 RX ORDER — ACETAMINOPHEN 500 MG
2 TABLET ORAL
Qty: 0 | Refills: 0 | DISCHARGE
Start: 2023-09-20

## 2023-09-20 RX ORDER — TIOTROPIUM BROMIDE 18 UG/1
2 CAPSULE ORAL; RESPIRATORY (INHALATION)
Qty: 0 | Refills: 0 | DISCHARGE
Start: 2023-09-20

## 2023-09-20 RX ORDER — MORPHINE SULFATE 50 MG/1
1 CAPSULE, EXTENDED RELEASE ORAL EVERY 4 HOURS
Refills: 0 | Status: DISCONTINUED | OUTPATIENT
Start: 2023-09-20 | End: 2023-09-20

## 2023-09-20 RX ORDER — ACETAMINOPHEN 500 MG
550 TABLET ORAL ONCE
Refills: 0 | Status: COMPLETED | OUTPATIENT
Start: 2023-09-20 | End: 2023-09-20

## 2023-09-20 RX ADMIN — HEPARIN SODIUM 5000 UNIT(S): 5000 INJECTION INTRAVENOUS; SUBCUTANEOUS at 09:05

## 2023-09-20 RX ADMIN — Medication 1 DROP(S): at 09:06

## 2023-09-20 RX ADMIN — ALBUTEROL 2 PUFF(S): 90 AEROSOL, METERED ORAL at 07:45

## 2023-09-20 RX ADMIN — TIOTROPIUM BROMIDE 2 PUFF(S): 18 CAPSULE ORAL; RESPIRATORY (INHALATION) at 08:25

## 2023-09-20 RX ADMIN — Medication 220 MILLIGRAM(S): at 17:01

## 2023-09-20 RX ADMIN — CEFEPIME 1000 MILLIGRAM(S): 1 INJECTION, POWDER, FOR SOLUTION INTRAMUSCULAR; INTRAVENOUS at 05:12

## 2023-09-20 RX ADMIN — Medication 550 MILLIGRAM(S): at 17:31

## 2023-09-20 NOTE — GOALS OF CARE CONVERSATION - ADVANCED CARE PLANNING - CONVERSATION DETAILS
Team spoke with pts family to discuss goals of care, assist with planning and provide supportive counseling. Pt. and family known to our team from previous admission. We reviewed pts current medical condition and inability to swallow. Pts family has always been clear pt. would not want a PEG. They recognize that pt. is approaching the end of her life. Feelings explored and support provided. We discussed options including comfort focus at [facility] VS inpatient hospice referral. Pts family aware that it will be up to hospice to make decision if pt. is accepted. Pts family would like a inpatient referral sent for hospice and requests [facility] as first choice but also would like referral sent to [facility] as well. Plan for referral to inpatient hospice to see if pt. is accepted. Emotional support provided. Our team will continue to follow.

## 2023-09-20 NOTE — DISCHARGE NOTE PROVIDER - ATTENDING DISCHARGE PHYSICAL EXAMINATION:
Constitutional: NAD  HEENT: Atraumatic, DREAD, Normal, No congestion  Respiratory: Breath Sounds normal, no rhonchi/wheeze  Cardiovascular: N S1S2;   Gastrointestinal: Abdomen soft, non tender, Bowel Ssounds present  Extremities: No edema, peripheral pulses present  Neurological: awake, flat affect , responds to pain  does not follow commands   Skin: Non cellulitic, no rash, ulcers

## 2023-09-20 NOTE — PROGRESS NOTE ADULT - CONVERSATION DETAILS
Team spoke with pts family to discuss goals of care, assist with planning and provide supportive counseling. Pt. and family known to our team from previous admission. We reviewed pts current medical condition and inability to swallow. Pts family has always been clear pt. would not want a PEG. They recognize that pt. is approaching the end of her life. Feelings explored and support provided. We discussed options including comfort focus at [facility] VS inpatient hospice referral. Pts family aware that it will be up to hospice to make decision if pt. is accepted. Pts family would like a inpatient referral sent for hospice and requests [facility] as first choice but also would like referral sent to [facility] as well. Plan for referral to inpatient hospice to see if pt. is accepted. Emotional support provided. Our team will continue to follow.      Family and I discussed again Advanced directives and [place] with [facility] was also an option    I did discuss case w. [facility] and pt was accepted for the In patient unit as family made clear they're preference was [facility]

## 2023-09-20 NOTE — PROGRESS NOTE ADULT - SUBJECTIVE AND OBJECTIVE BOX
YOSEF DESAI  88y  Female      Patient is a 88y old  Female who presents with a chief complaint of cough (19 Sep 2023 10:45)      INTERVAL HPI/OVERNIGHT EVENTS:  Seen and examined, No acute distress, appears weak, not speaking      REVIEW OF SYSTEMS:  Unable to perform 2/2 mental status    T(C): 36.4 (09-19-23 @ 08:57), Max: 37.1 (09-18-23 @ 17:16)  HR: 97 (09-19-23 @ 08:57) (85 - 101)  BP: 115/48 (09-19-23 @ 08:57) (109/60 - 115/48)  RR: 18 (09-19-23 @ 08:57) (18 - 18)  SpO2: 96% (09-19-23 @ 08:57) (92% - 96%)  Wt(kg): --Vital Signs Last 24 Hrs  T(C): 36.4 (19 Sep 2023 08:57), Max: 37.1 (18 Sep 2023 17:16)  T(F): 97.6 (19 Sep 2023 08:57), Max: 98.7 (18 Sep 2023 17:16)  HR: 97 (19 Sep 2023 08:57) (85 - 101)  BP: 115/48 (19 Sep 2023 08:57) (109/60 - 115/48)  BP(mean): --  RR: 18 (19 Sep 2023 08:57) (18 - 18)  SpO2: 96% (19 Sep 2023 08:57) (92% - 96%)    Parameters below as of 19 Sep 2023 08:57  Patient On (Oxygen Delivery Method): room air        PHYSICAL EXAM:  Constitutional: NAD  HEENT: Atraumatic, DREAD, Normal, No congestion  Respiratory: Breath Sounds normal, no rhonchi/wheeze  Cardiovascular: N S1S2;   Gastrointestinal: Abdomen soft, non tender, Bowel Ssounds present  Extremities: No edema, peripheral pulses present  Neurological: awake, flat affect , responds to pain  does not follow commands   Skin: Non cellulitic, no rash, ulcers    LABS:                        9.6    6.86  )-----------( 248      ( 19 Sep 2023 08:17 )             27.8     09-19    142  |  116<H>  |  7   ----------------------------<  125<H>  4.0   |  25  |  0.66    Ca    8.9      19 Sep 2023 06:48    TPro  5.2<L>  /  Alb  2.3<L>  /  TBili  0.5  /  DBili  x   /  AST  13<L>  /  ALT  10<L>  /  AlkPhos  78  09-18    PT/INR - ( 18 Sep 2023 07:21 )   PT: 11.3 sec;   INR: 1.00 ratio         PTT - ( 18 Sep 2023 07:21 )  PTT:34.6 sec  Urinalysis Basic - ( 19 Sep 2023 06:48 )    Color: x / Appearance: x / SG: x / pH: x  Gluc: 125 mg/dL / Ketone: x  / Bili: x / Urobili: x   Blood: x / Protein: x / Nitrite: x   Leuk Esterase: x / RBC: x / WBC x   Sq Epi: x / Non Sq Epi: x / Bacteria: x      CAPILLARY BLOOD GLUCOSE            Urinalysis Basic - ( 19 Sep 2023 06:48 )    Color: x / Appearance: x / SG: x / pH: x  Gluc: 125 mg/dL / Ketone: x  / Bili: x / Urobili: x   Blood: x / Protein: x / Nitrite: x   Leuk Esterase: x / RBC: x / WBC x   Sq Epi: x / Non Sq Epi: x / Bacteria: x        RADIOLOGY & ADDITIONAL TESTS:    Imaging Personally Reviewed:  [ ] YES  [ ] NO    HEALTH ISSUES - PROBLEM Dx:  Pneumonitis    Dysphagia, unspecified    Bronchiectasis    Dementia    Severe protein-calorie malnutrition        
  HPI:    CODE STATUS: DNR DNI      9/18  pt seen and examined  lethargic and now NPO   family was not at bedside   aide was present  will reach out to family       9/19  pt seen and examined  remains hospice appropriate  patient [facility] and family opted to go back to [facility] and was not interested in limiting care anyfurther.  pPall team reached out to set up a meeting for this morning as we were called specifically by daughter to help answer questions   When call made she didn't seem to have any questions at that time but requested a meeting w/ all siblings on the line with myself and them.   She asked to set something up for tomorrow morning 10am.  That being said Family told SW on 1n that there is a family conference for this morning to decide hospice vs. rwin.     Our team has had multiple discussions regarding   Hospice at home (at [facility]) vs. [facility]  We discussed specifically that [facility] Hospice will NOT provide IV Hydration, or artificial nutrition, or IV ABX   We did discuss sometimes if appropriate PO abx could be used if patient could swallow- which even last admission was a concern.     At the time of DC last admission she was [facility] And family decided NOT to go because they did understand it would be a temporary stay if pt stabilized  she would  be DC'd back to SNF at that time.   They opted to go back to SNF w/out hospice and stated that they knew they'd be back in the hospital in a few days.         Pain and Dyspnea:   Pt unable to characterise d/t clinical status     Review of Systems:    Unable to obtain d/t AMS       PHYSICAL EXAM:    Vital Signs Last 24 Hrs  T(C): 36.4 (19 Sep 2023 08:57), Max: 37.1 (18 Sep 2023 17:16)  T(F): 97.6 (19 Sep 2023 08:57), Max: 98.7 (18 Sep 2023 17:16)  HR: 97 (19 Sep 2023 08:57) (85 - 101)  BP: 115/48 (19 Sep 2023 08:57) (109/53 - 115/48)  BP(mean): --  RR: 18 (19 Sep 2023 08:57) (18 - 18)  SpO2: 96% (19 Sep 2023 08:57) (92% - 96%)    Parameters below as of 19 Sep 2023 08:57  Patient On (Oxygen Delivery Method): room air      Daily     Daily     PPSV2: 30  %  FAST:    General: calm in NAD  Mental Status: lethargic  HEENT:  perrl  Lungs: ctabl b/l bs -   Cardiac: s1s2 no mgr  GI: soft nontender +BS  : voids  Ext: moves all 4 extremities spontaneously  Neuro: no gross findings       LABS and RADIOLOGY: REVIEWED
89 y/o F w/ PMH of dementia, dyslipidemia, alzheimer's disease, glaucoma, OA, h/o aspiration pneumonia, p/w cough. Patient is non-verbal, and daughter at bedside provides history. States that patient has been having a cough since yesterday, and seem to have difficulty clearing her mucus. Patient has also been more lethargic compared to baseline as well.    9/18  comfortable , awake, does not follow commands, poor historian    ROS All other review of systems negative, except as noted in HPI    PHYSICAL EXAM:    Daily Height in cm: 154.94 (17 Sep 2023 16:35)    Daily     ICU Vital Signs Last 24 Hrs  T(C): 36.8 (18 Sep 2023 08:20), Max: 36.9 (18 Sep 2023 03:29)  T(F): 98.3 (18 Sep 2023 08:20), Max: 98.5 (18 Sep 2023 03:29)  HR: 81 (18 Sep 2023 08:20) (80 - 86)  BP: 124/48 (18 Sep 2023 08:20) (101/52 - 131/82)  BP(mean): --  ABP: --  ABP(mean): --  RR: 16 (18 Sep 2023 08:20) (16 - 18)  SpO2: 93% (18 Sep 2023 08:20) (93% - 100%)    O2 Parameters below as of 18 Sep 2023 08:20  Patient On (Oxygen Delivery Method): room air            Constitutional: NAD  HEENT: Atraumatic, DREAD, Normal, No congestion  Respiratory: Breath Sounds normal, no rhonchi/wheeze  Cardiovascular: N S1S2;   Gastrointestinal: Abdomen soft, non tender, Bowel Ssounds present  Extremities: No edema, peripheral pulses present  Neurological: awake, flat affect , responds to pain  does not follow commands   Skin: Non cellulitic, no rash, ulcers                            10.3   6.97  )-----------( 276      ( 18 Sep 2023 07:21 )             30.2       CBC Full  -  ( 18 Sep 2023 07:21 )  WBC Count : 6.97 K/uL  RBC Count : 2.98 M/uL  Hemoglobin : 10.3 g/dL  Hematocrit : 30.2 %  Platelet Count - Automated : 276 K/uL  Mean Cell Volume : 101.3 fl  Mean Cell Hemoglobin : 34.6 pg  Mean Cell Hemoglobin Concentration : 34.1 gm/dL  Auto Neutrophil # : 3.37 K/uL  Auto Lymphocyte # : 2.80 K/uL  Auto Monocyte # : 0.66 K/uL  Auto Eosinophil # : 0.07 K/uL  Auto Basophil # : 0.06 K/uL  Auto Neutrophil % : 48.3 %  Auto Lymphocyte % : 40.2 %  Auto Monocyte % : 9.5 %  Auto Eosinophil % : 1.0 %  Auto Basophil % : 0.9 %      09-18    141  |  112<H>  |  8   ----------------------------<  88  3.4<L>   |  25  |  0.58    Ca    9.1      18 Sep 2023 07:21    TPro  5.2<L>  /  Alb  2.3<L>  /  TBili  0.5  /  DBili  x   /  AST  13<L>  /  ALT  10<L>  /  AlkPhos  78  09-18      LIVER FUNCTIONS - ( 18 Sep 2023 07:21 )  Alb: 2.3 g/dL / Pro: 5.2 gm/dL / ALK PHOS: 78 U/L / ALT: 10 U/L / AST: 13 U/L / GGT: x             PT/INR - ( 18 Sep 2023 07:21 )   PT: 11.3 sec;   INR: 1.00 ratio         PTT - ( 18 Sep 2023 07:21 )  PTT:34.6 sec          Urinalysis Basic - ( 18 Sep 2023 07:21 )    Color: x / Appearance: x / SG: x / pH: x  Gluc: 88 mg/dL / Ketone: x  / Bili: x / Urobili: x   Blood: x / Protein: x / Nitrite: x   Leuk Esterase: x / RBC: x / WBC x   Sq Epi: x / Non Sq Epi: x / Bacteria: x            MEDICATIONS  (STANDING):  albuterol    90 MICROgram(s) HFA Inhaler 2 Puff(s) Inhalation every 6 hours  citalopram 20 milliGRAM(s) Oral daily  heparin   Injectable 5000 Unit(s) SubCutaneous every 12 hours  latanoprost 0.005% Ophthalmic Solution 1 Drop(s) Both EYES at bedtime  midodrine. 2.5 milliGRAM(s) Oral <User Schedule>  predniSONE   Tablet 20 milliGRAM(s) Oral daily  timolol 0.5% Solution 1 Drop(s) Both EYES daily  tiotropium 2.5 MICROgram(s) Inhaler 2 Puff(s) Inhalation daily      
Date of service: 09-19-23 @ 10:46    pt seen and examined   alert, awake no resp distress  afebrile    ROS: unable to obtain d/t medical condition     MEDICATIONS  (STANDING):  albuterol    90 MICROgram(s) HFA Inhaler 2 Puff(s) Inhalation every 6 hours  cefepime  Injectable. 1000 milliGRAM(s) IV Push every 24 hours  citalopram 20 milliGRAM(s) Oral daily  dextrose 5% + sodium chloride 0.9%. 1000 milliLiter(s) (80 mL/Hr) IV Continuous <Continuous>  heparin   Injectable 5000 Unit(s) SubCutaneous every 12 hours  latanoprost 0.005% Ophthalmic Solution 1 Drop(s) Both EYES at bedtime  midodrine. 2.5 milliGRAM(s) Oral <User Schedule>  predniSONE   Tablet 20 milliGRAM(s) Oral daily  timolol 0.5% Solution 1 Drop(s) Both EYES daily  tiotropium 2.5 MICROgram(s) Inhaler 2 Puff(s) Inhalation daily    Vital Signs Last 24 Hrs  T(C): 36.4 (19 Sep 2023 08:57), Max: 37.1 (18 Sep 2023 17:16)  T(F): 97.6 (19 Sep 2023 08:57), Max: 98.7 (18 Sep 2023 17:16)  HR: 97 (19 Sep 2023 08:57) (85 - 101)  BP: 115/48 (19 Sep 2023 08:57) (109/53 - 115/48)  BP(mean): --  RR: 18 (19 Sep 2023 08:57) (18 - 18)  SpO2: 96% (19 Sep 2023 08:57) (92% - 96%)    Parameters below as of 19 Sep 2023 08:57  Patient On (Oxygen Delivery Method): room air    PE:  Constitutional: frail looking  HEENT: NC/AT, EOMI, PERRLA, conjunctivae clear; ears and nose atraumatic; pharynx benign  Neck: supple; thyroid not palpable  Back: no tenderness  Respiratory: decreased breath sounds   Cardiovascular: S1S2 regular, no murmurs  Abdomen: soft, not tender, not distended, positive BS; liver and spleen WNL  Genitourinary: no suprapubic tenderness  Lymphatic: no LN palpable  Musculoskeletal: no muscle tenderness, no joint swelling or tenderness  Extremities: no pedal edema  Neurological/ Psychiatric:  moving all extremities  Skin: no rashes; no palpable lesions    Labs: all available labs reviewed                                   9.6    6.86  )-----------( 248      ( 19 Sep 2023 08:17 )             27.8     09-19    142  |  116<H>  |  7   ----------------------------<  125<H>  4.0   |  25  |  0.66    Ca    8.9      19 Sep 2023 06:48    TPro  5.2<L>  /  Alb  2.3<L>  /  TBili  0.5  /  DBili  x   /  AST  13<L>  /  ALT  10<L>  /  AlkPhos  78  09-18           Urinalysis Basic - ( 18 Sep 2023 07:21 )    Color: x / Appearance: x / SG: x / pH: x  Gluc: 88 mg/dL / Ketone: x  / Bili: x / Urobili: x   Blood: x / Protein: x / Nitrite: x   Leuk Esterase: x / RBC: x / WBC x   Sq Epi: x / Non Sq Epi: x / Bacteria: x    Culture - Blood (09.17.23 @ 17:17)   Specimen Source: .Blood None  Culture Results:   No growth at 24 hours  Culture - Blood (09.17.23 @ 17:17)   Specimen Source: .Blood None  Culture Results:   No growth at 24 hours      Radiology: all available radiological tests reviewed  < from: CT Chest No Cont (09.17.23 @ 21:32) >    ACC: [id number] EXAM:  CT CHEST   ORDERED BY: WON ROSAS     PROCEDURE DATE:  09/17/2023          INTERPRETATION:  CLINICAL INFORMATION: Left lower lobe infiltration?    COMPARISON: Same day radiograph, 9/6/2023 (CT of the chest)    CONTRAST/COMPLICATIONS:  IV Contrast: None  Oral Contrast: None  Complications: N/A    PROCEDURE:  CT of the Chest was performed.  Sagittal and coronal reformats were performed.    FINDINGS:    LUNGS AND AIRWAYS: Patent central airways.  Diffuse bronchial wall   thickening especially in the left lower lobe. 3 mm sized calcified nodule   in the right middle lobe (3-333).  PLEURA: Small left and trace right pleural effusion. No pneumothorax.  MEDIASTINUM AND LAURIE: No lymphadenopathy.  VESSELS: Mild atherosclerotic calcification.  HEART: Heart size is normal. No pericardial effusion.  CHEST WALL AND LOWER NECK: Within normal limits.  VISUALIZED UPPER ABDOMEN: Nodular thickening of left adrenal gland.   Scattered colonic diverticuli.  BONES: Severe compression deformity of T9 and T12 with acute kyphotic   curvature at thoracolumbar junction. Diffuse osteopenia.    IMPRESSION:  Trace right small left pleural effusion.  Diffuse bronchial wall thickening especially in the left lower lobe   likely a sequela of chronic inflammation and underlying bronchiectasis.   No evidence of pneumonia.          Advanced directives addressed: full resuscitation
Subjective:   patient seen and examined, She was sleepy, did not respond to my questions.   The daughter at bedside mentioned that she was unable to sleep last night hence she is resting.    per daughter the clearance is better.     Denies cough, expectoration, hemoptysis, pleuritic chest pain.    ROS (per daughter)  Gen: Denies fever, chills, rigors  HEENT: Denies neck swelling, difficulty swallowing, nasal congestion  PULM: As above  Cardiology: Denies palpitation, dizziness, dyspnea    Vital Signs Last 24 Hrs  T(C): 36.4 (19 Sep 2023 08:57), Max: 37.1 (18 Sep 2023 17:16)  T(F): 97.6 (19 Sep 2023 08:57), Max: 98.7 (18 Sep 2023 17:16)  HR: 97 (19 Sep 2023 08:57) (85 - 101)  BP: 115/48 (19 Sep 2023 08:57) (109/53 - 115/48)  BP(mean): --RR: 18 (19 Sep 2023 08:57) (18 - 18)  SpO2: 96% (19 Sep 2023 08:57) (92% - 96%)    Parameters below as of 19 Sep 2023 08:57  Patient On (Oxygen Delivery Method): room air        GENERAL APPEARANCE:  Pt. is not currently dyspneic, in no distress.   HEENT:   No icterus. Mucous membranes dry  NECK:  Supple.  Jugular venous pressure not elevated.   HEART:    Regular. Normal S1 and S2. There are no murmurs, rubs or gallops appreciated  CHEST:  Chest is clear to auscultation anteriorly Normal respiratory effort. **Contin  EXTREMITIES:  There is no cyanosis, clubbing or edema.     MEDICATIONS  (STANDING):  albuterol    90 MICROgram(s) HFA Inhaler 2 Puff(s) Inhalation every 6 hours  cefepime  Injectable. 1000 milliGRAM(s) IV Push every 24 hours  citalopram 20 milliGRAM(s) Oral daily  dextrose 5% + sodium chloride 0.9%. 1000 milliLiter(s) (80 mL/Hr) IV Continuous <Continuous>  heparin   Injectable 5000 Unit(s) SubCutaneous every 12 hours  latanoprost 0.005% Ophthalmic Solution 1 Drop(s) Both EYES at bedtime  midodrine. 2.5 milliGRAM(s) Oral <User Schedule>  predniSONE   Tablet 20 milliGRAM(s) Oral daily  timolol 0.5% Solution 1 Drop(s) Both EYES daily  tiotropium 2.5 MICROgram(s) Inhaler 2 Puff(s) Inhalation daily    MEDICATIONS  (PRN):  acetaminophen     Tablet .. 650 milliGRAM(s) Oral every 6 hours PRN Mild Pain (1 - 3)  glycopyrrolate Injectable 0.2 milliGRAM(s) IntraMuscular every 8 hours PRN secretions  guaiFENesin Oral Liquid (Sugar-Free) 100 milliGRAM(s) Oral every 6 hours PRN Cough  melatonin 3 milliGRAM(s) Oral at bedtime PRN Insomnia    Allergies    No Known Allergies    Intolerances        LABS:                        9.6    6.86  )-----------( 248      ( 19 Sep 2023 08:17 )             27.8     09-19    142  |  116<H>  |  7   ----------------------------<  125<H>  4.0   |  25  |  0.66    Ca    8.9      19 Sep 2023 06:48    TPro  5.2<L>  /  Alb  2.3<L>  /  TBili  0.5  /  DBili  x   /  AST  13<L>  /  ALT  10<L>  /  AlkPhos  78  09-18    PT/INR - ( 18 Sep 2023 07:21 )   PT: 11.3 sec;   INR: 1.00 ratio         PTT - ( 18 Sep 2023 07:21 )  PTT:34.6 sec    RADIOLOGY & ADDITIONAL TESTS:
Subjective:   patient seen and examined, She was sleepy, did not respond to my questions.   no acute events per RN    Denies cough, expectoration, hemoptysis, pleuritic chest pain.        Vital Signs Last 24 Hrs  T(C): 36.4 (19 Sep 2023 08:57), Max: 37.1 (18 Sep 2023 17:16)  T(F): 97.6 (19 Sep 2023 08:57), Max: 98.7 (18 Sep 2023 17:16)  HR: 97 (19 Sep 2023 08:57) (85 - 101)  BP: 115/48 (19 Sep 2023 08:57) (109/53 - 115/48)  BP(mean): --RR: 18 (19 Sep 2023 08:57) (18 - 18)  SpO2: 96% (19 Sep 2023 08:57) (92% - 96%)    Parameters below as of 19 Sep 2023 08:57  Patient On (Oxygen Delivery Method): room air        GENERAL APPEARANCE:  Pt. is not currently dyspneic, in no distress.   HEENT:   No icterus. Mucous membranes dry  NECK:  Supple.  Jugular venous pressure not elevated.   HEART:    Regular. Normal S1 and S2. There are no murmurs, rubs or gallops appreciated  CHEST:  Chest is clear to auscultation anteriorly Normal respiratory effort. poor effort  EXTREMITIES:  There is no cyanosis, clubbing or edema.     MEDICATIONS  (STANDING):  albuterol    90 MICROgram(s) HFA Inhaler 2 Puff(s) Inhalation every 6 hours  cefepime  Injectable. 1000 milliGRAM(s) IV Push every 24 hours  citalopram 20 milliGRAM(s) Oral daily  dextrose 5% + sodium chloride 0.9%. 1000 milliLiter(s) (80 mL/Hr) IV Continuous <Continuous>  heparin   Injectable 5000 Unit(s) SubCutaneous every 12 hours  latanoprost 0.005% Ophthalmic Solution 1 Drop(s) Both EYES at bedtime  midodrine. 2.5 milliGRAM(s) Oral <User Schedule>  predniSONE   Tablet 20 milliGRAM(s) Oral daily  timolol 0.5% Solution 1 Drop(s) Both EYES daily  tiotropium 2.5 MICROgram(s) Inhaler 2 Puff(s) Inhalation daily    MEDICATIONS  (PRN):  acetaminophen     Tablet .. 650 milliGRAM(s) Oral every 6 hours PRN Mild Pain (1 - 3)  glycopyrrolate Injectable 0.2 milliGRAM(s) IntraMuscular every 8 hours PRN secretions  guaiFENesin Oral Liquid (Sugar-Free) 100 milliGRAM(s) Oral every 6 hours PRN Cough  melatonin 3 milliGRAM(s) Oral at bedtime PRN Insomnia    Allergies    No Known Allergies    Intolerances        LABS:                        9.6    6.86  )-----------( 248      ( 19 Sep 2023 08:17 )             27.8     09-19    142  |  116<H>  |  7   ----------------------------<  125<H>  4.0   |  25  |  0.66    Ca    8.9      19 Sep 2023 06:48    TPro  5.2<L>  /  Alb  2.3<L>  /  TBili  0.5  /  DBili  x   /  AST  13<L>  /  ALT  10<L>  /  AlkPhos  78  09-18    PT/INR - ( 18 Sep 2023 07:21 )   PT: 11.3 sec;   INR: 1.00 ratio         PTT - ( 18 Sep 2023 07:21 )  PTT:34.6 sec    RADIOLOGY & ADDITIONAL TESTS:
  HPI:    CODE STATUS: DNR DNI    9/20  Long family discussion over phone regarding hospice philosophy   Patient has been declining over past few months.  Was admitted last week for similar situation at the time of discharge family opted not to go to [facility] patient hospice d/t concern that if she stabalized she would be dc/'d back to  [facility].    They have been explained on last admission and this one that if a patient no longer meets [facility] requirements a DC plan back to 'home like environment is worked out.     They opted not to send to [facility] finalyl voicing they didn't want patient to lose her bed at [facility].     Today we explained that medically she's still end stage dementia- increased aspiration is expected and this time it occured not 3 days after dc.             Pain and Dyspnea:   Pt unable to characterise d/t clinical status     Review of Systems:  Pt unable to characterise d/t clinical status             PHYSICAL EXAM:    Vital Signs Last 24 Hrs  T(C): 36.6 (20 Sep 2023 09:09), Max: 36.9 (19 Sep 2023 20:27)  T(F): 97.8 (20 Sep 2023 09:09), Max: 98.4 (19 Sep 2023 20:27)  HR: 91 (20 Sep 2023 09:09) (72 - 91)  BP: 137/82 (20 Sep 2023 09:09) (104/48 - 137/82)  BP(mean): --  RR: 18 (20 Sep 2023 09:09) (17 - 18)  SpO2: 95% (20 Sep 2023 09:09) (95% - 98%)    Parameters below as of 20 Sep 2023 09:09  Patient On (Oxygen Delivery Method): room air      Daily     Daily     PPSV2: 10  %  FAST:    General:  NAD frail ill appearing  Mental Status: lethargic  HEENT: e  perrl  Lungs:rhonchus, decreased bl bs  Cardiac: s1s2    GI: soft nontender +BS  : voids  Ext: no swelling  Neuro: lethargic       LABS and RADIOLOGY: REVIEWED

## 2023-09-20 NOTE — DISCHARGE NOTE NURSING/CASE MANAGEMENT/SOCIAL WORK - NSCORESITESY/N_GEN_A_CORE_RD
Yes 40 year old female presented to ED via EMS from home with c/o of SOB starting this morning after walking up a flight of stairs. 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 PMH asthma, morbid obesity, hx intubation x3, on Eliquis. pt denies current SOB, denies CP, nausea/vomiting, numbness/tingling, fever, cough, chills, dizziness, headache, blurred vision, neuro intact. pt a&ox3, lung sounds wheezes at bases bilaterally, heart rate regular, abdomen soft nontender nondistended to palp. skin intact. pt currently resting in bed comfortably, friend at bedside. Will continue to monitor and assess while offering support and reassurance.

## 2023-09-20 NOTE — DISCHARGE NOTE NURSING/CASE MANAGEMENT/SOCIAL WORK - PATIENT PORTAL LINK FT
You can access the FollowMyHealth Patient Portal offered by [facility] by registering at the following website: [email or web address]. By joining [facility]’s FollowMyHealth portal, you will also be able to view your health information using other applications (apps) compatible with our system.

## 2023-09-20 NOTE — DISCHARGE NOTE PROVIDER - CARE PROVIDER_API CALL
Fior Hughes.  Internal Medicine  [address] [place] [address]  Phone: [phone]  Fax: [phone]  Follow Up Time:

## 2023-09-20 NOTE — DISCHARGE NOTE NURSING/CASE MANAGEMENT/SOCIAL WORK - NSDCVIVACCINE_GEN_ALL_CORE_FT
Moderna COVID-19 Vaccine, Bivalent; 07-Oct-2022 13:40; Gudelia Fraire (RN); [employer] [id number] (Exp. Date: 27-May-2023); IntraMuscular; Deltoid Left.; 0.5 milliLiter(s);

## 2023-09-20 NOTE — DISCHARGE NOTE NURSING/CASE MANAGEMENT/SOCIAL WORK - NSDCPEFALRISK_GEN_ALL_CORE
For information on Fall & Injury Prevention, visit: [email or web address] OR  [email or web address] OR  https://www.cdc.gov/steadi/patient.html

## 2023-09-20 NOTE — DISCHARGE NOTE NURSING/CASE MANAGEMENT/SOCIAL WORK - NSDPFAC_GEN_ALL_CORE
RE: Orders   Received: Today   Message Contents   Nancy Yung MD  Charlysician SHANTAL Rodriguez             Orders placed. Thanks    Previous Messages            OB Check for Prenatal Care   2020   Angel Khalil CNM - [facility] [address] Visit Summary   Diagnoses      Supervision Of High Risk Pregnancy, Antepartum (Primary)   History of  delivery, currently pregnant   Orders Signed This Visit    (4)   FULL ROUT OBSTE CARE, DELIV    Acetaminophen 500 MG Cap    Doxylamine-Pyridoxine (DICLEGIS) 10-10 MG Tablet Enteric Coated    Prenatal Vit-Fe Fumarate-FA (PNV PRENATAL PLUS MULTIVITAMIN) 27-1 MG Tab    Orders Pended This Visit     None   Progress Notes     Tika Morgan MA at 2020 12:28 PM     Status: Cornelio Jimenez presents today for her OB visit.     Patient would like communication of their results via:     Cell Phone:       Telephone Information:   Mobile [phone]     Okay to leave a message containing results? Yes     Patient's current [facility] status: Inactive - Patient declined.      Chaperone needed:  No  Pt needs Kiswahili         Angel Khalil CNM at 2020 12:52 PM     Status: Cornelio Islas is a 34 year old female who presents for prenatal care.     Multiple complains of a skin rash that was evaluated at her last visit and found to be ringworm. She reports that the cream that was prescribed is not working. Patient was advised to have this concern evaluated as soon as possible by her PCP or to go to urgent care.     Patient requests a refill on nausea medication. She states that her nausea and vomiting are ongoing but she is able to tolerate fluid and some foods at times. Diclegis refill authorized and dietary management of nausea discussed.     24w3d     She denies cramping, vaginal bleeding, leaking of fluid from the vagina, or vision changes.     Headaches are occasional, usually after vomiting. Comfort measures  discussed. Prescription for tylenol sent to the pharmacy.     Fetal movement discussed with the patient.     Warning signs and symptoms were discussed.     All questions answered.     Return to office in 4 weeks for DMS, Tdap and routine prenatal care.     Angel Khalil CNM      services used for the entire duration of this visit.      Nancy Yung MD at 2020  4:05 PM     Status: Signed      Recommending surgery be scheduled between 20 and 20:  - Procedure(s): Repeat low transverse  section  - Facility: [facility]  - Length of Procedure: 1 hour  - Assist: Available partner  - Type of Admit: AM Admit  - PreOp Visit: No  - Medical Clearance from PMD: No  - Bowel Prep Needed? no  - Special Equipment: No  - Post-op visit: 6 weeks           [facility]

## 2023-09-20 NOTE — PROGRESS NOTE ADULT - ASSESSMENT
## Recurrent pneumonia;   discussed again the process of palliation.   Continues to have aspiration due to dementia and mental status  family does not want tube feeds.  Cont abx  cont nebs    ## Dysphagia: Discussed with speech/swallow team. For now aspiration precautions, slow supervised feeding.    ## left LL chronic bronchial thickening bronchiectasis    Likely related to aspiration    ## Goals of care: Again discussed with the patient's daughter at length.      
87 y/o F w/ PMH of dementia, dyslipidemia, alzheimer's disease, glaucoma, OA, h/o aspiration pneumonia, p/w cough. Patient is non-verbal, and daughter at bedside provides history. States that patient has been having a cough  and seem to have difficulty clearing her mucus. Patient has also been more lethargic compared to baseline as well. Here afebrile, wbc ct nl, imaging remarkable for trace right small left pleural effusion. Diffuse bronchial wall thickening especially in the left lower lobe likely a sequela of chronic inflammation and underlying bronchiectasis.  No evidence of pneumonia. Was given IV cefepime.     1. LLL bronchiectasis, aspiration pneumonitis. Alzheimers dementia. Recurrent aspiration  - imaging reviewed  - on cefepime 3bku12f #3  - f/u cultures  - no growth   - aspiration precautions  - fu cbc  - tolerating abx well so far; no side effects noted  - reason for abx use and side effects reviewed with patient  - supportive care  - fu cbc    2. other issues - care per medicine 
{\rtf1\dehyvx26492\ansi\bmcibht9911\ftnbj\uc1\deff0  {\fonttbl{\f0 \fnil Segoe UI;}{\f1 \fnil \fcharset0 Segoe UI;}{\f2 \fnil Times New Chucky;}}  {\colortbl ;\qyk130\uvwqq631\ztpa455 ;\red0\green0\blue0 ;\red0\green0\qmvn473 ;\red0\green0\blue0 ;}  {\stylesheet{\f0\fs20 Normal;}{\cs1 Default Paragraph Font;}{\cs2\f0\fs16 Line Number;}{\cs3\f2\fs24\ul\cf3 Hyperlink;}}  {\*\revtbl{Unknown;}}  \lfzqaq04280\ltqsui30502\mjqhe3941\ieomv0575\bnofx0884\dgorw7206\tbhxogg780\bwztvao807\nogrowautofit\mrogvl955\formshade\nofeaturethrottle1\dntblnsbdb\fet4\aendnotes\aftnnrlc\pgbrdrhead\pgbrdrfoot  \sectd\msifwb43108\uftjdx44003\guttersxn0\uhrbhupy1837\fbpguzqa2895\heonodrt3799\dcmkvtat5148\evbfbsw255\utvwygw316\sbkpage\pgncont\pgndec  \plain\plain\f0\fs24\ql\plain\f0\fs24\plain\f1\fs16\bwzg4384\hich\f1\dbch\f1\loch\f1\cf2\fs16 85 y/o F w/ PMH of dementia, dyslipidemia, alzheimer's disease, glaucoma, OA, h/o aspiration pneumonia, p/w cough\par  \par  *Metabolic encephalopathy & cough 2/2 chronic inflammation / bronchiectasis - questionable early aspiration PNA\par  -Albuterol / Spiriva / Steroids \par  -Given h/o aspiration PNA, will cover w/ cefepime\par  -Lactate = 0.8\par  -F/u blood cx\par  -COVID negative \par  -ID consult\par  -Pulm consult \par  -Pulse ox monitoring \par  -CT chest: +Trace pleural effusion, diffuse bronchial wall thickening in LLL 2/2 chronic inflammation / underlying bronchiectasis.\par  \par  *Pulmonary nodule\par  -F/u Pulm \par  \par  *Incidentally noted nodular thickening of adrenal gland\par  -F/u outpatient endo for further evaluation\par  \par  *Degenerative changes / severe compression deformity of T9 / T12\par  -F/u outpatient ortho \par  \par  *DVT ppx \par  -Heparin SubQ \par  \par  \plain\f1\fs16\agdk8602\hich\f1\dbch\f1\loch\f1\cf2\fs16\strike\plain\f1\fs16\qntz3301\hich\f1\dbch\f1\loch\f1\cf2\fs16\plain\f0\fs20\qjqk5995\hich\f0\dbch\f0\loch\f0\fs20\par  }  
    Symptom managment     Glycopyralate 0.2 mg IV q 6 hrs PRN for increased secretions  Artificial tears Q6 hrs  Biotene mouth spray Q6 hrs  Tylenol Q 6 hrs prn for fever  Dulcolax q 72 hrs PRN if no bowel movements        Symptom Management:  - pain: Morphine 1mg IV Z7ovrgh prn   - secretions:  glycopyrrolate  Q6h PRN  - dyspnea: O2, Morphine 1mg IV S7inoas prn   - fever: tylenol PRN  - eye care: biotene, artificial tears   - diet/anorexia: soft or pureed diet as tolerated  - urinary care: duff  - bowel regimen: dulcolax PRN    Psychological & Psychiatric Aspects of Care:  - confusion/agitiation: ativan PRN  - medical and SW providing ongoing psychosocial support to family    Spiritual, Restorationism, & Existential Aspects of Care:  - pastoral services available as needed    Cultural Aspects of Care:  - with this family there is no language barrier  - the following needs have been identified and addressed: none    Care of the Imminently Dying Patient:  - PPS: 10%  - Prognosis: could be anytime, but given disease progression prognosis is hours to days    Ethical & Legal Aspects of Care:  - the following ethical/legal concerns have been identified: none  - pt continues to need inpatient care for palliative extubation, symptom management and daily medication adjustment.    Discussed plan of care with patient's family, primary care team ,hospice team,  SW    Time spent:90 minutes including the care, coordination and counseling of this patient, 50% of which was spent coordinating and counseling. 
85 y/o F w/ PMH of dementia, dyslipidemia, alzheimer's disease, glaucoma, OA, h/o aspiration pneumonia, p/w cough    *Metabolic encephalopathy & cough 2/2 chronic inflammation / bronchiectasis - questionable early aspiration PNA  -Albuterol / Spiriva / Steroids   -  h/o aspiration PNA,  cw/ cefepime  -Lactate = 0.8  -F/u blood cx  -COVID negative   -ID following  -Pulm following  -Pulse ox monitoring   -CT chest: +Trace pleural effusion, diffuse bronchial wall thickening in LLL 2/2 chronic inflammation / underlying bronchiectasis.    *Pulmonary nodule  -F/u Pulm     *Incidentally noted nodular thickening of adrenal gland  -F/u outpatient endo for further evaluation    *Degenerative changes / severe compression deformity of T9 / T12  -F/u outpatient ortho     *DVT ppx   -Heparin SubQ     9/19  Daughter deciding between hospice/ LTC. C/w NPO, IV abx pending decision from family    
Process of Care  --Reviewed dx/treatment problems and alignment with Goals of Care      Endstage dementia  progressive dysphagia  NPO   pt qualifies for hospice care- unclear if she really meets IPU   yet would benefit from more comfort approach at this stage of her disease process.      Physical Aspects of Care  --Pain  comfortable  c/w current management    --Bowel Regimen  denies constipation  risk for constipation d/t immobility  daily dulcolax    --Dyspnea  No SOB at this time  comfortable and in NAD    --Nausea Vomiting  denies    --Weakness  PT as tolerated     Psychological and Psychiatric Aspects of Care:   --Greif/Bereavment: emotional support provided  --Hx of psychiatric dx: none  -Pastoral Care Available PRN     Social Aspects of Care  -SW involved     Cultural Aspects  -Primary Language: English    Goals of Care:     We discussed Palliative Care team being a supportive team when a patient has ongoing illnesses.  We also discussed that it is not an end of life care service, but can help navigate symptoms and emotional support througout their hospital stay here.    Hospice was explained as well  as an end of life care philosophy.  When a disease cannot be cured, or family/patient decide the treatment burdens out weigh the risk and one choses to change focus of treatment from cure to quality/comfort.       Prognosis: Death can occur at anytime, but if disease continues to progress naturally patient likely has days to weeks.    Ethical and Legal Aspects:   NA        Capacity: pt w/o capacity  HCP/Surrogate: cheryl  Code Status: dnr dni   MOLST: molst on chart  Dispo Plan:    Discussed With: Case coordinated with attending and SW and RN     Time Spent: 75 minutes including the care, coordination and counseling of this patient, 50% of which was spent coordinating and counseling. 
  ## Recurrent pneumonia;   goals of care discussion noted. agree    ## Dysphagia: Discussed with speech/swallow team. For now aspiration precautions, slow supervised feeding.    ## left LL chronic bronchial thickening bronchiectasis    Likely related to aspiration

## 2023-09-20 NOTE — DISCHARGE NOTE PROVIDER - HOSPITAL COURSE
89 y/o F w/ PMH of dementia, dyslipidemia, alzheimer's disease, glaucoma, OA, h/o aspiration pneumonia, p/w cough    *Metabolic encephalopathy & cough 2/2 chronic inflammation / bronchiectasis - questionable early aspiration PNA  -Albuterol / Spiriva / Steroids   -  h/o aspiration PNA,  cw/ cefepime  -Lactate = 0.8  -F/u blood cx  -COVID negative   -ID following  -Pulm following  -Pulse ox monitoring   -CT chest: +Trace pleural effusion, diffuse bronchial wall thickening in LLL 2/2 chronic inflammation / underlying bronchiectasis.    *Pulmonary nodule  -F/u Pulm     *Incidentally noted nodular thickening of adrenal gland  -F/u outpatient endo for further evaluation    *Degenerative changes / severe compression deformity of T9 / T12  -F/u outpatient ortho     *DVT ppx   -Heparin SubQ     9/19  Daughter deciding between hospice/ LTC. C/w NPO, IV abx pending decision from family    9/20 Pallaitive input appreciated family requesting hospice evaluation. Patient approved for inpatient hospice at hospice [facility].

## 2023-09-20 NOTE — DISCHARGE NOTE PROVIDER - DETAILS OF MALNUTRITION DIAGNOSIS/DIAGNOSES
This patient has been assessed with a concern for Malnutrition and was treated during this hospitalization for the following Nutrition diagnosis/diagnoses:     -  09/18/2023: Severe protein-calorie malnutrition   -  09/18/2023: Underweight (BMI < 19)

## 2023-09-27 DIAGNOSIS — J47.9 BRONCHIECTASIS, UNCOMPLICATED: ICD-10-CM

## 2023-09-27 DIAGNOSIS — F02.80 DEMENTIA IN OTHER DISEASES CLASSIFIED ELSEWHERE, UNSPECIFIED SEVERITY, WITHOUT BEHAVIORAL DISTURBANCE, PSYCHOTIC DISTURBANCE, MOOD DISTURBANCE, AND ANXIETY: ICD-10-CM

## 2023-09-27 DIAGNOSIS — E78.00 PURE HYPERCHOLESTEROLEMIA, UNSPECIFIED: ICD-10-CM

## 2023-09-27 DIAGNOSIS — E43 UNSPECIFIED SEVERE PROTEIN-CALORIE MALNUTRITION: ICD-10-CM

## 2023-09-27 DIAGNOSIS — Z87.01 PERSONAL HISTORY OF PNEUMONIA (RECURRENT): ICD-10-CM

## 2023-09-27 DIAGNOSIS — J98.11 ATELECTASIS: ICD-10-CM

## 2023-09-27 DIAGNOSIS — G93.41 METABOLIC ENCEPHALOPATHY: ICD-10-CM

## 2023-09-27 DIAGNOSIS — Z20.822 CONTACT WITH AND (SUSPECTED) EXPOSURE TO COVID-19: ICD-10-CM

## 2023-09-27 DIAGNOSIS — R63.8 OTHER SYMPTOMS AND SIGNS CONCERNING FOOD AND FLUID INTAKE: ICD-10-CM

## 2023-09-27 DIAGNOSIS — Z79.899 OTHER LONG TERM (CURRENT) DRUG THERAPY: ICD-10-CM

## 2023-09-27 DIAGNOSIS — G30.9 ALZHEIMER'S DISEASE, UNSPECIFIED: ICD-10-CM

## 2023-09-27 DIAGNOSIS — M19.90 UNSPECIFIED OSTEOARTHRITIS, UNSPECIFIED SITE: ICD-10-CM

## 2023-09-27 DIAGNOSIS — Z51.5 ENCOUNTER FOR PALLIATIVE CARE: ICD-10-CM

## 2023-09-27 DIAGNOSIS — M51.34 OTHER INTERVERTEBRAL DISC DEGENERATION, THORACIC REGION: ICD-10-CM

## 2023-09-27 DIAGNOSIS — H40.9 UNSPECIFIED GLAUCOMA: ICD-10-CM

## 2023-09-27 DIAGNOSIS — Z74.01 BED CONFINEMENT STATUS: ICD-10-CM

## 2023-09-27 DIAGNOSIS — Z66 DO NOT RESUSCITATE: ICD-10-CM

## 2023-09-27 DIAGNOSIS — R91.1 SOLITARY PULMONARY NODULE: ICD-10-CM

## 2023-09-27 DIAGNOSIS — J69.0 PNEUMONITIS DUE TO INHALATION OF FOOD AND VOMIT: ICD-10-CM

## 2023-09-27 DIAGNOSIS — E27.8 OTHER SPECIFIED DISORDERS OF ADRENAL GLAND: ICD-10-CM

## 2024-06-20 NOTE — ED ADULT NURSE NOTE - CAS TRG GEN SKIN CONDITION
Patient: Abena Shaw    [id number]  : 1946 -- AGE 77 y.o.    Provider: JUNE Knott     Location [facility]   Service Date: 2024              [facility]  New Visit Note      HISTORY OF PRESENT ILLNESS     The patient's referring provider is: No ref. provider found    HISTORY OF PRESENT ILLNESS   Abena Shaw is a 77 y.o. female who presents to a [facility] for an evaluation with concerns of No chief complaint on file.. I have independently interviewed and examined the patient in the office and reviewed available records.    Current History    Dr. Mendosa PCP reached out ot Dr. Marshall     On today's visit, the patient reports she has had a productive cough for several months. That was why her PCP got the imaging.  She states her mucous is yellow/green. She denies wheezing, SOB at rest, GERD, or CP. Previous palpitation she had some chest discomfort. She has [place] with going up stairs. She has a runny nose, but denies any post nasal drip or sneezing.      Previous pulmonary history: She has no history of recurrent infections, or lung disease as a child.  She had no previous lung hx, never on oxygen or inhaler therapy.     Inhalers/nebulized medications: none     Hospitalization History: She has not been hospitalized over the last year for breathing related problem.      ALLERGIES AND MEDICATIONS     ALLERGIES  No Known Allergies    MEDICATIONS  Current Outpatient Medications   Medication Sig Dispense Refill    aspirin 81 mg EC tablet Take 1 tablet (81 mg) by mouth once daily.      CALCIUM ORAL Calcium TABS   Refills: 0       Active      cholecalciferol (Vitamin D-3) 50 mcg (2,000 unit) capsule Take by mouth once daily.      cyanocobalamin, vitamin B-12, 2,500 mcg tablet, sublingual Place under the tongue once daily.      metoprolol succinate XL (Toprol-XL) 50 mg 24 hr tablet Take  1 tablet (50 mg) by mouth once daily. 90 tablet 3    pyridoxine (Vitamin B-6) 100 mg tablet Take 1 tablet (100 mg) by mouth once daily. 90 tablet 3     No current facility-administered medications for this visit.         PAST HISTORY     PAST MEDICAL HISTORY  - SVT - woff parkinson white - previous ablation     PAST SURGICAL HISTORY  Past Surgical History:   Procedure Laterality Date    BREAST SURGERY      augmentation    CARDIAC ELECTROPHYSIOLOGY STUDY AND ABLATION      CHOLECYSTECTOMY  10/15/2015    Cholecystectomy Laparoscopic       IMMUNIZATION HISTORY  Immunization History   Administered Date(s) Administered    Flu vaccine (IIV4), preservative free *Check age/dose* 10/25/2021    Influenza, seasonal, injectable 10/25/2021    Pfizer Purple Cap SARS-CoV-2 2021, 04/15/2021    Tdap vaccine, age 7 year and older (BOOSTRIX, ADACEL) 2022       SOCIAL HISTORY  Smokin- 70 1-1.5ppd ~ 45-65 pack years   Alcohol: none   Illicit drugs: none      OCCUPATIONAL/ENVIRONMENTAL HISTORY  Retired - previously worked at a bank, doctors office,      FAMILY HISTORY  Sister with COPD     RESULTS/DATA     Pulmonary Function Test Results     None on record     Chest Radiograph     CHEST 2 VIEW 2021  Impression  No acute cardiopulmonary process.      No orders to display        Chest CT Scan     CT lung screening low dose 2024  Impression  1. Underlying emphysema and generalized airway thickening. Airways  disease which is essentially localized to the right middle lobe and  lingula, compatible with non tuberculous mycobacterial infection. If  this is not a known diagnosis, advise correlation with sputum  analysis. Low-dose CT follow-up advised in 6 months to reassess.  2. Estimated coronary artery calcium score is 194*.  3. Bilateral mastectomies with implant reconstruction. Both implants  have peripheral coarse calcification.         Echocardiogram     Echo:   21 - The left ventricular systolic  "function is normal with a 65-70% estimated ejection fraction.2. RVSP within normal limits. RA normal size, RV normal size and function     4/21/23 - Left ventricular systolic function is normal with a 60-65% estimated ejection fraction.  2. Spectral Doppler shows an impaired relaxation pattern of left ventricular diastolic filling. RA normal size, RV normal size and function      Other testing/ Labs      Eosinophils Absolute (x10E9/L)   Date Value   07/02/2021 0.00     No results found for: \"IGE\"    REVIEW OF SYSTEMS     REVIEW OF SYSTEMS  Review of Systems- negative except what's noted in HPI       PHYSICAL EXAM     VITAL SIGNS: There were no vitals taken for this visit.     CURRENT WEIGHT: [unfilled]  BMI: [unfilled]  PREVIOUS WEIGHTS:  Wt Readings from Last 3 Encounters:   05/13/24 (!) 43.2 kg (95 lb 4 oz)   02/15/24 (!) 44.9 kg (99 lb)   11/02/23 (!) 43.5 kg (96 lb)       Physical Exam - virtual visit     ASSESSMENT/PLAN     Patient's visit was converted to a virtual visit.    1. Concern for atypical infection:   - Plan for bronchoscopy with biopsy   - Lab work prior to procedure - already completed   - Not on any anticoagulation     I explained the procedure to the patient. We discussed that the bronchoscopy will be performed by a member of the Interventional Pulmonary Team; depending on scheduling and provider availability. We also discussed that the IP providers function as a team and not infrequently may have to fill in for one another if there are emergent issues that need attention at the same time. The patient / family expressed understanding and agreed to proceed. All questions were answered.     Patient's visit was converted to a virtual visit. Spoke with the patient via phone for 12 minutes.    Prep: 10 minutes  Phone/Video: 12 minutes  Total: 22 minutes            " Warm

## 2024-09-11 NOTE — PHYSICAL THERAPY INITIAL EVALUATION ADULT - THERAPY FREQUENCY, PT EVAL
[FreeTextEntry1] : [place]:  Gross Motor  14-2     Fine Motor     Psychosocial      15-3  Language 16-2 5-7x/week

## 2025-01-09 NOTE — DISCHARGE NOTE PROVIDER - NSDCMRMEDTOKEN_GEN_ALL_CORE_FT
Dr Jeremiah unger.  Labs and BP's reviewed with MD. FHT's reactive. Pt continues to feel fetal movements. No contractions.  Discharge orders received.    acetaminophen 325 mg oral tablet: 2 tab(s) orally every 6 hours As needed Mild Pain (1 - 3)  albuterol 90 mcg/inh inhalation aerosol: 2 puff(s) inhaled every 6 hours  cefepime 1 g intravenous injection: 1 gram(s) intravenous every 24 hours  citalopram 20 mg oral tablet: 1 tab(s) orally once a day  Decara 1250 mcg (50,000 intl units) oral capsule: 1 cap(s) orally every 2 weeks  glycopyrrolate 0.2 mg/mL injectable solution: injectable every 8 hours as needed for  shortness of breath and/or wheezing  guaiFENesin 100 mg/5 mL oral liquid: 5 milliliter(s) orally every 6 hours As needed Cough  latanoprost 0.005% ophthalmic solution: 1 drop(s) in each eye once a day (at bedtime)  Melatonin 3 mg oral tablet: 1 tab(s) orally once a day (at bedtime) as needed for  midodrine 2.5 mg oral tablet: 1 tab(s) orally once a day  morphine: 1 milligram(s) intravenous every 4 hours as needed for  severe pain  predniSONE 20 mg oral tablet: 1 tab(s) orally once a day  timolol 0.5% ophthalmic solution: 1 drop(s) in each eye once a day  tiotropium 2.5 mcg/inh inhalation aerosol: 2 puff(s) inhaled once a day

## 2025-02-24 NOTE — PATIENT PROFILE ADULT - FALL HARM RISK - TYPE OF ASSESSMENT

## 2025-03-20 NOTE — DIETITIAN NUTRITION RISK NOTIFICATION - PHYSICAL ASSESSMENT CLAVICLES
Anesthesia Post-op Note    Patient: Aurora Ying  Procedure(s) Performed: Right Reverse Total Shoulder Arthroplasty (Right: Shoulder)  Anesthesia type: General    Vitals Value Taken Time   Temp 36.6 °C (97.8 °F) 03/20/25 1330   Pulse 65 03/20/25 1500   Resp 16 03/20/25 1500   SpO2 91 % 03/20/25 1500   /76 03/20/25 1500         Patient Location: Phase II  Post-op Vital Signs:stable  Level of Consciousness: awake, alert and participates in exam  Respiratory Status: spontaneous ventilation and unassisted  Cardiovascular stable and blood pressure returned to baseline  Hydration: euvolemic  Pain Management: adequately controlled and well controlled  Handoff: Handoff to receiving clinician was performed and questions were answered  Vomiting: none  Nausea: None  Airway Patency:patent  Post-op Assessment: awake, alert, appropriately conversant, or baseline, no complications, patient tolerated procedure well, no evidence of recall, dentition, mouth, tongue, and oropharynx unchanged , moving all extremities and No Corneal Abrasion      No notable events documented.                      
severe